# Patient Record
Sex: MALE | Race: WHITE | NOT HISPANIC OR LATINO | Employment: OTHER | URBAN - METROPOLITAN AREA
[De-identification: names, ages, dates, MRNs, and addresses within clinical notes are randomized per-mention and may not be internally consistent; named-entity substitution may affect disease eponyms.]

---

## 2017-06-12 ENCOUNTER — RECORDS - HEALTHEAST (OUTPATIENT)
Dept: LAB | Facility: CLINIC | Age: 67
End: 2017-06-12

## 2017-06-12 LAB
CHOLEST SERPL-MCNC: 153 MG/DL
FASTING STATUS PATIENT QL REPORTED: YES
HDLC SERPL-MCNC: 41 MG/DL
LDLC SERPL CALC-MCNC: 98 MG/DL
PSA SERPL-MCNC: 1.1 NG/ML (ref 0–4.5)
TRIGL SERPL-MCNC: 71 MG/DL

## 2017-10-15 ASSESSMENT — MIFFLIN-ST. JEOR: SCORE: 1612.22

## 2017-10-17 ENCOUNTER — SURGERY - HEALTHEAST (OUTPATIENT)
Dept: CARDIOLOGY | Facility: CLINIC | Age: 67
End: 2017-10-17

## 2017-10-17 ASSESSMENT — MIFFLIN-ST. JEOR: SCORE: 1604.5

## 2017-10-18 ASSESSMENT — MIFFLIN-ST. JEOR: SCORE: 1600.88

## 2017-12-06 ENCOUNTER — RECORDS - HEALTHEAST (OUTPATIENT)
Dept: ADMINISTRATIVE | Facility: OTHER | Age: 67
End: 2017-12-06

## 2017-12-06 LAB
LAB AP CHARGES (HE HISTORICAL CONVERSION): NORMAL
PATH REPORT.COMMENTS IMP SPEC: NORMAL
PATH REPORT.FINAL DX SPEC: NORMAL
PATH REPORT.GROSS SPEC: NORMAL
PATH REPORT.MICROSCOPIC SPEC OTHER STN: NORMAL
PATH REPORT.RELEVANT HX SPEC: NORMAL
RESULT FLAG (HE HISTORICAL CONVERSION): NORMAL

## 2018-12-29 ASSESSMENT — MIFFLIN-ST. JEOR: SCORE: 1661.2

## 2018-12-30 ASSESSMENT — MIFFLIN-ST. JEOR: SCORE: 1668.91

## 2018-12-31 ENCOUNTER — SURGERY - HEALTHEAST (OUTPATIENT)
Dept: CARDIOLOGY | Facility: CLINIC | Age: 68
End: 2018-12-31

## 2019-01-01 ASSESSMENT — MIFFLIN-ST. JEOR: SCORE: 1641.69

## 2019-01-07 ENCOUNTER — AMBULATORY - HEALTHEAST (OUTPATIENT)
Dept: CARDIOLOGY | Facility: CLINIC | Age: 69
End: 2019-01-07

## 2019-01-07 DIAGNOSIS — Z95.0 CARDIAC PACEMAKER IN SITU: ICD-10-CM

## 2019-01-07 LAB
HCC DEVICE COMMENTS: NORMAL
HCC DEVICE IMPLANTING PROVIDER: NORMAL
HCC DEVICE MANUFACTURE: NORMAL
HCC DEVICE MODEL: NORMAL
HCC DEVICE SERIAL NUMBER: NORMAL
HCC DEVICE TYPE: NORMAL

## 2019-01-07 ASSESSMENT — MIFFLIN-ST. JEOR: SCORE: 1641.69

## 2019-01-10 ENCOUNTER — COMMUNICATION - HEALTHEAST (OUTPATIENT)
Dept: CARDIOLOGY | Facility: CLINIC | Age: 69
End: 2019-01-10

## 2019-01-11 ENCOUNTER — COMMUNICATION - HEALTHEAST (OUTPATIENT)
Dept: CARDIOLOGY | Facility: CLINIC | Age: 69
End: 2019-01-11

## 2019-01-11 ENCOUNTER — AMBULATORY - HEALTHEAST (OUTPATIENT)
Dept: CARDIOLOGY | Facility: CLINIC | Age: 69
End: 2019-01-11

## 2019-01-11 DIAGNOSIS — Z95.0 CARDIAC PACEMAKER IN SITU: ICD-10-CM

## 2019-01-14 ENCOUNTER — OFFICE VISIT - HEALTHEAST (OUTPATIENT)
Dept: CARDIOLOGY | Facility: CLINIC | Age: 69
End: 2019-01-14

## 2019-01-14 DIAGNOSIS — I49.5 SSS (SICK SINUS SYNDROME) (H): ICD-10-CM

## 2019-01-14 DIAGNOSIS — I48.0 PAROXYSMAL ATRIAL FIBRILLATION (H): ICD-10-CM

## 2019-01-14 DIAGNOSIS — R00.1 BRADYCARDIA: ICD-10-CM

## 2019-01-14 LAB
ATRIAL RATE - MUSE: 89 BPM
DIASTOLIC BLOOD PRESSURE - MUSE: NORMAL MMHG
HCC DEVICE COMMENTS: NORMAL
HCC DEVICE IMPLANTING PROVIDER: NORMAL
HCC DEVICE MANUFACTURE: NORMAL
HCC DEVICE MODEL: NORMAL
HCC DEVICE SERIAL NUMBER: NORMAL
HCC DEVICE TYPE: NORMAL
INTERPRETATION ECG - MUSE: NORMAL
P AXIS - MUSE: 39 DEGREES
PR INTERVAL - MUSE: 158 MS
QRS DURATION - MUSE: 96 MS
QT - MUSE: 378 MS
QTC - MUSE: 459 MS
R AXIS - MUSE: -36 DEGREES
SYSTOLIC BLOOD PRESSURE - MUSE: NORMAL MMHG
T AXIS - MUSE: -2 DEGREES
VENTRICULAR RATE- MUSE: 89 BPM

## 2019-01-14 ASSESSMENT — MIFFLIN-ST. JEOR: SCORE: 1646.23

## 2019-01-15 ENCOUNTER — COMMUNICATION - HEALTHEAST (OUTPATIENT)
Dept: CARDIOLOGY | Facility: CLINIC | Age: 69
End: 2019-01-15

## 2019-01-15 DIAGNOSIS — I48.91 A-FIB (H): ICD-10-CM

## 2019-01-18 ENCOUNTER — AMBULATORY - HEALTHEAST (OUTPATIENT)
Dept: CARDIOLOGY | Facility: CLINIC | Age: 69
End: 2019-01-18

## 2019-01-18 DIAGNOSIS — Z95.0 CARDIAC PACEMAKER IN SITU: ICD-10-CM

## 2019-01-21 ENCOUNTER — AMBULATORY - HEALTHEAST (OUTPATIENT)
Dept: CARDIOLOGY | Facility: CLINIC | Age: 69
End: 2019-01-21

## 2019-01-21 DIAGNOSIS — Z95.0 CARDIAC PACEMAKER IN SITU: ICD-10-CM

## 2019-01-21 DIAGNOSIS — I49.5 SSS (SICK SINUS SYNDROME) (H): ICD-10-CM

## 2019-01-21 DIAGNOSIS — I48.0 PAROXYSMAL ATRIAL FIBRILLATION (H): ICD-10-CM

## 2019-01-21 DIAGNOSIS — I10 PRIMARY HYPERTENSION: ICD-10-CM

## 2019-01-21 LAB
HCC DEVICE COMMENTS: NORMAL
HCC DEVICE COMMENTS: NORMAL
HCC DEVICE IMPLANTING PROVIDER: NORMAL
HCC DEVICE IMPLANTING PROVIDER: NORMAL
HCC DEVICE MANUFACTURE: NORMAL
HCC DEVICE MANUFACTURE: NORMAL
HCC DEVICE MODEL: NORMAL
HCC DEVICE MODEL: NORMAL
HCC DEVICE SERIAL NUMBER: NORMAL
HCC DEVICE SERIAL NUMBER: NORMAL
HCC DEVICE TYPE: NORMAL
HCC DEVICE TYPE: NORMAL

## 2019-01-21 ASSESSMENT — MIFFLIN-ST. JEOR: SCORE: 1646.23

## 2019-01-23 ENCOUNTER — COMMUNICATION - HEALTHEAST (OUTPATIENT)
Dept: CARDIOLOGY | Facility: CLINIC | Age: 69
End: 2019-01-23

## 2019-01-25 ENCOUNTER — RECORDS - HEALTHEAST (OUTPATIENT)
Dept: LAB | Facility: CLINIC | Age: 69
End: 2019-01-25

## 2019-01-27 LAB — BACTERIA SPEC CULT: NO GROWTH

## 2019-02-04 ENCOUNTER — COMMUNICATION - HEALTHEAST (OUTPATIENT)
Dept: CARDIOLOGY | Facility: CLINIC | Age: 69
End: 2019-02-04

## 2019-02-05 ENCOUNTER — AMBULATORY - HEALTHEAST (OUTPATIENT)
Dept: CARDIOLOGY | Facility: CLINIC | Age: 69
End: 2019-02-05

## 2019-02-05 DIAGNOSIS — Z95.0 CARDIAC PACEMAKER IN SITU: ICD-10-CM

## 2019-02-18 ENCOUNTER — RECORDS - HEALTHEAST (OUTPATIENT)
Dept: ADMINISTRATIVE | Facility: OTHER | Age: 69
End: 2019-02-18

## 2019-02-18 ENCOUNTER — AMBULATORY - HEALTHEAST (OUTPATIENT)
Dept: CARDIOLOGY | Facility: CLINIC | Age: 69
End: 2019-02-18

## 2019-02-21 ENCOUNTER — AMBULATORY - HEALTHEAST (OUTPATIENT)
Dept: CARDIOLOGY | Facility: CLINIC | Age: 69
End: 2019-02-21

## 2019-02-21 DIAGNOSIS — Z95.0 CARDIAC PACEMAKER IN SITU: ICD-10-CM

## 2019-02-21 DIAGNOSIS — E78.2 MIXED HYPERLIPIDEMIA: ICD-10-CM

## 2019-02-21 DIAGNOSIS — I47.10 SVT (SUPRAVENTRICULAR TACHYCARDIA) (H): ICD-10-CM

## 2019-02-21 DIAGNOSIS — I10 PRIMARY HYPERTENSION: ICD-10-CM

## 2019-02-21 DIAGNOSIS — I49.5 SSS (SICK SINUS SYNDROME) (H): ICD-10-CM

## 2019-02-21 DIAGNOSIS — I48.91 A-FIB (H): ICD-10-CM

## 2019-02-21 DIAGNOSIS — I48.0 PAROXYSMAL ATRIAL FIBRILLATION (H): ICD-10-CM

## 2019-02-21 ASSESSMENT — MIFFLIN-ST. JEOR: SCORE: 1644.55

## 2019-03-06 ENCOUNTER — OFFICE VISIT - HEALTHEAST (OUTPATIENT)
Dept: SLEEP MEDICINE | Facility: CLINIC | Age: 69
End: 2019-03-06

## 2019-03-06 DIAGNOSIS — E66.811 CLASS 1 OBESITY DUE TO EXCESS CALORIES WITHOUT SERIOUS COMORBIDITY WITH BODY MASS INDEX (BMI) OF 31.0 TO 31.9 IN ADULT: ICD-10-CM

## 2019-03-06 DIAGNOSIS — E66.09 CLASS 1 OBESITY DUE TO EXCESS CALORIES WITHOUT SERIOUS COMORBIDITY WITH BODY MASS INDEX (BMI) OF 31.0 TO 31.9 IN ADULT: ICD-10-CM

## 2019-03-06 DIAGNOSIS — R06.83 SNORING: ICD-10-CM

## 2019-03-06 DIAGNOSIS — I48.0 PAROXYSMAL ATRIAL FIBRILLATION (H): ICD-10-CM

## 2019-03-06 DIAGNOSIS — G47.10 EXCESSIVE SLEEPINESS: ICD-10-CM

## 2019-03-06 ASSESSMENT — MIFFLIN-ST. JEOR: SCORE: 1644.51

## 2019-03-11 ENCOUNTER — OFFICE VISIT - HEALTHEAST (OUTPATIENT)
Dept: CARDIOLOGY | Facility: CLINIC | Age: 69
End: 2019-03-11

## 2019-03-11 DIAGNOSIS — I10 PRIMARY HYPERTENSION: ICD-10-CM

## 2019-03-11 DIAGNOSIS — I49.5 SSS (SICK SINUS SYNDROME) (H): ICD-10-CM

## 2019-03-11 DIAGNOSIS — I47.10 SVT (SUPRAVENTRICULAR TACHYCARDIA) (H): ICD-10-CM

## 2019-03-11 DIAGNOSIS — G45.9 TIA (TRANSIENT ISCHEMIC ATTACK): ICD-10-CM

## 2019-03-11 DIAGNOSIS — E78.2 MIXED HYPERLIPIDEMIA: ICD-10-CM

## 2019-03-11 DIAGNOSIS — R55 NEAR SYNCOPE: ICD-10-CM

## 2019-03-11 DIAGNOSIS — I48.0 PAROXYSMAL ATRIAL FIBRILLATION (H): ICD-10-CM

## 2019-03-11 DIAGNOSIS — R00.1 BRADYCARDIA: ICD-10-CM

## 2019-03-11 ASSESSMENT — MIFFLIN-ST. JEOR: SCORE: 1642.7

## 2019-03-26 ENCOUNTER — RECORDS - HEALTHEAST (OUTPATIENT)
Dept: ADMINISTRATIVE | Facility: OTHER | Age: 69
End: 2019-03-26

## 2019-03-26 ENCOUNTER — RECORDS - HEALTHEAST (OUTPATIENT)
Dept: SLEEP MEDICINE | Facility: CLINIC | Age: 69
End: 2019-03-26

## 2019-03-26 DIAGNOSIS — R06.83 SNORING: ICD-10-CM

## 2019-03-26 DIAGNOSIS — I48.0 PAROXYSMAL ATRIAL FIBRILLATION (H): ICD-10-CM

## 2019-03-26 DIAGNOSIS — G47.10 HYPERSOMNIA, UNSPECIFIED: ICD-10-CM

## 2019-04-03 ENCOUNTER — OFFICE VISIT - HEALTHEAST (OUTPATIENT)
Dept: SLEEP MEDICINE | Facility: CLINIC | Age: 69
End: 2019-04-03

## 2019-04-03 DIAGNOSIS — G47.33 OSA (OBSTRUCTIVE SLEEP APNEA): ICD-10-CM

## 2019-04-03 ASSESSMENT — MIFFLIN-ST. JEOR: SCORE: 1642.83

## 2019-04-19 ENCOUNTER — COMMUNICATION - HEALTHEAST (OUTPATIENT)
Dept: CARDIOLOGY | Facility: CLINIC | Age: 69
End: 2019-04-19

## 2019-04-20 ENCOUNTER — AMBULATORY - HEALTHEAST (OUTPATIENT)
Dept: CARDIOLOGY | Facility: CLINIC | Age: 69
End: 2019-04-20

## 2019-04-20 DIAGNOSIS — Z95.0 CARDIAC PACEMAKER IN SITU: ICD-10-CM

## 2019-04-26 ENCOUNTER — OFFICE VISIT - HEALTHEAST (OUTPATIENT)
Dept: CARDIOLOGY | Facility: CLINIC | Age: 69
End: 2019-04-26

## 2019-04-26 DIAGNOSIS — R55 NEAR SYNCOPE: ICD-10-CM

## 2019-04-26 DIAGNOSIS — R00.1 BRADYCARDIA: ICD-10-CM

## 2019-04-26 DIAGNOSIS — R07.9 CHEST PAIN, UNSPECIFIED TYPE: ICD-10-CM

## 2019-04-26 DIAGNOSIS — R42 DIZZINESS: ICD-10-CM

## 2019-04-26 DIAGNOSIS — I48.0 PAROXYSMAL ATRIAL FIBRILLATION (H): ICD-10-CM

## 2019-04-26 DIAGNOSIS — I47.10 SVT (SUPRAVENTRICULAR TACHYCARDIA) (H): ICD-10-CM

## 2019-04-26 DIAGNOSIS — K21.9 GASTROESOPHAGEAL REFLUX DISEASE, ESOPHAGITIS PRESENCE NOT SPECIFIED: ICD-10-CM

## 2019-04-26 DIAGNOSIS — R94.39 ABNORMAL STRESS TEST: ICD-10-CM

## 2019-04-26 DIAGNOSIS — G45.9 TIA (TRANSIENT ISCHEMIC ATTACK): ICD-10-CM

## 2019-04-26 DIAGNOSIS — I10 PRIMARY HYPERTENSION: ICD-10-CM

## 2019-04-26 DIAGNOSIS — G47.33 OSA (OBSTRUCTIVE SLEEP APNEA): ICD-10-CM

## 2019-04-26 DIAGNOSIS — I49.5 SSS (SICK SINUS SYNDROME) (H): ICD-10-CM

## 2019-04-26 DIAGNOSIS — Z95.0 CARDIAC PACEMAKER IN SITU: ICD-10-CM

## 2019-04-26 DIAGNOSIS — R41.0 CONFUSION: ICD-10-CM

## 2019-04-26 DIAGNOSIS — E78.2 MIXED HYPERLIPIDEMIA: ICD-10-CM

## 2019-04-26 DIAGNOSIS — R55 SYNCOPE: ICD-10-CM

## 2019-04-26 ASSESSMENT — MIFFLIN-ST. JEOR: SCORE: 1634.9

## 2019-05-25 ENCOUNTER — AMBULATORY - HEALTHEAST (OUTPATIENT)
Dept: CARDIOLOGY | Facility: CLINIC | Age: 69
End: 2019-05-25

## 2019-05-29 ENCOUNTER — OFFICE VISIT - HEALTHEAST (OUTPATIENT)
Dept: CARDIOLOGY | Facility: CLINIC | Age: 69
End: 2019-05-29

## 2019-05-29 DIAGNOSIS — I48.0 PAROXYSMAL ATRIAL FIBRILLATION (H): ICD-10-CM

## 2019-05-29 ASSESSMENT — MIFFLIN-ST. JEOR: SCORE: 1653.04

## 2019-06-22 ENCOUNTER — COMMUNICATION - HEALTHEAST (OUTPATIENT)
Dept: CARDIOLOGY | Facility: CLINIC | Age: 69
End: 2019-06-22

## 2019-06-24 ENCOUNTER — COMMUNICATION - HEALTHEAST (OUTPATIENT)
Dept: CARDIOLOGY | Facility: CLINIC | Age: 69
End: 2019-06-24

## 2019-06-24 DIAGNOSIS — I48.0 PAROXYSMAL ATRIAL FIBRILLATION (H): ICD-10-CM

## 2019-07-01 ENCOUNTER — RECORDS - HEALTHEAST (OUTPATIENT)
Dept: ADMINISTRATIVE | Facility: OTHER | Age: 69
End: 2019-07-01

## 2019-07-08 ENCOUNTER — HOSPITAL ENCOUNTER (OUTPATIENT)
Dept: NEUROLOGY | Facility: HOSPITAL | Age: 69
Discharge: HOME | End: 2019-07-08

## 2019-07-08 DIAGNOSIS — R43.9 DYSOSMIA: ICD-10-CM

## 2019-07-10 ENCOUNTER — OFFICE VISIT - HEALTHEAST (OUTPATIENT)
Dept: CARDIOLOGY | Facility: CLINIC | Age: 69
End: 2019-07-10

## 2019-07-10 DIAGNOSIS — I47.10 SVT (SUPRAVENTRICULAR TACHYCARDIA) (H): ICD-10-CM

## 2019-07-10 DIAGNOSIS — Z95.0 CARDIAC PACEMAKER IN SITU: ICD-10-CM

## 2019-07-10 DIAGNOSIS — I10 PRIMARY HYPERTENSION: ICD-10-CM

## 2019-07-10 DIAGNOSIS — I48.0 PAROXYSMAL ATRIAL FIBRILLATION (H): ICD-10-CM

## 2019-07-10 DIAGNOSIS — G47.33 OSA (OBSTRUCTIVE SLEEP APNEA): ICD-10-CM

## 2019-07-10 DIAGNOSIS — I49.5 SSS (SICK SINUS SYNDROME) (H): ICD-10-CM

## 2019-07-10 ASSESSMENT — MIFFLIN-ST. JEOR: SCORE: 1643.97

## 2019-07-15 ENCOUNTER — AMBULATORY - HEALTHEAST (OUTPATIENT)
Dept: CARDIOLOGY | Facility: CLINIC | Age: 69
End: 2019-07-15

## 2019-07-15 ENCOUNTER — COMMUNICATION - HEALTHEAST (OUTPATIENT)
Dept: CARDIOLOGY | Facility: CLINIC | Age: 69
End: 2019-07-15

## 2019-07-15 ENCOUNTER — SURGERY - HEALTHEAST (OUTPATIENT)
Dept: CARDIOLOGY | Facility: CLINIC | Age: 69
End: 2019-07-15

## 2019-07-15 DIAGNOSIS — I48.0 PAROXYSMAL ATRIAL FIBRILLATION (H): ICD-10-CM

## 2019-07-15 DIAGNOSIS — I42.9 CARDIOMYOPATHY (H): ICD-10-CM

## 2019-07-15 DIAGNOSIS — R93.1 ABNORMAL FINDINGS ON DIAGNOSTIC IMAGING OF HEART AND CORONARY CIRCULATION: ICD-10-CM

## 2019-07-24 ENCOUNTER — COMMUNICATION - HEALTHEAST (OUTPATIENT)
Dept: CARDIOLOGY | Facility: CLINIC | Age: 69
End: 2019-07-24

## 2019-08-14 ENCOUNTER — AMBULATORY - HEALTHEAST (OUTPATIENT)
Dept: CARDIOLOGY | Facility: CLINIC | Age: 69
End: 2019-08-14

## 2019-08-14 DIAGNOSIS — Z95.0 CARDIAC PACEMAKER IN SITU: ICD-10-CM

## 2019-08-15 ENCOUNTER — COMMUNICATION - HEALTHEAST (OUTPATIENT)
Dept: CARDIOLOGY | Facility: CLINIC | Age: 69
End: 2019-08-15

## 2019-09-05 ENCOUNTER — AMBULATORY - HEALTHEAST (OUTPATIENT)
Dept: CARDIOLOGY | Facility: CLINIC | Age: 69
End: 2019-09-05

## 2019-09-05 DIAGNOSIS — Z95.0 CARDIAC PACEMAKER IN SITU: ICD-10-CM

## 2019-12-10 ENCOUNTER — AMBULATORY - HEALTHEAST (OUTPATIENT)
Dept: CARDIOLOGY | Facility: CLINIC | Age: 69
End: 2019-12-10

## 2019-12-10 ENCOUNTER — COMMUNICATION - HEALTHEAST (OUTPATIENT)
Dept: CARDIOLOGY | Facility: CLINIC | Age: 69
End: 2019-12-10

## 2019-12-10 DIAGNOSIS — Z95.0 CARDIAC PACEMAKER IN SITU: ICD-10-CM

## 2019-12-10 DIAGNOSIS — I49.5 SSS (SICK SINUS SYNDROME) (H): ICD-10-CM

## 2020-01-01 ENCOUNTER — COMMUNICATION - HEALTHEAST (OUTPATIENT)
Dept: SCHEDULING | Facility: CLINIC | Age: 70
End: 2020-01-01

## 2020-01-30 ENCOUNTER — RECORDS - HEALTHEAST (OUTPATIENT)
Dept: ADMINISTRATIVE | Facility: OTHER | Age: 70
End: 2020-01-30

## 2020-01-30 ENCOUNTER — AMBULATORY - HEALTHEAST (OUTPATIENT)
Dept: CARDIOLOGY | Facility: CLINIC | Age: 70
End: 2020-01-30

## 2020-02-09 ENCOUNTER — COMMUNICATION - HEALTHEAST (OUTPATIENT)
Dept: CARDIOLOGY | Facility: CLINIC | Age: 70
End: 2020-02-09

## 2020-02-09 DIAGNOSIS — E78.2 MIXED HYPERLIPIDEMIA: ICD-10-CM

## 2020-02-18 ENCOUNTER — COMMUNICATION - HEALTHEAST (OUTPATIENT)
Dept: CARDIOLOGY | Facility: CLINIC | Age: 70
End: 2020-02-18

## 2020-04-13 ENCOUNTER — COMMUNICATION - HEALTHEAST (OUTPATIENT)
Dept: CARDIOLOGY | Facility: CLINIC | Age: 70
End: 2020-04-13

## 2020-04-13 DIAGNOSIS — I48.91 A-FIB (H): ICD-10-CM

## 2020-04-30 ENCOUNTER — AMBULATORY - HEALTHEAST (OUTPATIENT)
Dept: CARDIOLOGY | Facility: CLINIC | Age: 70
End: 2020-04-30

## 2020-04-30 DIAGNOSIS — Z95.0 CARDIAC PACEMAKER IN SITU: ICD-10-CM

## 2020-04-30 DIAGNOSIS — R00.1 BRADYCARDIA: ICD-10-CM

## 2020-05-06 ENCOUNTER — COMMUNICATION - HEALTHEAST (OUTPATIENT)
Dept: CARDIOLOGY | Facility: CLINIC | Age: 70
End: 2020-05-06

## 2020-05-06 ENCOUNTER — AMBULATORY - HEALTHEAST (OUTPATIENT)
Dept: CARDIOLOGY | Facility: CLINIC | Age: 70
End: 2020-05-06

## 2020-05-06 DIAGNOSIS — Z95.0 CARDIAC PACEMAKER IN SITU: ICD-10-CM

## 2020-05-06 DIAGNOSIS — R00.1 BRADYCARDIA: ICD-10-CM

## 2020-05-07 ENCOUNTER — OFFICE VISIT - HEALTHEAST (OUTPATIENT)
Dept: CARDIOLOGY | Facility: CLINIC | Age: 70
End: 2020-05-07

## 2020-05-07 DIAGNOSIS — Z86.79 STATUS POST ABLATION OF ATRIAL FIBRILLATION: ICD-10-CM

## 2020-05-07 DIAGNOSIS — Z95.0 CARDIAC PACEMAKER IN SITU: ICD-10-CM

## 2020-05-07 DIAGNOSIS — Z95.818 PRESENCE OF LEFT ATRIAL APPENDAGE CLOSURE DEVICE COMPOSED OF NICKEL-TITANIUM ALLOY WITH POLYETHYLENE TEREPHTHALATE MEMBRANE: ICD-10-CM

## 2020-05-07 DIAGNOSIS — Z98.890 STATUS POST ABLATION OF ATRIAL FIBRILLATION: ICD-10-CM

## 2020-05-07 DIAGNOSIS — I49.5 SSS (SICK SINUS SYNDROME) (H): ICD-10-CM

## 2020-05-07 DIAGNOSIS — I47.10 SVT (SUPRAVENTRICULAR TACHYCARDIA) (H): ICD-10-CM

## 2020-05-07 DIAGNOSIS — I48.0 PAROXYSMAL ATRIAL FIBRILLATION (H): ICD-10-CM

## 2020-05-07 DIAGNOSIS — G47.33 OSA (OBSTRUCTIVE SLEEP APNEA): ICD-10-CM

## 2020-06-18 ENCOUNTER — AMBULATORY - HEALTHEAST (OUTPATIENT)
Dept: CARDIOLOGY | Facility: CLINIC | Age: 70
End: 2020-06-18

## 2020-06-18 DIAGNOSIS — I47.10 SVT (SUPRAVENTRICULAR TACHYCARDIA) (H): ICD-10-CM

## 2020-06-18 DIAGNOSIS — I48.0 PAROXYSMAL ATRIAL FIBRILLATION (H): ICD-10-CM

## 2020-06-18 DIAGNOSIS — I49.5 SSS (SICK SINUS SYNDROME) (H): ICD-10-CM

## 2020-06-18 DIAGNOSIS — Z95.0 CARDIAC PACEMAKER IN SITU: ICD-10-CM

## 2020-06-22 ENCOUNTER — OFFICE VISIT - HEALTHEAST (OUTPATIENT)
Dept: CARDIOLOGY | Facility: CLINIC | Age: 70
End: 2020-06-22

## 2020-06-22 DIAGNOSIS — Z95.0 CARDIAC PACEMAKER IN SITU: ICD-10-CM

## 2020-06-22 DIAGNOSIS — I48.0 PAROXYSMAL ATRIAL FIBRILLATION (H): ICD-10-CM

## 2020-06-22 DIAGNOSIS — I10 PRIMARY HYPERTENSION: ICD-10-CM

## 2020-06-22 DIAGNOSIS — Z86.79 STATUS POST ABLATION OF ATRIAL FIBRILLATION: ICD-10-CM

## 2020-06-22 DIAGNOSIS — Z98.890 STATUS POST ABLATION OF ATRIAL FIBRILLATION: ICD-10-CM

## 2020-06-22 DIAGNOSIS — G47.33 OSA (OBSTRUCTIVE SLEEP APNEA): ICD-10-CM

## 2020-06-22 DIAGNOSIS — Z95.818 PRESENCE OF LEFT ATRIAL APPENDAGE CLOSURE DEVICE COMPOSED OF NICKEL-TITANIUM ALLOY WITH POLYETHYLENE TEREPHTHALATE MEMBRANE: ICD-10-CM

## 2020-06-22 DIAGNOSIS — I47.10 SVT (SUPRAVENTRICULAR TACHYCARDIA) (H): ICD-10-CM

## 2020-06-22 DIAGNOSIS — I49.5 SSS (SICK SINUS SYNDROME) (H): ICD-10-CM

## 2020-06-22 DIAGNOSIS — E78.2 MIXED HYPERLIPIDEMIA: ICD-10-CM

## 2020-07-08 ENCOUNTER — AMBULATORY - HEALTHEAST (OUTPATIENT)
Dept: CARDIOLOGY | Facility: CLINIC | Age: 70
End: 2020-07-08

## 2020-07-08 ENCOUNTER — COMMUNICATION - HEALTHEAST (OUTPATIENT)
Dept: CARDIOLOGY | Facility: CLINIC | Age: 70
End: 2020-07-08

## 2020-07-08 DIAGNOSIS — Z95.0 CARDIAC PACEMAKER IN SITU: ICD-10-CM

## 2020-07-08 DIAGNOSIS — I49.5 SSS (SICK SINUS SYNDROME) (H): ICD-10-CM

## 2020-07-26 ENCOUNTER — COMMUNICATION - HEALTHEAST (OUTPATIENT)
Dept: CARDIOLOGY | Facility: CLINIC | Age: 70
End: 2020-07-26

## 2020-07-26 DIAGNOSIS — E78.2 MIXED HYPERLIPIDEMIA: ICD-10-CM

## 2020-08-20 ENCOUNTER — COMMUNICATION - HEALTHEAST (OUTPATIENT)
Dept: CARDIOLOGY | Facility: CLINIC | Age: 70
End: 2020-08-20

## 2020-10-02 ENCOUNTER — RECORDS - HEALTHEAST (OUTPATIENT)
Dept: LAB | Facility: CLINIC | Age: 70
End: 2020-10-02

## 2020-10-02 LAB
ALBUMIN SERPL-MCNC: 4.2 G/DL (ref 3.5–5)
ALP SERPL-CCNC: 62 U/L (ref 45–120)
ALT SERPL W P-5'-P-CCNC: 35 U/L (ref 0–45)
ANION GAP SERPL CALCULATED.3IONS-SCNC: 9 MMOL/L (ref 5–18)
AST SERPL W P-5'-P-CCNC: 30 U/L (ref 0–40)
BILIRUB SERPL-MCNC: 0.4 MG/DL (ref 0–1)
BUN SERPL-MCNC: 11 MG/DL (ref 8–22)
CALCIUM SERPL-MCNC: 9.6 MG/DL (ref 8.5–10.5)
CHLORIDE BLD-SCNC: 108 MMOL/L (ref 98–107)
CHOLEST SERPL-MCNC: 134 MG/DL
CO2 SERPL-SCNC: 23 MMOL/L (ref 22–31)
CREAT SERPL-MCNC: 0.83 MG/DL (ref 0.7–1.3)
FASTING STATUS PATIENT QL REPORTED: NO
GFR SERPL CREATININE-BSD FRML MDRD: >60 ML/MIN/1.73M2
GLUCOSE BLD-MCNC: 103 MG/DL (ref 70–125)
HDLC SERPL-MCNC: 33 MG/DL
LDLC SERPL CALC-MCNC: 70 MG/DL
POTASSIUM BLD-SCNC: 4.5 MMOL/L (ref 3.5–5)
PROT SERPL-MCNC: 7.1 G/DL (ref 6–8)
PSA SERPL-MCNC: 0.7 NG/ML (ref 0–4.5)
SODIUM SERPL-SCNC: 140 MMOL/L (ref 136–145)
TRIGL SERPL-MCNC: 156 MG/DL

## 2020-10-09 ENCOUNTER — AMBULATORY - HEALTHEAST (OUTPATIENT)
Dept: CARDIOLOGY | Facility: CLINIC | Age: 70
End: 2020-10-09

## 2020-10-09 DIAGNOSIS — I49.5 SSS (SICK SINUS SYNDROME) (H): ICD-10-CM

## 2020-10-09 DIAGNOSIS — Z95.0 CARDIAC PACEMAKER IN SITU: ICD-10-CM

## 2020-10-29 ENCOUNTER — AMBULATORY - HEALTHEAST (OUTPATIENT)
Dept: CARDIOLOGY | Facility: CLINIC | Age: 70
End: 2020-10-29

## 2020-10-29 ENCOUNTER — RECORDS - HEALTHEAST (OUTPATIENT)
Dept: ADMINISTRATIVE | Facility: OTHER | Age: 70
End: 2020-10-29

## 2020-11-10 ENCOUNTER — HOSPITAL ENCOUNTER (OUTPATIENT)
Dept: RADIOLOGY | Facility: CLINIC | Age: 70
Discharge: HOME | End: 2020-11-10
Attending: ORTHOPAEDIC SURGERY

## 2020-11-10 ENCOUNTER — HOSPITAL ENCOUNTER (OUTPATIENT)
Dept: MRI IMAGING | Facility: CLINIC | Age: 70
Discharge: HOME | End: 2020-11-10
Attending: ORTHOPAEDIC SURGERY

## 2020-11-10 DIAGNOSIS — M25.512 LEFT SHOULDER PAIN: ICD-10-CM

## 2020-11-10 DIAGNOSIS — Z53.09 MRI CONTRAINDICATED DUE TO METAL IMPLANT: ICD-10-CM

## 2021-01-11 ENCOUNTER — AMBULATORY - HEALTHEAST (OUTPATIENT)
Dept: CARDIOLOGY | Facility: CLINIC | Age: 71
End: 2021-01-11

## 2021-01-11 DIAGNOSIS — I49.5 SSS (SICK SINUS SYNDROME) (H): ICD-10-CM

## 2021-01-11 DIAGNOSIS — Z95.0 CARDIAC PACEMAKER IN SITU: ICD-10-CM

## 2021-01-24 ENCOUNTER — COMMUNICATION - HEALTHEAST (OUTPATIENT)
Dept: CARDIOLOGY | Facility: CLINIC | Age: 71
End: 2021-01-24

## 2021-01-24 DIAGNOSIS — E78.2 MIXED HYPERLIPIDEMIA: ICD-10-CM

## 2021-01-25 RX ORDER — ATORVASTATIN CALCIUM 80 MG/1
TABLET, FILM COATED ORAL
Qty: 90 TABLET | Refills: 1 | Status: SHIPPED | OUTPATIENT
Start: 2021-01-25

## 2021-02-08 ENCOUNTER — COMMUNICATION - HEALTHEAST (OUTPATIENT)
Dept: CARDIOLOGY | Facility: CLINIC | Age: 71
End: 2021-02-08

## 2021-02-08 DIAGNOSIS — I10 PRIMARY HYPERTENSION: ICD-10-CM

## 2021-02-18 ENCOUNTER — COMMUNICATION - HEALTHEAST (OUTPATIENT)
Dept: CARDIOLOGY | Facility: CLINIC | Age: 71
End: 2021-02-18

## 2021-02-18 ENCOUNTER — AMBULATORY - HEALTHEAST (OUTPATIENT)
Dept: CARDIOLOGY | Facility: CLINIC | Age: 71
End: 2021-02-18

## 2021-02-18 DIAGNOSIS — I49.5 SSS (SICK SINUS SYNDROME) (H): ICD-10-CM

## 2021-02-18 DIAGNOSIS — Z95.0 CARDIAC PACEMAKER IN SITU: ICD-10-CM

## 2021-03-16 ENCOUNTER — COMMUNICATION - HEALTHEAST (OUTPATIENT)
Dept: CARDIOLOGY | Facility: CLINIC | Age: 71
End: 2021-03-16

## 2021-03-16 DIAGNOSIS — I49.5 SSS (SICK SINUS SYNDROME) (H): ICD-10-CM

## 2021-04-12 ENCOUNTER — AMBULATORY - HEALTHEAST (OUTPATIENT)
Dept: CARDIOLOGY | Facility: CLINIC | Age: 71
End: 2021-04-12

## 2021-04-12 DIAGNOSIS — I49.5 SSS (SICK SINUS SYNDROME) (H): ICD-10-CM

## 2021-04-12 DIAGNOSIS — Z95.0 CARDIAC PACEMAKER IN SITU: ICD-10-CM

## 2021-05-11 ENCOUNTER — RECORDS - HEALTHEAST (OUTPATIENT)
Dept: ADMINISTRATIVE | Facility: OTHER | Age: 71
End: 2021-05-11

## 2021-05-14 ENCOUNTER — AMBULATORY - HEALTHEAST (OUTPATIENT)
Dept: CARDIOLOGY | Facility: CLINIC | Age: 71
End: 2021-05-14

## 2021-05-14 DIAGNOSIS — I25.119 CORONARY ARTERY DISEASE INVOLVING NATIVE CORONARY ARTERY OF NATIVE HEART WITH ANGINA PECTORIS (H): ICD-10-CM

## 2021-05-14 DIAGNOSIS — I48.0 PAROXYSMAL ATRIAL FIBRILLATION (H): ICD-10-CM

## 2021-05-14 DIAGNOSIS — I10 PRIMARY HYPERTENSION: ICD-10-CM

## 2021-05-14 DIAGNOSIS — Z95.818 PRESENCE OF LEFT ATRIAL APPENDAGE CLOSURE DEVICE COMPOSED OF NICKEL-TITANIUM ALLOY WITH POLYETHYLENE TEREPHTHALATE MEMBRANE: ICD-10-CM

## 2021-05-14 DIAGNOSIS — Z95.0 CARDIAC PACEMAKER IN SITU: ICD-10-CM

## 2021-05-14 DIAGNOSIS — Z86.79 STATUS POST ABLATION OF ATRIAL FIBRILLATION: ICD-10-CM

## 2021-05-14 DIAGNOSIS — I42.9 CARDIOMYOPATHY (H): ICD-10-CM

## 2021-05-14 DIAGNOSIS — R00.1 BRADYCARDIA: ICD-10-CM

## 2021-05-14 DIAGNOSIS — I47.10 SVT (SUPRAVENTRICULAR TACHYCARDIA) (H): ICD-10-CM

## 2021-05-14 DIAGNOSIS — I49.5 SSS (SICK SINUS SYNDROME) (H): ICD-10-CM

## 2021-05-14 DIAGNOSIS — Z98.890 STATUS POST ABLATION OF ATRIAL FIBRILLATION: ICD-10-CM

## 2021-05-14 RX ORDER — IRBESARTAN 75 MG/1
37.5 TABLET ORAL DAILY
Qty: 45 TABLET | Refills: 3 | Status: SHIPPED | OUTPATIENT
Start: 2021-05-14 | End: 2022-02-24

## 2021-05-14 RX ORDER — METOPROLOL SUCCINATE 50 MG/1
50 TABLET, EXTENDED RELEASE ORAL DAILY
Qty: 90 TABLET | Refills: 3 | Status: SHIPPED | OUTPATIENT
Start: 2021-05-14 | End: 2022-02-24

## 2021-05-14 ASSESSMENT — MIFFLIN-ST. JEOR: SCORE: 1680.26

## 2021-05-17 ENCOUNTER — AMBULATORY - HEALTHEAST (OUTPATIENT)
Dept: CARDIOLOGY | Facility: CLINIC | Age: 71
End: 2021-05-17

## 2021-05-27 VITALS
WEIGHT: 212 LBS | DIASTOLIC BLOOD PRESSURE: 90 MMHG | HEART RATE: 67 BPM | RESPIRATION RATE: 14 BRPM | BODY MASS INDEX: 33.27 KG/M2 | SYSTOLIC BLOOD PRESSURE: 120 MMHG | HEIGHT: 67 IN

## 2021-05-27 NOTE — PROGRESS NOTES
"Sleep Study Review Visit  He is a 68 y.o. y/o male patient who comes to the sleep medicine clinic for sleep study review.    We had an extensive conversation to review the results of his sleep study.    The overnight polysomnography was completed with a digital sleep system using the international 10-20 electrode placement for recording EEG, EOG, EMG from chin, ECG, respiratory effort, oximetry, body position, airflow, nasal pressure, snoring sound, pulse rate and limb movement channels.  The study was completed as a diagnostic study.    Primary Findings:  Respiratory monitoring showed mild obstructive sleep apnea (AHI=8.5/hr).  Disease was more severe while supine.  The patient spent a total of 6.2 minutes at an oxygen saturation below 88%.     Other Findings:  Sleep Architecture:     Sleep onset latency was normal with Zolpidem 5 mg.    REM onset latency was normal.    All stages of sleep were sampled during the test.    Sleep efficiency was decreased.     Respiration:    Mean hemoglobin oxygen saturation (SaO2) during the diagnostic portion of the PSG in NREM and REM sleep was 91% with a SaO2 desaturation james observed to 85%.    Sleep-related Hypoxia was present as cumulative exposure time in sleep to SaO2 below 88% surpassed 5 total minutes (in this case, 6.2 minutes).     Movements:        Periodic leg movements were observed with high frequency.    The patient had a Periodic Limb Movement (PLM) index of 33.2 and the MELISSA PLM index was 2.3.     Parasomnia:    No activity consistent with parasomnia was observed in the video recording.     Electrocardiogram:    Two-lead ECG showed normal sinus rhythm.    We reviewed the oxygen saturation graph as well as the result tables from the report.    Wife reports he mainly sleeps lateral but sometimes on back (5 - 15%).   He thinks he only sleeps lateral.    Physical Exam:  /82   Pulse 65   Ht 5' 7.5\" (1.715 m)   Wt 202 lb (91.6 kg)   SpO2 97%   BMI 31.17 " kg/m    General appearance: No apparent distress, well groomed.  Head: Normocephalic, atraumatic.  Musculoskeletal: No edema noted.  No clubbing or cyanosis.  Skin: Warm, dry, intact.  Neurologic: Alert and oriented to person, place and time   Gait is normal.  Psychiatric: Affect pleasant.  Mood good.     Labs/Studies:  - We reviewed the results of the overnight PSG as described on the HPI.     Assessment and Plan:  In summary Ashok Del Rosario is a 68 y.o. year old male here for study results.  We had an extensive conversation to review the results of his sleep study and to  him on the importance of treating sleep apnea.  Mild sleep apnea is unlikely to increase risk for atrial fib or heart disease.  Treatment is based on improving symptoms and potential mild improvement in blood pressure for hypertensive patients.  We discussed options including: CPAP, MAD, Surgery, and Positional therapy.  Discussed mainly CPAP vs Zzoma or Night Shift as patient is edentulous and surgery for mild MARCELLA is not recommended.     Patient verbalized understanding of these issues, agrees with the plan and all questions were answered today. Patient was given an opportuntity to voice any other symptoms or concerns not listed above. Patient did not have any other symptoms or concerns.      Azael Espinoza MD  [facility] Board Certified in Internal Medicine and Sleep Medicine  [facility].    We spent a total of 15 minutes of face-to-face encounter and more than 50% of the encounter was used for counseling or coordination of care.

## 2021-05-28 ENCOUNTER — RECORDS - HEALTHEAST (OUTPATIENT)
Dept: ADMINISTRATIVE | Facility: CLINIC | Age: 71
End: 2021-05-28

## 2021-05-28 NOTE — TELEPHONE ENCOUNTER
----- Message from Shaheen Branch sent at 4/19/2019  8:42 AM CDT -----  Contact: Ashok  General phone call:    Caller: Annika  Primary cardiologist: Dr. Hyatt  Detailed reason for call: Patient has been itching and the skin on his fingers are peeling he is wanting to know if this could be because of the flecanide  New or active symptoms? Active  Best phone number: [phone]  Best time to contact: anytime  Ok to leave a detailed message? yes  Device?yes    Additional Info:

## 2021-05-28 NOTE — PROGRESS NOTES
Consultation - [facility]  Ashok Del Rosario,  1950, MRN [id number]    PCP: Omega Veloz MD, [phone]    Assessment and Plan: Acute confusional event with abnormal smell.  Hypertension.  Intermittent right bundle branch block morphology on ECG  Recommendations: Arrange for evaluation by neurology.  In the event that the symptoms were associated with flecainide will reduce dose to 75 mg once daily.    Chief Complaint: Confusion event    HPI:  We have been requested by Dr. Roa to evaluate Ashok Del Rosario for consultation who is a  68 y.o. year old male for above chief complaint.  Hx: 60-year-old man who was recently seen in the ER who noted as follows:68 y.o. male who presents to the Emergency Department for evaluation of dizziness. Patient describes a sharp pain on the back of his head with subsequent dizziness and associated sensation of room spinning. Patient experienced an episode of dizziness and temporary loss of vision at 1:00pm today which prompted his visit to the ED.      At time of signout, awaiting CT CTA.  Images have resulted and are negative for any acute pathology including hemorrhage, CVA.  Patient was quite symptomatic and the episodes were quite concerning, so initial ED provider suggested admission for observation and continued cardiac monitoring.  Pacemaker was interrogated and I spoke with [employer] and there were no events.  When I went to discuss results with patient, he had another wave of dizziness and associated abnormal smell.  At this time I am not sure whether this could be a prodrome of seizure activity, migraine, TIA, though he is anticoagulated, or less likely arrhythmia as his device did not show one.  Given that he still symptomatic, and unfortunately he cannot get an MRI at least in the ED due to his pacemaker, I discussed the case with the hospitalist and we will admit for observation.     Update: Patient call me back into the room to  state that he thought about our conversation and unfortunately still wishes to leave AGAINST MEDICAL ADVICE.  We discussed the risks including that of a cardiac event, neurologic event, seizure, permanent disability and death.  He unfortunately is too concerned about the financial implications of observation status so he is signing out AMA at this time.        Current Outpatient Medications:      acetaminophen (TYLENOL) 500 MG tablet, Take 1-2 tablets (500-1,000 mg total) by mouth every 4 (four) hours as needed., Disp: , Rfl: 0     atorvastatin (LIPITOR) 80 MG tablet, Take 1 tablet (80 mg total) by mouth at bedtime., Disp: 90 tablet, Rfl: 3     flecainide (TAMBOCOR) 150 MG tablet, Take 0.5 tablets (75 mg total) by mouth every 12 (twelve) hours., Disp: 90 tablet, Rfl: 3     irbesartan (AVAPRO) 150 MG tablet, Take 150 mg by mouth daily., Disp: , Rfl:      metoprolol succinate (TOPROL-XL) 50 MG 24 hr tablet, Take 1 tablet (50 mg total) by mouth daily., Disp: 30 tablet, Rfl: 5     metroNIDAZOLE (METROCREAM) 0.75 % cream, Apply 1 application topically 2 (two) times a day as needed (roseacea). For Rosacea - apply to nose and right side of face    , Disp: , Rfl:      omeprazole (PRILOSEC) 40 MG capsule, Take 40 mg by mouth daily before breakfast., Disp: , Rfl:      rivaroxaban 20 mg Tab, Take 1 tablet (20 mg total) by mouth daily with supper., Disp: 90 tablet, Rfl: 3  Medical History  Active Ambulatory (Non-Hospital) Problems    Diagnosis     MARCELLA (obstructive sleep apnea)     SVT (supraventricular tachycardia) (H)     Syncope     Near syncope     Paroxysmal atrial fibrillation (H)     Cardiac pacemaker in situ, dual chamber     SSS (sick sinus syndrome) (H)     TIA (transient ischemic attack)     Dizziness     Bradycardia     Primary hypertension     Mixed hyperlipidemia     Gastroesophageal reflux disease, esophagitis presence not specified     Abnormal stress test     Chest pain     Past Medical History:   Diagnosis Date      Coronary artery disease      Family history of myocardial infarction      GERD (gastroesophageal reflux disease)      High cholesterol      Hypertension        Surgical History  He  has a past surgical history that includes Coronary Angiogram (N/A, 10/17/2017); Left Heart Catheterization with Left Ventriculogram (N/A, 10/17/2017); EP Pacemaker Insertion (Left, 12/31/2018); and Abdominal surgery (2000).    Social History  Reviewed, and he  reports that he quit smoking about 19 years ago. He has never used smokeless tobacco. He reports that he drinks about 0.6 oz of alcohol per week. He reports that he does not use drugs.  Smoking status reviewed.  Social history othrwise not contributory to HPI.  Allergies  Allergies   Allergen Reactions     Cephalexin Rash       Family History  Reviewed, and family history includes Cancer in his father and mother; Colon cancer in his sister; Coronary artery disease in his brother; Heart attack in his brother; Rectal cancer in his sister; Valvular heart disease in his brother.  Extended Emergency Contact Information  Primary Emergency Contact: CarinEvelyne   [place]  Mobile Phone: [phone]  Relation: Spouse  Secondary Emergency Contact: Carin Lisseth   [place]  Mobile Phone: [phone]  Relation: Child  Family history otherwise negative or not conributory to HPI.    Psychosocial Needs  Social History     Social History Narrative     Not on file     Additional psychosocial needs reviewed per nursing assessment.    Prior to Admission Medications    (Not in a hospital admission)    Review of Systems:  A 12 point comprehensive review of systems was negative except as noted.  Review of systems is negative except for HPI  Physical Exam:  Less than 10 mL of urine  Vitals:    04/26/19 0933   BP: (!) 160/104   Pulse: (!) 56   Resp: 16     Head and neck without focal cranial neurologic defects.  JVD not distended.  Carotid upstroke normal  without bruit.  External eye exam normal without icterus.  External ear exam normal.  Neck without cervical lymphadenopathy or thyromegaly.  Heart: regular rate and rhythm, S1, S2 normal, no murmur, click, rub or gallop   Abdomen with normal bowel tones.  Skin without rash, ecchymosis, lesions.  Neuromuscular tone normal.  Peripheral pulse intact and equal.  Joints without swelling or erythema.      [unfilled]    Pertinent Labs  Lab Results: personally reviewed.   Admission on 04/20/2019, Discharged on 04/20/2019   Component Date Value     SYSTOLIC BLOOD PRESSURE 04/20/2019 172      DIASTOLIC BLOOD PRESSURE 04/20/2019 103      VENTRICULAR RATE 04/20/2019 92      ATRIAL RATE 04/20/2019 92      P-R INTERVAL 04/20/2019 176      QRS DURATION 04/20/2019 144      Q-T INTERVAL 04/20/2019 384      QTC CALCULATION (BEZET) 04/20/2019 474      P Axis 04/20/2019 61      R AXIS 04/20/2019 -43      T AXIS 04/20/2019 34      MUSE DIAGNOSIS 04/20/2019                      Value:Normal sinus rhythm  Left axis deviation  Right bundle branch block  Minimal voltage criteria for LVH, may be normal variant  Abnormal ECG  When compared with ECG of 18-JAN-2019 09:48,  Right bundle branch block is now Present  Confirmed by DIMITRI GONZALEZ MD LOC: ([id number]) on 4/20/2019 2:21:30 PM       Sodium 04/20/2019 137      Potassium 04/20/2019 4.1      Chloride 04/20/2019 108*     CO2 04/20/2019 18*     Anion Gap, Calculation 04/20/2019 11      Glucose 04/20/2019 171*     Calcium 04/20/2019 9.8      BUN 04/20/2019 11      Creatinine 04/20/2019 0.87      GFR MDRD Af Amer 04/20/2019 >60      GFR MDRD Non Af Amer 04/20/2019 >60      Troponin I 04/20/2019 <0.01      INR 04/20/2019 1.13*     WBC 04/20/2019 11.7*     RBC 04/20/2019 4.71      Hemoglobin 04/20/2019 14.5      Hematocrit 04/20/2019 41.8      MCV 04/20/2019 89      MCH 04/20/2019 30.8      MCHC 04/20/2019 34.7      RDW 04/20/2019 12.9      Platelets 04/20/2019 290      MPV 04/20/2019 9.3       Neutrophils % 04/20/2019 81*     Lymphocytes % 04/20/2019 15*     Monocytes % 04/20/2019 3      Eosinophils % 04/20/2019 0      Basophils % 04/20/2019 0      Neutrophils Absolute 04/20/2019 9.5*     Lymphocytes Absolute 04/20/2019 1.8      Monocytes Absolute 04/20/2019 0.4      Eosinophils Absolute 04/20/2019 0.0      Basophils Absolute 04/20/2019 0.0    Device Check on 04/20/2019   Component Date Value     Device Manufacture 04/20/2019 [employer]      Device Model 04/20/2019 [id number] [place] XT DR MRI      Device Serial Number 04/20/2019 [id number]      Device Type 04/20/2019 Pacemaker      Device Implanting Provid* 04/20/2019 ANTHONY Junior      Comments/Summary 04/20/2019                      Value:Type: [employer] remote pacemaker transmission done at [facility].  ZOHAIB transmission due to:  Dizziness  Presenting Rhythm/Rate: Normal Sinus in the 70s  Percentage Paced:  AS/VS - 95%  AP/VS - 4%  Battery status: 15 years remaining   Lead trends within expected range. Capture Thresholds within safety margin.  Arrhythmia history:  0 detected since device was last checked.  Device functioning as programmed.  Date of last device interrogation:  2/21/19  Katie Kan.     RN Device Check on 02/21/2019   Component Date Value     Device Manufacture 02/21/2019 [employer]      Device Model 02/21/2019 [id number] [place] XT DR MRI      Device Serial Number 02/21/2019 [id number]      Device Type 02/21/2019 Pacemaker      Device Implanting Provid* 02/21/2019 ANTHONY Junior      Comments/Summary 02/21/2019                      Value:Type: In clinic pacemaker check. Seen with Suzan Waddell RN CNP.   Presenting Rhythm: ASVS 82bpm  Lead/Battery status: Stable  Arrhythmias: No mode switches. 2 high ventricular rate detections. Review of EGMS show PSVT. Most recent and longest occurred on 2/10/2019 90a 5sec duration of PSVT with rates up to 200bpm.  Anticoagulant: Xarelto.   Comments: Normal device  function. Reprogrammed RA output from 2X to 1.5X with 1.5V safety margin, RV output from 2X to 1.5X with 1.5V safety margin, and PMT turned on.  BK     Device Check on 02/05/2019   Component Date Value     Device Manufacture 02/05/2019 [employer]      Device Model 02/05/2019 [id number] [place] XT DR MRI      Device Serial Number 02/05/2019 [id number]      Device Type 02/05/2019 Pacemaker      Device Implanting Provid* 02/05/2019 ANTHONY Junior      Comments/Summary 02/05/2019                      Value:Type: routine one month PO pacemaker remote transmission.   Presenting rhythm: sinus 80 bpm.  Battery/lead status: stable.  Arrhythmias: since 1/21/19; one ventricular high rate episode, appears to be PSVT. No mode switch episodes detected.  Comments: Normal magnet and pacemaker function. AMBREEN     Lab Requisition on 01/25/2019   Component Date Value     Culture 01/25/2019 No Growth    RN Device Check on 01/21/2019   Component Date Value     Device Manufacture 01/21/2019 [employer]      Device Model 01/21/2019 [id number] Kayli XT DR MRI      Device Serial Number 01/21/2019 [id number]      Device Type 01/21/2019 Pacemaker      Device Implanting Provid* 01/21/2019 ANTHONY Junior      Comments/Summary 01/21/2019                      Value:Type: In-clinic IPG check and initial appointment with Suzan Waddell RN, CNP in follow-up of atrial fibrillation (AF) with rapid ventricular response (RVR).  Presenting Rhythm: Sinus Rhythm at 63 bpm.  Lead/Battery status: Stable lead and battery measurements.  Arrhythmias: None since 01/18/19 in-hospital check  Anticoagulant: rivaroxaban  Comments: Normal device function; no programming changes made. Ms. Waddell provided with today's device findings. SMW     Admission on 01/18/2019, Discharged on 01/19/2019   Component Date Value     PTT 01/18/2019 31      INR 01/18/2019 1.24*     Troponin I 01/18/2019 <0.01      Magnesium 01/18/2019 2.1      Sodium 01/18/2019 138       Potassium 01/18/2019 4.0      Chloride 01/18/2019 107      CO2 01/18/2019 20*     Anion Gap, Calculation 01/18/2019 11      Glucose 01/18/2019 129*     BUN 01/18/2019 12      Creatinine 01/18/2019 0.92      GFR MDRD Af Amer 01/18/2019 >60      GFR MDRD Non Af Amer 01/18/2019 >60      Bilirubin, Total 01/18/2019 0.7      Calcium 01/18/2019 9.8      Protein, Total 01/18/2019 7.2      Albumin 01/18/2019 3.9      Alkaline Phosphatase 01/18/2019 51      AST 01/18/2019 33      ALT 01/18/2019 54*     BNP 01/18/2019 <10      WBC 01/18/2019 7.4      RBC 01/18/2019 4.81      Hemoglobin 01/18/2019 14.9      Hematocrit 01/18/2019 42.9      MCV 01/18/2019 89      MCH 01/18/2019 31.0      MCHC 01/18/2019 34.7      RDW 01/18/2019 11.9      Platelets 01/18/2019 286      MPV 01/18/2019 9.1      Neutrophils % 01/18/2019 48*     Lymphocytes % 01/18/2019 38      Monocytes % 01/18/2019 9      Eosinophils % 01/18/2019 4      Basophils % 01/18/2019 1      Neutrophils Absolute 01/18/2019 3.5      Lymphocytes Absolute 01/18/2019 2.8      Monocytes Absolute 01/18/2019 0.7      Eosinophils Absolute 01/18/2019 0.3      Basophils Absolute 01/18/2019 0.1      VENTRICULAR RATE 01/18/2019 103      ATRIAL RATE 01/18/2019 103      P-R INTERVAL 01/18/2019 168      QRS DURATION 01/18/2019 102      Q-T INTERVAL 01/18/2019 360      QTC CALCULATION (BEZET) 01/18/2019 471      P Axis 01/18/2019 22      R AXIS 01/18/2019 -33      T AXIS 01/18/2019 50      MUSE DIAGNOSIS 01/18/2019                      Value:Sinus tachycardia  Left axis deviation  Abnormal ECG  When compared with ECG of 14-JAN-2019 13:59,  T wave inversion no longer evident in Inferior leads  Confirmed by JUAN ANTONIO FLORES MD LOC:WW ([id number]) on 1/18/2019 10:57:32 AM       Troponin I 01/18/2019 0.03      Troponin I 01/18/2019 0.05      Troponin I 01/19/2019 0.04    Device Check on 01/18/2019   Component Date Value     Device Manufacture 01/18/2019 [employer]      Device Model 01/18/2019  W1DR01 Kayli XT DR MRI      Device Serial Number 01/18/2019 [id number]      Device Type 01/18/2019 Pacemaker      Device Implanting Provid* 01/18/2019 ANTHONY Junior      Comments/Summary 01/18/2019                      Value:Type: [employer] pacemaker remote transmission done at [facility].   Presenting rhythm: Atrial pacing w/PACâ  s and ventricular pacing at 60bpm.  Percentage Paced: 75% atrial pacing/76% ventricular pacing.   Additional Device details;Battery status: OK (5 years longevity remaining). Lead trends within expected range.  Capture Thresholds within safety limits.   Arrhythmia history: 15 mode switch episodes noted. AF burden approx. 2%. Most recent episode noted on Jan 18, â 19.  Heart Failure details: OptiVol status: Not available.  Other: AF  Impression: Normal device operation.  Report created by [employer] Pete lamas. AMBREEN     Office Visit on 01/14/2019   Component Date Value     VENTRICULAR RATE 01/14/2019 89      ATRIAL RATE 01/14/2019 89      P-R INTERVAL 01/14/2019 158      QRS DURATION 01/14/2019 96      Q-T INTERVAL 01/14/2019 378      QTC CALCULATION (BEZET) 01/14/2019 459      P Axis 01/14/2019 39      R AXIS 01/14/2019 -36      T AXIS 01/14/2019 -2      MUSE DIAGNOSIS 01/14/2019                      Value:Normal sinus rhythm  Left axis deviation  Incomplete right bundle branch block  Minimal voltage criteria for LVH, may be normal variant  Abnormal ECG  When compared with ECG of 11-JAN-2019 18:43,  Inverted T waves have replaced nonspecific T wave abnormality in Inferior leads  Confirmed by JACKIE RESENDEZ, [facility] LOC:DANICA ([id number]) on 1/14/2019 3:56:11 PM     Admission on 01/11/2019, Discharged on 01/11/2019   Component Date Value     SYSTOLIC BLOOD PRESSURE 01/11/2019 126      DIASTOLIC BLOOD PRESSURE 01/11/2019 85      VENTRICULAR RATE 01/11/2019 141      ATRIAL RATE 01/11/2019 115      QRS DURATION 01/11/2019 100      Q-T INTERVAL 01/11/2019 310      QTC CALCULATION (BEZET)  01/11/2019 474      R AXIS 01/11/2019 -37      T AXIS 01/11/2019 66      MUSE DIAGNOSIS 01/11/2019                      Value:Atrial fibrillation with rapid ventricular response  Left axis deviation  Pulmonary disease pattern  Incomplete right bundle branch block  Moderate voltage criteria for LVH, may be normal variant  Nonspecific ST abnormality  Abnormal ECG  When compared with ECG of 30-DEC-2018 10:24,  Atrial fibrillation has replaced Sinus rhythm  Vent. rate has increased BY  57 BPM  Nonspecific T wave abnormality no longer evident in Inferior leads  Confirmed by JUAN ANTONIO FLORES MD LOC: ([id number]) on 1/12/2019 5:46:18 PM       Sodium 01/11/2019 141      Potassium 01/11/2019 3.9      Chloride 01/11/2019 109*     CO2 01/11/2019 22      Anion Gap, Calculation 01/11/2019 10      Glucose 01/11/2019 131*     Calcium 01/11/2019 10.2      BUN 01/11/2019 14      Creatinine 01/11/2019 0.93      GFR MDRD Af Amer 01/11/2019 >60      GFR MDRD Non Af Amer 01/11/2019 >60      BNP 01/11/2019 11      Troponin I 01/11/2019 <0.01      WBC 01/11/2019 9.2      RBC 01/11/2019 5.06      Hemoglobin 01/11/2019 15.8      Hematocrit 01/11/2019 45.5      MCV 01/11/2019 90      MCH 01/11/2019 31.2      MCHC 01/11/2019 34.7      RDW 01/11/2019 12.4      Platelets 01/11/2019 321      MPV 01/11/2019 9.9      Neutrophils % 01/11/2019 54      Lymphocytes % 01/11/2019 35      Monocytes % 01/11/2019 7      Eosinophils % 01/11/2019 4      Basophils % 01/11/2019 1      Neutrophils Absolute 01/11/2019 4.9      Lymphocytes Absolute 01/11/2019 3.2      Monocytes Absolute 01/11/2019 0.6      Eosinophils Absolute 01/11/2019 0.4      Basophils Absolute 01/11/2019 0.1      SYSTOLIC BLOOD PRESSURE 01/11/2019 142      DIASTOLIC BLOOD PRESSURE 01/11/2019 102      VENTRICULAR RATE 01/11/2019 105      ATRIAL RATE 01/11/2019 105      P-R INTERVAL 01/11/2019 160      QRS DURATION 01/11/2019 98      Q-T INTERVAL 01/11/2019 354      QTC CALCULATION (ARUN)  01/11/2019 467      P Axis 01/11/2019 43      R AXIS 01/11/2019 -40      T AXIS 01/11/2019 32      MUSE DIAGNOSIS 01/11/2019                      Value:Sinus tachycardia  Left axis deviation  Pulmonary disease pattern  Incomplete right bundle branch block  Minimal voltage criteria for LVH, may be normal variant  Abnormal ECG  When compared with ECG of 11-JAN-2019 16:37,  Sinus rhythm has replaced Atrial fibrillation  Confirmed by JUAN ANTONIO FLORES MD LOC: ([id number]) on 1/12/2019 5:49:59 PM     There may be more visits with results that are not included.       Pertinent Radiology  Radiology Results: See Report  EKG Results: personally reviewed.  and See Report     Current Outpatient Medications:      acetaminophen (TYLENOL) 500 MG tablet, Take 1-2 tablets (500-1,000 mg total) by mouth every 4 (four) hours as needed., Disp: , Rfl: 0     atorvastatin (LIPITOR) 80 MG tablet, Take 1 tablet (80 mg total) by mouth at bedtime., Disp: 90 tablet, Rfl: 3     flecainide (TAMBOCOR) 150 MG tablet, Take 0.5 tablets (75 mg total) by mouth every 12 (twelve) hours., Disp: 90 tablet, Rfl: 3     irbesartan (AVAPRO) 150 MG tablet, Take 150 mg by mouth daily., Disp: , Rfl:      metoprolol succinate (TOPROL-XL) 50 MG 24 hr tablet, Take 1 tablet (50 mg total) by mouth daily., Disp: 30 tablet, Rfl: 5     metroNIDAZOLE (METROCREAM) 0.75 % cream, Apply 1 application topically 2 (two) times a day as needed (roseacea). For Rosacea - apply to nose and right side of face    , Disp: , Rfl:      omeprazole (PRILOSEC) 40 MG capsule, Take 40 mg by mouth daily before breakfast., Disp: , Rfl:      rivaroxaban 20 mg Tab, Take 1 tablet (20 mg total) by mouth daily with supper., Disp: 90 tablet, Rfl: 3

## 2021-05-28 NOTE — TELEPHONE ENCOUNTER
Return call made to patient to discuss concerns. Patient reports he noticed this morning that the skin between his fingers is itchy and peeling. Patient wonders if it could be from the flecainide.     Referenced Micromedex and discussed with Suzan Waddell who reports that this is unlikely related to flecainide. Especially since the symptoms have just occurred and patient has been on flecainide for several months. Patient was instructed to contact PCP to investigate other causes. Patient agreeable and also requested to schedule f/u with Dr. Hyatt. Patient was transferred to scheduling to arrange f/u. -[email or web address]

## 2021-05-29 NOTE — TELEPHONE ENCOUNTER
Pt called to report a small blood clot coming out of his nose. He takes Xarelto for afib. Nose not actively bleeding. Advised to monitor. If nose starts to actively bleed and they are unable to get it to stop, then report to the ER.    Follow up as planned.  Altaf Jacinto MD  Non-invasive Cardiology  [facility]

## 2021-05-29 NOTE — PROGRESS NOTES
[facility]   Outpatient Follow-up evaluation.     Current Outpatient Medications:      acetaminophen (TYLENOL) 500 MG tablet, Take 1-2 tablets (500-1,000 mg total) by mouth every 4 (four) hours as needed., Disp: , Rfl: 0     atorvastatin (LIPITOR) 80 MG tablet, Take 1 tablet (80 mg total) by mouth at bedtime., Disp: 90 tablet, Rfl: 3     flecainide (TAMBOCOR) 150 MG tablet, Take 0.5 tablets (75 mg total) by mouth Medrol Dose Pack scheduling ONLY for 1 dose. (Patient taking differently: 0.5 tablet in the morning and 1 tablet at night   ), Disp: 90 tablet, Rfl: 3     irbesartan (AVAPRO) 150 MG tablet, Take 150 mg by mouth daily., Disp: , Rfl:      metoprolol succinate (TOPROL-XL) 50 MG 24 hr tablet, Take 1 tablet (50 mg total) by mouth daily., Disp: 30 tablet, Rfl: 5     metroNIDAZOLE (METROCREAM) 0.75 % cream, Apply 1 application topically 2 (two) times a day as needed (roseacea). For Rosacea - apply to nose and right side of face    , Disp: , Rfl:      omeprazole (PRILOSEC) 40 MG capsule, Take 40 mg by mouth daily before breakfast., Disp: , Rfl:      rivaroxaban 20 mg Tab, Take 1 tablet (20 mg total) by mouth daily with supper., Disp: 90 tablet, Rfl: 3        Ashok Del Rosario is a 68 y.o. Male    Chief Complaint   Patient presents with     Follow-up       Diagnoses:(See Problem list)          Pacemaker 2018  Normal echo  Asx right carotid stenosis  NXT 2017: small infapical LVEF 61%  Paroxysmal atrial fibrillation Xarelto Flecainide  Recent ECV for a fin  Minimal ASCAD on angiogram 2017        Recommendations:    Will have seen in afib clinic to review whether candidate for ablation of Watchman      Subjective:   68 Mrecently seen in clinic 1 month ago w dizziness and abnormal olfaction sensations.      Pacer parox A Fib   Patient was seen in the emergency room sense of palpitations and tightness in the chest found to be in recurrent atrial fibrillation.  Underwent anesthesia and cardioversion  in the ER.  Since then he has felt back to normal.  The whole experience was very discomforting for him.  He like to explore options as to how to manage atrial fibrillation without having to have recurrent cardioversions.  He has been seen by atrial fibrillation clinic before.  Is somewhat aware of ablation procedures but does not recall having discussion on this.  No anginal pressure symptoms.  No other new changes.    Past Medical History:   Diagnosis Date     Coronary artery disease      Family history of myocardial infarction      GERD (gastroesophageal reflux disease)      High cholesterol      Hypertension      Past Surgical History:   Procedure Laterality Date     ABDOMINAL SURGERY  2000    appendix     CV CORONARY ANGIOGRAM N/A 10/17/2017    Procedure: Coronary Angiogram;  Surgeon: Ashok Hyatt MD;  Location: [facility];  Service:      CV LEFT HEART CATHETERIZATION WITH LEFT VENTRICULOGRAM N/A 10/17/2017    Procedure: Left Heart Catheterization with Left Ventriculogram;  Surgeon: Ashok Hyatt MD;  Location: [facility] Cath Lab;  Service:      EP PACEMAKER INSERT Left 12/31/2018    Procedure: EP Pacemaker Insertion;  Surgeon: Micheal Junior MD;  Location: [facility] Cath [facility];  Service: Cardiology     Allergies   Allergen Reactions     Cephalexin Rash     Family History   Problem Relation Age of Onset     Cancer Mother      Cancer Father      Heart attack Brother      Coronary artery disease Brother      Valvular heart disease Brother      Rectal cancer Sister      Colon cancer Sister       Social History     Socioeconomic History     Marital status:      Spouse name: Not on file     Number of children: Not on file     Years of education: Not on file     Highest education level: Not on file   Occupational History     Not on file   Social Needs     Financial resource strain: Not on file     Food insecurity:     Worry: Not on file     Inability: Not on file     Transportation  "needs:     Medical: Not on file     Non-medical: Not on file   Tobacco Use     Smoking status: Former Smoker     Last attempt to quit: 10/16/1999     Years since quittin.6     Smokeless tobacco: Never Used   Substance and Sexual Activity     Alcohol use: Yes     Alcohol/week: 0.6 oz     Types: 1 Cans of beer per week     Comment: none since december     Drug use: No     Sexual activity: Yes     Partners: Female   Lifestyle     Physical activity:     Days per week: Not on file     Minutes per session: Not on file     Stress: Not on file   Relationships     Social connections:     Talks on phone: Not on file     Gets together: Not on file     Attends [employer] service: Not on file     Active member of club or organization: Not on file     Attends meetings of clubs or organizations: Not on file     Relationship status: Not on file     Intimate partner violence:     Fear of current or ex partner: Not on file     Emotionally abused: Not on file     Physically abused: Not on file     Forced sexual activity: Not on file   Other Topics Concern     Not on file   Social History Narrative     Not on file         Objective:   /90 (Patient Site: Left Arm, Patient Position: Sitting, Cuff Size: Adult Large)   Pulse 64   Resp 16   Ht 5' 7\" (1.702 m)   Wt 206 lb (93.4 kg)   BMI 32.26 kg/m    206 lb (93.4 kg)   Wt Readings from Last 3 Encounters:   19 206 lb (93.4 kg)   19 205 lb (93 kg)   19 202 lb (91.6 kg)     BP Readings from Last 3 Encounters:   19 142/90   19 120/72   19 (!) 160/104     Pulse Readings from Last 3 Encounters:   19 64   19 87   19 (!) 56     General appearance: alert, appears stated age and cooperative  Head: Normocephalic, without obvious abnormality, atraumatic  Eyes: Normal external exam without jaundice.  Ears: Normal external auricular exam.  Nose: Normal external exam.Less than 10 mL of urine   Lungs: Clear to auscultation " bilaterally  Chest wall: no tenderness  Heart: regular rate and rhythm, S1, S2 normal, no murmur, click, rub or gallop regular rhythm on prolonged auscultation and palpation.  Pulses: 2+ and symmetric  Skin: Skin color, texture, turgor normal.   Neurologic: Grossly normal, no focal neurologic findings.    Review of Systems:   General: WNL  Cardiographics: Reviewed in clinic.    Lab Results:  Lab Results: Personally reviewed  Lab Results   Component Value Date    CHOL 172 12/31/2018    TRIG 102 12/31/2018    HDL 39 (L) 12/31/2018       Clinical evaluation time today including exam 30 minutes.  At least 50% of clinic evaluation time involved in assessment and patient counseling.  Part of this chart was created using a dictation software.  Typographic errors, word substitutions, and grammatical errors may unintentionally occur.    Ashok Hyatt M.D.  [facility]

## 2021-05-29 NOTE — PROGRESS NOTES
Presented to the emergency room in atrial fibrillation  Had been on Xarelto  Cut back on his flecainide was previously 150 twice a day but because of some giddiness it was reduced to 75 mg twice daily  Successful external cardioversion  Continue flecainide take 150 mg a.m., 75 mg p.m.  Follow-up in A. fib clinic in the next 2 to 3 weeks

## 2021-05-29 NOTE — PROGRESS NOTES
Discussed with his wife  Apparently has been a heart rate of 120-130s for the past few hours  Has a history atrial fibrillation apparently had a cardioversion in the past  Does have Medtronic pacemaker  Patient advised to seek attention at the emergency room  If he is in atrial fibrillation, could determine duration of atrial fibrillation by assessing his pacemaker

## 2021-05-29 NOTE — PATIENT INSTRUCTIONS - HE
Arrange follow up in EP atrial fibrillation clinic to discuss ablation and Watchman procedures for paroxysmal atrial fibrillation

## 2021-05-30 NOTE — PATIENT INSTRUCTIONS - HE
Ashok Del Rosario,    It was a pleasure to see you today at the [facility].     My recommendations after this visit include:    Change flecainide to 100 mg twice daily  Decrease irbesartan to 75 mg daily.  Call if blood pressure is consistently > 135/85    Consider ablation to treat A fib.    Follow up in 3 months, or sooner as needed.    Suzan Waddell, formerly [facility], Electrophysiology  [phone]  EP nurses [phone]

## 2021-05-30 NOTE — PROGRESS NOTES
1950  Home:[phone] (home) Cell:[phone] (mobile)  Emergency Contact: Evelyne Del Rosario     +++Important patient information for CSC/Cath Lab staff : None+++    [facility] EP Cath Lab Procedure Order   Ablation Type:  PVI- Atrial Fibrillation  Specific location of pathway: Left     Diagnosis:  AF  Anticipated Case Duration:  4 hours- Ok for two PVIs in a day   Scheduling Needs/Timeframe:  Next Available Please call pt to schedule    MD Preference: Dr Sedrick RESENDEZ Assist:  No Specific MD:  N/A    Current Device: Dual Pacemaker  Device Company/Device Rep Needed for Procedure: Medtronic    Pre-Procedural Testing needed: Pulmonary Vein CT/MRI  Mapping System Required:  GAL  ICE Needed:  Yes  Special equipment/Staff needed for case: None  Anesthesia:  General-Whole Case  Research Protocol:  No    [facility] EP Cath Lab Prep   Ordering Provider: Dr Dubose  Ordering Date: 7/15/2019  Orders Status: Intial order placed and Order set placed    H&P:  Schedule apt with EP NP to complete within 1 wk of scheduled PVI  PCP: Omega Veloz MD, [phone]    Pre-op Labs: Done within 7 days    Medical Records Pertinent for Procedure:  Echo 12/30/18 EF: 60%, EKG 5/25/19 AF with RVR and Device Implant Record [employer] DOI:12/31/18    Patient Education:    Teach with Patient: Teach with pt will be completed same day as pre-op H&P-see additional clinic note    Risks Reviewed:     Pulmonary Vein/AF/Radiofrequency Ablation  In addition to standard risks for Radiofrequency Ablation, there is:    <2% for significant pulmonary vein stenosis    <2% risk for embolic events    <1% risk for esophageal fistula    <1% risk death    Pulmonary Vein Isolation / Cryoablation Risks:    1-2% risk for phrenic nerve paralysis    <1% risk for pulmonary vein stenosis    Risk of esophageal irritation with no incidence of atrial-esophageal fistula    Rare cryoballoon rupture    <1% risk death      Consent: Will be obtained in [facility] day of  procedure    Pre-Procedure Instructions that were Reviewed with Patient:  NPO after midnight, Remove all jewelry prior to coming in for procedure, Shower prior to arrival, Notified patient of time and date of procedure by CV , Transportation arrangements needed s/p procedure, Post-procedure follow up process, Sedation plan/orders and Pre-procedure letter was sent to pt by CV     Pre-Procedure Medication Instructions:  Instructions given to pt regarding anticoagulants: Patient currently taking Xarelto, will change to Eliquis at pre-op physical with NP.- instructed to continue anticoagulation uninterrupted through their procedure  Instructions given to pt regarding antiarrhythmic medication: Flecainide; Pt instructed to hold 3 days prior to procedure  Instructions for medication, other than anticoagulants/antiarrhythmics listed above, given to pt: to take all morning medications with small sips of water, with the exception of OTC supplements and MVI and to start Pepcid 20mg BID 3 days prior to PVI, and to continue for 3 week s/p    Allergies   Allergen Reactions     Cephalexin Rash       Current Outpatient Medications:      acetaminophen (TYLENOL) 500 MG tablet, Take 1-2 tablets (500-1,000 mg total) by mouth every 4 (four) hours as needed., Disp: , Rfl: 0     atorvastatin (LIPITOR) 80 MG tablet, Take 1 tablet (80 mg total) by mouth at bedtime., Disp: 90 tablet, Rfl: 3     flecainide (TAMBOCOR) 100 MG tablet, Take 1 tablet (100 mg total) by mouth 2 (two) times a day., Disp: 180 tablet, Rfl: 3     irbesartan (AVAPRO) 75 MG tablet, Take 1 tablet (75 mg total) by mouth daily., Disp: 30 tablet, Rfl: 11     metoprolol succinate (TOPROL-XL) 50 MG 24 hr tablet, Take 1 tablet (50 mg total) by mouth daily., Disp: 30 tablet, Rfl: 5     metroNIDAZOLE (METROCREAM) 0.75 % cream, Apply 1 application topically 2 (two) times a day as needed (roseacea). For Rosacea - apply to nose and right side of face    , Disp: ,  Rfl:      omeprazole (PRILOSEC) 40 MG capsule, Take 40 mg by mouth daily before breakfast., Disp: , Rfl:      rivaroxaban 20 mg Tab, Take 1 tablet (20 mg total) by mouth daily with supper., Disp: 90 tablet, Rfl: 3    Documentation Date:7/15/2019 8:39 AM  Ivana Key RN

## 2021-05-30 NOTE — PROGRESS NOTES
Bedside EEG was performed that included photic stimulation; hyperventilation was deferred. The patient was awake and asleep throughout the recording.

## 2021-05-30 NOTE — PROGRESS NOTES
----- Message -----  From: Jorge Dubose MD  Sent: 7/11/2019   2:17 PM  To: Suzan Waddell, CNP, [facility] Ep Rn Support Pool    CJJMA  I have reviewed the patient's chart.  I agree that the patient is appropriate for moving forward with atrial fibrillation ablation.  Okay to schedule patient.  Procedure: 4 hours (okay for double up)  Mapping system: Carto or GAL  Cardiac CT/MRI: Yes  JOVANI: No if patient switches to Eliquis.  Thanks  Jorge

## 2021-05-31 VITALS — BODY MASS INDEX: 30.53 KG/M2 | HEIGHT: 67 IN | WEIGHT: 194.5 LBS

## 2021-05-31 NOTE — TELEPHONE ENCOUNTER
----- Message from Ashlyn Jacobs sent at 8/15/2019 12:10 PM CDT -----  Regarding: Remove order  Can you please remove this patient's PVI order?  Patient has cancelled procedure and all appointments associated with it.  He states he is going to [place].    Thank you

## 2021-06-02 VITALS — HEIGHT: 67 IN | BODY MASS INDEX: 31.86 KG/M2 | WEIGHT: 203 LBS

## 2021-06-02 VITALS — WEIGHT: 200 LBS | HEIGHT: 68 IN | BODY MASS INDEX: 30.31 KG/M2

## 2021-06-02 VITALS — WEIGHT: 203 LBS | BODY MASS INDEX: 31.86 KG/M2 | HEIGHT: 67 IN

## 2021-06-02 VITALS — HEIGHT: 68 IN | BODY MASS INDEX: 30.46 KG/M2 | WEIGHT: 201 LBS

## 2021-06-02 VITALS — BODY MASS INDEX: 30.31 KG/M2 | WEIGHT: 200 LBS | HEIGHT: 68 IN

## 2021-06-02 VITALS — BODY MASS INDEX: 30.46 KG/M2 | WEIGHT: 201 LBS | HEIGHT: 68 IN

## 2021-06-02 VITALS — WEIGHT: 202 LBS | BODY MASS INDEX: 30.62 KG/M2 | HEIGHT: 68 IN

## 2021-06-02 VITALS — WEIGHT: 202.6 LBS | BODY MASS INDEX: 31.8 KG/M2 | HEIGHT: 67 IN

## 2021-06-03 VITALS — WEIGHT: 204 LBS | BODY MASS INDEX: 32.02 KG/M2 | HEIGHT: 67 IN

## 2021-06-03 VITALS — BODY MASS INDEX: 31.71 KG/M2 | WEIGHT: 202 LBS | HEIGHT: 67 IN

## 2021-06-03 VITALS — BODY MASS INDEX: 32.33 KG/M2 | WEIGHT: 206 LBS | HEIGHT: 67 IN

## 2021-06-04 VITALS
DIASTOLIC BLOOD PRESSURE: 78 MMHG | HEART RATE: 78 BPM | BODY MASS INDEX: 32.11 KG/M2 | WEIGHT: 205 LBS | SYSTOLIC BLOOD PRESSURE: 150 MMHG

## 2021-06-04 VITALS
HEART RATE: 72 BPM | DIASTOLIC BLOOD PRESSURE: 64 MMHG | BODY MASS INDEX: 32.11 KG/M2 | SYSTOLIC BLOOD PRESSURE: 94 MMHG | WEIGHT: 205 LBS

## 2021-06-04 NOTE — TELEPHONE ENCOUNTER
Patient called back this am. Discussed remote findings. States he really has had no awareness of any fast rates or rhythm changes recently. Advised to call with any troubles. He agrees. He continues to follow with [facility] as well. Denies any other concerns at this time.    Indu Mcknight RN

## 2021-06-04 NOTE — TELEPHONE ENCOUNTER
"Dr Hyatt Cardiologist @ [facility]. Pacemaker check.  Wife and daughter have been diagnosed with influenza A. Patient had vaccine for B.   Daughter is on Tamiflu RX. Wife became ill/ vomiting all night after taking one dose (RX: twice a day for 5 days). Patient wants to know if he can take it? He wants to avoid becoming as ill as his wife and daughter are. He is taking RX Xarellto and other medications for his heart.   Today his throat is sore and he is coughing. Temperature during this call= 97.  When he overexerts himself he becomes short of breath, he states that this is not new. No difficulty breathing. Sore throat pain=\"3-4\" currently. Coughing is productive.  He states he has a slight touch of emphysema. He coughed all night last night.    Reason for Disposition    [1] Known COPD or other severe lung disease (i.e., bronchiectasis, cystic fibrosis, lung surgery) AND [2] worsening symptoms (i.e., increased sputum purulence or amount, increased breathing difficulty    Protocols used: COUGH - ACUTE [id number]    Patient requests on call Cardiologist call him back @ [phone] Home phone number  Pharmacy: [facility] in [facility].   Dr Huynh on call: paged to patient @ 4:15pm.     Charla Howe RN Triage Nurse Advisor  "

## 2021-06-04 NOTE — TELEPHONE ENCOUNTER
Type: routine remote pacemaker transmission.  Presenting rhythm: AS- and normal sinus, rate 80's, with frequent PACs.  Battery/lead status: stable, however trend shows low intrinsic R wave amplitude since last November.  Arrhythmias: since 9/5/19, two mode switches, EGMs confirm AF, longest 4 hrs on 10/31/19, burden 0.2%, V-rates >/=120 bpm 85%. Nine SVT-AF appear AF with RVR. Six fast A&V and 7 nonsustained VT; available EGMs suggest PSVT, at times possible AVNRT (example episode 48).  Anticoagulant: rivaroxaban  Comments: normal pacemaker function. Routed to device RN. prd     Transmission reviewed, agree with above. Hx of PVI at [facility] with last visit (3 months post-PVI) with Dr SAE Campos being 11/21/19 where it is noted that there had been no recent return of AF since PVI. Not sure if the 10/31/19 episode was addressed/noted prior to that visit. Otherwise since that episode of AF on 10/31 there have been 3 brief, atrial tachy bursts noted on logbook. Longest ~2 mins with rates 150s.      Call placed to patient to review findings. LVM for callback.   Indu Mcknight RN

## 2021-06-06 NOTE — TELEPHONE ENCOUNTER
PC from patient requesting a device RN visit and to see Suzan Waddell CNP. He said that last fall he had a PVI at [facility] and he had a Watchman Flex placed by them two weeks ago. He just noticed a [place] bruise about the size of his pacemaker on his chest.     He wanted to see Suzan to have the site assessed, but I advised he call [facility] since they did the recent procedure. He explained that he already did and they said he should be seen by a provider by tomorrow. I was honest and said I did not know he we would have anyone available, but I sent him to the .    Unfortunately, we did not have any openings in the time frame he requested. Therefore, he said he would go to the emergency room.    Renae López RN BSN  [facility]

## 2021-06-07 ENCOUNTER — HOSPITAL ENCOUNTER (OUTPATIENT)
Dept: NUCLEAR MEDICINE | Facility: CLINIC | Age: 71
Discharge: HOME | End: 2021-06-07
Attending: NURSE PRACTITIONER

## 2021-06-07 ENCOUNTER — HOSPITAL ENCOUNTER (OUTPATIENT)
Dept: CARDIOLOGY | Facility: CLINIC | Age: 71
Discharge: HOME | End: 2021-06-07
Attending: NURSE PRACTITIONER

## 2021-06-07 DIAGNOSIS — I25.119 CORONARY ARTERY DISEASE INVOLVING NATIVE CORONARY ARTERY OF NATIVE HEART WITH ANGINA PECTORIS (H): ICD-10-CM

## 2021-06-07 LAB
CV STRESS CURRENT BP HE: NORMAL
CV STRESS CURRENT HR HE: 102
CV STRESS CURRENT HR HE: 104
CV STRESS CURRENT HR HE: 104
CV STRESS CURRENT HR HE: 83
CV STRESS CURRENT HR HE: 85
CV STRESS CURRENT HR HE: 86
CV STRESS CURRENT HR HE: 88
CV STRESS CURRENT HR HE: 89
CV STRESS CURRENT HR HE: 89
CV STRESS CURRENT HR HE: 92
CV STRESS CURRENT HR HE: 92
CV STRESS CURRENT HR HE: 95
CV STRESS CURRENT HR HE: 95
CV STRESS CURRENT HR HE: 97
CV STRESS CURRENT HR HE: 98
CV STRESS CURRENT HR HE: 98
CV STRESS DEVIATION TIME HE: NORMAL
CV STRESS ECHO PERCENT HR HE: NORMAL
CV STRESS EXERCISE STAGE HE: NORMAL
CV STRESS FINAL RESTING BP HE: NORMAL
CV STRESS FINAL RESTING HR HE: 88
CV STRESS MAX HR HE: 104
CV STRESS MAX TREADMILL GRADE HE: 0
CV STRESS MAX TREADMILL SPEED HE: 0
CV STRESS PEAK DIA BP HE: NORMAL
CV STRESS PEAK SYS BP HE: NORMAL
CV STRESS PHASE HE: NORMAL
CV STRESS PROTOCOL HE: NORMAL
CV STRESS RESTING PT POSITION HE: NORMAL
CV STRESS ST DEVIATION AMOUNT HE: NORMAL
CV STRESS ST DEVIATION ELEVATION HE: NORMAL
CV STRESS ST EVELATION AMOUNT HE: NORMAL
CV STRESS TEST TYPE HE: NORMAL
CV STRESS TOTAL STAGE TIME MIN 1 HE: NORMAL
NUC STRESS EJECTION FRACTION: 63 %
RATE PRESSURE PRODUCT: NORMAL
STRESS ECHO BASELINE DIASTOLIC HE: 93
STRESS ECHO BASELINE HR: 86
STRESS ECHO BASELINE SYSTOLIC BP: 148
STRESS ECHO CALCULATED PERCENT HR: 69 %
STRESS ECHO LAST STRESS DIASTOLIC BP: 90
STRESS ECHO LAST STRESS HR: 97
STRESS ECHO LAST STRESS SYSTOLIC BP: 142
STRESS ECHO TARGET HR: 150

## 2021-06-07 NOTE — TELEPHONE ENCOUNTER
Patient's wife called to report that patient has been experiencing some shortness of breath, lightheadedness, as well as palpitations. She furthermore reports that patient's heart rate has been 130-170s within the past 1/2 hour. BP was reported at 118/82. Patient's spouse reports that patient is s/p PVI and Flecanide was discontinued by MD at the [facility] post PVI. Requested for a manual remote transmission. Results are as followed and were discussed with patient's spouse as well as Mane Alatorre NP. Mane recommended patient take an extra metoprolol 50mg tonight. These recommendations were then relayed to patent's spouse who verbalized understanding. Video visit with Suzan Waddell NP previously scheduled 5/7/2020.       Type: remote pacemaker check d/t patient symptoms  Presenting Rhythm: AF- VS-, 82-176bpm  Lead/Battery status: Stable lead and battery measurements.  Arrhythmias: 1 mode switch for 0.9% of the time. EGM available shows episode in progress since 1321 today. EGMs show AF with RVR. 1 SVT episode detected, this is the same AF episode.   Anticoagulant: Watchman  Comments: Normal device function. See Epic for notes. ANGELES Little, RN BSN   Device Nurse

## 2021-06-08 NOTE — PROGRESS NOTES
Review of Systems - History obtained from the patient  General ROS: negative  Psychological ROS: negative  Ophthalmic ROS: positive for - Visual disturbance, floaters   ENT ROS: negative  Hematological and Lymphatic ROS: positive for - easy bruising  Respiratory ROS: negative  Cardiovascular ROS: positive for - irregular heartbeat, palpitations and shortness of breath with activity  Gastrointestinal ROS: negative   Genito-Urinary ROS: negative  Musculoskeletal ROS: positive for - joint pain  Neurological ROS: positive for - daytime sleepiness, dizziness and loss  of balance   Dermatological ROS: negative

## 2021-06-08 NOTE — PATIENT INSTRUCTIONS - HE
Ashok Del Rosario,    It was a pleasure to see you today at the [facility].     My recommendations after this visit include:    Take an extra metoprolol 50 mg at onset of A fib    Gradually increase activity.  Call if shortness of breath worsens.    Call if A fib increases in frequency or duration    Follow up in 6-8 weeks, or sooner if needed      Suzan Waddell, [facility], Electrophysiology  [phone]  EP nurses [phone]

## 2021-06-09 NOTE — TELEPHONE ENCOUNTER
Type: remote pacemaker transmission sent for symptom of dizziness.  Presenting rhythm: normal sinus, rate 84 bpm.  Battery/lead status: stable  Arrhythmias: since 6/19/20, no AT/AF. Four nonsustained ventricular high rate episodes, these occurred between 6/21/20 and 6/25/20; EGMs suggest PSVT, longest 14 seconds, rates to 190 bpm.  Comments: normal pacemaker function. Routed to device RN. prd     Transmission reviewed, agree with above. Normal pacemaker function. No arrhythmias recorded to correlate with dizzy episodes.     Indu Mcknight, RN

## 2021-06-09 NOTE — TELEPHONE ENCOUNTER
Return call to patient, he states that yesterday evening while sitting down he felt really dizzy and had a strange taste in his mouth, this lasted for about 30 minutes.  He states this is how he felt when he went to the ER in 2018 and ended up having a dual pacemaker placed.    Pt states he is feeling well today, no dizziness lightheadedness or shortness of breath.  He states he is always well hydrated, avoids caffeine and alcohol.  He notes nothing out of the ordinary yesterday except that he has hurt his back.    Previous device check on 6-19-20 shows:  13 high ventricular arrhythmias detected. 6 fast A & V episodes, all viewable EGMs show atrial driven  SVT with rates between 153-176 bpm. 7 NSVT, all viewaable episodes show 1:1 conduction likely AVNRT.    Will ask patient to send another remote today to ensure that his episode was not related to heart rhythm.  Pt understands how to send a remote and will send now.  Will review with Device team and call patient once results have been reviewed.

## 2021-06-09 NOTE — TELEPHONE ENCOUNTER
Did call his wife and give an update on the phone, she states he is just not acting himself.  Explained his symptoms do not appear to be related to his heart rhythm, she states understanding.

## 2021-06-09 NOTE — TELEPHONE ENCOUNTER
Noted.  Chart reviewed and [facility].  Unable to leave message with normal transmission on voicemail.

## 2021-06-09 NOTE — PATIENT INSTRUCTIONS - HE
Ashok Del Rosario,    It was a pleasure to see you today at the [facility].     My recommendations after this visit include:    Decrease irbesartan to 1/2 tab (37.5 mg) daily.  Call if blood pressure is >140/x or < 95/x    Follow up in 3-4 months, or sooner if needed    Suzan Waddell, [facility], Electrophysiology  [phone]  EP nurses [phone]

## 2021-06-09 NOTE — PROGRESS NOTES
Remote device check.  Please see link for full device report.  Patient was informed of results and next follow up via mail.

## 2021-06-09 NOTE — TELEPHONE ENCOUNTER
----- Message from Marybeth Sheppard sent at 7/8/2020 11:42 AM CDT -----  General phone call:    Caller: Patient  Primary cardiologist: Suzan Waddell    Detailed reason for call: Patient stated that last night he got really dizzy and had a weird taste in his mouth. He does have a pacemaker and watchman. Please advise    Best phone number: [phone]  Best time to contact: today  Ok to leave a detailed message? yes  Device? yes    Additional Info:

## 2021-06-10 NOTE — TELEPHONE ENCOUNTER
Pat called me directly today.  He is complaining of blood pressure readings in the high 80s to 90s systolic over 60s diastolic occasionally.  At other times his blood pressure is running 110s over 60s.  Suzan Waddell NP recently cut his Avapro to 37.5 mg from 75 mg every day.  He denies any lightheadedness or dizziness.  He has been working hard outside in the heat.  He may be dehydrated.  He has tried increasing his fluid intake but may not be adequate.  He is not symptomatic with lower blood pressures and discussed he had a recent decrease in hypertensive medications and very hesitant to take him off of Avapro altogether.  To increase fluid intake.  To call Dr. Roa and make appointment with him if symptomatic with low blood pressures.

## 2021-06-11 ENCOUNTER — AMBULATORY - HEALTHEAST (OUTPATIENT)
Dept: CARDIOLOGY | Facility: CLINIC | Age: 71
End: 2021-06-11

## 2021-06-11 ENCOUNTER — RECORDS - HEALTHEAST (OUTPATIENT)
Dept: CARDIOLOGY | Facility: CLINIC | Age: 71
End: 2021-06-11

## 2021-06-11 DIAGNOSIS — Z98.890 STATUS POST ABLATION OF ATRIAL FIBRILLATION: ICD-10-CM

## 2021-06-11 DIAGNOSIS — Z95.0 CARDIAC PACEMAKER IN SITU: ICD-10-CM

## 2021-06-11 DIAGNOSIS — I25.10 NONOCCLUSIVE CORONARY ATHEROSCLEROSIS OF NATIVE CORONARY ARTERY: ICD-10-CM

## 2021-06-11 DIAGNOSIS — Z86.79 STATUS POST ABLATION OF ATRIAL FIBRILLATION: ICD-10-CM

## 2021-06-11 DIAGNOSIS — Z95.818 PRESENCE OF LEFT ATRIAL APPENDAGE CLOSURE DEVICE COMPOSED OF NICKEL-TITANIUM ALLOY WITH POLYETHYLENE TEREPHTHALATE MEMBRANE: ICD-10-CM

## 2021-06-11 DIAGNOSIS — I49.5 SSS (SICK SINUS SYNDROME) (H): ICD-10-CM

## 2021-06-11 DIAGNOSIS — I48.0 PAROXYSMAL ATRIAL FIBRILLATION (H): ICD-10-CM

## 2021-06-13 NOTE — PROGRESS NOTES
Patient tolerated MRI with no concerns expressed or observed.  [employer] rep arrived prior to scan and placed pacer into MRI Surescan mode.  HR & oximetry monitored continuously throughout.  [employer] rep placed patient back into previous pacer mode at completion of exam.

## 2021-06-15 NOTE — PROGRESS NOTES
Remote device check.  Please see link for full device report.  Patient was informed of results and next follow up via phone call.   Indu Mkcnight RN

## 2021-06-15 NOTE — TELEPHONE ENCOUNTER
Not sure what the [employer] nakia is picking up.  He can attach a strip to [employer] message if he wishes.  As he is asymptomatic and device check shows no significant arrhythmia I am not concerned.  No new recommendations.  Thanks,  Suzan

## 2021-06-15 NOTE — TELEPHONE ENCOUNTER
Recommendations from Suzan noted and reviewed with patient. State he will try to see if he can upload something onto [employer] but is still feeling ok and not too worried if he cannot figure out Mychart. Advised to call with any other concerns/questions.     Indu Mcknight RN

## 2021-06-15 NOTE — TELEPHONE ENCOUNTER
"Type: routine pacemaker remote transmission.   Presenting rhythm: As-Vs (NSR) HR 90-100s  Battery/lead status: stable.  Arrhythmias: since 1/11/21; 9 Fast A&V episodes, 14 ventricular high rate episodes, all available EGMs appear to be AT/SVT w RVR up to 180s, all <1 min.   Comments: normal pacemaker function. No arrhythmia noted to correlate with patient's Kardia monitor report of \"NSR with wide QRS.\"  Asymptomatic. AP 3%,  4%. See note in Epic. Routed to Suzan DURAN CNP per patient request.  MLG       Patient called device clinic with concerns about his home Kardia monitor showing him a message that states \"NSR with wide QRS.\" Patient denies any unsual symptoms just worried because he has not seen this message on this device before. Had him send pacemaker remote transmission. Nothing noted that would indicate wide QRS/NSVTs etc. He did have a few brief bursts of atrial tachy arrhythmias, but did not see any today that would correlate. Does have MVP on, and it will  briefly if needed.      Kardia may be picking up ectopy or paced beats intermittently, although seldom paced and PVC counts are 5/hour.  Unsure why it has consistently shown same message for last few hours, presenting EGM on pacemaker shows NSR HR 90-100s.      Reviewed with patient that his pacemaker remote looks good and if asymptomatic would not necessarily be too concerned about what the Kardia is stating. Patient understands and states \"I feel just fine.\" Advised to call with any troublesome symptoms. Verbalized understanding. Denies any other questions/concerns.     Will review with Suzan MONTANO CNP per patient request for any other recommendations.     Indu Mcknight RN    "

## 2021-06-16 PROBLEM — Z86.79 STATUS POST ABLATION OF ATRIAL FIBRILLATION: Status: ACTIVE | Noted: 2020-05-07

## 2021-06-16 PROBLEM — Z95.0 CARDIAC PACEMAKER IN SITU: Status: ACTIVE | Noted: 2019-01-07

## 2021-06-16 PROBLEM — I48.0 PAROXYSMAL ATRIAL FIBRILLATION (H): Status: ACTIVE | Noted: 2019-01-14

## 2021-06-16 PROBLEM — G47.33 OSA (OBSTRUCTIVE SLEEP APNEA): Status: ACTIVE | Noted: 2019-04-03

## 2021-06-16 PROBLEM — I47.10 SVT (SUPRAVENTRICULAR TACHYCARDIA) (H): Status: ACTIVE | Noted: 2019-01-19

## 2021-06-16 PROBLEM — Z98.890 STATUS POST ABLATION OF ATRIAL FIBRILLATION: Status: ACTIVE | Noted: 2020-05-07

## 2021-06-16 PROBLEM — I25.10 NONOCCLUSIVE CORONARY ATHEROSCLEROSIS OF NATIVE CORONARY ARTERY: Status: ACTIVE | Noted: 2017-10-17

## 2021-06-16 PROBLEM — Z95.818 PRESENCE OF LEFT ATRIAL APPENDAGE CLOSURE DEVICE COMPOSED OF NICKEL-TITANIUM ALLOY WITH POLYETHYLENE TEREPHTHALATE MEMBRANE: Status: ACTIVE | Noted: 2020-05-07

## 2021-06-17 NOTE — PROGRESS NOTES
In clinic device check with Suzan Waddell.  Please see link for full device report.  Patient was informed of results and next follow up during today's visit.

## 2021-06-17 NOTE — PATIENT INSTRUCTIONS - HE
Ashok Del Rosario,    It was a pleasure to see you today at the [facility].     My recommendations after this visit include:    Continue current medications    Stress test to evaluate shortness of breath with activity    Follow up with Dr. Hyatt (overdue)  Follow up with me in 6 months, or sooner if needed    Suzan Waddell, [facility], Electrophysiology  [phone]  EP nurses [phone]

## 2021-06-19 NOTE — LETTER
Letter by Zulay Potts RN at      Author: Zulay Potts RN Service: -- Author Type: --    Filed:  Encounter Date: 7/24/2019 Status: (Other)       Ashok Del Rosario  [address] [place] [address]        IMPORTANT INFORMATION REGARDING YOUR      PULMONARY VEIN ISOLATION ABLATION     Pre procedure Cardiac CT/MRI :      Used to obtain images of your Pulmonary Veins prior to the procedure to assist in mapping your heart during the ablation.    A  will be calling you to set up a time for the test.    The CT or MRI should be completed at least 2 weeks prior to your ablation.    Preoperative Physical : Tuesday 10/8/19      Your pre operative physical is scheduled with our EP Nurse Practitioner  Suzan Waddell .     Please bring these instructions with you to your appointment.    You will meet with a Nurse for education after your visit with the EP Nurse Practitioner    Pulmonary Vein Isolation Ablation: Monday 10/14/19 with Dr Dubose      Please arrive at 6:00 am for registration and prep.    Your procedure is scheduled for 8:00 am. Please keep in mind this time is not exact, and this time may vary based on unforseen circumstances.     Preparing at Home for your Pulmonary Vein Isolation Ablation:    Have nothing to eat or drink after midnight the night prior to your ablation.    Please DO NOT take any of your medication the morning of your procedure EXCEPT your Xarelto the morning of your ablation.    Your will need to start Pepcid 20mg twice daily 3 days prior to your ablation, and continue this for 3 weeks after your ablation. This medication will be sent to your pharmacy at your the time of your office visit in the clinic. It is important to take this medication to help reduce the discomfort caused through the process of your ablation, that potentially can cause irritation to the esophagus.    You will have general anesthesia for the procedure and will need to lay flat for 4-6 hours after the  procedure.    You will need to bring a current list of your medications with you for our admissions process the day of your procedure, please do not bring any of your own medications with you to the hospital.    The hospital cannot store your valuables, so please leave them at home    You will need to take off your jewelry prior to your procedure, we advise leaving your jewelry at home, as we cannot store your valuables    We ask that you please shower the morning of your procedure, or the night before.    You will have a jones catheter placed in your bladder prior to the procedure.  Please let us know if you have had difficulty with a jones catheter in the past.    If you use a CPAP machine for breathing while you are sleeping, please bring this with you to the hospital.    You will stay over night for an Observation stay.    Anticoagulation:    It is important to remain on your anticoagulation medication (Xarelto) uninterrupted before and after your ablation, PLEASE DO NOT STOP THIS MEDICATION.    You may not be able to have this procedure if you skip a dose of your Xarelto within 30 days of your procedure. We encourage you to make sure you are taking this medication without skipping any doses, if you happen to miss a dose prior to your procedure please contact us to make further arrangements and If you have any questions regarding your medication prior to your procedure please contact me at the number listed below.    Postoperative Information :      This procedure requires you to spend the night in the hospital overnight for observation. The hospital staff have asked that you arrange for your family/health  to be present at the hospital by 9:00 AM the following day to review your discharge instructions and transport you home from the hospital. Their goal is to have you discharged from the hospital by around 10:00AM.    Detailed information regarding discharge instructions will be given to you when you leave  the hospital.    Please plan on taking 5-7 days after the procedure for recuperation.     We ask that you do not drive until you are seen in the clinic for your post op follow up.  This is to aide in the healing of your groin sites.  This is scheduled on Thursday 10/17/19.    Follow up:  After the ablation you will be scheduled to see:    EP Nurse Clinician for an assessment and suture removal, if appropriate.    EP Nurse Practitioner in 4-6 weeks;  A 2 week heart monitor will be ordered for you to wear about 8 weeks after the ablation.    Dr Dubose or the Electrophysiology Nurse Practitioner 3 months after the ablation.    If you have any questions regarding scheduling please call the scheduling line at [phone]    Travel Restrictions following procedure :      No traveling by plane for 4 weeks.    Please refrain from extended travel outside the [place] for 3 weeks.    Contact the clinic for questions.    Parking information :      Please arrive at [facility], located at: [address] [place] [address]      Parking is available at the [address] [place] [address] entrance, and is open at 5:00 am.    If you park in the ramp located on [facility] street, take the elevator to Lobby/level one.     Enter the doors to the atrium/lobby area and check in at the main reception desk.    You will be assisted to Cardiac Special Care on the third floor.     Ramp parking will be free the day of your procedure and reduced to $4.00 for each visit after.      Please ask at the main reception desk in the lobby or on the third floor for parking validation.    Contact Information :      Please call with any questions or concerns regarding the procedure:Zulay Potts RN [phone]      Scheduling questions or concerns: Cardiovascular Procedural Schedulers at [phone]    For urgent needs after clinic hours or on weekends please contact the physician on call at [phone]                        Thank you for  [facility] [place].        Information on Atrial Fibrillation Ablations    What is Catheter Ablation?             A Catheter Ablation is a procedure that treats certain types of abnormal heart rhythms (arrhythmia). There are several components to the procedure, but the final purpose is target and destroy (ablate) small areas of your heart muscle that are causing the arrhythmia.     Why is an Ablations Done?  A catheter ablation is an effective way to treat some types of abnormal heart rhythms. An ablation is a relatively low risk procedure that may permanently cure your abnormal heart rhythm.  The ablation process damages the heart cells which results in scarring of that area. The scar is electrically inactive and can produce a permanent cure for the abnormal rhythm.  Ablation procedures can help avoid the need for rhythm medications and give patients the ability to return to their normal activity and live an active life. In patients that do not have symptoms, ablations are not typically done as there may still be an increased risk of stroke.    Why is Catheter Ablation Done?  Sometimes, the hearts electrical system does not work properly.  This can cause abnormal heart rhythms, called arrhythmias.  During an arrhythmia, the heart may beat too fast, too slowly, or irregularly.  Your doctor has recommended catheter ablation to treat a rapid (fast) heart rhythm, or tachycardia.      How Catheter Ablation Is Done  Catheter ablation uses thin, flexible wires called electrode catheters to find and destroy (ablate) problem cells. Here is how the procedure is done:    The pulmonary veins will be treated first. There are currently two tools used to ablate around the pulmonary veins.  1) Radiofrequency catheter will heat the tissue.  2)  Cryo-balloon catheter will freeze the tissue.       Testing will be done to confirm that effective treatment has been delivered.       Further testing may be performed to see if  a fib is still present or if some other rhythm problem such as atrial flutter is present. If an ongoing rhythm problem is discovered then further ablation can be done to isolate and destroy those areas responsible for the arrhythmia.       Your Experience during Catheter Ablation  In most cases, catheter ablations are done in an electrophysiology (EP) lab. Depending on your arrhythmia it often takes 4-6 hours, and sometimes longer.     The procedural area: You will be transferred back to the procedure room once you have been appropriately prepped by the nursing staff_ and you are ready for your ablation.     Sedation: You to be put completely asleep for your ablation using general anesthesia.    While you are asleep a bladder catheter will be inserted. This will be removed after your bedrest is complete.    Inserting the catheters: You will have 3-4 catheters inserted into the veins. Catheter locations can include the shoulder, neck, and groins. Catheters are guided to the heart with the help of x-ray monitors.    Finishing up: When the procedure is finished, the catheters are taken out of your body. A special closure device may be used to help seal these puncture sites. Youre then taken to your room to rest, and will be cared for by an intensive care unit (ICU) nurse.      Risks and Complications  The risks of catheter ablation are fairly low compared to the benefits you receive. Possible risks and complications include:    Common (up to 10%)  o Bleeding or bruising  o Shortness of breath  o Heartburn    Uncommon (< 1%)  o Blood clots  o A slow heart rhythm (requiring a permanent pacemaker)  o Perforation of the heart muscle, blood vessel, or lung (may require an emergency procedure)  o Stroke  o Damage to a heart valve   o Heart attack, also known as acute myocardial infarction, or AMI   o Death (extremely rare)    Before your Catheter Ablation  Before your catheter ablation, you will meet with the  electrophysiologist (specially trained heart doctor) who will do the procedure. The provider or a registered nurse will provide you with detailed instructions on how to prepare for this procedure, some of these instructions are listed below.    You will likely be told to stop or change your heart rhythm medications for a period of time before your ablation.     You may have testing done several days prior to your ablation or the morning of your ablation, such as an ECG, x-ray, blood tests, or echocardiogram.     You will not be allowed to eat or drink 8 hours before your ablation. You will be given further instructions by your physician or a registered nurse regarding the medication you will take the morning of your procedure.    You will need to arrange to have a  home from the hospital; you will not be permitted to drive after your procedure due to the sedation that you receive.     You are allowed to bring personal items and clothing to the hospital, but please refrain from bringing any valuables as the hospital is not responsible for any lost or stolen items.    You will need to bring a list of the names and dosages of the medication you are taking to the hospital.    It is important to mention to your doctor or registered nurse if you have any allergies, reactions to anesthesia, or have had history of bleeding problems.     Arriving at the Hospital the morning of your Catheter Ablation  You will need to check in at the 1st floor entrance at the [facility] the morning of your ablation, you will then be escorted to the 3rd floor where they will prepare you for your ablation and where your ablation will be performed.    When you arrive on the floor the doctor or registered nurse will meet with you prior to your ablation, this is a good time to ask questions and address any concerns you may have. You will then be asked to sign the consent form for your ablation, if this has not  already been done.    The nursing staff will begin to prepare you for your procedure:    A nurse will shave and cleanse the area where the ablation catheters will be placed. These areas are most commonly the left and right groin sites (the fold between your thigh and abdomen), and in some cases the chest, arm, and neck. This is done to reduce the risk of infection.      The nursing staff will start an intravenous (IV) line into a vein in your arm, which allows the staff to give you medication and sedatives to help you relax prior and during your ablation.    In some cases, the nursing staff will need to place a catheter that will drain urine from your bladder (Cosby Catheter), which is required due to the length and complexity of the ablation you are having.    After Catheter Ablation  Recovery immediately after your ablation in the hospital  After your catheter ablation procedure, you will be taken to a recovery room. You may need to lie flat for 2-6 hours while the insertion sites close up. During this time, a nurse will monitor you, and you will be given medication to make you comfortable. This ablation requires you to stay in the hospital overnight.    Going Home  When it is time to go home, your will need to have an adult family member or friend drive you. Most people can walk, climb stairs, and perform light activity soon after catheter ablation. You can most likely return to your full routine within a few days. However, you may be told to avoid running, heavy lifting, and other strenuous activities for a short time. Please make sure to follow any specific activity restrictions provided by the medical staff at the time of your discharge from the hospital.    Doctor's typically advise that you not drive until your post procedure assessment visit in the clinic.     Avoid heavy physical activity and heavy lifting for several days after the procedure to allow your body to heal.    Ask your doctor when you can  expect to return to work.    Take your temperature and check your incision for signs of infection (redness, swelling, drainage, or warmth) every day for a week. It is normal to have a small bruise or lump where the catheter was inserted.    Take your medications exactly as directed. Do not skip doses or stop medication without consulting your physician prior.    Learn to take your own pulse and keep a record of your results.    Follow-Up  After your ablations you will have a follow up visit to see how you are doing, to assess your rhythm after your ablation, and to address any medication changes if necessary. In many cases, one ablation is enough to treat an arrhythmia. However, sometimes the problem returns or another is found. If this happens, you may need a second catheter ablation. Tell your healthcare provider if you have any new or returning symptoms.    Common Symptoms after Catheter Ablation  In the first few weeks after catheter ablation, you may feel mild chest fullness or aching. You may also feel as if your heart is skipping beats or your heartbeat may feel faster than normal. You may think that your heart rhythm problem is about to return. These sensations are normal and usually go away with time. Talk to your healthcare provider if you are concerned.    When to Call Your Doctor    Increased bleeding, bruising, or pain at the insertion site    Episodes of atrial fibrillation are common post procedure, call the clinic if episodes are lasting longer than 4-6 hours    Difficulty with your speech or walking, or any visual disturbance    Lightheaded, dizziness, or feeling faint    Shortness of breath or chest pain    Coldness, swelling, or numbness of the arm or leg near the insertion site    A bruise or lump at the insertion site that is larger than a walnut    A fever over 100 F

## 2021-06-19 NOTE — LETTER
Letter by Marcella Velazco RDCS at      Author: Marcella Velazco RDCS Service: -- Author Type: --    Filed:  Encounter Date: 8/14/2019 Status: (Other)         Ashok Del Rosario  [address] [place] [address]      August 15, 2019      Dear Mr. Del Rosario,    RE: Remote Results    We are writing to you regarding your recent Remote Pacemaker check from the hospital. Your transmission was received successfully. Battery status is satisfactory at this time.     Your results are showing no significant changes.    Your next device appointment will be a remote check on September 5, 2019; this will occur automatically.    To schedule or reschedule, please call [phone] and press 1.    NOTE: If you would like to do an extra transmission, please call [phone] and press 3 to speak to a nurse BEFORE transmitting. This ensures that the Device Clinic staff is aware of the reason you are sending a transmission, and can follow-up with you after it has been reviewed.    We will be checking your implanted device from home (remotely) every three months unless otherwise instructed. We will need to see you in the clinic at least once a year. You may need to be seen in the clinic sooner depending on the results of your check.    Please be aware:    The follow-up schedule is like a Physician prescription.    Your remote monitor is paired to your specific implanted device.      Sincerely,    [facility]

## 2021-06-19 NOTE — LETTER
Letter by Marcella Velazco RDCS at      Author: Marcella Velazco RDCS Service: -- Author Type: --    Filed:  Encounter Date: 9/5/2019 Status: (Other)         Ashok Del Rosario  [address] [place] [address]      September 5, 2019      Dear Mr. Del Rosario,    RE: Remote Results    We are writing to you regarding your recent Remote Pacemaker check from home. Your transmission was received successfully. Battery status is satisfactory at this time.     Your results are being reviewed.  You will be contacted if further information is necessary.     Your next device appointment will be a remote check on December 10, 2019; this will occur automatically.    To schedule or reschedule, please call [phone] and press 1.    NOTE: If you would like to do an extra transmission, please call [phone] and press 3 to speak to a nurse BEFORE transmitting. This ensures that the Device Clinic staff is aware of the reason you are sending a transmission, and can follow-up with you after it has been reviewed.    We will be checking your implanted device from home (remotely) every three months unless otherwise instructed. We will need to see you in the clinic at least once a year. You may need to be seen in the clinic sooner depending on the results of your check.    Please be aware:    The follow-up schedule is like a Physician prescription.    Your remote monitor is paired to your specific implanted device.      Sincerely,    [facility]

## 2021-06-20 ENCOUNTER — HEALTH MAINTENANCE LETTER (OUTPATIENT)
Age: 71
End: 2021-06-20

## 2021-06-20 NOTE — LETTER
Letter by Marcella Velazco RDCS at      Author: Marcella Velazco RDCS Service: -- Author Type: --    Filed:  Encounter Date: 10/9/2020 Status: (Other)         Ashok Del Rosario  [address] [place] [address]      October 9, 2020      Dear Mr. Del Rosario,    RE: Remote Results    We are writing to you regarding your recent Remote Pacemaker check from home. Your transmission was received successfully. Battery status is satisfactory at this time.     Your results are showing no significant changes.    Your next device appointment will be a remote check on January 11, 2021; this will occur automatically.    To schedule or reschedule, please call [phone] and press 1.    NOTE: If you would like to do an extra transmission, please call [phone] and press 3 to speak to a nurse BEFORE transmitting. This ensures that the Device Clinic staff is aware of the reason you are sending a transmission, and can follow-up with you after it has been reviewed.    We will be checking your implanted device from home (remotely) every three months unless otherwise instructed. We will need to see you in the clinic at least once a year. You may need to be seen in the clinic sooner depending on the results of your check.    Please be aware:    The follow-up schedule is like a Physician prescription.    Your remote monitor is paired to your specific implanted device.      Sincerely,    [facility]

## 2021-06-20 NOTE — LETTER
Letter by Renae López RN at      Author: Renae López RN Service: -- Author Type: --    Filed:  Encounter Date: 4/30/2020 Status: (Other)         Ashok Del Rosario  [address] [place] [address]      April 30, 2020      Dear Mr. Del Rosario,    RE: Remote Results    We are writing to you regarding your recent Remote Pacemaker check from home. Your transmission was received successfully. Battery status is satisfactory at this time.     Your results are showing no significant changes.    Your next device appointment will be a remote check on 8/4/2020.  You can choose the time of day you wish to transmit.    To schedule or reschedule, please call [phone] and press 1.    NOTE: If you would like to do an extra transmission, please call [phone] and press 3 to speak to a nurse BEFORE transmitting. This ensures that the Device Clinic staff is aware of the reason you are sending a transmission, and can follow-up with you after it has been reviewed.    We will be checking your implanted device from home (remotely) every three months unless otherwise instructed. We will need to see you in the clinic at least once a year. You may need to be seen in the clinic sooner depending on the results of your check.    Please be aware:    The follow-up schedule is like a Physician prescription.    Your remote monitor is paired to your specific implanted device.      Sincerely,    [facility]

## 2021-06-20 NOTE — LETTER
Letter by Ivana eKy RN at      Author: Ivana Key RN Service: -- Author Type: --    Filed:  Encounter Date: 6/18/2020 Status: (Other)         Ashok Del Rosario  [address] [place] [address]      June 19, 2020      Dear Mr. Del Rosario,    RE: Remote Results    We are writing to you regarding your recent Remote Pacemaker check from home. Your transmission was received successfully. Battery status is satisfactory at this time.     Your results are within normal limits.    Your next device appointment will be a clinic visit.  Please call in August 2020 to schedule.      To schedule or reschedule, please call [phone] and press 1.    NOTE: If you would like to do an extra transmission, please call [phone] and press 3 to speak to a nurse BEFORE transmitting. This ensures that the Device Clinic staff is aware of the reason you are sending a transmission, and can follow-up with you after it has been reviewed.    We will be checking your implanted device from home (remotely) every three months unless otherwise instructed. We will need to see you in the clinic at least once a year. You may need to be seen in the clinic sooner depending on the results of your check.    Please be aware:    The follow-up schedule is like a Physician prescription.    Your remote monitor is paired to your specific implanted device.      Sincerely,    [facility]

## 2021-06-20 NOTE — LETTER
Letter by Marcella Velazco RDCS at      Author: Marcella Velazco RDCS Service: -- Author Type: --    Filed:  Encounter Date: 12/10/2019 Status: Signed         Ashok Del Rosario  [address] [place] [address]      December 10, 2019      Dear Mr. Del Rosario,    RE: Remote Results    We are writing to you regarding your recent Remote Pacemaker check from home. Your transmission was received successfully. Battery status is satisfactory at this time.     Your results are being reviewed.  You will be contacted if further information is necessary.     Your next device appointment will be a clinic visit.  Please call in December to schedule.      To schedule or reschedule, please call [phone] and press 1.    NOTE: If you would like to do an extra transmission, please call [phone] and press 3 to speak to a nurse BEFORE transmitting. This ensures that the Device Clinic staff is aware of the reason you are sending a transmission, and can follow-up with you after it has been reviewed.    We will be checking your implanted device from home (remotely) every three months unless otherwise instructed. We will need to see you in the clinic at least once a year. You may need to be seen in the clinic sooner depending on the results of your check.    Please be aware:    The follow-up schedule is like a Physician prescription.    Your remote monitor is paired to your specific implanted device.      Sincerely,    [facility]

## 2021-06-21 NOTE — LETTER
Letter by Any Sherman at      Author: Any Sherman Service: -- Author Type: --    Filed:  Encounter Date: 1/11/2021 Status: (Other)         Ashok Del Rosario  [address] [place] [address]      January 11, 2021      Dear Mr. Del Rosario,    RE: Remote Results    We are writing to you regarding your recent Remote Pacemaker check from home. Your transmission was received successfully. Battery status is satisfactory at this time.     Your results are showing no significant changes.    Your next device appointment will be a remote check on April 12, 2021; this will occur automatically.    To schedule or reschedule, please call [phone] and press 1.    NOTE: If you would like to do an extra transmission, please call [phone] and press 3 to speak to a nurse BEFORE transmitting. This ensures that the Device Clinic staff is aware of the reason you are sending a transmission, and can follow-up with you after it has been reviewed.    We will be checking your implanted device from home (remotely) every three months unless otherwise instructed. We will need to see you in the clinic at least once a year. You may need to be seen in the clinic sooner depending on the results of your check.    Please be aware:    The follow-up schedule is like a Physician prescription.    Your remote monitor is paired to your specific implanted device.      Sincerely,    [facility]

## 2021-06-21 NOTE — LETTER
Letter by Marcella Velazco RDCS at      Author: Marcella Velazco RDCS Service: -- Author Type: --    Filed:  Encounter Date: 4/12/2021 Status: (Other)         Ashok Del Rosario  [address] [place] [address]      April 12, 2021      Dear Mr. Del Rosario,    RE: Remote Results    We are writing to you regarding your recent Remote Pacemaker check from home. Your transmission was received successfully. Battery status is satisfactory at this time.     Your results are showing no significant changes.    You are overdue for your clinic visit.  Please call now to schedule.    To schedule or reschedule, please call [phone] and press 1.    NOTE: If you would like to do an extra transmission, please call [phone] and press 3 to speak to a nurse BEFORE transmitting. This ensures that the Device Clinic staff is aware of the reason you are sending a transmission, and can follow-up with you after it has been reviewed.    We will be checking your implanted device from home (remotely) every three months unless otherwise instructed. We will need to see you in the clinic at least once a year. You may need to be seen in the clinic sooner depending on the results of your check.    Please be aware:    The follow-up schedule is like a Physician prescription.    Your remote monitor is paired to your specific implanted device.      Sincerely,    [facility]

## 2021-06-22 NOTE — PATIENT INSTRUCTIONS - HE
Pacemaker Post-operative Checklist      The Device Registered Nurse (RN) reviewed the pacemaker function.      The Device RN did a wound assessment and wound care teaching.    Please call the Device Nurses with any signs of infection or questions regarding wound healing. Device Nurse Line: [phone], Option #3      The Device RN demonstrated and displayed the specific remote monitoring system for your pacemaker.      The Device RN reviewed the Partnership Agreement Form.    Patient Instructions    Do not lift your LEFT arm above the shoulder height, perform any vigorous arm movements such as swimming, golfing, washing windows, shoveling show, vacuuming or lifting greater than 10-15lbs with the affected arm for FOUR weeks from the date of implant, after 01/28/2019.  You may apply an ice pack to your device site for 10 minutes 4 to 5 times daily as needed for pain or discomfort.      To reduce the risk of infection, try to avoid any dental procedures for the first 6 weeks, after 02/11/2019 after your pacemaker implant. If you have an emergent or urgent dental need during this time, contact the device clinic for a prescription for an antibiotic.      You will receive a device identification (ID) card in the mail from the device  within 6 weeks to replace the temporary ID card you were given in the hospital.      You may travel by any mode of transportation; just show your pacemaker ID card. You may be subject to a hand search or use of a handheld wand, but official should not keep the wand over the implant site for greater than 5-10 seconds.      For any surgery, let your doctor know you have a pacemaker.       Your pacemaker is MRI safe after 02/11/2019      Most household appliances, including a microwave, will not interfere with your pacemaker function. If you suspect interference, simply move away from the source. Cell phones do not cause a problem. Please refer to the device booklet from the   or their website under the section on electromagnetic interference (CEM) for further guidelines on things that may interfere with your pacemaker.       Device Clinic Contact Information  Device Nurses: [phone], Option #3    Device Remote Specialists: [phone], Option #2. For questions about your Remote Transmission or Transmission Schedule  Device Schedulers: [phone], Option #1

## 2021-06-22 NOTE — PROGRESS NOTES
"DEVICE CLINIC RN POST-OP NOTE    Reason for visit: In-clinic post-operative wound and device check; s/p implant of a dual chamber pacemaker system, MRI conditional     Device: Medtronic [id number] [place] XT DR CHILDERS, RA and RV Leads: Medtronic [id number] CapSureFix Novus MRI SureScan  Procedure date: 12/31/2018  Implant Diagnosis: Symptomatic Bradycardia, Sick Sinus Syndrome, Syncope  Implanting Physician: Dr. Micheal Junior.      Assessment  Subjective: \"I haven't had any of that lightheadedness or feeling like I'm going to faint since I got the pacemaker.\"  \"It's still a little sore.\"  We discussed using an ice pack for 10 minute 4 to 5 times daily for site discomfort.  Vitals:   Vitals:    01/07/19 1420   BP: 112/70   Pulse: 76   Resp: 18   Temp: 97.7  F (36.5  C)     Heart Sounds: normal  Lung Sounds: clear to auscultation bilaterally  Incision/device pocket: Clean, dry and intact with resolving ecchymosis and edema.  There are no signs of infection present.  The Steri-strips were removed at today's visit and the area cleaned of residual adhesive.      Device Findings  Interrogation of device reveals normal sensing and capture thresholds  See the Paceart Report for a full summary of the device information.    Other: Intrinsic rhythm today is sinus rhythm at 68 bpm, occasional PACs observed.      Patient Education  Ashok Del Rosario was accompanied today by his wife, Eevlyne and daughter, Lisseth.     [facility]'s Partnership Agreement for Device Patients and Post-operative Checklist were presented and reviewed with Mr. Del Rosario and his family at today's appointment.    Signs and symptoms of infection, poor wound healing, and normal device function were reviewed. Range of motion and weight restrictions for the left arm are for four weeks. He was issued a Spotie remote monitor and instructed on its set-up and use for remote monitoring by the [employer] representative prior to today's " appointment. These instructions were reviewed with Mr. Del Rosario and his family at today s visit.       Plan  - On month Remote transmission on 02/05/2019  - Three month post-operative in-clinic device check on 03/26/2019 at our [facility] clinic location    Mariana Reynoso RN, MA  Device Nurse  [facility]

## 2021-06-23 NOTE — TELEPHONE ENCOUNTER
----- Message from Jacqui Sanches sent at 1/10/2019 10:05 AM CST -----  General phone call:    Caller: Patient Spouse Evelyne  Primary cardiologist: Dr Hyatt  Detailed reason for call: Evelyne is calling regarding new vitamins the patient wanted to take and wanted to know if that is ok. Patient wanted to take fish oils with omega 3 and b12. Please give Evelyne a call back  New or active symptoms? no  Best phone number: [phone]  Best time to contact: anytime  Ok to leave a detailed message? yes  Device? yes    Additional Info:

## 2021-06-23 NOTE — TELEPHONE ENCOUNTER
Spoke with patient's wife, Evelyne, she has questions about taking fish oil, omega 3 and B12.     Evelyne instructed to discuss with patient PCP and to make f/u appointment with Dr. Hyatt. Evelyne verbalized understanding and agrees with plan. Transferred to scheduling to make appointment with Dr. Hyatt. No further questions or concerns at this time.

## 2021-06-23 NOTE — TELEPHONE ENCOUNTER
Patient missed his morning dose of flecanide and took it at 1530. Patient wondering if he should still take his evening dose of flecanide.     Spoke with Mane Alatorre NP she states that he would be fine to take his other dose around 10 pm this evening.     Patient informed of Mane's recommendations, patient verbalized understanding and agrees with plan. No further questions or concerns at this time.

## 2021-06-23 NOTE — TELEPHONE ENCOUNTER
Jossie Lewis RN called in different dose of Xarelto to see if insurance will cover.    Patient informed and instructed to call back with any further questions or concerns. Patient verbalized understanding and agrees with plan. No further questions or concerns at this time.

## 2021-06-23 NOTE — PROGRESS NOTES
[facility]   Outpatient Follow-up evaluation.      Current Outpatient Medications:      acetaminophen (TYLENOL) 500 MG tablet, Take 1-2 tablets (500-1,000 mg total) by mouth every 4 (four) hours as needed., Disp: , Rfl: 0     aspirin 81 mg chewable tablet, Chew 81 mg every evening. , Disp: , Rfl:      atorvastatin (LIPITOR) 40 MG tablet, Take 1 tablet (40 mg total) by mouth at bedtime., Disp: 30 tablet, Rfl: 5     irbesartan (AVAPRO) 150 MG tablet, Take 150 mg by mouth daily., Disp: , Rfl:      metoprolol succinate (TOPROL-XL) 50 MG 24 hr tablet, Take 1 tablet (50 mg total) by mouth daily., Disp: 30 tablet, Rfl: 5     metroNIDAZOLE (METROCREAM) 0.75 % cream, Apply 1 application topically 2 (two) times a day. For Rosacea - apply to nose and right side of face, Disp: , Rfl:      omeprazole (PRILOSEC) 40 MG capsule, Take 40 mg by mouth daily before breakfast., Disp: , Rfl:         Ashok Del Rosario is a 68 y.o. Male    Chief Complaint   Patient presents with     Follow-up       Diagnoses:(See Problem list)          Recommendations:    Will initiate therapy with Xarelto 20 mg a day in view of his atrial fibrillation event.      Subjective:   68-year-old man who was seen recently in the hospital for possible TIA and found to have bradycardia.  Underwent permanent pacemaker placement and released.  He was seen in the emergency room this past weekend because of new onset atrial fibrillation.  He underwent cardioversion in the ER with successful restoration of sinus rhythm.  The ER note states as follows:     He is found to be in atrial fibrillation with RVR. After an interrogation of his pacemaker it is determined that the patient was in atrial fibrillation since about 2:30 this afternoon. Therefore he would be in the appropriate zone to be able to electrocardiovert him. He is currently on aspirin. He has no history of atrial fibrillation in the past. Patient's labs were largely unremarkable and the  chest x-ray was unremarkable. Patient was sedated with propofol, and required 2 shocks to be able to convert him to normal sinus rhythm. The first shock at 100 joules synchronized shock converted him briefly, however after about 10 seconds he went back into atrial fibrillation and required a second shock. The second shock at 100 joules converted the patient to normal sinus and he remained that way. He was observed in the emergency department for a period of time after the electrocardioversion and he never returned to atrial fibrillation therefor at this time I believe he is appropriate for discharge to home. He does have a follow up appointment with Dr. Hyatt in cardiology on Monday, which is appropriate follow up for this patient. Return precautions discussed.   Since his release from the hospital he has felt well tolerating.  He was started on metoprolol in the ER.  He had not been on beta-blocker before.  He is tolerating medication well.  He is currently not on a anticoagulant.  He has had a history of snoring and nasal problems in the past question of sleep apnea but never has had a sleep study.  History of stroke question of TIA during his recent admission.                 Past Medical History:   Diagnosis Date     Family history of myocardial infarction      GERD (gastroesophageal reflux disease)      High cholesterol      Hypertension      Past Surgical History:   Procedure Laterality Date     CV CORONARY ANGIOGRAM N/A 10/17/2017    Procedure: Coronary Angiogram;  Surgeon: Ashok Hyatt MD;  Location: [facility] Cath Lab;  Service:      CV LEFT HEART CATHETERIZATION WITH LEFT VENTRICULOGRAM N/A 10/17/2017    Procedure: Left Heart Catheterization with Left Ventriculogram;  Surgeon: Ashok Hyatt MD;  Location: [facility] Cath Lab;  Service:      EP PACEMAKER INSERT Left 12/31/2018    Procedure: EP Pacemaker Insertion;  Surgeon: Micheal Junior MD;  Location: [facility] Cath Lab;  Service:  "Cardiology     Allergies   Allergen Reactions     Cephalexin Rash     Family History   Problem Relation Age of Onset     Cancer Mother      Cancer Father      Heart attack Brother      Coronary artery disease Brother      Valvular heart disease Brother      Rectal cancer Sister      Colon cancer Sister       Social History     Socioeconomic History     Marital status:      Spouse name: Not on file     Number of children: Not on file     Years of education: Not on file     Highest education level: Not on file   Social Needs     Financial resource strain: Not on file     Food insecurity - worry: Not on file     Food insecurity - inability: Not on file     Transportation needs - medical: Not on file     Transportation needs - non-medical: Not on file   Occupational History     Not on file   Tobacco Use     Smoking status: Former Smoker     Last attempt to quit: 10/16/1999     Years since quittin.2     Smokeless tobacco: Never Used   Substance and Sexual Activity     Alcohol use: Yes     Alcohol/week: 0.6 oz     Types: 1 Cans of beer per week     Drug use: No     Sexual activity: Yes     Partners: Female   Other Topics Concern     Not on file   Social History Narrative     Not on file         Objective:   /80 (Patient Site: Left Arm, Patient Position: Sitting, Cuff Size: Adult Large)   Pulse 88   Resp 18   Ht 5' 8\" (1.727 m)   Wt 201 lb (91.2 kg)   BMI 30.56 kg/m    201 lb (91.2 kg)   Wt Readings from Last 3 Encounters:   19 201 lb (91.2 kg)   19 200 lb (90.7 kg)   19 200 lb (90.7 kg)     BP Readings from Last 3 Encounters:   19 124/80   19 127/84   19 112/70     Pulse Readings from Last 3 Encounters:   19 88   19 94   19 76     General appearance: alert, appears stated age and cooperative  Head: Normocephalic, without obvious abnormality, atraumatic  Eyes: Normal external exam without jaundice.  Ears: Normal external auricular exam.  Nose: " Normal external exam.  Lungs: clear to auscultation bilaterally  Chest wall: no tenderness  Heart: regular rate and rhythm, S1, S2 normal, no murmur, click, rub or gallop   Pulses: 2+ and symmetric  Skin: Skin color, texture, turgor normal.   Neurologic: Grossly normal, no focal neurologic findings.    Review of Systems:   General: WNL  Cardiographics: Reviewed in clinic.    Lab Results:  Lab Results: Personally reviewed  Lab Results   Component Value Date     01/11/2019    K 3.9 01/11/2019     (H) 01/11/2019    CO2 22 01/11/2019    BUN 14 01/11/2019    CREATININE 0.93 01/11/2019    CALCIUM 10.2 01/11/2019       Clinical evaluation time today including exam 20 minutes.  At least 50% of clinic evaluation time involved in assessment and patient counseling.  Part of this chart was created using a dictation software.  Typographic errors, word substitutions, and grammatical errors may unintentionally occur.    Ashok Hyatt M.D.  [facility]

## 2021-06-23 NOTE — TELEPHONE ENCOUNTER
----- Message from Roberto Perez sent at 1/23/2019  3:30 PM CST -----  Contact: Pt  General phone call:    Caller: Pt    Primary cardiologist: Dr Hyatt / Suzan MONTANO    Detailed reason for call: Pt was prescribed FLECANIDE by Suzan - his directions were 1/2 tab AM 1/2 tab PM - he forgot to take the AM dose so he took it now, but wasn't sure if that was ok - and should he also take it in the PM dose tonight as usual?    Pt wanted a call back today please    New or active symptoms? Na    Best phone number: home or cell he said either    Best time to contact: any    Ok to leave a detailed message? Yes    Device? n    Additional Info:

## 2021-06-23 NOTE — TELEPHONE ENCOUNTER
----- Message from Abiola Horn sent at 2/4/2019  8:29 AM CST -----  Contact: Patient   Caller: Annika    Primary cardiologist: Dr. Hyatt    Detailed reason for call: Annika states he has a new pacemaker in January and he has a cold or sinus infection, he can't breath well and asks what Dr. Hyatt recommends he can take for over-the-counter medication? Please return call.     New or active symptoms? Yes    Best phone number: [phone]  Best time to contact: Today  Ok to leave a detailed message? Yes  Device? Yes

## 2021-06-23 NOTE — PATIENT INSTRUCTIONS - HE
Ashok Del Rosario,    It was a pleasure to see you today at the [facility].     My recommendations after this visit include:    Continue current medications.    Keep a log of A fib episodes.    Follow up in 1 month    Suzan Waddell, [facility], Electrophysiology  [phone]  Shahrzad (nurse) [phone]

## 2021-06-23 NOTE — TELEPHONE ENCOUNTER
----- Message from Roberto Perez sent at 1/15/2019  8:37 AM CST -----  Contact: Pt  General phone call:    Caller: Pt    Primary cardiologist: JHOAN Hyatt    Detailed reason for call: Pt was just seen in an OV - was prescribed XARELTO - but it is far too expensive so he is asking for another option - would like a call to let him know what options are    New or active symptoms? na  Best phone number: [phone]  Best time to contact: any  Ok to leave a detailed message? yes  Device? no    Additional Info: thank you

## 2021-06-23 NOTE — TELEPHONE ENCOUNTER
Patient's insurance does not cover Xarelto, patient wondering if there is another medication he can try.     Dr. Hyatt what is your recommendation?

## 2021-06-23 NOTE — PROGRESS NOTES
In clinic device check with Device RN and follow-up with Suzan Waddell RN, CNP.  Please see link for full device report.  Patient was informed of results and next follow up during today's visit.

## 2021-06-23 NOTE — TELEPHONE ENCOUNTER
"Called patient and he states \"sinus infection\" has been spreading through his family members. Patient reports \"now it's got me\" and wonders what he can take to help with symptoms. Instructed patient to contact PCP for recommendations, but to avoid nasal decongestants containing pseudoephedrine in the meantime. Patient verbalized understanding and had no other questions at this time.  "

## 2021-06-23 NOTE — TELEPHONE ENCOUNTER
Somewhat panic call from patient's wife regarding Ashok's symptoms. He is calling with lightheadedness and dizziness. He just went to lay down and take his BP and heart rate with a BP cuff. His BP is 134/93 and heart rate 144bpm. He is resting now and took the heart rate again and it was down to 137bpm.    Advised he rest and see if the symptoms resolve. He will retake his BP and assess symptoms in 30 minutes . He has a history of PAT and PACs noted on the last remote check and his symptoms are similar to how he felt prior to getting the pacemaker with bradycardia.    They agreed to this plan and will go to the [facility] should his symptoms worsen and if they are still symptomatic.     Renae López RN BSN  [facility]

## 2021-06-24 NOTE — PROGRESS NOTES
[facility]   Outpatient Follow-up evaluation.     Current Outpatient Medications:      acetaminophen (TYLENOL) 500 MG tablet, Take 1-2 tablets (500-1,000 mg total) by mouth every 4 (four) hours as needed., Disp: , Rfl: 0     atorvastatin (LIPITOR) 80 MG tablet, Take 1 tablet (80 mg total) by mouth at bedtime., Disp: 90 tablet, Rfl: 3     flecainide (TAMBOCOR) 150 MG tablet, Take 0.5 tablets (75 mg total) by mouth every 12 (twelve) hours., Disp: 90 tablet, Rfl: 3     irbesartan (AVAPRO) 150 MG tablet, Take 150 mg by mouth daily., Disp: , Rfl:      metoprolol succinate (TOPROL-XL) 50 MG 24 hr tablet, Take 1 tablet (50 mg total) by mouth daily., Disp: 30 tablet, Rfl: 5     metroNIDAZOLE (METROCREAM) 0.75 % cream, Apply 1 application topically 2 (two) times a day as needed (roseacea). For Rosacea - apply to nose and right side of face    , Disp: , Rfl:      omeprazole (PRILOSEC) 40 MG capsule, Take 40 mg by mouth daily before breakfast., Disp: , Rfl:      rivaroxaban 20 mg Tab, Take 1 tablet (20 mg total) by mouth daily with supper., Disp: 90 tablet, Rfl: 3     zolpidem (AMBIEN) 5 MG tablet, Do not take this at home.  For use if needed during the sleep study. May take one tablet by mouth as directed once you are in the Sleep Lab., Disp: 1 tablet, Rfl: 0        Ashok Del Rosario is a 68 y.o. Male    Chief Complaint   Patient presents with     Follow-up     Shortness of Breath     SOB since having pacemaker put in and getting worse, confusion, with dizziness        Diagnoses:(See Problem list)        SVT.  Paroxysmal atrial fibrillation.  Recent cardioversion for atrial fibrillation.  Permanent pacemaker placement.  Daytime fatigue and possibility of sleep apnea.    Recommendations:    At present we will proceed with a sleep study to see if atrial fibrillation could be causing some of his daytime fatigue weakness symptoms.  Alternatively he might be overmedicated with his antihypertension regimen  with addition of metoprolol.  I suggested that we first see if the sleep study shows and consider CPAP treatment if MARCELLA is present.  If is not very significant study we could attempt reducing his irbesartan to 75 mg daily and monitor his blood pressure and see if he feels better on a lower dose      Subjective:   Daytime fatigue and weakness.  Some dyspnea with exertion.  No chest pain pressure tightness or heaviness.  Several paroxysms of SVT and a recent episode of atrial fibrillation requiring cardioversion.  Currently in sinus rhythm with stable rhythm                    Past Medical History:   Diagnosis Date     Coronary artery disease      Family history of myocardial infarction      GERD (gastroesophageal reflux disease)      High cholesterol      Hypertension      Past Surgical History:   Procedure Laterality Date     ABDOMINAL SURGERY  2000    appendix     CV CORONARY ANGIOGRAM N/A 10/17/2017    Procedure: Coronary Angiogram;  Surgeon: Ashok Hyatt MD;  Location: [facility] Cath Lab;  Service:      CV LEFT HEART CATHETERIZATION WITH LEFT VENTRICULOGRAM N/A 10/17/2017    Procedure: Left Heart Catheterization with Left Ventriculogram;  Surgeon: Ashok Hyatt MD;  Location: [facility] Cath Lab;  Service:      EP PACEMAKER INSERT Left 12/31/2018    Procedure: EP Pacemaker Insertion;  Surgeon: Micheal Junior MD;  Location: [facility] Cath Lab;  Service: Cardiology     Allergies   Allergen Reactions     Cephalexin Rash     Family History   Problem Relation Age of Onset     Cancer Mother      Cancer Father      Heart attack Brother      Coronary artery disease Brother      Valvular heart disease Brother      Rectal cancer Sister      Colon cancer Sister       Social History     Socioeconomic History     Marital status:      Spouse name: Not on file     Number of children: Not on file     Years of education: Not on file     Highest education level: Not on file   Occupational History     Not  "on file   Social Needs     Financial resource strain: Not on file     Food insecurity:     Worry: Not on file     Inability: Not on file     Transportation needs:     Medical: Not on file     Non-medical: Not on file   Tobacco Use     Smoking status: Former Smoker     Last attempt to quit: 10/16/1999     Years since quittin.4     Smokeless tobacco: Never Used   Substance and Sexual Activity     Alcohol use: Yes     Alcohol/week: 0.6 oz     Types: 1 Cans of beer per week     Comment: none since december     Drug use: No     Sexual activity: Yes     Partners: Female   Lifestyle     Physical activity:     Days per week: Not on file     Minutes per session: Not on file     Stress: Not on file   Relationships     Social connections:     Talks on phone: Not on file     Gets together: Not on file     Attends Sabianist service: Not on file     Active member of club or organization: Not on file     Attends meetings of clubs or organizations: Not on file     Relationship status: Not on file     Intimate partner violence:     Fear of current or ex partner: Not on file     Emotionally abused: Not on file     Physically abused: Not on file     Forced sexual activity: Not on file   Other Topics Concern     Not on file   Social History Narrative     Not on file         Objective:   /80 (Patient Site: Left Arm, Patient Position: Sitting, Cuff Size: Adult Regular)   Pulse 80   Resp 16   Ht 5' 7.32\" (1.71 m)   Wt 202 lb 9.6 oz (91.9 kg)   BMI 31.43 kg/m    202 lb 9.6 oz (91.9 kg)   Wt Readings from Last 3 Encounters:   19 202 lb 9.6 oz (91.9 kg)   19 203 lb (92.1 kg)   19 203 lb (92.1 kg)     BP Readings from Last 3 Encounters:   19 120/80   19 122/86   19 104/74     Pulse Readings from Last 3 Encounters:   19 80   19 74   19 84     General appearance: alert, appears stated age and cooperative  Head: Normocephalic, without obvious abnormality, atraumatic  Eyes: " Normal external exam without jaundice.  Ears: Normal external auricular exam.  Nose: Normal external exam.  Lungs: clear to auscultation bilaterally  Chest wall: no tenderness  Heart: regular rate and rhythm, S1, S2 normal, no murmur, click, rub or gallop normal rhythm on exam  Pulses: 2+ and symmetric  Skin: Skin color, texture, turgor normal.   Neurologic: Grossly normal, no focal neurologic findings.    Review of Systems:   General: WNL  Cardiographics: Reviewed in clinic.    Lab Results:  Lab Results: Personally reviewed  Lab Results   Component Value Date     01/18/2019    K 4.0 01/18/2019     01/18/2019    CO2 20 (L) 01/18/2019    BUN 12 01/18/2019    CREATININE 0.92 01/18/2019    CALCIUM 9.8 01/18/2019       Clinical evaluation time today including exam 35 minutes.  At least 50% of clinic evaluation time involved in assessment and patient counseling.  Part of this chart was created using a dictation software.  Typographic errors, word substitutions, and grammatical errors may unintentionally occur.    Ashok Hyatt M.D.  [facility]

## 2021-06-24 NOTE — PATIENT INSTRUCTIONS - HE
Ashok Del Rosario,    It was a pleasure to see you today at the [facility].     My recommendations after this visit include:    Continue current medications.    Follow up with Dr. Hyatt in 2-3 months.  Follow up with me in 6 months, or sooner if needed.    Suzan Waddell, [facility], Electrophysiology  [phone]  Shahrzad (nurse) [phone]

## 2021-06-24 NOTE — PROGRESS NOTES
Dear  Ashok Hyatt Md  [address] [place] [address]    Thank you for the opportunity to participate in the care of  Ashok Del Rosario.    Ashok Del Rosario is sent by Ashok Hyatt MD for a sleep consultation regarding snoring and sleep apnea.     He has a history of remote heavy tobacco use (60 - 70 pack-years), HTN, SSS s/p PPM, and atrial fibrillation, and obesity .    Schedule - Retired .  Now doing part time maintenance for [employer].  Up for the day around 5 AM.  Usually going to bed around 9 - 10 PM. Napping during the day in the chair.   He is waking during the night and watching the clock.    Sleep Disordered Breathing - Snoring is minimal but per wife he is a heavy breather.  No witnessed apneas or snort arousals.    No nocturia.  Occasional nocturnal GERD.   Upon waking feels pretty good.  He denies morning headaches.  No morning dry mouth.  Does get morning sinus congestion.   Patient was counseled on the importance of driving while alert, to pull over if drowsy, or nap before getting into the vehicle if sleepy.    Movement/Behaviors - No somniloquy.  No somnambulism.  No sleep related eating.  No dream enactment behavior.   He has only few teeth left and denies bruxism.    Patient denies typical restless legs syndrome symptoms.  Does get nocturnal leg cramps.    Alcohol use - None since heart troubles. Previously 3 - 5 drinks/beers in a sitting only on social occasions  Caffeine intake - coffee 2.5 cups /day. Last caffeine intake is usually in the morning.  Tobacco exposure - quit 20+ years ago  Illicit substances - none    Lives with wife, Libby, and daughter.  Other adult child lives in the [facility].  Has 1 pet, dog (daughter's dog).     No family history of snoring or Obstructive Sleep Apnea.    Past Medical History  Past Medical History:   Diagnosis Date     Coronary artery disease      Family history of myocardial infarction      GERD  (gastroesophageal reflux disease)      High cholesterol      Hypertension      Patient Active Problem List    Diagnosis Date Noted     SVT (supraventricular tachycardia) (H) 01/19/2019     Syncope 01/18/2019     Near syncope 01/18/2019     Paroxysmal atrial fibrillation (H) 01/14/2019     Cardiac pacemaker in situ, dual chamber 01/07/2019     Overview Note:     Medtronic [id number] [place] XT DR CHILDERS, RA and RV Leads: Medtronic 5076 CapSureFix Novus MRI SureScan, DOI 12/31/18 by Dr. Micheal Junior.       SSS (sick sinus syndrome) (H)      TIA (transient ischemic attack) 12/30/2018     Dizziness 12/30/2018     Bradycardia 12/29/2018     Primary hypertension      Mixed hyperlipidemia      Gastroesophageal reflux disease, esophagitis presence not specified      Abnormal stress test 10/16/2017     Chest pain 10/15/2017          Past Surgical History  Past Surgical History:   Procedure Laterality Date     ABDOMINAL SURGERY  2000    appendix     CV CORONARY ANGIOGRAM N/A 10/17/2017    Procedure: Coronary Angiogram;  Surgeon: Ashok Hyatt MD;  Location: [facility] Cath Lab;  Service:      CV LEFT HEART CATHETERIZATION WITH LEFT VENTRICULOGRAM N/A 10/17/2017    Procedure: Left Heart Catheterization with Left Ventriculogram;  Surgeon: Ashok Hyatt MD;  Location: [facility] Cath Lab;  Service:      EP PACEMAKER INSERT Left 12/31/2018    Procedure: EP Pacemaker Insertion;  Surgeon: Micheal Junior MD;  Location: [facility] Cath Lab;  Service: Cardiology        Meds  Current Outpatient Medications   Medication Sig Dispense Refill     acetaminophen (TYLENOL) 500 MG tablet Take 1-2 tablets (500-1,000 mg total) by mouth every 4 (four) hours as needed.  0     atorvastatin (LIPITOR) 80 MG tablet Take 1 tablet (80 mg total) by mouth at bedtime. 90 tablet 3     flecainide (TAMBOCOR) 150 MG tablet Take 0.5 tablets (75 mg total) by mouth every 12 (twelve) hours. 90 tablet 3     irbesartan (AVAPRO) 150 MG tablet Take 150 mg by  mouth daily.       metoprolol succinate (TOPROL-XL) 50 MG 24 hr tablet Take 1 tablet (50 mg total) by mouth daily. 30 tablet 5     metroNIDAZOLE (METROCREAM) 0.75 % cream Apply 1 application topically 2 (two) times a day as needed (roseacea). For Rosacea - apply to nose and right side of face              omeprazole (PRILOSEC) 40 MG capsule Take 40 mg by mouth daily before breakfast.       rivaroxaban 20 mg Tab Take 1 tablet (20 mg total) by mouth daily with supper. 90 tablet 3     No current facility-administered medications for this visit.         Allergies  Cephalexin     Social History  Social History     Socioeconomic History     Marital status:      Spouse name: Not on file     Number of children: Not on file     Years of education: Not on file     Highest education level: Not on file   Occupational History     Not on file   Social Needs     Financial resource strain: Not on file     Food insecurity:     Worry: Not on file     Inability: Not on file     Transportation needs:     Medical: Not on file     Non-medical: Not on file   Tobacco Use     Smoking status: Former Smoker     Last attempt to quit: 10/16/1999     Years since quittin.4     Smokeless tobacco: Never Used   Substance and Sexual Activity     Alcohol use: Yes     Alcohol/week: 0.6 oz     Types: 1 Cans of beer per week     Comment: none since december     Drug use: No     Sexual activity: Yes     Partners: Female   Lifestyle     Physical activity:     Days per week: Not on file     Minutes per session: Not on file     Stress: Not on file   Relationships     Social connections:     Talks on phone: Not on file     Gets together: Not on file     Attends Druze service: Not on file     Active member of club or organization: Not on file     Attends meetings of clubs or organizations: Not on file     Relationship status: Not on file     Intimate partner violence:     Fear of current or ex partner: Not on file     Emotionally abused: Not on  "file     Physically abused: Not on file     Forced sexual activity: Not on file   Other Topics Concern     Not on file   Social History Narrative     Not on file        Family History  Family History   Problem Relation Age of Onset     Cancer Mother      Cancer Father      Heart attack Brother      Coronary artery disease Brother      Valvular heart disease Brother      Rectal cancer Sister      Colon cancer Sister         Review of Systems: Dyspnea on exertion.   Constitutional: Negative except as noted in HPI.   Eyes: Negative except as noted in HPI.   ENT: Negative except as noted in HPI.   Cardiovascular: Negative except as noted in HPI.   Respiratory: Negative except as noted in HPI.   Gastrointestinal: Negative except as noted in HPI.   Genitourinary: Negative except as noted in HPI.   Musculoskeletal: Negative except as noted in HPI.   Integumentary: Negative except as noted in HPI.   Neurological: Negative except as noted in HPI.   Psychiatric: Negative except as noted in HPI.   Endocrine: Negative except as noted in HPI.   Hematologic/Lymphatic: Negative except as noted in HPI.      Physical Exam:  /86 (Patient Site: Right Arm, Patient Position: Sitting, Cuff Size: Adult Regular)   Pulse 74   Ht 5' 7.32\" (1.71 m)   Wt 203 lb (92.1 kg)   SpO2 97%   BMI 31.49 kg/m    General appearance: No apparent distress, well groomed.    HEENT:   Head: Normocephalic, atraumatic.  Eyes: PERRL  Mouth: Teeth: Upper full and lower partial  Oropharynx:  Mallampati Classification: III  Neck: Supple without bruit.    Musculoskeletal: No edema noted.  No clubbing or cyanosis.  Skin: Warm, dry, intact.  Neurologic: Alert and oriented to person, place and time   Gait is normal.  Psychiatric: Affect pleasant.  Mood good.     Labs/Studies:   Lab Results   Component Value Date    WBC 7.4 01/18/2019    HGB 14.9 01/18/2019    HCT 42.9 01/18/2019    MCV 89 01/18/2019     01/18/2019         Chemistry        Component " Value Date/Time     01/18/2019 1007    K 4.0 01/18/2019 1007     01/18/2019 1007    CO2 20 (L) 01/18/2019 1007    BUN 12 01/18/2019 1007    CREATININE 0.92 01/18/2019 1007     (H) 01/18/2019 1007        Component Value Date/Time    CALCIUM 9.8 01/18/2019 1007    ALKPHOS 51 01/18/2019 1007    AST 33 01/18/2019 1007    ALT 54 (H) 01/18/2019 1007    BILITOT 0.7 01/18/2019 1007            No results found for: FERRITIN  Lab Results   Component Value Date    TSH 1.75 12/31/2018     No results found for: HGBA1C      Assessment and Plan:  1. Snoring  2. Excessive sleepiness  I have a high suspicion for MARCELLA based on presence of snoring, obesity and gender. We discussed pathophysiology of obstructive sleep apnea, testing with overnight polysomnography, and treatment modalities (CPAP only discussed at this visit). We discussed consequences of untreated MARCELLA. Patient is interested in treatment and willing to undergo overnight sleep testing. Study has been ordered today.    In case of insomnia during the study:  one-time medication has been ordered.    - zolpidem (AMBIEN) 5 MG tablet; Do not take this at home.  For use if needed during the sleep study. May take one tablet by mouth as directed once you are in the Sleep Lab.  Dispense: 1 tablet; Refill: 0  - Split Night Sleep Study; Future    3. Paroxysmal atrial fibrillation (H)  Discussed link between atrial fibrillation and Sleep Apnea, both in terms of Obstructive Sleep Apnea as a risk factor for atrial fibrillation occurrence, recurrence, and persistence, and in terms of atrial fibrillation as a risk factor for Central Sleep Apnea.    - zolpidem (AMBIEN) 5 MG tablet; Do not take this at home.  For use if needed during the sleep study. May take one tablet by mouth as directed once you are in the Sleep Lab.  Dispense: 1 tablet; Refill: 0  - Split Night Sleep Study; Future    4. Class 1 obesity due to excess calories without serious comorbidity with body mass  index (BMI) of 31.0 to 31.9 in adult  We discussed the link between obesity, sleep apnea, and health outcomes.  Patient was encouraged to decrease caloric intake and increase activity levels to try to move towards a normal weight.  He was encouraged to discuss further strategies with his primary care provider.     Patient verbalized understanding of these issues, agrees with the plan and all questions were answered today. Patient was given an opportuntity to voice any other symptoms or concerns not listed above. Patient did not have any other symptoms or concerns.         MD ROBIN Keys Board Certified in Internal Medicine and Sleep Medicine  [facility].

## 2021-06-24 NOTE — PROGRESS NOTES
In clinic device check with Suzan Waddell RN CNP.  Please see link for full device report.  Patient was informed of results and next follow up during today's visit.      Small tall stitch noted on the lateral end of the incision, attempted to clip but unable, discussed what to monitor and look for. Pt will call with any questions or concerns.    Jyoti Rosas RN BSN PHN  Device Nurse   [facility]

## 2021-06-26 NOTE — PROGRESS NOTES
[facility] Heart Care Note    Assessment:  paroxysmal atrial fibrillation with elevated ventricular response  Atrial fibrillation ablation  [facility] 2020  Left atrial appendage occlusion [facility] February 4, 2020-watchman FLX  Tachybradycardia syndrome  Dual-chamber Medtronic pacemaker implanted December 31, 2018  Mild to moderate coronary artery disease  Coronary angiography  October 17, 2017 showed 30% stenosis LAD, left circumflex  Stress nuclear scan June 7, 2021-ejection fraction 63%; no ischemia; infarct   Excellent cholesterol control while on atorvastatin-LDL 70  Left shoulder pain seems to be some type of rotator cuff process may have been triggered by the pacemaker implant;  Moderate carotid artery stenosis-asymptomatic blockages less than 60%  Hypertension. -Controlled      Plan:    Pacemaker evaluation shows excellent function with almost no atrial fibrillation none lasting more than a few seconds-should last another 10 years   Stress nuclear scan June 7, 2021 showed preserved left ventricular ejection fraction at 63% (normal greater than 50%)  and there was no evidence for insufficiency of coronary blood  flow or ischemia  Your cholesterol has been well controlled/excellent  with LDL 70  Your left shoulder is quite painful and appears to have some type of degenerative process.  Although this may have been related to the pacemaker implant, removing the pacemaker will not improve or take care of this problem rather you should follow-up with the orthopedic surgeons about treatment of the shoulder issue which might include therapy or even surgery  We reviewed medications but made no adjustments  Continue to have device check through transtelephonic monitoring every 3 months  Return in 1 year for comprehensive cardiac arrhythmia and device assessment          Subjective:    I had the opportunity to see.Ashok Del Rosario , who is a 70 y.o. male with a known tachybradycardia syndrome  During episodes  atrial fibrillation would have very fast heart rates and is very symptomatic  Underwent ablation of atrial fibrillation at the [facility] and sometime later had left atrial appendage occlusion with a watchman device  Since then he has had very little atrial fibrillation  Does have a history of tachybradycardia syndrome with sinus bradycardia and atrial fibrillation with elevated ventricular response  Underwent a Medtronic dual-chamber pacemaker December 31, 2018  Has no awareness of tachypalpitations no syncopal or near syncopal episodes  Breathing is good exercise capacity good no orthopnea PND or pedal edema  Does have a history of hypertension-well-controlled with medications  Had evaluation for coronary artery disease.  Coronary angiography October 17, 2017 showed 30-35% stenosis of LAD, left circumflex  Stress nuclear scan June 7, 2021 showed ejection fraction 63% with no ischemia or infarction  Does have a history of carotid artery disease that is asymptomatic.  Studies have shown less than 69% blockage  Has been on atorvastatin and a recent cholesterol 134 with LDL 70  His biggest complaint is left shoulder pain.  His shoulder is quite painful he is seen the orthopedic specialist.  He is had a number of injections without benefit  Wonders if his pacemaker is causing the trouble and if removal would help  Tenderness is not right at the pacemaker but clearly in the shoulder joint area no swelling of the left arm discoloration of the left arm hand  We discussed how the pacemaker may have led to some type of Sudex type dystrophy or related to immobility shoulder impingement syndrome.  But removing of the pacemaker would not improve his left shoulder condition       Problem List:  Patient Active Problem List   Diagnosis     Primary hypertension     Mixed hyperlipidemia     Gastroesophageal reflux disease, esophagitis presence not specified     SSS (sick sinus syndrome) (H)     Cardiac pacemaker in situ, dual  chamber     Paroxysmal atrial fibrillation (H)     SVT (supraventricular tachycardia) (H)     MARCELLA (obstructive sleep apnea)     Status post ablation of atrial fibrillation     Left Atrial Appendage Closure (Watchman FLX)     Nonocclusive coronary atherosclerosis of native coronary artery     Medical History:  Past Medical History:   Diagnosis Date     Anxiety about health     per [facility]     Dyslipidemia      GERD (gastroesophageal reflux disease)      Hypertension      Nonocclusive coronary atherosclerosis of native coronary artery 10/17/2017     Paroxysmal atrial fibrillation (H) 01/14/2019    Dx [id number] score = 5 (age, HTN, CAD, TIA 2) Rx flecainide Rx Xarelto     Pulmonary emphysema (H) 11/21/2019    per [place]     Right bundle branch block 08/29/2019     Solitary pulmonary nodule 11/21/2019    per [place]     Syncope 01/18/2019     TIA (transient ischemic attack) 12/30/2018     Surgical History:  Past Surgical History:   Procedure Laterality Date     APPENDECTOMY  2000     CARDIAC ELECTROPHYSIOLOGY MAPPING AND ABLATION  08/29/2019    PVI done at [place]     CV CORONARY ANGIOGRAM N/A 10/17/2017    Procedure: Coronary Angiogram;  Surgeon: Ashok Hyatt MD;  Location: [facility] Lab;  Service:      CV LEFT HEART CATHETERIZATION WITH LEFT VENTRICULOGRAM N/A 10/17/2017    Procedure: Left Heart Catheterization with Left Ventriculogram;  Surgeon: Ashok Hyatt MD;  Location: [facility] Lab;  Service:      EP PACEMAKER INSERT Left 12/31/2018    Procedure: EP Pacemaker Insertion;  Surgeon: Micheal Juniro MD;  Location: [facility] Lab;  Service: Cardiology     Left atrial appendage closure  02/04/2020    Watchman FLX at [place]     Social History:  Social History     Socioeconomic History     Marital status:      Spouse name: Not on file     Number of children: Not on file     Years of education: Not on file     Highest education level: Not on file   Occupational History     Not on file    Social Needs     Financial resource strain: Not on file     Food insecurity     Worry: Not on file     Inability: Not on file     Transportation needs     Medical: Not on file     Non-medical: Not on file   Tobacco Use     Smoking status: Former Smoker     Quit date: 10/16/1999     Years since quittin.6     Smokeless tobacco: Never Used   Substance and Sexual Activity     Alcohol use: Yes     Alcohol/week: 1.0 standard drinks     Types: 1 Cans of beer per week     Comment: none since december     Drug use: No     Sexual activity: Yes     Partners: Female   Lifestyle     Physical activity     Days per week: Not on file     Minutes per session: Not on file     Stress: Not on file   Relationships     Social connections     Talks on phone: Not on file     Gets together: Not on file     Attends Zoroastrianism service: Not on file     Active member of club or organization: Not on file     Attends meetings of clubs or organizations: Not on file     Relationship status: Not on file     Intimate partner violence     Fear of current or ex partner: Not on file     Emotionally abused: Not on file     Physically abused: Not on file     Forced sexual activity: Not on file   Other Topics Concern     Not on file   Social History Narrative     Not on file       Review of Systems:      General: WNL  Eyes: WNL  Ears/Nose/Throat: Hearing Loss  Lungs: WNL  Heart: Shortness of Breath with activity  Stomach: Heartburn  Bladder: WNL  Muscle/Joints: WNL  Skin: WNL  Nervous System: WNL  Mental Health: WNL     Blood: WNL        Family History:  Family History   Problem Relation Age of Onset     Cancer Mother      Cancer Father      Heart attack Brother      Coronary artery disease Brother      Valvular heart disease Brother      Rectal cancer Sister      Colon cancer Sister          Allergies:  Allergies   Allergen Reactions     Cephalexin Rash       Medications:  Current Outpatient Medications   Medication Sig Dispense Refill      acetaminophen (TYLENOL) 500 MG tablet Take 1-2 tablets (500-1,000 mg total) by mouth every 4 (four) hours as needed.  0     aspirin (ASPIR-81) 81 MG EC tablet Take 81 mg by mouth daily.        atorvastatin (LIPITOR) 80 MG tablet TAKE 1 TABLET BY MOUTH EVERYDAY AT BEDTIME 90 tablet 1     irbesartan (AVAPRO) 75 MG tablet Take 0.5 tablets (37.5 mg total) by mouth daily. 45 tablet 3     metoprolol succinate (TOPROL-XL) 50 MG 24 hr tablet Take 1 tablet (50 mg total) by mouth daily. 90 tablet 3     metroNIDAZOLE (METROCREAM) 0.75 % cream Apply 1 application topically 2 (two) times a day as needed (roseacea). For Rosacea - apply to nose and right side of face              omeprazole (PRILOSEC) 40 MG capsule Take 40 mg by mouth daily before breakfast.       polymyxin B sulf/trimethoprim (POLYMYXIN B SUL-TRIMETHOPRIM OPHT) Apply to eye as needed.       No current facility-administered medications for this visit.          Surgical site  The pacemaker is well-healed to left subclavicular site.  There are no signs of inflammation irritation erythema or swelling no tenderness around the pacemaker site although there is exquisite tenderness into the left shoulder region    Device interrogation  Less than 5% atrial pacing, less than 5% ventricular pacing  Almost no atrial fibrillation in the past year  Lead function satisfactory  No ventricular tachycardia    Objective:   Vital signs:  /72 (Patient Site: Right Arm, Patient Position: Sitting, Cuff Size: Adult Regular)   Pulse 72   Temp 98.5  F (36.9  C)   Resp 16   Wt 209 lb 12.8 oz (95.2 kg)   BMI 32.86 kg/m        Physical Exam:    GENERAL APPEARANCE: Alert, cooperative and in no acute distress.  HEENT: No scleral icterus. No Xanthelasma. Oral mucous membranes pink and moist.  NECK: JVP normal cm. No Hepatojugular reflux. Thyroid not  Palpable  CHEST: clear to auscultation and percussion  CARDIOVASCULAR: S1, S2 without murmur    Brachial, radial  pulses are intact and  symmetric.   No carotid bruits noted.  ABDOMEN: Non tender. BS+. No bruits.  EXTREMITIES: No cyanosis, clubbing or edema.  Exquisite localized tenderness to the left shoulder joint patient has restricted left shoulder movement    Lab Results:  LIPIDS:  Lab Results   Component Value Date    CHOL 134 10/02/2020    CHOL 172 12/31/2018    CHOL 168 10/16/2017     Lab Results   Component Value Date    HDL 33 (L) 10/02/2020    HDL 39 (L) 12/31/2018    HDL 42 10/16/2017     Lab Results   Component Value Date    LDLCALC 70 10/02/2020    LDLCALC 113 12/31/2018    LDLCALC 104 10/16/2017     Lab Results   Component Value Date    TRIG 156 (H) 10/02/2020    TRIG 102 12/31/2018    TRIG 108 10/16/2017     No components found for: CHOLHDL    BMP:  Lab Results   Component Value Date    CREATININE 0.83 10/02/2020    BUN 11 10/02/2020     10/02/2020    K 4.5 10/02/2020     (H) 10/02/2020    CO2 23 10/02/2020         This note has been dictated using voice recognition software. Any grammatical or context distortions are unintentional and inherent to the software.  Micheal Junior MD  [facility]  [phone]

## 2021-06-26 NOTE — PATIENT INSTRUCTIONS - HE
Ashok Del Rosario    Thank you for coming to the [facility] today for a cardiac evaluation  It was my pleasure to see you today  A good contact for any questions would be Zulay Strong  RN @ [phone]    Pacemaker evaluation shows excellent function with almost no atrial fibrillation none lasting more than a few seconds-should last another 10 years   Stress nuclear scan June 7, 2021 showed preserved left ventricular ejection fraction at 63% (normal greater than 50%)  and there was no evidence for insufficiency of coronary blood  flow or ischemia  Your cholesterol has been well controlled/excellent  with LDL 70  Your left shoulder is quite painful and appears to have some type of degenerative process.  Although this may have been related to the pacemaker implant, removing the pacemaker will not improve or take care of this problem rather you should follow-up with the orthopedic surgeons about treatment of the shoulder issue which might include therapy or even surgery  We reviewed medications but made no adjustments  Continue to have device check through transtelephonic monitoring every 3 months  Return in 1 year for comprehensive cardiac arrhythmia and device assessment

## 2021-06-27 NOTE — PROGRESS NOTES
"Progress Notes by Suzan Waddell CNP at 2/21/2019  3:30 PM     Author: Suzan Waddell CNP Service: -- Author Type: Nurse Practitioner    Filed: 2/21/2019  4:13 PM Encounter Date: 2/21/2019 Status: Signed    : Suzan Waddell CNP (Nurse Practitioner)           Click to link to [facility] HEART CARE ELECTROPHYSIOLOGY NOTE      Assessment/Recommendations   Assessment/Plan:    1.  Paroxysmal atrial fibrillation: No recurrence of A fib or flutter, though he did have 2 very brief episodes of PSVT.  Flecainide 75 mg twice daily with his metoprolol succinate 50 mg daily.  We reviewed the natural progression of atrial fibrillation and flutter as well as treatment options of medication versus ablation.  Continue flecainide 75 mg twice daily with metoprolol succinate 50 mg daily.  I have a low threshold for switching his antiarrhythmic medication therapy to sotalol due to coronary disease, though it is very mild at this point.      He was reassured that atrial fibrillation is not life-threatening, but carries an increased risk for stroke.  He has a [id number] score of 5 for age 65-74, hypertension, possible TIA, and vascular disease.  Continue Xarelto 20 mg daily.  We discussed the importance of taking this with a full meal to ensure proper absorption.  He is taking it with his evening meal.  He states his medication will be expensive if he reaches the \"donut hole\", so we discussed alternatives of Eliquis, Pradaxa, and warfarin.  He will remain on Xarelto at this time.    2.  Sick sinus syndrome status post dual-chamber pacemaker implant:  The pacemaker was interrogated and is functioning appropriately.  The battery shows estimated longevity of 15 years.  Atrial and ventricular lead sensing, impedance, and pacing thresholds are stable and within normal limits.  He has had no further symptoms of syncope since his pacemaker implant.    3.  Hypertension: Blood pressure at target today.    4.  " Nonobstructive coronary artery disease: 30-35% stenosis of the LAD and circumflex seen on previous angiogram.  Denies anginal symptoms.  Continue atorvastatin 80 mg daily.    Follow up in 6 months.     History of Present Illness    Mr. Ashok Del Rosario is a very pleasant 68 y.o. male who comes in today company by his wife for EP device and arrhythmia follow-up.  December 2018, he was hospitalized for possible TIA and was found to have symptomatic sinus bradycardia for which he underwent dual-chamber pacemaker implant.  He was newly diagnosed with atrial fibrillation with rapid ventricular response associated with tachypalpitations, lightheadedness, and shortness of breath.  He underwent early cardioversion in the emergency department on 1/11/2019 after which he was started on metoprolol succinate 50 mg daily and Xarelto 20 mg daily for stroke prophylaxis.  He was hospitalized again over for another episode that was severely symptomatic as the heart rates were in the 200s.  He was given adenosine by EMS for what appeared to be SVT.  During his hospitalization, he was going to be started on sotalol, but was instead started on flecainide 75 mg twice daily by Dr. Chand who evaluated his coronary artery disease as not significant.  He also has a known history of carotid artery stenosis, hypertension, hyperlipidemia, and anxiety.    Pat states that he has been feeling well and has not had any further episodes of arrhythmia.  He denies chest discomfort, palpitations, abdominal fullness/bloating or peripheral edema, shortness of breath, paroxysmal nocturnal dyspnea, orthopnea, lightheadedness, dizziness, pre-syncope, or syncope.  He is scheduled for sleep apnea evaluation with Dr. Espinoza on 3/6/2019.    Cardiographics (personally reviewed):  EKG, Done 1/18/2019 starts in A. fib with transition to sinus rhythm  EKG done 1/14/2019 shows sinus rhythm at 89 bpm, incomplete right bundle branch block, QT/QTc interval  measures 378/459 ms  EKG done 1/11/2019 shows atrial fibrillation with rapid ventricular response at 141 bpm.  Subsequent EKG shows sinus tachycardia at 105 bpm with an incomplete right bundle branch block    Pacemaker Interrogation (MDT implant 12/31/2018):  Pacing mode: MVP   Presenting rhythm: AS-VS 82  Underlying rhythm: SR 82 bpm  A-paced: 2.8%.  V-paced <0.1%.  Battery: estimated longevity 15 years.  Atrial and Ventricular leads: Stable with good sensing, impedance, and pacing thresholds  Arrhythmias: 0 AT/AF.  2 NSVT, EGMS show brief PSVT.  [place]: <1  Histograms: Good rate distribution    Echo done 12/30/2018:    Left ventricle ejection fraction is normal. The estimated left ventricular ejection fraction is 60%.    Normal left ventricular size.    Normal right ventricular size and systolic function.    No hemodynamically significant valvular heart abnormalities.    When compared to the previous study dated 10/16/2017, no significant change.    Coronary angiogram done 10/17/2017:     Mid Cx lesion 30% stenosed.    Prox LAD to Dist LAD lesion 35% stenosed.  The left ventricular size is normal. The left ventricular systolic function is normal. LV systolic pressure is normal. LV end diastolic pressure is normal. There are no wall motion abnormalities in the left ventricle.       Physical Examination Review of Systems   Vitals:    02/21/19 1429   BP: 104/74   Pulse: 84   Resp: 16     Body mass index is 31.49 kg/m .  Wt Readings from Last 3 Encounters:   02/21/19 203 lb (92.1 kg)   01/21/19 201 lb (91.2 kg)   01/19/19 195 lb 3.2 oz (88.5 kg)     General Appearance:   Alert, well-appearing and in no acute distress.   HEENT: Atraumatic, normocephalic.  No scleral icterus, normal conjunctivae, EOM intact, PERRL; mucous membranes pink and moist.     Chest: Chest symmetric, spine straight.  Left subclavian pacemaker site with no sign of infection or tissue thinning.   Lungs:   Respirations unlabored: Lungs are  clear to auscultation.   Cardiovascular:   Normal first and second heart sounds with no murmurs, rubs, or gallops.  Regular rate and rhythm.  Radial and posterior tibial pulses are intact, no edema.   Abdomen:  Soft, nontender, nondistended, bowel sounds present   Extremities: No cyanosis or clubbing   Musculoskeletal: Moves all extremities   Skin: Warm, dry, intact.    Neurologic: Mood and affect are appropriate, alert and oriented to person, place, time, and situation    General: WNL  Eyes: Visual Distubance  Ears/Nose/Throat: Nosebleeds  Lungs: Shortness of Breath, Snoring  Heart: Shortness of Breath with activity  Stomach: WNL  Bladder: WNL  Muscle/Joints: WNL  Skin: WNL  Nervous System: WNL  Mental Health: WNL     Blood: WNL     Medical History  Surgical History Family History Social History   Past Medical History:   Diagnosis Date   ? Coronary artery disease    ? Family history of myocardial infarction    ? GERD (gastroesophageal reflux disease)    ? High cholesterol    ? Hypertension     Past Surgical History:   Procedure Laterality Date   ? ABDOMINAL SURGERY  2000    appendix   ? CV CORONARY ANGIOGRAM N/A 10/17/2017    Procedure: Coronary Angiogram;  Surgeon: Ashok Hyatt MD;  Location: [facility] Cath Lab;  Service:    ? CV LEFT HEART CATHETERIZATION WITH LEFT VENTRICULOGRAM N/A 10/17/2017    Procedure: Left Heart Catheterization with Left Ventriculogram;  Surgeon: Ashok Hyatt MD;  Location: [facility] Cath Lab;  Service:    ? EP PACEMAKER INSERT Left 12/31/2018    Procedure: EP Pacemaker Insertion;  Surgeon: Micheal Junior MD;  Location: [facility] Cath Lab;  Service: Cardiology    Family History   Problem Relation Age of Onset   ? Cancer Mother    ? Cancer Father    ? Heart attack Brother    ? Coronary artery disease Brother    ? Valvular heart disease Brother    ? Rectal cancer Sister    ? Colon cancer Sister     Social History     Socioeconomic History   ? Marital status:       Spouse name: Not on file   ? Number of children: Not on file   ? Years of education: Not on file   ? Highest education level: Not on file   Occupational History   ? Not on file   Social Needs   ? Financial resource strain: Not on file   ? Food insecurity:     Worry: Not on file     Inability: Not on file   ? Transportation needs:     Medical: Not on file     Non-medical: Not on file   Tobacco Use   ? Smoking status: Former Smoker     Last attempt to quit: 10/16/1999     Years since quittin.3   ? Smokeless tobacco: Never Used   Substance and Sexual Activity   ? Alcohol use: Yes     Alcohol/week: 0.6 oz     Types: 1 Cans of beer per week     Comment: none since december   ? Drug use: No   ? Sexual activity: Yes     Partners: Female   Lifestyle   ? Physical activity:     Days per week: Not on file     Minutes per session: Not on file   ? Stress: Not on file   Relationships   ? Social connections:     Talks on phone: Not on file     Gets together: Not on file     Attends Jain service: Not on file     Active member of club or organization: Not on file     Attends meetings of clubs or organizations: Not on file     Relationship status: Not on file   ? Intimate partner violence:     Fear of current or ex partner: Not on file     Emotionally abused: Not on file     Physically abused: Not on file     Forced sexual activity: Not on file   Other Topics Concern   ? Not on file   Social History Narrative   ? Not on file          Medications  Allergies   Current Outpatient Medications   Medication Sig Dispense Refill   ? acetaminophen (TYLENOL) 500 MG tablet Take 1-2 tablets (500-1,000 mg total) by mouth every 4 (four) hours as needed.  0   ? flecainide (TAMBOCOR) 150 MG tablet Take 0.5 tablets (75 mg total) by mouth every 12 (twelve) hours. 90 tablet 3   ? irbesartan (AVAPRO) 150 MG tablet Take 150 mg by mouth daily.     ? metoprolol succinate (TOPROL-XL) 50 MG 24 hr tablet Take 1 tablet (50 mg total) by mouth daily.  30 tablet 5   ? metroNIDAZOLE (METROCREAM) 0.75 % cream Apply 1 application topically 2 (two) times a day as needed (roseacea). For Rosacea - apply to nose and right side of face            ? omeprazole (PRILOSEC) 40 MG capsule Take 40 mg by mouth daily before breakfast.     ? rivaroxaban 20 mg Tab Take 1 tablet (20 mg total) by mouth daily with supper. 90 tablet 3   ? tobramycin-dexamethasone (TOBRADEX) ophthalmic solution INSTILL 2 DROPS 4 TIMES A DAY IN EACH EYE 7 DAY(S)  0   ? atorvastatin (LIPITOR) 40 MG tablet Take 2 tablets (80 mg total) by mouth at bedtime. 90 tablet 3   ? hydrOXYzine pamoate (VISTARIL) 25 MG capsule Take 25-50 mg by mouth every 8 (eight) hours as needed for anxiety.       No current facility-administered medications for this visit.       Allergies   Allergen Reactions   ? Cephalexin Rash      Medical, surgical, family, social history, and medications were all reviewed and updated as necessary.   Lab Results    Chemistry CBC Other   Lab Results   Component Value Date    CREATININE 0.92 01/18/2019    BUN 12 01/18/2019     01/18/2019    K 4.0 01/18/2019     01/18/2019    CO2 20 (L) 01/18/2019     Creatinine (mg/dL)   Date Value   01/18/2019 0.92   01/11/2019 0.93   12/29/2018 0.77   10/17/2017 0.98    Lab Results   Component Value Date    WBC 7.4 01/18/2019    HGB 14.9 01/18/2019    HCT 42.9 01/18/2019    MCV 89 01/18/2019     01/18/2019    Lab Results   Component Value Date    INR 1.24 (H) 01/18/2019    INR 1.03 12/29/2018    INR 1.03 10/17/2017     Lab Results   Component Value Date    TSH 1.75 12/31/2018            30 minutes were spent face to face in this visit with more than 50% spent discussing diagnoses as listed above, counseling, and coordination of care.    This note has been dictated using voice recognition software. Any grammatical, typographical, or context distortions are unintentional and inherent to the software.    Suzan Waddell, [facility]   Electrophysiology

## 2021-06-27 NOTE — PROGRESS NOTES
Progress Notes by Suzan Waddell CNP at 1/21/2019  8:30 AM     Author: Suzan Waddell CNP Service: -- Author Type: Nurse Practitioner    Filed: 1/21/2019 10:07 AM Encounter Date: 1/21/2019 Status: Signed    : Suzan Waddell CNP (Nurse Practitioner)           Click to link to [facility] Care     [facility] HEART [facility] ELECTROPHYSIOLOGY NOTE      Assessment/Recommendations   Assessment/Plan:    1.  Paroxysmal atrial fibrillation: Newly diagnosed on 1/11/2019 at which time he underwent early cardioversion.  He had recurrence of what is documented as a potential SVT, though likely atrial tachycardia or flutter, for which she was started on flecainide 75 mg twice daily with his metoprolol succinate 50 mg daily.  We had a lengthy discussion of the physiology and natural progression of atrial fibrillation and flutter as well as treatment options of rate control versus rhythm control with antiarrhythmic medications versus pulmonary vein isolation ablation.  We also discussed possible triggers; he is scheduled for valuation by sleep medicine in March.  Continue current doses of flecainide and metoprolol.  He was instructed to keep a log of symptomatic episodes of A. Fib.  He was given formation on A. fib to review at home.    He was reassured that atrial fibrillation is not life-threatening, but carries an increased risk for stroke.  He has a FOV1GR2-[id number] score of 5 for age 65-74, hypertension, possible TIA, and vascular disease.  Continue Xarelto 20 mg daily.  We discussed the importance of taking this with a full meal to ensure proper absorption.  He is taking it with his evening meal.  He states his medication is very expensive for him, so we discussed alternatives of Eliquis, Pradaxa, and warfarin.  He was given some information to review at home.    2.  Sick sinus syndrome status post dual-chamber pacemaker implant: The site is well-healed with no sign of infection or hematoma, though the overlying skin is  sensitive to light touch.  He was reassured that should improve with time.  The pacemaker was interrogated and is functioning appropriately.  The battery shows estimated longevity of 15.2 years.  Atrial and ventricular lead sensing, impedance, and pacing thresholds are stable and within normal limits.  He has had no further symptoms of syncope since his pacemaker implant.    3.  Hypertension: Blood pressure at target today.    4.  Nonobstructive coronary artery disease: 30-35% stenosis of the LAD and circumflex seen on previous angiogram.  Denies anginal symptoms.    Follow up in 1 month.     History of Present Illness    Mr. Ashok Del Rosario is a very pleasant 68 y.o. male who comes in today company by his wife and daughter for EP consultation of atrial fibrillation.  December 2018, he was hospitalized for possible TIA and was found to have symptomatic sinus bradycardia for which he underwent dual-chamber pacemaker implant.  Newly diagnosed with atrial fibrillation with rapid ventricular response associated with tachypalpitations, lightheadedness, and shortness of breath.  He underwent early cardioversion in the emergency department on 1/11/2019 after which he was started on metoprolol succinate 50 mg daily and Xarelto 20 mg daily for stroke prophylaxis.  He was hospitalized again over the weekend for another episode that was severely symptomatic at the heart rates were in the 200s.  He was given adenosine by EMS for what appeared to be SVT.  During his hospitalization, he was going to be started on sotalol, but was instead started on flecainide 75 mg twice daily by Dr. Chand who evaluated his coronary artery disease as not significant.  He also has a known history of carotid artery stenosis, hypertension, hyperlipidemia, and anxiety.    He states that he has not had any further episodes of arrhythmia.  He denies chest discomfort, palpitations, abdominal fullness/bloating or peripheral edema, shortness of  breath, paroxysmal nocturnal dyspnea, orthopnea, lightheadedness, dizziness, pre-syncope, or syncope.  He did have a brief episode of blurred vision, but without recurrence.  He is scheduled for sleep apnea evaluation in March.    Cardiographics (personally reviewed):  EKG, Done 1/18/2019 starts in A. fib with transition to sinus rhythm  EKG done 1/14/2019 shows sinus rhythm at 89 bpm, incomplete right bundle branch block, QT/QTc interval measures 378/459 ms  EKG done 1/11/2019 shows atrial fibrillation with rapid ventricular response at 141 bpm.  Subsequent EKG shows sinus tachycardia at 105 bpm with an incomplete right bundle branch block    Pacemaker Interrogation (MDT implant 12/31/2018):  Pacing mode: MVP   Presenting rhythm: SR 63 bpm  Underlying rhythm: SR 63 bpm  A-paced: 5.8%.  V-paced <0.1%.  Battery: estimated longevity 15.2 years.  Atrial and Ventricular leads: Stable with good sensing, impedance, and pacing thresholds  Arrhythmias: 1:1 AT/AF in the 200s on 1/18/19.  Episodes of AF on 1/11/2019 with RVR.  [place]: <1  Histograms: Good rate distribution      Echo done 12/30/2018:    Left ventricle ejection fraction is normal. The estimated left ventricular ejection fraction is 60%.    Normal left ventricular size.    Normal right ventricular size and systolic function.    No hemodynamically significant valvular heart abnormalities.    When compared to the previous study dated 10/16/2017, no significant change.    Coronary angiogram done 10/17/2017:     Mid Cx lesion 30% stenosed.    Prox LAD to Dist LAD lesion 35% stenosed.  The left ventricular size is normal. The left ventricular systolic function is normal. LV systolic pressure is normal. LV end diastolic pressure is normal. There are no wall motion abnormalities in the left ventricle.       Physical Examination Review of Systems   Vitals:    01/21/19 0742   BP: 138/80   Pulse: 68   Resp: 16     Body mass index is 30.56 kg/m .  Wt Readings from  Last 3 Encounters:   01/21/19 201 lb (91.2 kg)   01/19/19 195 lb 3.2 oz (88.5 kg)   01/14/19 201 lb (91.2 kg)     General Appearance:   Alert, well-appearing and in no acute distress.   HEENT: Atraumatic, normocephalic.  No scleral icterus, normal conjunctivae, EOM intact, PERRL; mucous membranes pink and moist.     Chest: Chest symmetric, spine straight.  Left subclavian pacemaker site with no sign of infection or tissue thinning, site is sensitive to light touch.   Lungs:   Respirations unlabored: Lungs are clear to auscultation.   Cardiovascular:   Normal first and second heart sounds with no murmurs, rubs, or gallops.  Regular rate and rhythm.  Radial and posterior tibial pulses are intact, no JVD, no edema.   Abdomen:  Soft, nontender, nondistended, bowel sounds present   Extremities: No cyanosis or clubbing   Musculoskeletal: Moves all extremities   Skin: Warm, dry, intact.    Neurologic: Mood and affect are appropriate, alert and oriented to person, place, time, and situation    General: WNL  Eyes: Visual Distubance  Ears/Nose/Throat: Hearing Loss  Lungs: Shortness of Breath  Heart: Shortness of Breath with activity  Stomach: WNL  Bladder: WNL  Muscle/Joints: WNL  Skin: Rash  Nervous System: WNL  Mental Health: WNL, Anxiety     Blood: Easy Bruising     Medical History  Surgical History Family History Social History   Past Medical History:   Diagnosis Date   ? Coronary artery disease    ? Family history of myocardial infarction    ? GERD (gastroesophageal reflux disease)    ? High cholesterol    ? Hypertension     Past Surgical History:   Procedure Laterality Date   ? ABDOMINAL SURGERY  2000    appendix   ? CV CORONARY ANGIOGRAM N/A 10/17/2017    Procedure: Coronary Angiogram;  Surgeon: Ashok Hyatt MD;  Location: [facility] Cath Lab;  Service:    ? CV LEFT HEART CATHETERIZATION WITH LEFT VENTRICULOGRAM N/A 10/17/2017    Procedure: Left Heart Catheterization with Left Ventriculogram;  Surgeon: Ashok BHAT  MD [place];  Location: [facility];  Service:    ? EP PACEMAKER INSERT Left 2018    Procedure: EP Pacemaker Insertion;  Surgeon: Micheal Junior MD;  Location: [facility] Cath Lab;  Service: Cardiology    Family History   Problem Relation Age of Onset   ? Cancer Mother    ? Cancer Father    ? Heart attack Brother    ? Coronary artery disease Brother    ? Valvular heart disease Brother    ? Rectal cancer Sister    ? Colon cancer Sister     Social History     Socioeconomic History   ? Marital status:      Spouse name: Not on file   ? Number of children: Not on file   ? Years of education: Not on file   ? Highest education level: Not on file   Social Needs   ? Financial resource strain: Not on file   ? Food insecurity - worry: Not on file   ? Food insecurity - inability: Not on file   ? Transportation needs - medical: Not on file   ? Transportation needs - non-medical: Not on file   Occupational History   ? Not on file   Tobacco Use   ? Smoking status: Former Smoker     Last attempt to quit: 10/16/1999     Years since quittin.2   ? Smokeless tobacco: Never Used   Substance and Sexual Activity   ? Alcohol use: Yes     Alcohol/week: 0.6 oz     Types: 1 Cans of beer per week     Comment: none since december   ? Drug use: No   ? Sexual activity: Yes     Partners: Female   Other Topics Concern   ? Not on file   Social History Narrative   ? Not on file          Medications  Allergies   Current Outpatient Medications   Medication Sig Dispense Refill   ? acetaminophen (TYLENOL) 500 MG tablet Take 1-2 tablets (500-1,000 mg total) by mouth every 4 (four) hours as needed.  0   ? atorvastatin (LIPITOR) 40 MG tablet Take 2 tablets (80 mg total) by mouth at bedtime. 60 tablet 0   ? flecainide (TAMBOCOR) 150 MG tablet Take 0.5 tablets (75 mg total) by mouth every 12 (twelve) hours. 90 tablet 3   ? hydrOXYzine pamoate (VISTARIL) 25 MG capsule Take 25-50 mg by mouth every 8 (eight) hours as needed for  anxiety.     ? irbesartan (AVAPRO) 150 MG tablet Take 150 mg by mouth daily.     ? metoprolol succinate (TOPROL-XL) 50 MG 24 hr tablet Take 1 tablet (50 mg total) by mouth daily. 30 tablet 5   ? metroNIDAZOLE (METROCREAM) 0.75 % cream Apply 1 application topically 2 (two) times a day as needed (roseacea). For Rosacea - apply to nose and right side of face            ? omeprazole (PRILOSEC) 40 MG capsule Take 40 mg by mouth daily before breakfast.     ? rivaroxaban 20 mg Tab Take 1 tablet (20 mg total) by mouth daily with supper. 90 tablet 1     No current facility-administered medications for this visit.       Allergies   Allergen Reactions   ? Cephalexin Rash      Medical, surgical, family, social history, and medications were all reviewed and updated as necessary.   Lab Results    Chemistry CBC Other   Lab Results   Component Value Date    CREATININE 0.92 01/18/2019    BUN 12 01/18/2019     01/18/2019    K 4.0 01/18/2019     01/18/2019    CO2 20 (L) 01/18/2019     Creatinine (mg/dL)   Date Value   01/18/2019 0.92   01/11/2019 0.93   12/29/2018 0.77   10/17/2017 0.98    Lab Results   Component Value Date    WBC 7.4 01/18/2019    HGB 14.9 01/18/2019    HCT 42.9 01/18/2019    MCV 89 01/18/2019     01/18/2019    Lab Results   Component Value Date    INR 1.24 (H) 01/18/2019    INR 1.03 12/29/2018    INR 1.03 10/17/2017     Lab Results   Component Value Date    TSH 1.75 12/31/2018            60 minutes were spent face to face in this visit with more than 50% spent discussing diagnoses as listed above, counseling, and coordination of care.    This note has been dictated using voice recognition software. Any grammatical, typographical, or context distortions are unintentional and inherent to the software.    Suzan Waddell, [facility]   Electrophysiology

## 2021-06-27 NOTE — PROGRESS NOTES
Progress Notes by Suzan Waddell CNP at 7/10/2019  7:50 AM     Author: Suzan Waddell CNP Service: -- Author Type: Nurse Practitioner    Filed: 7/10/2019 10:33 AM Encounter Date: 7/10/2019 Status: Signed    : Suzan Waddell CNP (Nurse Practitioner)           Click to link to [facility] HEART [facility] ELECTROPHYSIOLOGY NOTE      Assessment/Recommendations   Assessment/Plan:    1.  Paroxysmal atrial fibrillation: Symptomatic recurrence of FELISHA duncan with rapid ventricular response undergoing urgent cardioversion in the emergency department on 5/25/2019.  His flecainide was increased at that point and he has had no further episodes, but would like to get off of antiarrhythmic medications.  We had a lengthy discussion the physiology and natural progression of atrial fibrillation and treatment options of antiarrhythmic medications versus  pulmonary vein isolation ablation.  We discussed the ablation procedure utilizing cryo or RF and less than 1% risk for complication including but not limited to stroke, myocardial or esophageal perforation, pulmonary vein stenosis, phrenic nerve injury, or death.  We also discussed expected recovery and follow-up.  He was given some information to review at home.  I will have 1 of the EP nurses call him in about a week to see if he wishes to move forward with ablation.  In the interim, change flecainide to 100 mg twice daily.    He was reassured that atrial fibrillation is not life-threatening, but carries an increased risk for stroke.  He has a [id number] score of 5 for age 65-74, hypertension, possible TIA, and vascular disease.  Continue Xarelto 20 mg daily.  He is taking it with his evening meal and denies side effects, missed doses, or bleeding issues.     2.  Sick sinus syndrome status post dual-chamber pacemaker implant: Previous pacemaker interrogation shows that it is functioning appropriately.  The battery showed estimated longevity of 15 years.   Atrial and ventricular lead sensing, impedance, and pacing thresholds were stable and within normal limits.  He has had no further symptoms of syncope since his pacemaker implant.    3.  Hypertension: Blood pressure at target in clinic today, but frequent episodes of symptomatic hypotension at home with blood pressures consistently 90s/50s.  Decrease irbesartan to 75 mg daily.    4.  Nonobstructive coronary artery disease: 30-35% stenosis of the LAD and circumflex seen on previous angiogram.  Denies anginal symptoms.  Continue atorvastatin 80 mg daily.    Follow up in 3 months, or sooner if he decides to move forward with PVI.     History of Present Illness    Mr. Ashok Del Rosario is a very pleasant 68 y.o. male who comes in today company by his wife for EP device and arrhythmia follow-up.  December 2018, he was hospitalized for possible TIA and was found to have symptomatic sinus bradycardia for which he underwent dual-chamber pacemaker implant.  He was newly diagnosed with atrial fibrillation with rapid ventricular response associated with tachypalpitations, lightheadedness, and shortness of breath.  He underwent early cardioversion in the emergency department on 1/11/2019 after which he was started on metoprolol succinate 50 mg daily and Xarelto 20 mg daily for stroke prophylaxis.  He was hospitalized again over for another episode that was severely symptomatic as the heart rates were in the 200s.  He was given adenosine by EMS for what appeared to be SVT.  During his hospitalization, he was going to be started on sotalol, but was instead started on flecainide 75 mg twice daily by Dr. Chand who evaluated his coronary artery disease as not significant.  He also has a known history of carotid artery stenosis, hypertension, hyperlipidemia, anxiety, and mild obstructive sleep apnea.  He had an episode of sudden sharp pain in the back of his head with subsequent dizziness and abnormal smell with negative CT  scan, he recently had an EEG, results pending.  He remains on Xarelto 20 mg daily for stroke prophylaxis.    Annika has had 3 or 4 episodes of atrial fibrillation since I last saw him in February.  Due to symptom severity, he is evaluated in the emergency department and has undergone urgent cardioversion, last on 5/25/2019 after which his flecainide was increased to 75 mg in the morning and 150 mg in the evening.  He states he has had no further episodes of A. fib.  His primary complaint is ongoing orthostatic lightheadedness and his blood pressures in the90s/50s.  He reports that his heart rates have been stable in the 60s.  He denies chest discomfort, abdominal fullness/bloating or peripheral edema, shortness of breath, paroxysmal nocturnal dyspnea, orthopnea, dizziness, pre-syncope, or syncope.     Cardiographics (personally reviewed):  EKG done 5/25/2019 shows atrial fibrillation with rapid ventricular response at 139 bpm   EKG, Done 1/18/2019 starts in A. fib with transition to sinus rhythm  EKG done 1/14/2019 shows sinus rhythm at 89 bpm, incomplete right bundle branch block, QT/QTc interval measures 378/459 ms  EKG done 1/11/2019 shows atrial fibrillation with rapid ventricular response at 141 bpm.  Subsequent EKG shows sinus tachycardia at 105 bpm with an incomplete right bundle branch block    Pacemaker Interrogation 4/22/19 (MDT implant 12/31/2018):  Pacing mode: MVP   Presenting rhythm: AS-VS 70s  Underlying rhythm: SR 70s  A-paced: 4%.  V-paced <0.1%.  Battery: estimated longevity 15 years.  Atrial and Ventricular leads: Stable with good sensing, impedance, and pacing thresholds  Arrhythmias: None  Herod: <1  Histograms: Good rate distribution    Echo done 12/30/2018:    Left ventricle ejection fraction is normal. The estimated left ventricular ejection fraction is 60%.    Normal left ventricular size.    Normal right ventricular size and systolic function.    No hemodynamically significant valvular  heart abnormalities.    When compared to the previous study dated 10/16/2017, no significant change.    Coronary angiogram done 10/17/2017:     Mid Cx lesion 30% stenosed.    Prox LAD to Dist LAD lesion 35% stenosed.  The left ventricular size is normal. The left ventricular systolic function is normal. LV systolic pressure is normal. LV end diastolic pressure is normal. There are no wall motion abnormalities in the left ventricle.       Physical Examination Review of Systems   Vitals:    07/10/19 0759   BP: 124/90   Pulse: 72   Resp: 16     Body mass index is 31.95 kg/m .  Wt Readings from Last 3 Encounters:   07/10/19 204 lb (92.5 kg)   05/29/19 206 lb (93.4 kg)   05/25/19 205 lb (93 kg)     General Appearance:   Alert, well-appearing and in no acute distress.   HEENT: Atraumatic, normocephalic.  No scleral icterus, normal conjunctivae, EOM intact, PERRL; mucous membranes pink and moist.     Chest: Chest symmetric, spine straight.  Left subclavian pacemaker site with no sign of infection or tissue thinning.   Lungs:   Respirations unlabored: Lungs are clear to auscultation.   Cardiovascular:   Normal first and second heart sounds with no murmurs, rubs, or gallops.  Regular rate and rhythm.  Radial and posterior tibial pulses are intact, no edema.   Abdomen:  Soft, nontender, nondistended, bowel sounds present   Extremities: No cyanosis or clubbing   Musculoskeletal: Moves all extremities   Skin: Warm, dry, intact.    Neurologic: Mood and affect are appropriate, alert and oriented to person, place, time, and situation    General: WNL  Eyes: Visual Distubance  Ears/Nose/Throat: Hearing Loss  Lungs: Shortness of Breath  Heart: Shortness of Breath with activity, Irregular Heartbeat  Stomach: WNL  Bladder: WNL  Muscle/Joints: WNL  Skin: WNL  Nervous System: Daytime Sleepiness, Dizziness  Mental Health: WNL     Blood: WNL     Medical History  Surgical History Family History Social History   Past Medical History:    Diagnosis Date   ? Coronary artery disease    ? Family history of myocardial infarction    ? GERD (gastroesophageal reflux disease)    ? High cholesterol    ? Hypertension     Past Surgical History:   Procedure Laterality Date   ? ABDOMINAL SURGERY  2000    appendix   ? CV CORONARY ANGIOGRAM N/A 10/17/2017    Procedure: Coronary Angiogram;  Surgeon: Ashok Hyatt MD;  Location: [facility] Cath Lab;  Service:    ? CV LEFT HEART CATHETERIZATION WITH LEFT VENTRICULOGRAM N/A 10/17/2017    Procedure: Left Heart Catheterization with Left Ventriculogram;  Surgeon: Ashok Hyatt MD;  Location: [facility] Cath Lab;  Service:    ? EP PACEMAKER INSERT Left 2018    Procedure: EP Pacemaker Insertion;  Surgeon: Micheal Junior MD;  Location: [facility] Cath Lab;  Service: Cardiology    Family History   Problem Relation Age of Onset   ? Cancer Mother    ? Cancer Father    ? Heart attack Brother    ? Coronary artery disease Brother    ? Valvular heart disease Brother    ? Rectal cancer Sister    ? Colon cancer Sister     Social History     Socioeconomic History   ? Marital status:      Spouse name: Not on file   ? Number of children: Not on file   ? Years of education: Not on file   ? Highest education level: Not on file   Occupational History   ? Not on file   Social Needs   ? Financial resource strain: Not on file   ? Food insecurity:     Worry: Not on file     Inability: Not on file   ? Transportation needs:     Medical: Not on file     Non-medical: Not on file   Tobacco Use   ? Smoking status: Former Smoker     Last attempt to quit: 10/16/1999     Years since quittin.7   ? Smokeless tobacco: Never Used   Substance and Sexual Activity   ? Alcohol use: Yes     Alcohol/week: 0.6 oz     Types: 1 Cans of beer per week     Comment: none since december   ? Drug use: No   ? Sexual activity: Yes     Partners: Female   Lifestyle   ? Physical activity:     Days per week: Not on file     Minutes per  session: Not on file   ? Stress: Not on file   Relationships   ? Social connections:     Talks on phone: Not on file     Gets together: Not on file     Attends [employer] service: Not on file     Active member of club or organization: Not on file     Attends meetings of clubs or organizations: Not on file     Relationship status: Not on file   ? Intimate partner violence:     Fear of current or ex partner: Not on file     Emotionally abused: Not on file     Physically abused: Not on file     Forced sexual activity: Not on file   Other Topics Concern   ? Not on file   Social History Narrative   ? Not on file          Medications  Allergies   Current Outpatient Medications   Medication Sig Dispense Refill   ? acetaminophen (TYLENOL) 500 MG tablet Take 1-2 tablets (500-1,000 mg total) by mouth every 4 (four) hours as needed.  0   ? atorvastatin (LIPITOR) 80 MG tablet Take 1 tablet (80 mg total) by mouth at bedtime. 90 tablet 3   ? flecainide (TAMBOCOR) 100 MG tablet Take 1 tablet (100 mg total) by mouth 2 (two) times a day. 180 tablet 3   ? irbesartan (AVAPRO) 75 MG tablet Take 1 tablet (75 mg total) by mouth daily. 30 tablet 11   ? metoprolol succinate (TOPROL-XL) 50 MG 24 hr tablet Take 1 tablet (50 mg total) by mouth daily. 30 tablet 5   ? metroNIDAZOLE (METROCREAM) 0.75 % cream Apply 1 application topically 2 (two) times a day as needed (roseacea). For Rosacea - apply to nose and right side of face            ? omeprazole (PRILOSEC) 40 MG capsule Take 40 mg by mouth daily before breakfast.     ? rivaroxaban 20 mg Tab Take 1 tablet (20 mg total) by mouth daily with supper. 90 tablet 3     No current facility-administered medications for this visit.       Allergies   Allergen Reactions   ? Cephalexin Rash      Medical, surgical, family, social history, and medications were all reviewed and updated as necessary.   Lab Results    Chemistry CBC Other   Lab Results   Component Value Date    CREATININE 0.82 05/25/2019     BUN 7 (L) 05/25/2019     05/25/2019    K 4.0 05/25/2019     (H) 05/25/2019    CO2 20 (L) 05/25/2019     Creatinine (mg/dL)   Date Value   05/25/2019 0.82   04/20/2019 0.87   01/18/2019 0.92   01/11/2019 0.93    Lab Results   Component Value Date    WBC 8.8 05/25/2019    HGB 14.9 05/25/2019    HCT 42.0 05/25/2019    MCV 86 05/25/2019     05/25/2019    Lab Results   Component Value Date    INR 1.13 (H) 04/20/2019    INR 1.24 (H) 01/18/2019    INR 1.03 12/29/2018     Lab Results   Component Value Date    TSH 1.75 12/31/2018            50 minutes were spent face to face in this visit with more than 50% spent discussing diagnoses as listed above, counseling, and coordination of care.    This note has been dictated using voice recognition software. Any grammatical, typographical, or context distortions are unintentional and inherent to the software.    Suzan Waddell, [facility]   Electrophysiology

## 2021-06-29 NOTE — PROGRESS NOTES
"Progress Notes by Suzan Waddell CNP at 6/22/2020  9:50 AM     Author: Suzan Waddell CNP Service: -- Author Type: Nurse Practitioner    Filed: 6/22/2020 11:40 AM Encounter Date: 6/22/2020 Status: Signed    : Suzan Waddell CNP (Nurse Practitioner)           The patient has been notified of following:     \"This video visit will be conducted via a call between you and your physician/provider. We have found that certain health care needs can be provided without the need for an in-person physical exam.  This service lets us provide the care you need with a video conversation.  If a prescription is necessary we can send it directly to your pharmacy.  If lab work is needed we can place an order for that and you can then stop by our lab to have the test done at a later time.      Patient has given verbal consent to a Video visit? Yes    HEART CARE VIDEO ENCOUNTER        The patient has chosen to have the visit conducted as a video visit, to reduce risk of exposure given the current status of Coronavirus in our community. This video visit is being conducted via a call between the patient and physician/provider. Health care needs are being provided without a physical exam.     Assessment/Recommendations   Assessment/Plan:  1.  Paroxysmal atrial fibrillation: No further episodes of A fib.  He remains off membrane active antiarrhythmic medications.  Continue metoprolol succinate 50 mg daily.  Take an extra metoprolol 50 mg at onset of AF.       He was reassured that atrial fibrillation is not life-threatening, but carries an increased risk for stroke.  He has a [id number] score of 5 for age 65-74, hypertension, possible TIA, and vascular disease.  He underwent left atrial appendage closure with the Watchman FLX device as part of the option study done at [place] on 2/4/2020.  He has had no neurologic symptoms or events.  Post-implant JOVANI no significant pari-device leak or thrombus, so Xarelto was discontinued.  He remains " on aspirin 81 mg with clopidogrel 75 mg daily..      2.  Sick sinus syndrome status post dual-chamber pacemaker implant:  The pacemaker was remotely interrogated and is functioning appropriately.  The battery showed estimated longevity of 13.4 years.  Atrial and ventricular lead sensing, impedance, and pacing thresholds were stable and within normal limits.  He has had no further symptoms of syncope since his pacemaker implant.     3.  Hypertension: Blood pressure on the low side of target per home readings.  Continue metoprolol.  Decrease irbesartan to 1/2 tab daily.     4.  Nonobstructive coronary artery disease: 30-35% stenosis of the LAD and circumflex seen on previous angiogram, but severe coronary calcification seen on more recent CTA.  Denies exertional chest discomfort.  He has noted dyspnea with more strenuous exertion over the past month, though has significantly increased his level of activity with the warmer weather.  He was instructed to gradually increase activity.  Consider NM stress test for ischemia evaluation if symptoms do not improve.  Continue atorvastatin 80 mg daily.    Follow Up Plan:  3 months  I have reviewed the note as documented.  This accurately captures the substance of my conversation with the patient.    Total time of video between patient and provider was 21 minutes   Start Time: 0955   Stop Time: 1016    Originating Location (pt. Location): Home    Distant Location (provider location):  Home     Mode of Communication:  [facility]       History of Present Illness/Subjective    Ashok Del Rosario is a 69 y.o. male who is being evaluated via a billable video visit and has consented to a video visit. He has a history of sinus bradycardia status post dual-chamber pacemaker implant on 12/31/2018, TIA, atrial fibrillation, carotid artery stenosis, hypertension, hyperlipidemia, anxiety, and mild obstructive sleep apnea.  He was newly diagnosed with atrial fibrillation  with rapid ventricular response for which he underwent early cardioversion in the emergency department on 1/11/2019.  Symptoms consist of tachypalpitations, lightheadedness, and shortness of breath.   He was started on metoprolol, but hospitalized again over for another episode that was severely symptomatic as the heart rates were in the 200s.  He was given adenosine by EMS for what appeared to be SVT.  During his hospitalization, he was going to be started on sotalol, but was instead started on flecainide 75 mg twice daily by Dr. Chand who evaluated his coronary artery disease as not significant.   He underwent pulmonary vein isolation ablation on 8/29/2019 performed at [place] prior to which his flecainide was discontinued.he underwent left atrial appendage closure with the Watchman FLX as part of the option study on 2/4/2020, also done at [place].    Post implant JOVANI showed a well-seated device with no significant pari-device flow and no evidence of thrombus.  Xarelto was discontinued and he is on aspirin and Plavix for 45 days after which time the Plavix will be discontinued.  He returns here for subsequent device and arrhythmia follow-up.  He is also following with a pulmonologist for emphysema and an indeterminate opacity of the LLL seen on pre-PVI CT.     Pat states that he has been feeling well.  He continues to have dyspnea with more strenuous activity, but symptoms have lessened.  He has had no further episodes of A. fib.  He has been very active with yard work and playing golf. He denies chest discomfort, abdominal fullness/bloating or peripheral edema, shortness of breath at rest or normal activity, paroxysmal nocturnal dyspnea, orthopnea, dizziness, pre-syncope, or syncope.  SBP has been consistently in the 90s.     Cardiographics (EKGs and device interrogations personally reviewed):  EKG done 2/5/2020 (image not available for review) NSR 77 bpm, QT/QTc 408/461 ms  EKG done 5/25/2019 shows atrial  fibrillation with rapid ventricular response at 139 bpm   EKG, Done 1/18/2019 starts in A. fib with transition to sinus rhythm  EKG done 1/14/2019 shows sinus rhythm at 89 bpm, incomplete right bundle branch block, QT/QTc interval measures 378/459 ms  EKG done 1/11/2019 shows atrial fibrillation with rapid ventricular response at 141 bpm.  Subsequent EKG shows sinus tachycardia at 105 bpm with an incomplete right bundle branch block     Pacemaker Interrogation, remote 6/19/2020 (MDT implant 12/31/2018):  Pacing mode: MVP   Presenting rhythm: AS-VS. SR 70s with 7-bt run of AT  Underlying rhythm: NA  A-paced: 5.2%.  V-paced 15.3%.  Battery: estimated longevity 13.4 years.  Atrial and Ventricular leads: Stable with good sensing, impedance, and pacing thresholds  Arrhythmias: 0 AT/AF.  13 HVR + 7 NSVT, EGMs show 1-1 SVT , longest 11 sec  [place]: 0% (previous 0.3%)  Histograms: Good rate distribution     JOVANI done 5/29/2020 at [place]:  1. Status post WATCHMAN device placement (02-).  2. Sinus rhythm.  3. Small (3-4 mm) posterior leak (JOVANI angle 129 degree at mid-esophageal level), otherwise good  seal of WATCHMAN device.  4. No thrombus identified on atrial facing surface of WATCHMAN device.  5. No evidence of LA thrombus.  6. Tiny posterior pericardial effusion (2 mm).  7. Estimated left ventricular ejection fraction 60 %.  8. Small residual iatrogenic atrial septal defect (approximately 3 mm in diameter) with left to  right shunt  9. Moderate tricuspid valve regurgitation.  10. Normal estimated pulmonary artery systolic pressure.  11. Tiny patent foramen ovale with left to right but no evidence of right to left shunt.  12. Severe immobile atherosclerosis of the descending thoracic aorta.    CTA pulmonary veings done 11/20/2019 at [place]:   PULMONARY VEINS:  Two right and two left pulmonary veins enter the left atrium unobstructed. No  evidence of pulmonary vein stenosis.     CARDIOVASCULAR FINDINGS:      Presumed focal dissection with nearby ulcerated plaque in the left subclavian  artery, likely unchanged. Scattered calcified and noncalcified plaque in the  remainder of the thoracic aorta and visualized arch vessels. Severe coronary  calcification. No intracardiac mass or thrombus. Left atrial enlargement.    ADDITIONAL FINDINGS:      Focal peripheral hyperenhancement in hepatic segment 4A, indeterminate but  likely benign. Left anterior chest wall dual chamber pacemaker. Redemonstrated  centrilobular and paraseptal emphysema with decreased fluid and debris within  the previously noted left lower lobe bulla/cavity; this lesion remains somewhat  thick-walled.    Coronary angiogram done 10/17/2017:     Mid Cx lesion 30% stenosed.    Prox LAD to Dist LAD lesion 35% stenosed.  The left ventricular size is normal. The left ventricular systolic function is normal. LV systolic pressure is normal. LV end diastolic pressure is normal. There are no wall motion abnormalities in the left ventricle.      I have reviewed and updated the patient's Past Medical History, Social History, Family History and Medication List.     Physical Examination performed via live video encounter Review of Systems   General Appearance:   Well-appearing, in no distress, normal body habitus, upright.   ENT/Mouth: membranes moist, no nasal discharge or bleeding gums.  Normal head shape, no evidence of injury or laceration.     EYES:  no scleral icterus, normal conjunctivae   Neck: no evidence of thyromegaly.  Supple   Chest/Lungs:   No audible wheezing equal chest wall expansion. Non labored breathing.  No cough.   Cardiovascular:   No evidence of elevated jugular venous pressure.  No evidence of pitting edema bilaterally    Abdomen:  no evidence of abdominal distention. No observe juandice.     Extremities: no cyanosis or clubbing noted.    Skin: no xanthelasma, normal skin color. No evidence of facial lacerations.      Neurologic: Normal arm  motion bilateral, no tremors.  No evidence of focal defect.       Psychiatric: alert and oriented x4, calm.  Mood and affect appropriate.           See CMA note attached, personally reviewed.                                   Medical History  Surgical History Family History Social History   Past Medical History:   Diagnosis Date   ? Coronary artery disease    ? Family history of myocardial infarction    ? GERD (gastroesophageal reflux disease)    ? High cholesterol    ? Hypertension    ? Paroxysmal atrial fibrillation (H) 1/14/2019    Dx [id number] score = 5 (age, HTN, CAD, TIA 2) Rx flecainide Rx Xarelto    Past Surgical History:   Procedure Laterality Date   ? ABDOMINAL SURGERY  2000    appendix   ? CARDIAC ELECTROPHYSIOLOGY MAPPING AND ABLATION  08/29/2019    PVI done at [place]   ? CV CORONARY ANGIOGRAM N/A 10/17/2017    Procedure: Coronary Angiogram;  Surgeon: Ashok Hyatt MD;  Location: [facility] Cath Lab;  Service:    ? CV LEFT HEART CATHETERIZATION WITH LEFT VENTRICULOGRAM N/A 10/17/2017    Procedure: Left Heart Catheterization with Left Ventriculogram;  Surgeon: Ashok Hyatt MD;  Location: [facility] Cath Lab;  Service:    ? EP PACEMAKER INSERT Left 12/31/2018    Procedure: EP Pacemaker Insertion;  Surgeon: Micheal Junior MD;  Location: [facility] Cath Lab;  Service: Cardiology   ? Left atrial appendage closure  02/04/2020    Watchman FLX at [place]    Family History   Problem Relation Age of Onset   ? Cancer Mother    ? Cancer Father    ? Heart attack Brother    ? Coronary artery disease Brother    ? Valvular heart disease Brother    ? Rectal cancer Sister    ? Colon cancer Sister       Social History     Socioeconomic History   ? Marital status:      Spouse name: Not on file   ? Number of children: Not on file   ? Years of education: Not on file   ? Highest education level: Not on file   Occupational History   ? Not on file   Social Needs   ? Financial resource strain: Not on file    ? Food insecurity     Worry: Not on file     Inability: Not on file   ? Transportation needs     Medical: Not on file     Non-medical: Not on file   Tobacco Use   ? Smoking status: Former Smoker     Last attempt to quit: 10/16/1999     Years since quittin.6   ? Smokeless tobacco: Never Used   Substance and Sexual Activity   ? Alcohol use: Yes     Alcohol/week: 1.0 standard drinks     Types: 1 Cans of beer per week     Comment: none since december   ? Drug use: No   ? Sexual activity: Yes     Partners: Female   Lifestyle   ? Physical activity     Days per week: Not on file     Minutes per session: Not on file   ? Stress: Not on file   Relationships   ? Social connections     Talks on phone: Not on file     Gets together: Not on file     Attends [employer] service: Not on file     Active member of club or organization: Not on file     Attends meetings of clubs or organizations: Not on file     Relationship status: Not on file   ? Intimate partner violence     Fear of current or ex partner: Not on file     Emotionally abused: Not on file     Physically abused: Not on file     Forced sexual activity: Not on file   Other Topics Concern   ? Not on file   Social History Narrative   ? Not on file          Medications  Allergies   Current Outpatient Medications   Medication Sig Dispense Refill   ? acetaminophen (TYLENOL) 500 MG tablet Take 1-2 tablets (500-1,000 mg total) by mouth every 4 (four) hours as needed.  0   ? aspirin (ASPIR-81) 81 MG EC tablet Take 81 mg by mouth daily.      ? atorvastatin (LIPITOR) 80 MG tablet TAKE 1 TABLET BY MOUTH EVERYDAY AT BEDTIME 90 tablet 0   ? clopidogreL (PLAVIX) 75 mg tablet Take 75 mg by mouth daily.     ? irbesartan (AVAPRO) 75 MG tablet Take 0.5 tablets (37.5 mg total) by mouth daily. 45 tablet 3   ? metoprolol succinate (TOPROL-XL) 50 MG 24 hr tablet Take 1 tablet (50 mg total) by mouth daily. 90 tablet 3   ? metroNIDAZOLE (METROCREAM) 0.75 % cream Apply 1 application  topically 2 (two) times a day as needed (roseacea). For Rosacea - apply to nose and right side of face            ? omeprazole (PRILOSEC) 40 MG capsule Take 40 mg by mouth daily before breakfast.     ? polymyxin B sulf/trimethoprim (POLYMYXIN B SUL-TRIMETHOPRIM OPHT) Apply to eye as needed.       No current facility-administered medications for this visit.     Allergies   Allergen Reactions   ? Cephalexin Rash         Lab Results    Chemistry/lipid CBC Cardiac Enzymes/BNP/TSH/INR   Lab Results   Component Value Date    CHOL 172 12/31/2018    HDL 39 (L) 12/31/2018    LDLCALC 113 12/31/2018    TRIG 102 12/31/2018    CREATININE 0.85 08/14/2019    BUN 10 08/14/2019    K 3.7 08/14/2019     08/14/2019     08/14/2019    CO2 22 08/14/2019    Lab Results   Component Value Date    WBC 11.1 (H) 08/14/2019    HGB 13.6 (L) 08/14/2019    HCT 38.4 (L) 08/14/2019    MCV 88 08/14/2019     08/14/2019    Lab Results   Component Value Date    TROPONINI <0.01 08/15/2019    BNP <10 01/18/2019    TSH 1.75 12/31/2018    INR 2.34 (H) 08/14/2019     Outside labs reviewed     Suzan Waddell CNP  Cardiac Electrophysiology

## 2021-06-29 NOTE — PROGRESS NOTES
Progress Notes by Indu Mcknight RN at 10/29/2020 12:13 PM     Author: Indu Mcknight RN Service: -- Author Type: Registered Nurse    Filed: 11/9/2020  2:12 PM Encounter Date: 10/29/2020 Status: Signed    : Indu Mcknight RN (Registered Nurse)           Per [employer] lookup, device is 1.5/3T compatible.   Indu Mcknight RN

## 2021-06-29 NOTE — PROGRESS NOTES
"Progress Notes by Suzan Waddell CNP at 5/7/2020  9:10 AM     Author: Suzan Waddell CNP Service: -- Author Type: Nurse Practitioner    Filed: 5/7/2020 12:48 PM Encounter Date: 5/7/2020 Status: Signed    : Suzan Waddell CNP (Nurse Practitioner)           The patient has been notified of following:     \"This video visit will be conducted via a call between you and your physician/provider. We have found that certain health care needs can be provided without the need for an in-person physical exam.  This service lets us provide the care you need with a video conversation.  If a prescription is necessary we can send it directly to your pharmacy.  If lab work is needed we can place an order for that and you can then stop by our lab to have the test done at a later time.      Patient has given verbal consent to a Video visit? Yes    HEART CARE VIDEO ENCOUNTER        The patient has chosen to have the visit conducted as a video visit, to reduce risk of exposure given the current status of Coronavirus in our community. This video visit is being conducted via a call between the patient and physician/provider. Health care needs are being provided without a physical exam.     Assessment/Recommendations   Assessment/Plan:  1.  Paroxysmal atrial fibrillation: He has had 3 rather prolonged episodes of AF with RVR since ablation done at [place] in August 2019.  The episode in February may have been related to the Watchman implant procedure, but the episode yesterday had no identifiable trigger.  He is very symptomatic and ventricular rates are fast, though terminated after taking an extra metoprolol.  We reviewed AF recurrence post ablation and treatment options of medications or repeat ablation.  At this point, AF burden remains less than 1%.  He remains off membrane active antiarrhythmic medications.  Continue metoprolol succinate 50 mg daily.  Take an extra metoprolol 50 mg at onset of AF.       He was reassured that atrial " fibrillation is not life-threatening, but carries an increased risk for stroke.  He has a [id number] score of 5 for age 65-74, hypertension, possible TIA, and vascular disease.  He underwent left atrial appendage closure with the Watchman FLX device as part of the option study done at [place] on 2/4/2020.  He remains on Xarelto 20 mg daily and aspirin 81 mg daily.    3-month post implant JOVANI will likely be scheduled in the very near future.      2.  Sick sinus syndrome status post dual-chamber pacemaker implant:  The pacemaker was remotely interrogated and is functioning appropriately.  The battery showed estimated longevity of 13.6 years.  Atrial and ventricular lead sensing, impedance, and pacing thresholds were stable and within normal limits.  He has had no further symptoms of syncope since his pacemaker implant.     3.  Hypertension: Blood pressure at target per home readings.  Continue metoprolol ans irbesartan.     4.  Nonobstructive coronary artery disease: 30-35% stenosis of the LAD and circumflex seen on previous angiogram, but severe coronary calcification seen on more recent CTA.  Denies exertional chest discomfort.  He has noted dyspnea with more strenuous exertion over the past month, though has significantly increased his level of activity with the warmer weather.  He was instructed to gradually increase activity.  Will do NM stress test for ischemia evaluation if symptoms do not improve.  Continue atorvastatin 80 mg daily.    Follow Up Plan:  6-8 weeks  I have reviewed the note as documented.  This accurately captures the substance of my conversation with the patient.    Total time of video between patient and provider was 30 minutes   Start Time: 0931   Stop Time: 1001    Originating Location (pt. Location): Home    Distant Location (provider location):  [facility] HEART CARE      Mode of Communication:  [facility]       History of Present Illness/Subjective    Ashok RODRIGUEZ  Carin is a 69 y.o. male who is being evaluated via a billable video visit and has consented to a video visit. He has a history of sinus bradycardia status post dual-chamber pacemaker implant on 12/31/2018, TIA, atrial fibrillation, carotid artery stenosis, hypertension, hyperlipidemia, anxiety, and mild obstructive sleep apnea.  He was newly with atrial fibrillation with rapid ventricular response for which he underwent early cardioversion in the emergency department on 1/11/2019.  Symptoms consist of tachypalpitations, lightheadedness, and shortness of breath.   He was started on metoprolol, but hospitalized again over for another episode that was severely symptomatic as the heart rates were in the 200s.  He was given adenosine by EMS for what appeared to be SVT.  During his hospitalization, he was going to be started on sotalol, but was instead started on flecainide 75 mg twice daily by Dr. Chand who evaluated his coronary artery disease as not significant.   He underwent pulmonary vein isolation ablation on 8/29/2019 performed at [place] prior to which his flecainide was discontinued.he underwent left atrial appendage closure with the Watchman FLX as part of the option study on 2/4/2020, also done at [place].  He remains on Xarelto 20 mg daily for stroke prophylaxis.  He is in the process of having his 3-month post implant JOVANI scheduled.  He returns here for subsequent device and arrhythmia follow-up.  He is also following with a pulmonologist for emphysema and an indeterminate opacity of the LLL seen on pre-PVI CT.     Pat has had 3 AF with RVR since his ablation, 1 in October, 1 lasting for 11 hours on 2/22/2020, and another on 5/6/2020.  He took an extra metoprolol 50 mg as instructed and the episode terminated fairly shortly thereafter.  Today he reports his heart rate at 78 bpm with a blood pressure of 119/78.  Generally, he has felt quite well, though notes for at least the past month dyspnea with more  strenuous activity.  He also states that he has been more active doing yard work. He denies chest discomfort, abdominal fullness/bloating or peripheral edema, shortness of breath at rest or normal activity, paroxysmal nocturnal dyspnea, orthopnea, dizziness, pre-syncope, or syncope.      Cardiographics (EKGs and device interrogations personally reviewed):  EKG done 2/5/2020 (image not available for review) NSR 77 bpm, QT/QTc 408/461 ms  EKG done 5/25/2019 shows atrial fibrillation with rapid ventricular response at 139 bpm   EKG, Done 1/18/2019 starts in A. fib with transition to sinus rhythm  EKG done 1/14/2019 shows sinus rhythm at 89 bpm, incomplete right bundle branch block, QT/QTc interval measures 378/459 ms  EKG done 1/11/2019 shows atrial fibrillation with rapid ventricular response at 141 bpm.  Subsequent EKG shows sinus tachycardia at 105 bpm with an incomplete right bundle branch block     Pacemaker Interrogation, remote 4/30/20 (MDT implant 12/31/2018):  Pacing mode: MVP   Presenting rhythm: AS-VS/ 70s  Underlying rhythm: NA  A-paced: 3%.  V-paced 4%.  Battery: estimated longevity 13.6 years.  Atrial and Ventricular leads: Stable with good sensing, impedance, and pacing thresholds  Arrhythmias: 4 AF-RVR, longest 11 hrs on 2/22/2020.  VR > 120 bpm 70%.  17 HVR EGMs show 1-1 SVT up to 21 sec and 170 bpm  [place]: 0.3%  Histograms: Good rate distribution    Alert PPM remote from 5/6/2020 shows episode of AF-RVR  bpm     Echo done 2/6/2020 at [place]:  1. Status post WATCHMAN device placement, 04-February-2020.  2. No pericardial effusion.  3. Normal left ventricular chamber size and wall thickness  4. Calculated 2-D linear left ventricular ejection fraction 68 %.  5. No regional wall motion abnormalities.  6. Normal right ventricular chamber size and systolic function  7. Thickened tricuspid valve with mild-moderate tricuspid valve regurgitation.    CTA pulmonary veings done 11/20/2019 at  [place]:   PULMONARY VEINS:  Two right and two left pulmonary veins enter the left atrium unobstructed. No  evidence of pulmonary vein stenosis.     CARDIOVASCULAR FINDINGS:     Presumed focal dissection with nearby ulcerated plaque in the left subclavian  artery, likely unchanged. Scattered calcified and noncalcified plaque in the  remainder of the thoracic aorta and visualized arch vessels. Severe coronary  calcification. No intracardiac mass or thrombus. Left atrial enlargement.    ADDITIONAL FINDINGS:      Focal peripheral hyperenhancement in hepatic segment 4A, indeterminate but  likely benign. Left anterior chest wall dual chamber pacemaker. Redemonstrated  centrilobular and paraseptal emphysema with decreased fluid and debris within  the previously noted left lower lobe bulla/cavity; this lesion remains somewhat  thick-walled.    Coronary angiogram done 10/17/2017:     Mid Cx lesion 30% stenosed.    Prox LAD to Dist LAD lesion 35% stenosed.  The left ventricular size is normal. The left ventricular systolic function is normal. LV systolic pressure is normal. LV end diastolic pressure is normal. There are no wall motion abnormalities in the left ventricle.      I have reviewed and updated the patient's Past Medical History, Social History, Family History and Medication List.     Physical Examination performed via live video encounter Review of Systems   General Appearance:    Awake, alert, no distress, normal body habitus, upright.   ENT/Mouth: membranes moist, no nasal discharge or bleeding gums.  Normal head shape, no evidence of injury or laceration.     EYES:  no scleral icterus, normal conjunctivae   Neck: no evidence of thyromegaly.  Supple   Chest/Lungs:   No audible wheezing equal chest wall expansion. Non labored breathing.  No cough.   Cardiovascular:   No evidence of elevated jugular venous pressure.  No evidence of pitting edema bilaterally    Abdomen:  no evidence of abdominal distention. No observe  ruth.     Extremities: no cyanosis or clubbing noted.    Skin: no xanthelasma, normal skin color. No evidence of facial lacerations.      Neurologic: Normal arm motion bilateral, no tremors.  No evidence of focal defect.       Psychiatric: alert and oriented x4, calm.  Mood and affect appropriate.           See CMA note attached, personally reviewed.                                   Medical History  Surgical History Family History Social History   Past Medical History:   Diagnosis Date   ? Coronary artery disease    ? Family history of myocardial infarction    ? GERD (gastroesophageal reflux disease)    ? High cholesterol    ? Hypertension    ? Paroxysmal atrial fibrillation (H) 1/14/2019    Dx [id number] score = 5 (age, HTN, CAD, TIA 2) Rx flecainide Rx Xarelto    Past Surgical History:   Procedure Laterality Date   ? ABDOMINAL SURGERY  2000    appendix   ? CARDIAC ELECTROPHYSIOLOGY MAPPING AND ABLATION  08/29/2019    PVI done at [place]   ? CV CORONARY ANGIOGRAM N/A 10/17/2017    Procedure: Coronary Angiogram;  Surgeon: Ashok Hyatt MD;  Location: [facility] Cath Lab;  Service:    ? CV LEFT HEART CATHETERIZATION WITH LEFT VENTRICULOGRAM N/A 10/17/2017    Procedure: Left Heart Catheterization with Left Ventriculogram;  Surgeon: Ashok Hyatt MD;  Location: [facility] Cath Lab;  Service:    ? EP PACEMAKER INSERT Left 12/31/2018    Procedure: EP Pacemaker Insertion;  Surgeon: Micheal Junior MD;  Location: [facility] Cath Lab;  Service: Cardiology   ? Left atrial appendage closure  02/04/2020    Watchman FLX at [place]    Family History   Problem Relation Age of Onset   ? Cancer Mother    ? Cancer Father    ? Heart attack Brother    ? Coronary artery disease Brother    ? Valvular heart disease Brother    ? Rectal cancer Sister    ? Colon cancer Sister       Social History     Socioeconomic History   ? Marital status:      Spouse name: Not on file   ? Number of children: Not on file   ? Years  of education: Not on file   ? Highest education level: Not on file   Occupational History   ? Not on file   Social Needs   ? Financial resource strain: Not on file   ? Food insecurity     Worry: Not on file     Inability: Not on file   ? Transportation needs     Medical: Not on file     Non-medical: Not on file   Tobacco Use   ? Smoking status: Former Smoker     Last attempt to quit: 10/16/1999     Years since quittin.5   ? Smokeless tobacco: Never Used   Substance and Sexual Activity   ? Alcohol use: Yes     Alcohol/week: 1.0 standard drinks     Types: 1 Cans of beer per week     Comment: none since december   ? Drug use: No   ? Sexual activity: Yes     Partners: Female   Lifestyle   ? Physical activity     Days per week: Not on file     Minutes per session: Not on file   ? Stress: Not on file   Relationships   ? Social connections     Talks on phone: Not on file     Gets together: Not on file     Attends Jain service: Not on file     Active member of club or organization: Not on file     Attends meetings of clubs or organizations: Not on file     Relationship status: Not on file   ? Intimate partner violence     Fear of current or ex partner: Not on file     Emotionally abused: Not on file     Physically abused: Not on file     Forced sexual activity: Not on file   Other Topics Concern   ? Not on file   Social History Narrative   ? Not on file          Medications  Allergies   Current Outpatient Medications   Medication Sig Dispense Refill   ? acetaminophen (TYLENOL) 500 MG tablet Take 1-2 tablets (500-1,000 mg total) by mouth every 4 (four) hours as needed.  0   ? aspirin (ASPIR-81) 81 MG EC tablet 1 tab(s)     ? atorvastatin (LIPITOR) 80 MG tablet TAKE 1 TABLET BY MOUTH EVERYDAY AT BEDTIME 90 tablet 0   ? irbesartan (AVAPRO) 75 MG tablet Take 1 tablet (75 mg total) by mouth daily. 30 tablet 11   ? metoprolol succinate (TOPROL-XL) 50 MG 24 hr tablet Take 1 tablet (50 mg total) by mouth daily. 90 tablet  3   ? omeprazole (PRILOSEC) 40 MG capsule Take 40 mg by mouth daily before breakfast.     ? XARELTO 20 mg tablet TAKE 1 TABLET (20 MG TOTAL) BY MOUTH DAILY WITH SUPPER. 90 tablet 0   ? metroNIDAZOLE (METROCREAM) 0.75 % cream Apply 1 application topically 2 (two) times a day as needed (roseacea). For Rosacea - apply to nose and right side of face              No current facility-administered medications for this visit.     Allergies   Allergen Reactions   ? Cephalexin Rash         Lab Results    Chemistry/lipid CBC Cardiac Enzymes/BNP/TSH/INR   Lab Results   Component Value Date    CHOL 172 12/31/2018    HDL 39 (L) 12/31/2018    LDLCALC 113 12/31/2018    TRIG 102 12/31/2018    CREATININE 0.85 08/14/2019    BUN 10 08/14/2019    K 3.7 08/14/2019     08/14/2019     08/14/2019    CO2 22 08/14/2019    Lab Results   Component Value Date    WBC 11.1 (H) 08/14/2019    HGB 13.6 (L) 08/14/2019    HCT 38.4 (L) 08/14/2019    MCV 88 08/14/2019     08/14/2019    Lab Results   Component Value Date    TROPONINI <0.01 08/15/2019    BNP <10 01/18/2019    TSH 1.75 12/31/2018    INR 2.34 (H) 08/14/2019     Outside labs reviewed     Suzan Waddell CNP  Cardiac Electrophysiology

## 2021-06-30 NOTE — PROGRESS NOTES
Progress Notes by Micheal Junior MD at 5/17/2021  9:40 AM     Author: Micheal Junior MD Service: -- Author Type: Physician    Filed: 5/17/2021  9:41 AM Encounter Date: 5/17/2021 Status: Signed    : Micheal Junior MD (Physician)       Suzan Waddell, Micheal Kwong MD             Pt having pain at PPM site into left shoulder.  Initially, pain consistent with rotator cuff, but was able to worsen it by pressing on his PPM.  If this is really the case, what are our options?  Should I schedule him to see you to see if you think the PPM is really involved?   Thanks,   Suzan      Pacemaker is implanted December 31, 2018  I with discomfort at pacemaker site  Plan  Set patient up to see me in device clinic

## 2021-06-30 NOTE — PROGRESS NOTES
Progress Notes by Suzan Waddell CNP at 5/14/2021  9:50 AM     Author: Suzan Waddell CNP Service: -- Author Type: Nurse Practitioner    Filed: 5/14/2021 12:34 PM Encounter Date: 5/14/2021 Status: Signed    : Suzan Waddell CNP (Nurse Practitioner)             Assessment/Recommendations   Assessment/Plan:    1.  Paroxysmal atrial fibrillation:  Brief episodes of atrial tachycardia, but no episodes of A fib.  He remains off membrane active antiarrhythmic medications.  Continue metoprolol succinate 50 mg daily.  Take an extra metoprolol 50 mg at onset of AF.       He was reassured that atrial fibrillation is not life-threatening, but carries an increased risk for stroke.  He has a [id number]-PMEx score of 5 for age 65-74, hypertension, possible TIA, and vascular disease.  He underwent left atrial appendage closure with the Watchman FLX device as part of the option study done at [place] on 2/4/2020.  He has had no neurologic symptoms or events.   He remains on aspirin 81 mg indefinitely.      2.  Sick sinus syndrome status post dual-chamber pacemaker implant:  The pacemaker was interrogated and is functioning appropriately.  The battery showed estimated longevity of 12.6 years.  Atrial and ventricular lead sensing, impedance, and pacing thresholds were stable and within normal limits.  He has had no further symptoms of syncope since his pacemaker implant.  He is having discomfort at the pacemaker site, reproducible with palpation.  Discuss further with MICHELLE RESENDEZ.     3.  Hypertension: Blood pressure at target.  Continue metoprolol and irbesartan.     4.  Nonobstructive coronary artery disease: 30-35% stenosis of the LAD and circumflex seen on previous angiogram, but severe coronary calcification seen on more recent CTA.  Denies exertional chest discomfort, but continues to have dyspnea with exertion.  NM stress test to evaluate for ischemia.  Continue atorvastatin 80 mg daily.    Follow up with Dr. Hyatt after his stress  test  Routine device and arrhythmia follow-up in 6 months     History of Present Illness/Subjective    Mr. Ashok Del Rosario is a 70 y.o. male who comes in today for EP device and arrhythmia follow-up.  He has a history of sinus bradycardia status post dual-chamber pacemaker implant on 12/31/2018, TIA, atrial fibrillation, carotid artery stenosis, hypertension, hyperlipidemia, anxiety, and mild obstructive sleep apnea.  He was newly diagnosed with atrial fibrillation with rapid ventricular response for which he underwent early cardioversion in the emergency department on 1/11/2019.  Symptoms consist of tachypalpitations, lightheadedness, and shortness of breath.   He was started on metoprolol, but hospitalized again over for another episode that was severely symptomatic as the heart rates were in the 200s.  He was given adenosine by EMS for what appeared to be SVT.  During his hospitalization, he was going to be started on sotalol, but was instead started on flecainide 75 mg twice daily by Dr. Chand who evaluated his coronary artery disease as not significant.   He underwent pulmonary vein isolation ablation on 8/29/2019 performed at [place] prior to which his flecainide was discontinued.he underwent left atrial appendage closure with the Watchman FLX as part of the option study on 2/4/2020, also done at [place].    Post implant JOVANI showed a well-seated device with no significant pari-device flow and no evidence of thrombus.  Xarelto was discontinued and he is on aspirin and Plavix for 45 days after which time the Plavix will be discontinued.  He returns here for subsequent device and arrhythmia follow-up.  He is also following with a pulmonologist for emphysema and an indeterminate opacity of the LLL seen on pre-PVI CT.     Pat states that he has been feeling well.  He continues to be very active, but still has dyspnea on exertion.  He denies chest discomfort, abdominal fullness/bloating or peripheral edema,  "shortness of breath at rest or normal activity, paroxysmal nocturnal dyspnea, orthopnea, dizziness, pre-syncope, or syncope.    Is also had significant left shoulder pain, particularly with lateral above the head movement.  He has had cortisone injections without relief and seen orthopedist.  Comfort is worsened if he has been laying on his left side.  Able to reproduce pain with pressing on his pacemaker while he was lifting his arm.     Cardiographics (EKGs and device interrogations personally reviewed):  EKG done 2/5/2020 (image not available for review) NSR 77 bpm, QT/QTc 408/461 ms  EKG done 5/25/2019 shows atrial fibrillation with rapid ventricular response at 139 bpm   EKG, Done 1/18/2019 starts in A. fib with transition to sinus rhythm  EKG done 1/14/2019 shows sinus rhythm at 89 bpm, incomplete right bundle branch block, QT/QTc interval measures 378/459 ms  EKG done 1/11/2019 shows atrial fibrillation with rapid ventricular response at 141 bpm.  Subsequent EKG shows sinus tachycardia at 105 bpm with an incomplete right bundle branch block    Kardia strips reviewed, show sinus rhythm, \"wide QRS\" is minimal and consistent with known right bundle branch block     Pacemaker Interrogation (MDT implant 12/31/2018):  Pacing mode: MVP   Presenting rhythm: AS-VS. SR 70  Underlying rhythm: SR 70  A-paced: 3.2%.  V-paced 3.1%.  Battery: estimated longevity 12.6 years.  Atrial and Ventricular leads: Stable with good sensing, impedance, and pacing thresholds  Arrhythmias: 0 AF.  11 HVR + 10 NSVT, EGMs show 1-1 SVT , longest 7 min, but most < 10 sec  [place]: < 0.1%  Histograms: Good rate distribution     JOVANI done 1/23/2021 at [place]:  1. The baseline ECG demonstrated sinus rhythm with a rate of 82 bpm.  2. The WATCHMAN device is visualized and is well-expanded within the left atrial appendage.  3. Small residual jet on the postero-inferiorior aspect of the device (jet size 2 mm; JOVANI angle  139 degrees; clip #61). The " remainder of the WATCHMAN device margin appears well sealed.  4. No intracardiac mass or thrombus identified.  No thrombus on the atrial face of the WATCHMAN  device.  5. No pericardial effusion.  6. No shunt at atrial level by color flow imaging and agitated saline contrast injection.  7. Normal left ventricular chamber size.  Left ventricular ejection fraction 60%.  8. 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  9. Normal right ventricular chamber size and systolic function.  10. Moderate tricuspid valve regurgitation.     CTA pulmonary veings done 11/20/2019 at [place]:   PULMONARY VEINS:  Two right and two left pulmonary veins enter the left atrium unobstructed. No  evidence of pulmonary vein stenosis.     CARDIOVASCULAR FINDINGS:     Presumed focal dissection with nearby ulcerated plaque in the left subclavian  artery, likely unchanged. Scattered calcified and noncalcified plaque in the  remainder of the thoracic aorta and visualized arch vessels. Severe coronary  calcification. No intracardiac mass or thrombus. Left atrial enlargement.    ADDITIONAL FINDINGS:      Focal peripheral hyperenhancement in hepatic segment 4A, indeterminate but  likely benign. Left anterior chest wall dual chamber pacemaker. Redemonstrated  centrilobular and paraseptal emphysema with decreased fluid and debris within  the previously noted left lower lobe bulla/cavity; this lesion remains somewhat  thick-walled.     Coronary angiogram done 10/17/2017:     Mid Cx lesion 30% stenosed.    Prox LAD to Dist LAD lesion 35% stenosed.  The left ventricular size is normal. The left ventricular systolic function is normal. LV systolic pressure is normal. LV end diastolic pressure is normal. There are no wall motion abnormalities in the left ventricle.    I have reviewed and updated the patient's Past Medical History, Social History, Family History and Medication List.     Physical Examination Review of Systems    Vitals:    05/14/21 0934   BP: 120/90   Pulse: 67   Resp: 14     Body mass index is 33.2 kg/m .  Wt Readings from Last 3 Encounters:   05/14/21 212 lb (96.2 kg)   07/08/20 205 lb (93 kg)   06/22/20 205 lb (93 kg)       General Appearance:   Alert, well-appearing and in no acute distress.   HEENT: Atraumatic, normocephalic.  No scleral icterus, normal conjunctivae, EOMs intact, PERRL.  Wearing a mask.   Chest/Lungs:   Chest symmetric, spine straight.  Respirations unlabored.  Lungs are clear to auscultation.  Left subclavian pacemaker site with no sign of infection or tissue thinning.  Discomfort at site radiating to shoulder reproducible with palpation.   Cardiovascular:   Regular rate and rhythm.  Normal first and second heart sounds with no murmurs, rubs, or gallops; radial and posterior tibial pulses are intact, No edema.   Abdomen:  Soft, nondistended, bowel sounds present.   Extremities: No cyanosis or clubbing.   Musculoskeletal: Moves all extremities.     Skin: Warm, dry, intact.    Neurologic: Mood and affect are appropriate.  Alert and oriented to person, place, time, and situation.    General: WNL  Eyes: Visual Distubance  Ears/Nose/Throat: Hearing Loss  Lungs: Shortness of Breath, Snoring  Heart: Shortness of Breath with activity, Irregular Heartbeat  Stomach: Nausea  Bladder: WNL  Muscle/Joints: Muscle Weakness  Skin: WNL  Nervous System: WNL  Mental Health: Confusion     Blood: Easy Bleeding, Easy Bruising       Medical History  Surgical History Family History Social History   Past Medical History:   Diagnosis Date   ? Coronary artery disease    ? Family history of myocardial infarction    ? GERD (gastroesophageal reflux disease)    ? High cholesterol    ? Hypertension    ? Paroxysmal atrial fibrillation (H) 1/14/2019    Dx [id number] score = 5 (age, HTN, CAD, TIA 2) Rx flecainide Rx Xarelto    Past Surgical History:   Procedure Laterality Date   ? ABDOMINAL SURGERY  2000    appendix   ? CARDIAC  ELECTROPHYSIOLOGY MAPPING AND ABLATION  2019    PVI done at [place]   ? CV CORONARY ANGIOGRAM N/A 10/17/2017    Procedure: Coronary Angiogram;  Surgeon: Ashok Hyatt MD;  Location: [facility] Cath Lab;  Service:    ? CV LEFT HEART CATHETERIZATION WITH LEFT VENTRICULOGRAM N/A 10/17/2017    Procedure: Left Heart Catheterization with Left Ventriculogram;  Surgeon: Ashok Hyatt MD;  Location: [facility] Cath Lab;  Service:    ? EP PACEMAKER INSERT Left 2018    Procedure: EP Pacemaker Insertion;  Surgeon: Micheal Junior MD;  Location: [facility] Cath Lab;  Service: Cardiology   ? Left atrial appendage closure  2020    Watchman FLX at [place]    Family History   Problem Relation Age of Onset   ? Cancer Mother    ? Cancer Father    ? Heart attack Brother    ? Coronary artery disease Brother    ? Valvular heart disease Brother    ? Rectal cancer Sister    ? Colon cancer Sister     Social History     Tobacco Use   ? Smoking status: Former Smoker     Quit date: 10/16/1999     Years since quittin.5   ? Smokeless tobacco: Never Used   Substance Use Topics   ? Alcohol use: Yes     Alcohol/week: 1.0 standard drinks     Types: 1 Cans of beer per week     Comment: none since december   ? Drug use: No          Medications  Allergies   Current Outpatient Medications   Medication Sig Dispense Refill   ? acetaminophen (TYLENOL) 500 MG tablet Take 1-2 tablets (500-1,000 mg total) by mouth every 4 (four) hours as needed.  0   ? aspirin (ASPIR-81) 81 MG EC tablet Take 81 mg by mouth daily.      ? atorvastatin (LIPITOR) 80 MG tablet TAKE 1 TABLET BY MOUTH EVERYDAY AT BEDTIME 90 tablet 1   ? irbesartan (AVAPRO) 75 MG tablet TAKE 1 TABLET BY MOUTH EVERY DAY 90 tablet 1   ? metoprolol succinate (TOPROL-XL) 50 MG 24 hr tablet TAKE 1 TABLET BY MOUTH EVERY DAY 90 tablet 0   ? metroNIDAZOLE (METROCREAM) 0.75 % cream Apply 1 application topically 2 (two) times a day as needed (roseacea). For Rosacea - apply to  nose and right side of face            ? omeprazole (PRILOSEC) 40 MG capsule Take 40 mg by mouth daily before breakfast.     ? polymyxin B sulf/trimethoprim (POLYMYXIN B SUL-TRIMETHOPRIM OPHT) Apply to eye as needed.     ? clopidogreL (PLAVIX) 75 mg tablet Take 75 mg by mouth daily.       No current facility-administered medications for this visit.     Allergies   Allergen Reactions   ? Cephalexin Rash         Lab Results    Chemistry/lipid CBC Other   Lab Results   Component Value Date    CREATININE 0.83 10/02/2020    BUN 11 10/02/2020     10/02/2020    K 4.5 10/02/2020     (H) 10/02/2020    CO2 23 10/02/2020     Creatinine (mg/dL)   Date Value   10/02/2020 0.83   07/08/2020 0.89   08/14/2019 0.85   05/25/2019 0.82       Lab Results   Component Value Date    CHOL 134 10/02/2020    HDL 33 (L) 10/02/2020     Lab Results   Component Value Date    LDLCALC 70 10/02/2020    Lab Results   Component Value Date    WBC 7.8 07/08/2020    HGB 13.4 (L) 07/08/2020    HCT 40.7 07/08/2020    MCV 89 07/08/2020     07/08/2020    Lab Results   Component Value Date    TROPONINI <0.01 07/08/2020    BNP <10 01/18/2019    TSH 0.53 07/08/2020     Lab Results   Component Value Date    INR 2.34 (H) 08/14/2019    INR 1.13 (H) 04/20/2019    INR 1.24 (H) 01/18/2019      58 minutes were spent on the date of encounter performing chart review, history and exam, documentation, and further activities as noted above.  This note has been dictated using voice recognition software. Any grammatical, typographical, or context distortions are unintentional and inherent to the software.

## 2021-07-06 VITALS
HEART RATE: 72 BPM | BODY MASS INDEX: 32.86 KG/M2 | RESPIRATION RATE: 16 BRPM | WEIGHT: 209.8 LBS | DIASTOLIC BLOOD PRESSURE: 72 MMHG | TEMPERATURE: 98.5 F | SYSTOLIC BLOOD PRESSURE: 118 MMHG

## 2021-07-11 PROBLEM — I47.10 SVT (SUPRAVENTRICULAR TACHYCARDIA) (H): Status: RESOLVED | Noted: 2019-01-19 | Resolved: 2021-07-06

## 2021-07-14 PROBLEM — J43.9 PULMONARY EMPHYSEMA (H): Status: RESOLVED | Noted: 2019-11-21 | Resolved: 2021-06-07

## 2021-07-14 PROBLEM — I25.119 CORONARY ARTERY DISEASE INVOLVING NATIVE CORONARY ARTERY OF NATIVE HEART WITH ANGINA PECTORIS (H): Status: RESOLVED | Noted: 2021-05-14 | Resolved: 2021-06-07

## 2021-07-14 PROBLEM — R00.1 BRADYCARDIA: Status: RESOLVED | Noted: 2018-12-29 | Resolved: 2021-06-07

## 2021-07-14 PROBLEM — I47.10 SVT (SUPRAVENTRICULAR TACHYCARDIA) (H): Status: RESOLVED | Noted: 2019-01-19 | Resolved: 2021-07-06

## 2021-07-14 PROBLEM — G45.9 TIA (TRANSIENT ISCHEMIC ATTACK): Status: RESOLVED | Noted: 2018-12-30 | Resolved: 2021-06-07

## 2021-07-22 ENCOUNTER — DOCUMENTATION ONLY (OUTPATIENT)
Dept: CARDIOLOGY | Facility: CLINIC | Age: 71
End: 2021-07-22

## 2021-07-22 NOTE — PROGRESS NOTES
7/23/2021 Wife called back and was informed about Suzan's recommendations- verbalized agreement.  Sadie Gil RN          7/22/2021 8:46 am, Called patient and wife about Afib episode on 7/18 for 9.5 hours, VR > 120 bpm ~ 65% of the time. (report below)  [facility] and tried to just relax on 7/18.  Will forward to Suzan MONTANO and Dr Hyatt for recommendations.        Type: alert remote pacemaker transmission 7/18/21 and 7/19/21 for AT/AF exceeding burden.  Presenting rhythm: AF with ventricular sensing, rate 120 bpm. Occasional ventricular undersensing noted.  Battery/lead status: stable  Arrhythmias: since 5/14/21, one AF episode, in progress since 7/18/21 at 3:10PM, V-rates >/=120 bpm 60%.   Transmission dated 7/19/21 shows episode duration was 9.5 hrs, presenting rhythm that day was normal sinus rate 90 bpm  13 nonsustained V-high rates, most appear PSVT, episode 236 appears 6-bt NSVT. EGMs for SVT episodes suggest RVR during AF. Fast A&V events appear supraventricular.  Anticoagulant: Hx LAAO 02/2020.   Comments: normal pacemaker function. Routed to device RN. prd  ADD: agree with above. AFib terminated by 12:58 am on 7/19.  Note to Dr. Hyatt and Suzan MONTANO NP. Sadie Gil RN.

## 2021-07-22 NOTE — TELEPHONE ENCOUNTER
As long as he converted back to SR, no changes at this time.  Remind him he is to take an extra metoprolol at onset of AF with RVR.  Follow up in 3 months, or sooner if he has frequent AF.  Thanks,  Suzan

## 2021-08-20 ENCOUNTER — ANCILLARY PROCEDURE (OUTPATIENT)
Dept: CARDIOLOGY | Facility: CLINIC | Age: 71
End: 2021-08-20
Attending: INTERNAL MEDICINE
Payer: MEDICARE

## 2021-08-20 DIAGNOSIS — Z95.0 CARDIAC PACEMAKER IN SITU: ICD-10-CM

## 2021-08-20 PROCEDURE — 93294 REM INTERROG EVL PM/LDLS PM: CPT | Performed by: INTERNAL MEDICINE

## 2021-08-20 PROCEDURE — 93296 REM INTERROG EVL PM/IDS: CPT | Performed by: INTERNAL MEDICINE

## 2021-08-21 LAB
MDC_IDC_EPISODE_DTM: NORMAL
MDC_IDC_EPISODE_DTM: NORMAL
MDC_IDC_EPISODE_DURATION: 0 S
MDC_IDC_EPISODE_DURATION: 18 S
MDC_IDC_EPISODE_ID: 247
MDC_IDC_EPISODE_ID: 248
MDC_IDC_EPISODE_TYPE: NORMAL
MDC_IDC_EPISODE_TYPE: NORMAL
MDC_IDC_LEAD_IMPLANT_DT: NORMAL
MDC_IDC_LEAD_IMPLANT_DT: NORMAL
MDC_IDC_LEAD_LOCATION: NORMAL
MDC_IDC_LEAD_LOCATION: NORMAL
MDC_IDC_LEAD_LOCATION_DETAIL_1: NORMAL
MDC_IDC_LEAD_LOCATION_DETAIL_1: NORMAL
MDC_IDC_LEAD_MFG: NORMAL
MDC_IDC_LEAD_MFG: NORMAL
MDC_IDC_LEAD_MODEL: NORMAL
MDC_IDC_LEAD_MODEL: NORMAL
MDC_IDC_LEAD_POLARITY_TYPE: NORMAL
MDC_IDC_LEAD_POLARITY_TYPE: NORMAL
MDC_IDC_LEAD_SERIAL: NORMAL
MDC_IDC_LEAD_SERIAL: NORMAL
MDC_IDC_LEAD_SPECIAL_FUNCTION: NORMAL
MDC_IDC_LEAD_SPECIAL_FUNCTION: NORMAL
MDC_IDC_MSMT_BATTERY_DTM: NORMAL
MDC_IDC_MSMT_BATTERY_REMAINING_LONGEVITY: 148 MO
MDC_IDC_MSMT_BATTERY_RRT_TRIGGER: 2.62
MDC_IDC_MSMT_BATTERY_STATUS: NORMAL
MDC_IDC_MSMT_BATTERY_VOLTAGE: 3.02 V
MDC_IDC_MSMT_LEADCHNL_RA_IMPEDANCE_VALUE: 285 OHM
MDC_IDC_MSMT_LEADCHNL_RA_IMPEDANCE_VALUE: 342 OHM
MDC_IDC_MSMT_LEADCHNL_RA_PACING_THRESHOLD_AMPLITUDE: 0.62 V
MDC_IDC_MSMT_LEADCHNL_RA_PACING_THRESHOLD_PULSEWIDTH: 0.4 MS
MDC_IDC_MSMT_LEADCHNL_RA_SENSING_INTR_AMPL: 2.88 MV
MDC_IDC_MSMT_LEADCHNL_RA_SENSING_INTR_AMPL: 2.88 MV
MDC_IDC_MSMT_LEADCHNL_RV_IMPEDANCE_VALUE: 323 OHM
MDC_IDC_MSMT_LEADCHNL_RV_IMPEDANCE_VALUE: 380 OHM
MDC_IDC_MSMT_LEADCHNL_RV_PACING_THRESHOLD_AMPLITUDE: 1 V
MDC_IDC_MSMT_LEADCHNL_RV_PACING_THRESHOLD_PULSEWIDTH: 0.4 MS
MDC_IDC_MSMT_LEADCHNL_RV_SENSING_INTR_AMPL: 1.88 MV
MDC_IDC_MSMT_LEADCHNL_RV_SENSING_INTR_AMPL: 1.88 MV
MDC_IDC_PG_IMPLANT_DTM: NORMAL
MDC_IDC_PG_MFG: NORMAL
MDC_IDC_PG_MODEL: NORMAL
MDC_IDC_PG_SERIAL: NORMAL
MDC_IDC_PG_TYPE: NORMAL
MDC_IDC_SESS_CLINIC_NAME: NORMAL
MDC_IDC_SESS_DTM: NORMAL
MDC_IDC_SESS_TYPE: NORMAL
MDC_IDC_SET_BRADY_AT_MODE_SWITCH_RATE: 171 {BEATS}/MIN
MDC_IDC_SET_BRADY_HYSTRATE: NORMAL
MDC_IDC_SET_BRADY_LOWRATE: 60 {BEATS}/MIN
MDC_IDC_SET_BRADY_MAX_SENSOR_RATE: 130 {BEATS}/MIN
MDC_IDC_SET_BRADY_MAX_TRACKING_RATE: 130 {BEATS}/MIN
MDC_IDC_SET_BRADY_MODE: NORMAL
MDC_IDC_SET_BRADY_PAV_DELAY_LOW: 180 MS
MDC_IDC_SET_BRADY_SAV_DELAY_LOW: 150 MS
MDC_IDC_SET_LEADCHNL_RA_PACING_AMPLITUDE: 1.5 V
MDC_IDC_SET_LEADCHNL_RA_PACING_ANODE_ELECTRODE_1: NORMAL
MDC_IDC_SET_LEADCHNL_RA_PACING_ANODE_LOCATION_1: NORMAL
MDC_IDC_SET_LEADCHNL_RA_PACING_CAPTURE_MODE: NORMAL
MDC_IDC_SET_LEADCHNL_RA_PACING_CATHODE_ELECTRODE_1: NORMAL
MDC_IDC_SET_LEADCHNL_RA_PACING_CATHODE_LOCATION_1: NORMAL
MDC_IDC_SET_LEADCHNL_RA_PACING_POLARITY: NORMAL
MDC_IDC_SET_LEADCHNL_RA_PACING_PULSEWIDTH: 0.4 MS
MDC_IDC_SET_LEADCHNL_RA_SENSING_ANODE_ELECTRODE_1: NORMAL
MDC_IDC_SET_LEADCHNL_RA_SENSING_ANODE_LOCATION_1: NORMAL
MDC_IDC_SET_LEADCHNL_RA_SENSING_CATHODE_ELECTRODE_1: NORMAL
MDC_IDC_SET_LEADCHNL_RA_SENSING_CATHODE_LOCATION_1: NORMAL
MDC_IDC_SET_LEADCHNL_RA_SENSING_POLARITY: NORMAL
MDC_IDC_SET_LEADCHNL_RA_SENSING_SENSITIVITY: 0.3 MV
MDC_IDC_SET_LEADCHNL_RV_PACING_AMPLITUDE: 1.5 V
MDC_IDC_SET_LEADCHNL_RV_PACING_ANODE_ELECTRODE_1: NORMAL
MDC_IDC_SET_LEADCHNL_RV_PACING_ANODE_LOCATION_1: NORMAL
MDC_IDC_SET_LEADCHNL_RV_PACING_CAPTURE_MODE: NORMAL
MDC_IDC_SET_LEADCHNL_RV_PACING_CATHODE_ELECTRODE_1: NORMAL
MDC_IDC_SET_LEADCHNL_RV_PACING_CATHODE_LOCATION_1: NORMAL
MDC_IDC_SET_LEADCHNL_RV_PACING_POLARITY: NORMAL
MDC_IDC_SET_LEADCHNL_RV_PACING_PULSEWIDTH: 0.4 MS
MDC_IDC_SET_LEADCHNL_RV_SENSING_ANODE_ELECTRODE_1: NORMAL
MDC_IDC_SET_LEADCHNL_RV_SENSING_ANODE_LOCATION_1: NORMAL
MDC_IDC_SET_LEADCHNL_RV_SENSING_CATHODE_ELECTRODE_1: NORMAL
MDC_IDC_SET_LEADCHNL_RV_SENSING_CATHODE_LOCATION_1: NORMAL
MDC_IDC_SET_LEADCHNL_RV_SENSING_POLARITY: NORMAL
MDC_IDC_SET_LEADCHNL_RV_SENSING_SENSITIVITY: 0.9 MV
MDC_IDC_SET_ZONE_DETECTION_INTERVAL: 350 MS
MDC_IDC_SET_ZONE_DETECTION_INTERVAL: 400 MS
MDC_IDC_SET_ZONE_TYPE: NORMAL
MDC_IDC_STAT_AT_BURDEN_PERCENT: 0 %
MDC_IDC_STAT_AT_DTM_END: NORMAL
MDC_IDC_STAT_AT_DTM_START: NORMAL
MDC_IDC_STAT_BRADY_AP_VP_PERCENT: 1.85 %
MDC_IDC_STAT_BRADY_AP_VS_PERCENT: 4.33 %
MDC_IDC_STAT_BRADY_AS_VP_PERCENT: 3.36 %
MDC_IDC_STAT_BRADY_AS_VS_PERCENT: 90.46 %
MDC_IDC_STAT_BRADY_DTM_END: NORMAL
MDC_IDC_STAT_BRADY_DTM_START: NORMAL
MDC_IDC_STAT_BRADY_RA_PERCENT_PACED: 4.92 %
MDC_IDC_STAT_BRADY_RV_PERCENT_PACED: 5.21 %
MDC_IDC_STAT_EPISODE_RECENT_COUNT: 0
MDC_IDC_STAT_EPISODE_RECENT_COUNT: 1
MDC_IDC_STAT_EPISODE_RECENT_COUNT_DTM_END: NORMAL
MDC_IDC_STAT_EPISODE_RECENT_COUNT_DTM_START: NORMAL
MDC_IDC_STAT_EPISODE_TOTAL_COUNT: 0
MDC_IDC_STAT_EPISODE_TOTAL_COUNT: 0
MDC_IDC_STAT_EPISODE_TOTAL_COUNT: 10
MDC_IDC_STAT_EPISODE_TOTAL_COUNT: 113
MDC_IDC_STAT_EPISODE_TOTAL_COUNT: 47
MDC_IDC_STAT_EPISODE_TOTAL_COUNT_DTM_END: NORMAL
MDC_IDC_STAT_EPISODE_TOTAL_COUNT_DTM_START: NORMAL
MDC_IDC_STAT_EPISODE_TYPE: NORMAL

## 2021-09-16 ENCOUNTER — LAB REQUISITION (OUTPATIENT)
Dept: LAB | Facility: CLINIC | Age: 71
End: 2021-09-16
Payer: MEDICARE

## 2021-09-16 DIAGNOSIS — Z03.818 ENCOUNTER FOR OBSERVATION FOR SUSPECTED EXPOSURE TO OTHER BIOLOGICAL AGENTS RULED OUT: ICD-10-CM

## 2021-09-16 PROCEDURE — U0005 INFEC AGEN DETEC AMPLI PROBE: HCPCS | Mod: ORL | Performed by: PHYSICIAN ASSISTANT

## 2021-09-17 LAB — SARS-COV-2 RNA RESP QL NAA+PROBE: NEGATIVE

## 2021-10-11 ENCOUNTER — HEALTH MAINTENANCE LETTER (OUTPATIENT)
Age: 71
End: 2021-10-11

## 2021-10-20 ENCOUNTER — MEDICAL CORRESPONDENCE (OUTPATIENT)
Dept: HEALTH INFORMATION MANAGEMENT | Facility: CLINIC | Age: 71
End: 2021-10-20

## 2021-10-20 ENCOUNTER — TRANSFERRED RECORDS (OUTPATIENT)
Dept: HEALTH INFORMATION MANAGEMENT | Facility: CLINIC | Age: 71
End: 2021-10-20
Payer: MEDICARE

## 2021-10-22 ENCOUNTER — TRANSCRIBE ORDERS (OUTPATIENT)
Dept: OTHER | Age: 71
End: 2021-10-22

## 2021-10-22 ENCOUNTER — PATIENT OUTREACH (OUTPATIENT)
Dept: ONCOLOGY | Facility: CLINIC | Age: 71
End: 2021-10-22

## 2021-10-22 DIAGNOSIS — K63.5 COLORECTAL POLYPS: Primary | ICD-10-CM

## 2021-10-22 DIAGNOSIS — K62.1 COLORECTAL POLYPS: Primary | ICD-10-CM

## 2021-10-22 DIAGNOSIS — K57.30 DIVERTICULOSIS OF COLON WITHOUT DIVERTICULITIS: ICD-10-CM

## 2021-10-22 DIAGNOSIS — K64.8 HEMORRHOIDS, INTERNAL: ICD-10-CM

## 2021-10-22 NOTE — PROGRESS NOTES
Writer received referral, reviewed for appropriate plan, and sent to New Patient Scheduling for completion.    Referred by [facility] for multiple polyps and family history of CRC

## 2021-10-25 ENCOUNTER — DOCUMENTATION ONLY (OUTPATIENT)
Dept: ONCOLOGY | Facility: CLINIC | Age: 71
End: 2021-10-25

## 2021-11-16 ENCOUNTER — LAB REQUISITION (OUTPATIENT)
Dept: LAB | Facility: CLINIC | Age: 71
End: 2021-11-16
Payer: MEDICARE

## 2021-11-16 DIAGNOSIS — I10 ESSENTIAL (PRIMARY) HYPERTENSION: ICD-10-CM

## 2021-11-16 DIAGNOSIS — I25.119 ATHEROSCLEROTIC HEART DISEASE OF NATIVE CORONARY ARTERY WITH UNSPECIFIED ANGINA PECTORIS (H): ICD-10-CM

## 2021-11-16 DIAGNOSIS — Z12.5 ENCOUNTER FOR SCREENING FOR MALIGNANT NEOPLASM OF PROSTATE: ICD-10-CM

## 2021-11-16 LAB
ALBUMIN SERPL-MCNC: 3.9 G/DL (ref 3.5–5)
ALP SERPL-CCNC: 67 U/L (ref 45–120)
ALT SERPL W P-5'-P-CCNC: 34 U/L (ref 0–45)
ANION GAP SERPL CALCULATED.3IONS-SCNC: 10 MMOL/L (ref 5–18)
AST SERPL W P-5'-P-CCNC: 25 U/L (ref 0–40)
BILIRUB SERPL-MCNC: 0.4 MG/DL (ref 0–1)
BUN SERPL-MCNC: 7 MG/DL (ref 8–28)
CALCIUM SERPL-MCNC: 10.2 MG/DL (ref 8.5–10.5)
CHLORIDE BLD-SCNC: 111 MMOL/L (ref 98–107)
CHOLEST SERPL-MCNC: 119 MG/DL
CO2 SERPL-SCNC: 22 MMOL/L (ref 22–31)
CREAT SERPL-MCNC: 0.97 MG/DL (ref 0.7–1.3)
FASTING STATUS PATIENT QL REPORTED: ABNORMAL
GFR SERPL CREATININE-BSD FRML MDRD: 79 ML/MIN/1.73M2
GLUCOSE BLD-MCNC: 111 MG/DL (ref 70–125)
HDLC SERPL-MCNC: 34 MG/DL
LDLC SERPL CALC-MCNC: 65 MG/DL
POTASSIUM BLD-SCNC: 5.2 MMOL/L (ref 3.5–5)
PROT SERPL-MCNC: 7.1 G/DL (ref 6–8)
PSA SERPL-MCNC: 0.77 UG/L (ref 0–6.5)
SODIUM SERPL-SCNC: 143 MMOL/L (ref 136–145)
TRIGL SERPL-MCNC: 98 MG/DL

## 2021-11-16 PROCEDURE — 80061 LIPID PANEL: CPT | Mod: ORL | Performed by: FAMILY MEDICINE

## 2021-11-16 PROCEDURE — 80053 COMPREHEN METABOLIC PANEL: CPT | Mod: ORL | Performed by: FAMILY MEDICINE

## 2021-11-16 PROCEDURE — G0103 PSA SCREENING: HCPCS | Mod: ORL | Performed by: FAMILY MEDICINE

## 2021-11-29 ENCOUNTER — ANCILLARY PROCEDURE (OUTPATIENT)
Dept: CARDIOLOGY | Facility: CLINIC | Age: 71
End: 2021-11-29
Attending: INTERNAL MEDICINE
Payer: MEDICARE

## 2021-11-29 DIAGNOSIS — Z95.0 PACEMAKER: ICD-10-CM

## 2021-11-29 DIAGNOSIS — I49.5 SICK SINUS SYNDROME (H): ICD-10-CM

## 2021-11-29 PROCEDURE — 93294 REM INTERROG EVL PM/LDLS PM: CPT | Performed by: INTERNAL MEDICINE

## 2021-11-29 PROCEDURE — 93296 REM INTERROG EVL PM/IDS: CPT | Performed by: INTERNAL MEDICINE

## 2021-12-08 LAB
MDC_IDC_EPISODE_DTM: NORMAL
MDC_IDC_EPISODE_DTM: NORMAL
MDC_IDC_EPISODE_DURATION: 1 S
MDC_IDC_EPISODE_DURATION: 9 S
MDC_IDC_EPISODE_ID: 266
MDC_IDC_EPISODE_ID: 267
MDC_IDC_EPISODE_TYPE: NORMAL
MDC_IDC_EPISODE_TYPE: NORMAL
MDC_IDC_LEAD_IMPLANT_DT: NORMAL
MDC_IDC_LEAD_IMPLANT_DT: NORMAL
MDC_IDC_LEAD_LOCATION: NORMAL
MDC_IDC_LEAD_LOCATION: NORMAL
MDC_IDC_LEAD_LOCATION_DETAIL_1: NORMAL
MDC_IDC_LEAD_LOCATION_DETAIL_1: NORMAL
MDC_IDC_LEAD_MFG: NORMAL
MDC_IDC_LEAD_MFG: NORMAL
MDC_IDC_LEAD_MODEL: NORMAL
MDC_IDC_LEAD_MODEL: NORMAL
MDC_IDC_LEAD_POLARITY_TYPE: NORMAL
MDC_IDC_LEAD_POLARITY_TYPE: NORMAL
MDC_IDC_LEAD_SERIAL: NORMAL
MDC_IDC_LEAD_SERIAL: NORMAL
MDC_IDC_LEAD_SPECIAL_FUNCTION: NORMAL
MDC_IDC_LEAD_SPECIAL_FUNCTION: NORMAL
MDC_IDC_MSMT_BATTERY_DTM: NORMAL
MDC_IDC_MSMT_BATTERY_REMAINING_LONGEVITY: 144 MO
MDC_IDC_MSMT_BATTERY_RRT_TRIGGER: 2.62
MDC_IDC_MSMT_BATTERY_STATUS: NORMAL
MDC_IDC_MSMT_BATTERY_VOLTAGE: 3.02 V
MDC_IDC_MSMT_LEADCHNL_RA_IMPEDANCE_VALUE: 304 OHM
MDC_IDC_MSMT_LEADCHNL_RA_IMPEDANCE_VALUE: 342 OHM
MDC_IDC_MSMT_LEADCHNL_RA_PACING_THRESHOLD_AMPLITUDE: 0.62 V
MDC_IDC_MSMT_LEADCHNL_RA_PACING_THRESHOLD_PULSEWIDTH: 0.4 MS
MDC_IDC_MSMT_LEADCHNL_RA_SENSING_INTR_AMPL: 2.5 MV
MDC_IDC_MSMT_LEADCHNL_RA_SENSING_INTR_AMPL: 2.5 MV
MDC_IDC_MSMT_LEADCHNL_RV_IMPEDANCE_VALUE: 361 OHM
MDC_IDC_MSMT_LEADCHNL_RV_IMPEDANCE_VALUE: 437 OHM
MDC_IDC_MSMT_LEADCHNL_RV_PACING_THRESHOLD_AMPLITUDE: 1.12 V
MDC_IDC_MSMT_LEADCHNL_RV_PACING_THRESHOLD_PULSEWIDTH: 0.4 MS
MDC_IDC_MSMT_LEADCHNL_RV_SENSING_INTR_AMPL: 1.25 MV
MDC_IDC_MSMT_LEADCHNL_RV_SENSING_INTR_AMPL: 1.25 MV
MDC_IDC_PG_IMPLANT_DTM: NORMAL
MDC_IDC_PG_MFG: NORMAL
MDC_IDC_PG_MODEL: NORMAL
MDC_IDC_PG_SERIAL: NORMAL
MDC_IDC_PG_TYPE: NORMAL
MDC_IDC_SESS_CLINIC_NAME: NORMAL
MDC_IDC_SESS_DTM: NORMAL
MDC_IDC_SESS_TYPE: NORMAL
MDC_IDC_SET_BRADY_AT_MODE_SWITCH_RATE: 171 {BEATS}/MIN
MDC_IDC_SET_BRADY_HYSTRATE: NORMAL
MDC_IDC_SET_BRADY_LOWRATE: 60 {BEATS}/MIN
MDC_IDC_SET_BRADY_MAX_SENSOR_RATE: 130 {BEATS}/MIN
MDC_IDC_SET_BRADY_MAX_TRACKING_RATE: 130 {BEATS}/MIN
MDC_IDC_SET_BRADY_MODE: NORMAL
MDC_IDC_SET_BRADY_PAV_DELAY_LOW: 180 MS
MDC_IDC_SET_BRADY_SAV_DELAY_LOW: 150 MS
MDC_IDC_SET_LEADCHNL_RA_PACING_AMPLITUDE: 1.5 V
MDC_IDC_SET_LEADCHNL_RA_PACING_ANODE_ELECTRODE_1: NORMAL
MDC_IDC_SET_LEADCHNL_RA_PACING_ANODE_LOCATION_1: NORMAL
MDC_IDC_SET_LEADCHNL_RA_PACING_CAPTURE_MODE: NORMAL
MDC_IDC_SET_LEADCHNL_RA_PACING_CATHODE_ELECTRODE_1: NORMAL
MDC_IDC_SET_LEADCHNL_RA_PACING_CATHODE_LOCATION_1: NORMAL
MDC_IDC_SET_LEADCHNL_RA_PACING_POLARITY: NORMAL
MDC_IDC_SET_LEADCHNL_RA_PACING_PULSEWIDTH: 0.4 MS
MDC_IDC_SET_LEADCHNL_RA_SENSING_ANODE_ELECTRODE_1: NORMAL
MDC_IDC_SET_LEADCHNL_RA_SENSING_ANODE_LOCATION_1: NORMAL
MDC_IDC_SET_LEADCHNL_RA_SENSING_CATHODE_ELECTRODE_1: NORMAL
MDC_IDC_SET_LEADCHNL_RA_SENSING_CATHODE_LOCATION_1: NORMAL
MDC_IDC_SET_LEADCHNL_RA_SENSING_POLARITY: NORMAL
MDC_IDC_SET_LEADCHNL_RA_SENSING_SENSITIVITY: 0.3 MV
MDC_IDC_SET_LEADCHNL_RV_PACING_AMPLITUDE: 1.75 V
MDC_IDC_SET_LEADCHNL_RV_PACING_ANODE_ELECTRODE_1: NORMAL
MDC_IDC_SET_LEADCHNL_RV_PACING_ANODE_LOCATION_1: NORMAL
MDC_IDC_SET_LEADCHNL_RV_PACING_CAPTURE_MODE: NORMAL
MDC_IDC_SET_LEADCHNL_RV_PACING_CATHODE_ELECTRODE_1: NORMAL
MDC_IDC_SET_LEADCHNL_RV_PACING_CATHODE_LOCATION_1: NORMAL
MDC_IDC_SET_LEADCHNL_RV_PACING_POLARITY: NORMAL
MDC_IDC_SET_LEADCHNL_RV_PACING_PULSEWIDTH: 0.4 MS
MDC_IDC_SET_LEADCHNL_RV_SENSING_ANODE_ELECTRODE_1: NORMAL
MDC_IDC_SET_LEADCHNL_RV_SENSING_ANODE_LOCATION_1: NORMAL
MDC_IDC_SET_LEADCHNL_RV_SENSING_CATHODE_ELECTRODE_1: NORMAL
MDC_IDC_SET_LEADCHNL_RV_SENSING_CATHODE_LOCATION_1: NORMAL
MDC_IDC_SET_LEADCHNL_RV_SENSING_POLARITY: NORMAL
MDC_IDC_SET_LEADCHNL_RV_SENSING_SENSITIVITY: 0.9 MV
MDC_IDC_SET_ZONE_DETECTION_INTERVAL: 350 MS
MDC_IDC_SET_ZONE_DETECTION_INTERVAL: 400 MS
MDC_IDC_SET_ZONE_TYPE: NORMAL
MDC_IDC_STAT_AT_BURDEN_PERCENT: 0 %
MDC_IDC_STAT_AT_DTM_END: NORMAL
MDC_IDC_STAT_AT_DTM_START: NORMAL
MDC_IDC_STAT_BRADY_AP_VP_PERCENT: 2.76 %
MDC_IDC_STAT_BRADY_AP_VS_PERCENT: 4.09 %
MDC_IDC_STAT_BRADY_AS_VP_PERCENT: 7.64 %
MDC_IDC_STAT_BRADY_AS_VS_PERCENT: 85.5 %
MDC_IDC_STAT_BRADY_DTM_END: NORMAL
MDC_IDC_STAT_BRADY_DTM_START: NORMAL
MDC_IDC_STAT_BRADY_RA_PERCENT_PACED: 4.91 %
MDC_IDC_STAT_BRADY_RV_PERCENT_PACED: 10.41 %
MDC_IDC_STAT_EPISODE_RECENT_COUNT: 0
MDC_IDC_STAT_EPISODE_RECENT_COUNT: 16
MDC_IDC_STAT_EPISODE_RECENT_COUNT_DTM_END: NORMAL
MDC_IDC_STAT_EPISODE_RECENT_COUNT_DTM_START: NORMAL
MDC_IDC_STAT_EPISODE_TOTAL_COUNT: 0
MDC_IDC_STAT_EPISODE_TOTAL_COUNT: 0
MDC_IDC_STAT_EPISODE_TOTAL_COUNT: 10
MDC_IDC_STAT_EPISODE_TOTAL_COUNT: 129
MDC_IDC_STAT_EPISODE_TOTAL_COUNT: 47
MDC_IDC_STAT_EPISODE_TOTAL_COUNT_DTM_END: NORMAL
MDC_IDC_STAT_EPISODE_TOTAL_COUNT_DTM_START: NORMAL
MDC_IDC_STAT_EPISODE_TYPE: NORMAL

## 2022-01-10 ENCOUNTER — TELEPHONE (OUTPATIENT)
Dept: CARDIOLOGY | Facility: CLINIC | Age: 72
End: 2022-01-10
Payer: MEDICARE

## 2022-01-10 NOTE — TELEPHONE ENCOUNTER
1/10/2022:  Took call from patient wondering if Suzan Waddell NP still works here, and then if he should be worried about his fitbit saying his heart rhythm has a wide QRS.  Per patient records, he  10%, explained that could be what the watch is seeing, nothing to be worried about.  Sadie Gil, Device RN.

## 2022-02-24 ENCOUNTER — OFFICE VISIT (OUTPATIENT)
Dept: CARDIOLOGY | Facility: CLINIC | Age: 72
End: 2022-02-24
Attending: INTERNAL MEDICINE
Payer: MEDICARE

## 2022-02-24 VITALS
WEIGHT: 212.4 LBS | DIASTOLIC BLOOD PRESSURE: 86 MMHG | BODY MASS INDEX: 33.34 KG/M2 | SYSTOLIC BLOOD PRESSURE: 118 MMHG | RESPIRATION RATE: 16 BRPM | HEIGHT: 67 IN | HEART RATE: 88 BPM

## 2022-02-24 DIAGNOSIS — Z95.0 PACEMAKER: ICD-10-CM

## 2022-02-24 DIAGNOSIS — I48.0 PAROXYSMAL ATRIAL FIBRILLATION (H): Primary | ICD-10-CM

## 2022-02-24 DIAGNOSIS — Z98.890 STATUS POST ABLATION OF ATRIAL FIBRILLATION: ICD-10-CM

## 2022-02-24 DIAGNOSIS — Z95.0 CARDIAC PACEMAKER IN SITU: ICD-10-CM

## 2022-02-24 DIAGNOSIS — Z86.79 STATUS POST ABLATION OF ATRIAL FIBRILLATION: ICD-10-CM

## 2022-02-24 DIAGNOSIS — I49.5 SICK SINUS SYNDROME (H): Primary | ICD-10-CM

## 2022-02-24 DIAGNOSIS — I25.10 NONOCCLUSIVE CORONARY ATHEROSCLEROSIS OF NATIVE CORONARY ARTERY: ICD-10-CM

## 2022-02-24 DIAGNOSIS — I48.91 A-FIB (H): ICD-10-CM

## 2022-02-24 DIAGNOSIS — I49.5 SSS (SICK SINUS SYNDROME) (H): ICD-10-CM

## 2022-02-24 DIAGNOSIS — Z95.818 PRESENCE OF LEFT ATRIAL APPENDAGE CLOSURE DEVICE COMPOSED OF NICKEL-TITANIUM ALLOY WITH POLYETHYLENE TEREPHTHALATE MEMBRANE: ICD-10-CM

## 2022-02-24 PROCEDURE — 99215 OFFICE O/P EST HI 40 MIN: CPT | Performed by: NURSE PRACTITIONER

## 2022-02-24 PROCEDURE — 93280 PM DEVICE PROGR EVAL DUAL: CPT | Performed by: INTERNAL MEDICINE

## 2022-02-24 RX ORDER — CARBOXYMETHYLCELLULOSE SODIUM 5 MG/ML
1 SOLUTION/ DROPS OPHTHALMIC AT BEDTIME
COMMUNITY

## 2022-02-24 RX ORDER — METOPROLOL SUCCINATE 50 MG/1
50 TABLET, EXTENDED RELEASE ORAL 2 TIMES DAILY
Qty: 180 TABLET | Refills: 3 | Status: SHIPPED | OUTPATIENT
Start: 2022-02-24 | End: 2022-07-26 | Stop reason: ALTCHOICE

## 2022-02-24 RX ORDER — NEOMYCIN SULFATE, POLYMYXIN B SULFATE AND DEXAMETHASONE 3.5; 10000; 1 MG/ML; [USP'U]/ML; MG/ML
SUSPENSION/ DROPS OPHTHALMIC PRN
COMMUNITY
Start: 2022-02-16 | End: 2022-09-14

## 2022-02-24 RX ORDER — NEOMYCIN SULFATE, POLYMYXIN B SULFATE, AND DEXAMETHASONE 3.5; 10000; 1 MG/G; [USP'U]/G; MG/G
0.5 OINTMENT OPHTHALMIC
COMMUNITY
Start: 2022-02-16 | End: 2024-05-07

## 2022-02-24 NOTE — LETTER
2/24/2022    Omega Veloz MD  [facility] [address]   [place] [address]    RE: Ashok Del Rosario       Dear Colleague,     I had the pleasure of seeing Ashok Del Rosario in the [facility].    HEART CARE ELECTROPHYSIOLOGY NOTE      [facility]  [phone]      Assessment/Recommendations   Assessment/Plan:  1.  Paroxysmal atrial fibrillation:   Prolonged episode of A. fib yesterday lasting almost 14 hours with very rapid ventricular rates associated with lightheadedness and nausea.  He also has short episodes more consistent with PAT.  He remains off membrane active antiarrhythmic medications.  Increase metoprolol succinate to 50 mg twice daily.  If the morning dose is making him more tired, take the entire 100 mg at night.     He was reassured that atrial fibrillation is not life-threatening, but carries an increased risk for stroke.  He has a [id number] score of 5 for age 65-74, hypertension, possible TIA, and vascular disease.  He underwent left atrial appendage closure with the Watchman FLX device as part of the option study done at [place] on 2/4/2020.  He has had no neurologic symptoms or events.   He remains on aspirin 81 mg indefinitely.      2.  Sick sinus syndrome status post dual-chamber pacemaker implant:  The pacemaker was interrogated and is functioning appropriately.  The battery showed estimated longevity of 11.7 years.  Atrial and ventricular lead sensing, impedance, and pacing thresholds were stable and within normal limits.  He has had no further symptoms of syncope since his pacemaker implant.       3.  Hypertension: Blood pressure at target.  Discontinue irbesartan to allow for the increase in metoprolol as above.     4.  Nonobstructive coronary artery disease: 30-35% stenosis of the LAD and circumflex seen on previous angiogram, but severe coronary calcification seen on more recent CTA.  Denies exertional chest discomfort,  but continues to have dyspnea and lightheadedness with strenuous exertion.  NM stress test done in June 2021 negative for ischemia.  He was instructed to call if episodes continue or worsen.  Continue atorvastatin 80 mg daily.    5.  Nonsustained ventricular tachycardia: Brief episodes consistent with NSVT seen on pacemaker interrogation.  Considered low risk in the setting of normal LVEF without ischemia.  Possibly symptomatic with lightheadedness.  Increase metoprolol as above.    Follow-up with me in 3 months  Follow up with Dr. Hyatt in 6 months     History of Present Illness/Subjective    HPI: Ashok Del Rosario is a 71 year old male who comes in today companied by his wife for EP device and arrhythmia follow-up.  He has a history of sinus bradycardia status post dual-chamber pacemaker implant on 12/31/2018, TIA, atrial fibrillation, carotid artery stenosis, hypertension, hyperlipidemia, anxiety, mild obstructive sleep apnea, and emphysema.      He was diagnosed with atrial fibrillation with rapid ventricular response for which he underwent early cardioversion in the emergency department on 1/11/2019.  Symptoms consist of tachypalpitations, lightheadedness, and shortness of breath.   He was started on metoprolol, but hospitalized again over for another episode that was severely symptomatic as the heart rates were in the 200s.  He was given adenosine by EMS for what appeared to be SVT.  During his hospitalization, he was going to be started on sotalol, but was instead started on flecainide 75 mg twice daily by Dr. Chand who evaluated his coronary artery disease as not significant.   He underwent pulmonary vein isolation ablation on 8/29/2019 performed at [facility] prior to which his flecainide was discontinued.  He underwent left atrial appendage closure with the Watchman FLX as part of the option study on 2/4/2020, also done at [facility].    Post implant JOVANI showed a well-seated device with no significant  pari-device flow and no evidence of thrombus.  He is on aspirin for stroke prophylaxis.       Pat states that he has been feeling well.  He got very short of breath and lightheaded after shoveling snow the other day.  Yesterday, he had no specific awareness of arrhythmia, but was dizzy and nauseous.  He reports occasional episodes of lightheadedness.  He denies chest discomfort, abdominal fullness/bloating or peripheral edema, shortness of breath at rest or normal activity, paroxysmal nocturnal dyspnea, orthopnea, dizziness, pre-syncope, or syncope.        Cardiographics (EKGs and device interrogations personally reviewed):  EKG done 2/5/2020 (image not available for review) NSR 77 bpm, QT/QTc 408/461 ms  EKG done 5/25/2019 shows atrial fibrillation with rapid ventricular response at 139 bpm   EKG, Done 1/18/2019 starts in A. fib with transition to sinus rhythm  EKG done 1/14/2019 shows sinus rhythm at 89 bpm, incomplete right bundle branch block, QT/QTc interval measures 378/459 ms  EKG done 1/11/2019 shows atrial fibrillation with rapid ventricular response at 141 bpm.  Subsequent EKG shows sinus tachycardia at 105 bpm with an incomplete right bundle branch block     Pacemaker Interrogation (MDT implant 12/31/2018):  Pacing mode: MVP   Presenting rhythm: AS-VS. SR 87  Underlying rhythm: SR 87  A-paced: 4.8%.  V-paced 8%.  Battery: estimated longevity 12.6 years.  Atrial and Ventricular leads: Stable with good sensing, impedance, and pacing thresholds  Arrhythmias: 2 AF with RVR, longest 13.5 hours on 2/22/2022.  Also 20 2 fast AMV consistent with SVT.  45 NSVT, some appear to be true NSVT, others atrial driven  Donnelly: 0.3% (previous < 0.1%)  Histograms: Good rate distribution     JOVANI done 1/23/2021 at [place]:  1. The baseline ECG demonstrated sinus rhythm with a rate of 82 bpm.  2. The WATCHMAN device is visualized and is well-expanded within the left atrial appendage.  3. Small residual jet on the  postero-inferiorior aspect of the device (jet size 2 mm; JOVANI angle  139 degrees; clip #61). The remainder of the WATCHMAN device margin appears well sealed.  4. No intracardiac mass or thrombus identified.  No thrombus on the atrial face of the WATCHMAN  device.  5. No pericardial effusion.  6. No shunt at atrial level by color flow imaging and agitated saline contrast injection.  7. Normal left ventricular chamber size.  Left ventricular ejection fraction 60%.  8. 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  9. Normal right ventricular chamber size and systolic function.  10. Moderate tricuspid valve regurgitation.     CTA pulmonary veings done 11/20/2019 at [place]:   PULMONARY VEINS:  Two right and two left pulmonary veins enter the left atrium unobstructed. No  evidence of pulmonary vein stenosis.     CARDIOVASCULAR FINDINGS:     Presumed focal dissection with nearby ulcerated plaque in the left subclavian  artery, likely unchanged. Scattered calcified and noncalcified plaque in the  remainder of the thoracic aorta and visualized arch vessels. Severe coronary  calcification. No intracardiac mass or thrombus. Left atrial enlargement.    ADDITIONAL FINDINGS:      Focal peripheral hyperenhancement in hepatic segment 4A, indeterminate but  likely benign. Left anterior chest wall dual chamber pacemaker. Redemonstrated  centrilobular and paraseptal emphysema with decreased fluid and debris within  the previously noted left lower lobe bulla/cavity; this lesion remains somewhat  thick-walled.    Nuclear Lexiscan stress test done 6/7/2021:     The nuclear stress test is negative for inducible myocardial ischemia or infarction.     The left ventricular ejection fraction at stress is 63%.     A prior study was conducted on 10/16/2017.  This study has no change when compared with the prior study.     Coronary angiogram done 10/17/2017:     Mid Cx lesion 30% stenosed.    Prox LAD to Dist LAD  lesion 35% stenosed.  The left ventricular size is normal. The left ventricular systolic function is normal. LV systolic pressure is normal. LV end diastolic pressure is normal. There are no wall motion abnormalities in the left ventricle.    I have reviewed and updated the patient's Past Medical History, Social History, Family History and Medication List.  Outside records personally reviewed.     Physical Examination  Review of Systems   Vitals: There were no vitals taken for this visit.  BMI= There is no height or weight on file to calculate BMI.  Wt Readings from Last 3 Encounters:   07/06/21 94.3 kg (208 lb)   06/11/21 95.2 kg (209 lb 12.8 oz)   05/14/21 96.2 kg (212 lb)       General Appearance:   Alert, well-appearing and in no acute distress.   HEENT: Atraumatic, normocephalic.  No scleral icterus, normal conjunctivae, EOMs intact, PERRL.  Wearing a mask.   Chest/Lungs:   Chest symmetric, spine straight.  Respirations unlabored.  Lungs are clear to auscultation.  Left subclavian pacemaker site with no sign of infection or tissue thinning.   Cardiovascular:   Regular rate and rhythm.  Normal first and second heart sounds with no murmurs, rubs, or gallops; radial and posterior tibial pulses are intact, No edema.   Abdomen:  Soft, nondistended, bowel sounds present.   Extremities: No cyanosis or clubbing.   Musculoskeletal: Moves all extremities.     Skin: Warm, dry, intact.    Neurologic: Mood and affect are appropriate.  Alert and oriented to person, place, time, and situation.        ROS: 10 point ROS neg other than the symptoms noted above in the HPI.         Medical History  Surgical History Family History Social History   Past Medical History:   Diagnosis Date     Anxiety about health     per [facility]     Dyslipidemia      GERD (gastroesophageal reflux disease)      Hypertension      Nonocclusive coronary atherosclerosis of native coronary artery 10/17/2017     Paroxysmal atrial fibrillation (H)  2019    Dx [id number] score = 5 (age, HTN, CAD, TIA 2) Rx flecainide Rx Xarelto     Pulmonary emphysema (H) 2019    per [facility]     Right bundle branch block 2019     Solitary pulmonary nodule 2019    per [facility]     Syncope 2019     TIA (transient ischemic attack) 2018     Past Surgical History:   Procedure Laterality Date     APPENDECTOMY       CARDIAC ELECTROPHYSIOLOGY MAPPING AND ABLATION  2019    PVI done at [facility]     CV CORONARY ANGIOGRAM N/A 10/17/2017    Procedure: Coronary Angiogram;  Surgeon: Ashok Hyatt MD;  Location: [facility] Cath Lab;  Service:      CV LEFT HEART CATHETERIZATION WITH LEFT VENTRICULOGRAM N/A 10/17/2017    Procedure: Left Heart Catheterization with Left Ventriculogram;  Surgeon: Ashok Hyatt MD;  Location: [facility] Cath Lab;  Service:      EP PACEMAKER INSERT Left 2018    Procedure: EP Pacemaker Insertion;  Surgeon: Micheal Junior MD;  Location: [facility] Cath Lab;  Service: Cardiology     OTHER SURGICAL HISTORY  2020    Left atrial appendage closureWatchman FLX at [facility]     Family History   Problem Relation Age of Onset     Cancer Mother      Cancer Father      Coronary Artery Disease Brother      Coronary Artery Disease Brother      Valvular heart disease Brother      Rectal Cancer Sister      Colon Cancer Sister         Social History     Socioeconomic History     Marital status:      Spouse name: Not on file     Number of children: Not on file     Years of education: Not on file     Highest education level: Not on file   Occupational History     Not on file   Tobacco Use     Smoking status: Former Smoker     Quit date: 10/16/1999     Years since quittin.3     Smokeless tobacco: Never Used   Substance and Sexual Activity     Alcohol use: Yes     Alcohol/week: 1.0 standard drink     Comment: Alcoholic Drinks/day: none since december     Drug use: No     Sexual activity: Yes     Partners: Female    Other Topics Concern     Not on file   Social History Narrative     Not on file     Social Determinants of Health     Financial Resource Strain: Not on file   Food Insecurity: Not on file   Transportation Needs: Not on file   Physical Activity: Not on file   Stress: Not on file   Social Connections: Not on file   Intimate Partner Violence: Not on file   Housing Stability: Not on file           Medications  Allergies   Current Outpatient Medications   Medication Sig Dispense Refill     acetaminophen (TYLENOL) 500 MG tablet [ACETAMINOPHEN (TYLENOL) 500 MG TABLET] Take 1-2 tablets (500-1,000 mg total) by mouth every 4 (four) hours as needed.  0     aspirin (ASPIR-81) 81 MG EC tablet [ASPIRIN (ASPIR-81) 81 MG EC TABLET] Take 81 mg by mouth daily.        atorvastatin (LIPITOR) 80 MG tablet [ATORVASTATIN (LIPITOR) 80 MG TABLET] TAKE 1 TABLET BY MOUTH EVERYDAY AT BEDTIME 90 tablet 1     irbesartan (AVAPRO) 75 MG tablet [IRBESARTAN (AVAPRO) 75 MG TABLET] Take 0.5 tablets (37.5 mg total) by mouth daily. 45 tablet 3     metoprolol succinate (TOPROL-XL) 50 MG 24 hr tablet [METOPROLOL SUCCINATE (TOPROL-XL) 50 MG 24 HR TABLET] Take 1 tablet (50 mg total) by mouth daily. 90 tablet 3     metroNIDAZOLE (METROCREAM) 0.75 % cream [METRONIDAZOLE (METROCREAM) 0.75 % CREAM] Apply 1 application topically 2 (two) times a day as needed (roseacea). For Rosacea - apply to nose and right side of face              omeprazole (PRILOSEC) 40 MG capsule [OMEPRAZOLE (PRILOSEC) 40 MG CAPSULE] Take 40 mg by mouth daily before breakfast.       polymyxin B sulf/trimethoprim (POLYMYXIN B SUL-TRIMETHOPRIM OPHT) [POLYMYXIN B SULF/TRIMETHOPRIM (POLYMYXIN B SUL-TRIMETHOPRIM OPHT)] Apply to eye as needed.         Allergies   Allergen Reactions     Cephalexin Rash          Lab Results    Chemistry/lipid CBC Cardiac Enzymes/BNP/TSH/INR   Recent Labs   Lab Test 11/16/21 0923   CHOL 119   HDL 34*   LDL 65   TRIG 98     Recent Labs   Lab Test 11/16/21 0923  10/02/20  1020 12/31/18  0442   LDL 65 70 113     Recent Labs   Lab Test 11/16/21  0923      POTASSIUM 5.2*   CHLORIDE 111*   CO2 22      BUN 7*   CR 0.97   GFRESTIMATED 79   ESSENCE 10.2     Recent Labs   Lab Test 11/16/21  0923 10/02/20  1020 07/08/20  1620   CR 0.97 0.83 0.89     No results for input(s): A1C in the last 38970 hours.   Recent Labs   Lab Test 07/08/20  1620   WBC 7.8   HGB 13.4*   HCT 40.7   MCV 89        Recent Labs   Lab Test 07/08/20  1620 08/14/19  2230 05/25/19  1413   HGB 13.4* 13.6* 14.9    Recent Labs   Lab Test 07/08/20  1620 08/15/19  0138 08/14/19  2230   TROPONINI <0.01 <0.01 <0.01     Recent Labs   Lab Test 01/18/19  1007 01/11/19  1639   BNP <10 11     Recent Labs   Lab Test 07/08/20  1620   TSH 0.53     Recent Labs   Lab Test 08/14/19  2230 04/20/19  1348 01/18/19  1007   INR 2.34* 1.13* 1.24*      52 minutes were spent on the date of encounter performing chart review, history and exam, documentation, and further activities as noted above.

## 2022-02-24 NOTE — PATIENT INSTRUCTIONS
Ashok Del Rosario,    It was a pleasure to see you today at the [facility].     My recommendations after this visit include:    Increase metoprolol succinate to 50 mg twice daily.  If you are more tired, take the whole 100 mg at night.  Stop taking irbesartan.    If you continue to have exertional symptoms, call Dr. Hyatt    Follow up in 3 months    Suzan Waddell, CNP  [facility], Electrophysiology  [phone]  EP nurses [phone]

## 2022-02-24 NOTE — PROGRESS NOTES
HEART CARE ELECTROPHYSIOLOGY NOTE      [facility]  [phone]      Assessment/Recommendations   Assessment/Plan:  1.  Paroxysmal atrial fibrillation:   Prolonged episode of A. fib yesterday lasting almost 14 hours with very rapid ventricular rates associated with lightheadedness and nausea.  He also has short episodes more consistent with PAT.  He remains off membrane active antiarrhythmic medications.  Increase metoprolol succinate to 50 mg twice daily.  If the morning dose is making him more tired, take the entire 100 mg at night.     He was reassured that atrial fibrillation is not life-threatening, but carries an increased risk for stroke.  He has a [id number] score of 5 for age 65-74, hypertension, possible TIA, and vascular disease.  He underwent left atrial appendage closure with the Watchman FLX device as part of the option study done at [place] on 2/4/2020.  He has had no neurologic symptoms or events.   He remains on aspirin 81 mg indefinitely.      2.  Sick sinus syndrome status post dual-chamber pacemaker implant:  The pacemaker was interrogated and is functioning appropriately.  The battery showed estimated longevity of 11.7 years.  Atrial and ventricular lead sensing, impedance, and pacing thresholds were stable and within normal limits.  He has had no further symptoms of syncope since his pacemaker implant.       3.  Hypertension: Blood pressure at target.  Discontinue irbesartan to allow for the increase in metoprolol as above.     4.  Nonobstructive coronary artery disease: 30-35% stenosis of the LAD and circumflex seen on previous angiogram, but severe coronary calcification seen on more recent CTA.  Denies exertional chest discomfort, but continues to have dyspnea and lightheadedness with strenuous exertion.  NM stress test done in June 2021 negative for ischemia.  He was instructed to call if episodes continue or worsen.  Continue atorvastatin 80 mg daily.    5.   Nonsustained ventricular tachycardia: Brief episodes consistent with NSVT seen on pacemaker interrogation.  Considered low risk in the setting of normal LVEF without ischemia.  Possibly symptomatic with lightheadedness.  Increase metoprolol as above.    Follow-up with me in 3 months  Follow up with Dr. Hyatt in 6 months     History of Present Illness/Subjective    HPI: Ashok Del Rosario is a 71 year old male who comes in today companied by his wife for EP device and arrhythmia follow-up.  He has a history of sinus bradycardia status post dual-chamber pacemaker implant on 12/31/2018, TIA, atrial fibrillation, carotid artery stenosis, hypertension, hyperlipidemia, anxiety, mild obstructive sleep apnea, and emphysema.      He was diagnosed with atrial fibrillation with rapid ventricular response for which he underwent early cardioversion in the emergency department on 1/11/2019.  Symptoms consist of tachypalpitations, lightheadedness, and shortness of breath.   He was started on metoprolol, but hospitalized again over for another episode that was severely symptomatic as the heart rates were in the 200s.  He was given adenosine by EMS for what appeared to be SVT.  During his hospitalization, he was going to be started on sotalol, but was instead started on flecainide 75 mg twice daily by Dr. Chand who evaluated his coronary artery disease as not significant.   He underwent pulmonary vein isolation ablation on 8/29/2019 performed at [place] prior to which his flecainide was discontinued.  He underwent left atrial appendage closure with the Watchman FLX as part of the option study on 2/4/2020, also done at [place].    Post implant JOVANI showed a well-seated device with no significant pari-device flow and no evidence of thrombus.  He is on aspirin for stroke prophylaxis.       Annika states that he has been feeling well.  He got very short of breath and lightheaded after shoveling snow the other day.  Yesterday, he had no  specific awareness of arrhythmia, but was dizzy and nauseous.  He reports occasional episodes of lightheadedness.  He denies chest discomfort, abdominal fullness/bloating or peripheral edema, shortness of breath at rest or normal activity, paroxysmal nocturnal dyspnea, orthopnea, dizziness, pre-syncope, or syncope.        Cardiographics (EKGs and device interrogations personally reviewed):  EKG done 2/5/2020 (image not available for review) NSR 77 bpm, QT/QTc 408/461 ms  EKG done 5/25/2019 shows atrial fibrillation with rapid ventricular response at 139 bpm   EKG, Done 1/18/2019 starts in A. fib with transition to sinus rhythm  EKG done 1/14/2019 shows sinus rhythm at 89 bpm, incomplete right bundle branch block, QT/QTc interval measures 378/459 ms  EKG done 1/11/2019 shows atrial fibrillation with rapid ventricular response at 141 bpm.  Subsequent EKG shows sinus tachycardia at 105 bpm with an incomplete right bundle branch block     Pacemaker Interrogation (MDT implant 12/31/2018):  Pacing mode: MVP   Presenting rhythm: AS-VS. SR 87  Underlying rhythm: SR 87  A-paced: 4.8%.  V-paced 8%.  Battery: estimated longevity 12.6 years.  Atrial and Ventricular leads: Stable with good sensing, impedance, and pacing thresholds  Arrhythmias: 2 AF with RVR, longest 13.5 hours on 2/22/2022.  Also 20 2 fast AMV consistent with SVT.  45 NSVT, some appear to be true NSVT, others atrial driven  [place]: 0.3% (previous < 0.1%)  Histograms: Good rate distribution     JOVANI done 1/23/2021 at [place]:  1. The baseline ECG demonstrated sinus rhythm with a rate of 82 bpm.  2. The WATCHMAN device is visualized and is well-expanded within the left atrial appendage.  3. Small residual jet on the postero-inferiorior aspect of the device (jet size 2 mm; JOVANI angle  139 degrees; clip #61). The remainder of the WATCHMAN device margin appears well sealed.  4. No intracardiac mass or thrombus identified.  No thrombus on the atrial face of the  WATCHMAN  device.  5. No pericardial effusion.  6. No shunt at atrial level by color flow imaging and agitated saline contrast injection.  7. Normal left ventricular chamber size.  Left ventricular ejection fraction 60%.  8. 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  9. Normal right ventricular chamber size and systolic function.  10. Moderate tricuspid valve regurgitation.     CTA pulmonary veings done 11/20/2019 at [place]:   PULMONARY VEINS:  Two right and two left pulmonary veins enter the left atrium unobstructed. No  evidence of pulmonary vein stenosis.     CARDIOVASCULAR FINDINGS:     Presumed focal dissection with nearby ulcerated plaque in the left subclavian  artery, likely unchanged. Scattered calcified and noncalcified plaque in the  remainder of the thoracic aorta and visualized arch vessels. Severe coronary  calcification. No intracardiac mass or thrombus. Left atrial enlargement.    ADDITIONAL FINDINGS:      Focal peripheral hyperenhancement in hepatic segment 4A, indeterminate but  likely benign. Left anterior chest wall dual chamber pacemaker. Redemonstrated  centrilobular and paraseptal emphysema with decreased fluid and debris within  the previously noted left lower lobe bulla/cavity; this lesion remains somewhat  thick-walled.    Nuclear Lexiscan stress test done 6/7/2021:     The nuclear stress test is negative for inducible myocardial ischemia or infarction.     The left ventricular ejection fraction at stress is 63%.     A prior study was conducted on 10/16/2017.  This study has no change when compared with the prior study.     Coronary angiogram done 10/17/2017:     Mid Cx lesion 30% stenosed.    Prox LAD to Dist LAD lesion 35% stenosed.  The left ventricular size is normal. The left ventricular systolic function is normal. LV systolic pressure is normal. LV end diastolic pressure is normal. There are no wall motion abnormalities in the left ventricle.    I have  reviewed and updated the patient's Past Medical History, Social History, Family History and Medication List.  Outside records personally reviewed.     Physical Examination  Review of Systems   Vitals: There were no vitals taken for this visit.  BMI= There is no height or weight on file to calculate BMI.  Wt Readings from Last 3 Encounters:   07/06/21 94.3 kg (208 lb)   06/11/21 95.2 kg (209 lb 12.8 oz)   05/14/21 96.2 kg (212 lb)       General Appearance:   Alert, well-appearing and in no acute distress.   HEENT: Atraumatic, normocephalic.  No scleral icterus, normal conjunctivae, EOMs intact, PERRL.  Wearing a mask.   Chest/Lungs:   Chest symmetric, spine straight.  Respirations unlabored.  Lungs are clear to auscultation.  Left subclavian pacemaker site with no sign of infection or tissue thinning.   Cardiovascular:   Regular rate and rhythm.  Normal first and second heart sounds with no murmurs, rubs, or gallops; radial and posterior tibial pulses are intact, No edema.   Abdomen:  Soft, nondistended, bowel sounds present.   Extremities: No cyanosis or clubbing.   Musculoskeletal: Moves all extremities.     Skin: Warm, dry, intact.    Neurologic: Mood and affect are appropriate.  Alert and oriented to person, place, time, and situation.        ROS: 10 point ROS neg other than the symptoms noted above in the HPI.         Medical History  Surgical History Family History Social History   Past Medical History:   Diagnosis Date     Anxiety about health     per [facility]     Dyslipidemia      GERD (gastroesophageal reflux disease)      Hypertension      Nonocclusive coronary atherosclerosis of native coronary artery 10/17/2017     Paroxysmal atrial fibrillation (H) 01/14/2019    Dx [id number] score = 5 (age, HTN, CAD, TIA 2) Rx flecainide Rx Xarelto     Pulmonary emphysema (H) 11/21/2019    per [place]     Right bundle branch block 08/29/2019     Solitary pulmonary nodule 11/21/2019    per [place]     Syncope 01/18/2019      TIA (transient ischemic attack) 2018     Past Surgical History:   Procedure Laterality Date     APPENDECTOMY       CARDIAC ELECTROPHYSIOLOGY MAPPING AND ABLATION  2019    PVI done at [place]     CV CORONARY ANGIOGRAM N/A 10/17/2017    Procedure: Coronary Angiogram;  Surgeon: Ashok Hyatt MD;  Location: [facility];  Service:      CV LEFT HEART CATHETERIZATION WITH LEFT VENTRICULOGRAM N/A 10/17/2017    Procedure: Left Heart Catheterization with Left Ventriculogram;  Surgeon: Ashok Hyatt MD;  Location: [facility];  Service:      EP PACEMAKER INSERT Left 2018    Procedure: EP Pacemaker Insertion;  Surgeon: Micheal Junior MD;  Location: [facility];  Service: Cardiology     OTHER SURGICAL HISTORY  2020    Left atrial appendage closureWatchman FLX at [place]     Family History   Problem Relation Age of Onset     Cancer Mother      Cancer Father      Coronary Artery Disease Brother      Coronary Artery Disease Brother      Valvular heart disease Brother      Rectal Cancer Sister      Colon Cancer Sister         Social History     Socioeconomic History     Marital status:      Spouse name: Not on file     Number of children: Not on file     Years of education: Not on file     Highest education level: Not on file   Occupational History     Not on file   Tobacco Use     Smoking status: Former Smoker     Quit date: 10/16/1999     Years since quittin.3     Smokeless tobacco: Never Used   Substance and Sexual Activity     Alcohol use: Yes     Alcohol/week: 1.0 standard drink     Comment: Alcoholic Drinks/day: none since december     Drug use: No     Sexual activity: Yes     Partners: Female   Other Topics Concern     Not on file   Social History Narrative     Not on file     Social Determinants of Health     Financial Resource Strain: Not on file   Food Insecurity: Not on file   Transportation Needs: Not on file   Physical Activity: Not on file    Stress: Not on file   Social Connections: Not on file   Intimate Partner Violence: Not on file   Housing Stability: Not on file           Medications  Allergies   Current Outpatient Medications   Medication Sig Dispense Refill     acetaminophen (TYLENOL) 500 MG tablet [ACETAMINOPHEN (TYLENOL) 500 MG TABLET] Take 1-2 tablets (500-1,000 mg total) by mouth every 4 (four) hours as needed.  0     aspirin (ASPIR-81) 81 MG EC tablet [ASPIRIN (ASPIR-81) 81 MG EC TABLET] Take 81 mg by mouth daily.        atorvastatin (LIPITOR) 80 MG tablet [ATORVASTATIN (LIPITOR) 80 MG TABLET] TAKE 1 TABLET BY MOUTH EVERYDAY AT BEDTIME 90 tablet 1     irbesartan (AVAPRO) 75 MG tablet [IRBESARTAN (AVAPRO) 75 MG TABLET] Take 0.5 tablets (37.5 mg total) by mouth daily. 45 tablet 3     metoprolol succinate (TOPROL-XL) 50 MG 24 hr tablet [METOPROLOL SUCCINATE (TOPROL-XL) 50 MG 24 HR TABLET] Take 1 tablet (50 mg total) by mouth daily. 90 tablet 3     metroNIDAZOLE (METROCREAM) 0.75 % cream [METRONIDAZOLE (METROCREAM) 0.75 % CREAM] Apply 1 application topically 2 (two) times a day as needed (roseacea). For Rosacea - apply to nose and right side of face              omeprazole (PRILOSEC) 40 MG capsule [OMEPRAZOLE (PRILOSEC) 40 MG CAPSULE] Take 40 mg by mouth daily before breakfast.       polymyxin B sulf/trimethoprim (POLYMYXIN B SUL-TRIMETHOPRIM OPHT) [POLYMYXIN B SULF/TRIMETHOPRIM (POLYMYXIN B SUL-TRIMETHOPRIM OPHT)] Apply to eye as needed.         Allergies   Allergen Reactions     Cephalexin Rash          Lab Results    Chemistry/lipid CBC Cardiac Enzymes/BNP/TSH/INR   Recent Labs   Lab Test 11/16/21 0923   CHOL 119   HDL 34*   LDL 65   TRIG 98     Recent Labs   Lab Test 11/16/21  0923 10/02/20  1020 12/31/18  0442   LDL 65 70 113     Recent Labs   Lab Test 11/16/21 0923      POTASSIUM 5.2*   CHLORIDE 111*   CO2 22      BUN 7*   CR 0.97   GFRESTIMATED 79   ESSENCE 10.2     Recent Labs   Lab Test 11/16/21  0923 10/02/20  1020  07/08/20  1620   CR 0.97 0.83 0.89     No results for input(s): A1C in the last 78675 hours.   Recent Labs   Lab Test 07/08/20  1620   WBC 7.8   HGB 13.4*   HCT 40.7   MCV 89        Recent Labs   Lab Test 07/08/20  1620 08/14/19  2230 05/25/19  1413   HGB 13.4* 13.6* 14.9    Recent Labs   Lab Test 07/08/20  1620 08/15/19  0138 08/14/19  2230   TROPONINI <0.01 <0.01 <0.01     Recent Labs   Lab Test 01/18/19  1007 01/11/19  1639   BNP <10 11     Recent Labs   Lab Test 07/08/20  1620   TSH 0.53     Recent Labs   Lab Test 08/14/19  2230 04/20/19  1348 01/18/19  1007   INR 2.34* 1.13* 1.24*      52 minutes were spent on the date of encounter performing chart review, history and exam, documentation, and further activities as noted above.

## 2022-02-25 LAB
MDC_IDC_EPISODE_DTM: NORMAL
MDC_IDC_EPISODE_DURATION: 0 S
MDC_IDC_EPISODE_DURATION: 1 S
MDC_IDC_EPISODE_DURATION: 126 S
MDC_IDC_EPISODE_DURATION: 16 S
MDC_IDC_EPISODE_DURATION: 2 S
MDC_IDC_EPISODE_DURATION: 216 S
MDC_IDC_EPISODE_DURATION: 256 S
MDC_IDC_EPISODE_DURATION: 3 S
MDC_IDC_EPISODE_DURATION: 44 S
MDC_IDC_EPISODE_DURATION: 440 S
MDC_IDC_EPISODE_DURATION: 7 S
MDC_IDC_EPISODE_DURATION: 7 S
MDC_IDC_EPISODE_DURATION: NORMAL S
MDC_IDC_EPISODE_ID: 268
MDC_IDC_EPISODE_ID: 269
MDC_IDC_EPISODE_ID: 270
MDC_IDC_EPISODE_ID: 271
MDC_IDC_EPISODE_ID: 272
MDC_IDC_EPISODE_ID: 273
MDC_IDC_EPISODE_ID: 274
MDC_IDC_EPISODE_ID: 275
MDC_IDC_EPISODE_ID: 276
MDC_IDC_EPISODE_ID: 277
MDC_IDC_EPISODE_ID: 278
MDC_IDC_EPISODE_ID: 279
MDC_IDC_EPISODE_ID: 280
MDC_IDC_EPISODE_ID: 281
MDC_IDC_EPISODE_ID: 282
MDC_IDC_EPISODE_ID: 283
MDC_IDC_EPISODE_ID: 284
MDC_IDC_EPISODE_ID: 285
MDC_IDC_EPISODE_ID: 286
MDC_IDC_EPISODE_ID: 287
MDC_IDC_EPISODE_ID: 288
MDC_IDC_EPISODE_ID: 289
MDC_IDC_EPISODE_ID: 290
MDC_IDC_EPISODE_ID: 291
MDC_IDC_EPISODE_TYPE: NORMAL
MDC_IDC_LEAD_IMPLANT_DT: NORMAL
MDC_IDC_LEAD_IMPLANT_DT: NORMAL
MDC_IDC_LEAD_LOCATION: NORMAL
MDC_IDC_LEAD_LOCATION: NORMAL
MDC_IDC_LEAD_LOCATION_DETAIL_1: NORMAL
MDC_IDC_LEAD_LOCATION_DETAIL_1: NORMAL
MDC_IDC_LEAD_MFG: NORMAL
MDC_IDC_LEAD_MFG: NORMAL
MDC_IDC_LEAD_MODEL: NORMAL
MDC_IDC_LEAD_MODEL: NORMAL
MDC_IDC_LEAD_POLARITY_TYPE: NORMAL
MDC_IDC_LEAD_POLARITY_TYPE: NORMAL
MDC_IDC_LEAD_SERIAL: NORMAL
MDC_IDC_LEAD_SERIAL: NORMAL
MDC_IDC_LEAD_SPECIAL_FUNCTION: NORMAL
MDC_IDC_LEAD_SPECIAL_FUNCTION: NORMAL
MDC_IDC_MSMT_BATTERY_DTM: NORMAL
MDC_IDC_MSMT_BATTERY_REMAINING_LONGEVITY: 141 MO
MDC_IDC_MSMT_BATTERY_RRT_TRIGGER: 2.62
MDC_IDC_MSMT_BATTERY_STATUS: NORMAL
MDC_IDC_MSMT_BATTERY_VOLTAGE: 3.01 V
MDC_IDC_MSMT_LEADCHNL_RA_IMPEDANCE_VALUE: 304 OHM
MDC_IDC_MSMT_LEADCHNL_RA_IMPEDANCE_VALUE: 361 OHM
MDC_IDC_MSMT_LEADCHNL_RA_PACING_THRESHOLD_AMPLITUDE: 0.5 V
MDC_IDC_MSMT_LEADCHNL_RA_PACING_THRESHOLD_AMPLITUDE: 0.62 V
MDC_IDC_MSMT_LEADCHNL_RA_PACING_THRESHOLD_PULSEWIDTH: 0.4 MS
MDC_IDC_MSMT_LEADCHNL_RA_PACING_THRESHOLD_PULSEWIDTH: 0.4 MS
MDC_IDC_MSMT_LEADCHNL_RA_SENSING_INTR_AMPL: 3.12 MV
MDC_IDC_MSMT_LEADCHNL_RA_SENSING_INTR_AMPL: 3.62 MV
MDC_IDC_MSMT_LEADCHNL_RV_IMPEDANCE_VALUE: 342 OHM
MDC_IDC_MSMT_LEADCHNL_RV_IMPEDANCE_VALUE: 399 OHM
MDC_IDC_MSMT_LEADCHNL_RV_PACING_THRESHOLD_AMPLITUDE: 1.25 V
MDC_IDC_MSMT_LEADCHNL_RV_PACING_THRESHOLD_AMPLITUDE: 1.25 V
MDC_IDC_MSMT_LEADCHNL_RV_PACING_THRESHOLD_PULSEWIDTH: 0.4 MS
MDC_IDC_MSMT_LEADCHNL_RV_PACING_THRESHOLD_PULSEWIDTH: 0.4 MS
MDC_IDC_MSMT_LEADCHNL_RV_SENSING_INTR_AMPL: 1.88 MV
MDC_IDC_MSMT_LEADCHNL_RV_SENSING_INTR_AMPL: 2 MV
MDC_IDC_PG_IMPLANT_DTM: NORMAL
MDC_IDC_PG_MFG: NORMAL
MDC_IDC_PG_MODEL: NORMAL
MDC_IDC_PG_SERIAL: NORMAL
MDC_IDC_PG_TYPE: NORMAL
MDC_IDC_SESS_CLINIC_NAME: NORMAL
MDC_IDC_SESS_DTM: NORMAL
MDC_IDC_SESS_TYPE: NORMAL
MDC_IDC_SET_BRADY_AT_MODE_SWITCH_RATE: 171 {BEATS}/MIN
MDC_IDC_SET_BRADY_HYSTRATE: NORMAL
MDC_IDC_SET_BRADY_LOWRATE: 60 {BEATS}/MIN
MDC_IDC_SET_BRADY_MAX_SENSOR_RATE: 130 {BEATS}/MIN
MDC_IDC_SET_BRADY_MAX_TRACKING_RATE: 130 {BEATS}/MIN
MDC_IDC_SET_BRADY_MODE: NORMAL
MDC_IDC_SET_BRADY_PAV_DELAY_LOW: 180 MS
MDC_IDC_SET_BRADY_SAV_DELAY_LOW: 150 MS
MDC_IDC_SET_LEADCHNL_RA_PACING_AMPLITUDE: NORMAL
MDC_IDC_SET_LEADCHNL_RA_PACING_ANODE_ELECTRODE_1: NORMAL
MDC_IDC_SET_LEADCHNL_RA_PACING_ANODE_LOCATION_1: NORMAL
MDC_IDC_SET_LEADCHNL_RA_PACING_CAPTURE_MODE: NORMAL
MDC_IDC_SET_LEADCHNL_RA_PACING_CATHODE_ELECTRODE_1: NORMAL
MDC_IDC_SET_LEADCHNL_RA_PACING_CATHODE_LOCATION_1: NORMAL
MDC_IDC_SET_LEADCHNL_RA_PACING_POLARITY: NORMAL
MDC_IDC_SET_LEADCHNL_RA_PACING_PULSEWIDTH: 0.4 MS
MDC_IDC_SET_LEADCHNL_RA_SENSING_ANODE_ELECTRODE_1: NORMAL
MDC_IDC_SET_LEADCHNL_RA_SENSING_ANODE_LOCATION_1: NORMAL
MDC_IDC_SET_LEADCHNL_RA_SENSING_CATHODE_ELECTRODE_1: NORMAL
MDC_IDC_SET_LEADCHNL_RA_SENSING_CATHODE_LOCATION_1: NORMAL
MDC_IDC_SET_LEADCHNL_RA_SENSING_POLARITY: NORMAL
MDC_IDC_SET_LEADCHNL_RA_SENSING_SENSITIVITY: 0.3 MV
MDC_IDC_SET_LEADCHNL_RV_PACING_AMPLITUDE: NORMAL
MDC_IDC_SET_LEADCHNL_RV_PACING_ANODE_ELECTRODE_1: NORMAL
MDC_IDC_SET_LEADCHNL_RV_PACING_ANODE_LOCATION_1: NORMAL
MDC_IDC_SET_LEADCHNL_RV_PACING_CAPTURE_MODE: NORMAL
MDC_IDC_SET_LEADCHNL_RV_PACING_CATHODE_ELECTRODE_1: NORMAL
MDC_IDC_SET_LEADCHNL_RV_PACING_CATHODE_LOCATION_1: NORMAL
MDC_IDC_SET_LEADCHNL_RV_PACING_POLARITY: NORMAL
MDC_IDC_SET_LEADCHNL_RV_PACING_PULSEWIDTH: 0.4 MS
MDC_IDC_SET_LEADCHNL_RV_SENSING_ANODE_ELECTRODE_1: NORMAL
MDC_IDC_SET_LEADCHNL_RV_SENSING_ANODE_LOCATION_1: NORMAL
MDC_IDC_SET_LEADCHNL_RV_SENSING_CATHODE_ELECTRODE_1: NORMAL
MDC_IDC_SET_LEADCHNL_RV_SENSING_CATHODE_LOCATION_1: NORMAL
MDC_IDC_SET_LEADCHNL_RV_SENSING_POLARITY: NORMAL
MDC_IDC_SET_LEADCHNL_RV_SENSING_SENSITIVITY: 0.9 MV
MDC_IDC_SET_ZONE_DETECTION_INTERVAL: 350 MS
MDC_IDC_SET_ZONE_DETECTION_INTERVAL: 400 MS
MDC_IDC_SET_ZONE_TYPE: NORMAL
MDC_IDC_STAT_AT_BURDEN_PERCENT: 0.3 %
MDC_IDC_STAT_AT_DTM_END: NORMAL
MDC_IDC_STAT_AT_DTM_START: NORMAL
MDC_IDC_STAT_AT_MODE_SW_COUNT: 2
MDC_IDC_STAT_BRADY_AP_VP_PERCENT: 2.3 %
MDC_IDC_STAT_BRADY_AP_VS_PERCENT: 4.08 %
MDC_IDC_STAT_BRADY_AS_VP_PERCENT: 5.64 %
MDC_IDC_STAT_BRADY_AS_VS_PERCENT: 87.98 %
MDC_IDC_STAT_BRADY_DTM_END: NORMAL
MDC_IDC_STAT_BRADY_DTM_START: NORMAL
MDC_IDC_STAT_BRADY_RA_PERCENT_PACED: 4.77 %
MDC_IDC_STAT_BRADY_RV_PERCENT_PACED: 7.95 %
MDC_IDC_STAT_EPISODE_RECENT_COUNT: 0
MDC_IDC_STAT_EPISODE_RECENT_COUNT: 2
MDC_IDC_STAT_EPISODE_RECENT_COUNT: 45
MDC_IDC_STAT_EPISODE_RECENT_COUNT: 45
MDC_IDC_STAT_EPISODE_RECENT_COUNT_DTM_END: NORMAL
MDC_IDC_STAT_EPISODE_RECENT_COUNT_DTM_START: NORMAL
MDC_IDC_STAT_EPISODE_TOTAL_COUNT: 0
MDC_IDC_STAT_EPISODE_TOTAL_COUNT: 11
MDC_IDC_STAT_EPISODE_TOTAL_COUNT: 144
MDC_IDC_STAT_EPISODE_TOTAL_COUNT_DTM_END: NORMAL
MDC_IDC_STAT_EPISODE_TOTAL_COUNT_DTM_START: NORMAL
MDC_IDC_STAT_EPISODE_TYPE: NORMAL

## 2022-03-08 ENCOUNTER — TELEPHONE (OUTPATIENT)
Dept: CARDIOLOGY | Facility: CLINIC | Age: 72
End: 2022-03-08
Payer: MEDICARE

## 2022-03-08 NOTE — TELEPHONE ENCOUNTER
Sadie Gil RN Westlund, Jessica, RN  Caller: Unspecified (Today,  9:05 AM)  I called him but had to L/M. If nothing else, he could send in a picture of his incision for us to check. Sadie

## 2022-03-08 NOTE — TELEPHONE ENCOUNTER
"Pc from patient and wife to report over the last few days he notes intense \"stinging\" at his PPM incision, he also feels like he can't lift up his left arm.  Denies any changes, or injury, and has attempted to ice it with some relief.  He denies fever or drainage, but notes the skin appears \"maikol red and feels warm\".     He also reports a BP today of 148/96, had recent medication changes at 2/24 OV with Suzan.      I've asked him to monitor his BP, taking down multiple readings as one elevated BP isnt enough to see a trend.    He otherwise reports he's feeling pretty good on the increased metoprolol, Hrs 60-lows 100's.    Finally, pt reports a wide QRS reading on his apple watch, review of chart shows this was previously discussed as likely VPacing.    Will send to device and Suzan to see if changes are needed.  "

## 2022-03-10 NOTE — TELEPHONE ENCOUNTER
3/10/2022: Talked to patient about stinging at incision and being unable to left Left arm much.  He stated that it has been going on for about 6 months, has been a little worse lately. He has tried ice packs.  Explained that he should check it with his Primary MD, they can check the shoulder and call the cardiologists if they think the PPM needs to be checked- it was just checked on 2/24/2022.  His wife also chimed in and said the shoulder has been a problem for a while.  Sadie Gil, Device RN.

## 2022-05-24 ENCOUNTER — OFFICE VISIT (OUTPATIENT)
Dept: CARDIOLOGY | Facility: CLINIC | Age: 72
End: 2022-05-24
Attending: NURSE PRACTITIONER
Payer: MEDICARE

## 2022-05-24 VITALS
RESPIRATION RATE: 12 BRPM | WEIGHT: 216 LBS | HEART RATE: 66 BPM | DIASTOLIC BLOOD PRESSURE: 78 MMHG | BODY MASS INDEX: 33.83 KG/M2 | SYSTOLIC BLOOD PRESSURE: 138 MMHG

## 2022-05-24 DIAGNOSIS — Z95.0 CARDIAC PACEMAKER IN SITU: ICD-10-CM

## 2022-05-24 DIAGNOSIS — I25.10 NONOCCLUSIVE CORONARY ATHEROSCLEROSIS OF NATIVE CORONARY ARTERY: ICD-10-CM

## 2022-05-24 DIAGNOSIS — Z98.890 STATUS POST ABLATION OF ATRIAL FIBRILLATION: ICD-10-CM

## 2022-05-24 DIAGNOSIS — Z86.79 STATUS POST ABLATION OF ATRIAL FIBRILLATION: ICD-10-CM

## 2022-05-24 DIAGNOSIS — Z95.818 PRESENCE OF LEFT ATRIAL APPENDAGE CLOSURE DEVICE COMPOSED OF NICKEL-TITANIUM ALLOY WITH POLYETHYLENE TEREPHTHALATE MEMBRANE: ICD-10-CM

## 2022-05-24 DIAGNOSIS — I49.5 SSS (SICK SINUS SYNDROME) (H): ICD-10-CM

## 2022-05-24 DIAGNOSIS — I48.0 PAROXYSMAL ATRIAL FIBRILLATION (H): Primary | ICD-10-CM

## 2022-05-24 PROCEDURE — 99215 OFFICE O/P EST HI 40 MIN: CPT | Performed by: NURSE PRACTITIONER

## 2022-05-24 RX ORDER — IRBESARTAN 75 MG/1
TABLET ORAL
COMMUNITY
Start: 2022-05-16 | End: 2022-07-29

## 2022-05-24 NOTE — PROGRESS NOTES
HEART CARE ELECTROPHYSIOLOGY NOTE      [facility]  [phone]      Assessment/Recommendations   Assessment/Plan:  1.  Paroxysmal atrial fibrillation:   Brief episodes of SVT primarily on 5/2/2022, but no A. fib.  He believes he was doing heavy yard work that morning.  He remains off membrane active antiarrhythmic medications.  Continue metoprolol succinate 50 mg twice daily.  He has been more tired since the increase in metoprolol; take the entire 100 mg at night.  Consider using a lower dose of metoprolol and adding diltiazem.  If he has more frequent episodes of AF, further discuss antiarrhythmic medications versus repeat ablation.     He was reassured that atrial fibrillation is not life-threatening, but carries an increased risk for stroke.  He has a [id number] score of 5 for age 65-74, hypertension, possible TIA, and vascular disease.  He underwent left atrial appendage closure with the Watchman FLX device as part of the option study done at [place] on 2/4/2020.  He has had no neurologic symptoms or events.   He remains on aspirin 81 mg indefinitely.      2.  Sick sinus syndrome status post dual-chamber pacemaker implant:  The pacemaker was remotely interrogated and is functioning appropriately.  The battery showed estimated longevity of 11.5 years.  Atrial and ventricular lead sensing, impedance, and pacing thresholds were stable and within normal limits.  He has had no further symptoms of syncope since his pacemaker implant.       3.  Hypertension: Blood pressure at target.  Continue metoprolol as above.     4.  Nonobstructive coronary artery disease: 30-35% stenosis of the LAD and circumflex seen on previous angiogram, but severe coronary calcification seen on more recent CTA.  Denies exertional chest discomfort, but continues to have dyspnea with strenuous exertion.  NM stress test done in June 2021 negative for ischemia.  He was instructed to call if episodes continue or worsen.   Continue atorvastatin 80 mg daily.    5.  Nonsustained ventricular tachycardia: Brief episodes consistent with NSVT seen on pacemaker interrogation.  Considered low risk in the setting of normal LVEF without ischemia.  Possibly symptomatic with lightheadedness.  Increase metoprolol as above.    Follow up with Dr. Hyatt in 3 months  Follow up with me in February 2023     History of Present Illness/Subjective    HPI: Ashok Del Rosario is a 71 year old male who comes in today companied by his wife for EP device and arrhythmia follow-up.  He has a history of sinus bradycardia status post dual-chamber pacemaker implant on 12/31/2018, TIA, atrial fibrillation, carotid artery stenosis, hypertension, hyperlipidemia, anxiety, mild obstructive sleep apnea, and emphysema.      He was diagnosed with atrial fibrillation with rapid ventricular response for which he underwent early cardioversion in the emergency department on 1/11/2019.  Symptoms consist of tachypalpitations, lightheadedness, and shortness of breath.   He was started on metoprolol, but hospitalized again over for another episode that was severely symptomatic as the heart rates were in the 200s.  He was given adenosine by EMS for what appeared to be SVT.  During his hospitalization, he was going to be started on sotalol, but was instead started on flecainide 75 mg twice daily by Dr. Chand who evaluated his coronary artery disease as not significant.   He underwent pulmonary vein isolation ablation on 8/29/2019 performed at [place] prior to which his flecainide was discontinued.  He underwent left atrial appendage closure with the Watchman FLX as part of the option study on 2/4/2020, also done at [place].    Post implant JOVANI showed a well-seated device with no significant pari-device flow and no evidence of thrombus.  He had a prolonged episode of AF with very rapid ventricular response in February associated with lightheadedness and nausea as well as short  episodes more consistent with PAT.  He is on aspirin for stroke prophylaxis.       Pat states that he has been feeling well, but believes he is a bit more tired since the increase in metoprolol.  He has had some dyspnea with very strenuous activity, but not with routine activities.  He has not had any A. fib.  He denies chest discomfort, sustained palpitations, abdominal fullness/bloating or peripheral edema, shortness of breath at rest or normal activity, paroxysmal nocturnal dyspnea, orthopnea, dizziness, pre-syncope, or syncope.        Cardiographics (EKGs and device interrogation personally reviewed):  EKG done 2/5/2020 (image not available for review) NSR 77 bpm, QT/QTc 408/461 ms  EKG done 5/25/2019 shows atrial fibrillation with rapid ventricular response at 139 bpm   EKG, Done 1/18/2019 starts in A. fib with transition to sinus rhythm  EKG done 1/14/2019 shows sinus rhythm at 89 bpm, incomplete right bundle branch block, QT/QTc interval measures 378/459 ms  EKG done 1/11/2019 shows atrial fibrillation with rapid ventricular response at 141 bpm.  Subsequent EKG shows sinus tachycardia at 105 bpm with an incomplete right bundle branch block     Pacemaker Interrogation, remote 5/11/2022 (MDT implant 12/31/2018):  Pacing mode: MVP   Presenting rhythm: AS-VS.   Underlying rhythm: NA  A-paced: 2.8%.  V-paced 7.8%.  Battery: estimated longevity 11.5 years.  Atrial and Ventricular leads: Stable with good sensing, impedance, and pacing thresholds  Arrhythmias: 0 AT/AF.  8 fast A&V consistent with SVT.  23 NSVT, some appear to be true NSVT, others atrial driven, most on 5/2/2022  [place]: <0.1% (previous 0.3%)  Histograms: Good rate distribution     JOVANI done 1/23/2021 at [place]:  1. The baseline ECG demonstrated sinus rhythm with a rate of 82 bpm.  2. The WATCHMAN device is visualized and is well-expanded within the left atrial appendage.  3. Small residual jet on the postero-inferiorior aspect of the device (jet  size 2 mm; JOVANI angle  139 degrees; clip #61). The remainder of the WATCHMAN device margin appears well sealed.  4. No intracardiac mass or thrombus identified.  No thrombus on the atrial face of the WATCHMAN  device.  5. No pericardial effusion.  6. No shunt at atrial level by color flow imaging and agitated saline contrast injection.  7. Normal left ventricular chamber size.  Left ventricular ejection fraction 60%.  8. 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  9. Normal right ventricular chamber size and systolic function.  10. Moderate tricuspid valve regurgitation.     CTA pulmonary veings done 11/20/2019 at [place]:   PULMONARY VEINS:  Two right and two left pulmonary veins enter the left atrium unobstructed. No  evidence of pulmonary vein stenosis.     CARDIOVASCULAR FINDINGS:     Presumed focal dissection with nearby ulcerated plaque in the left subclavian  artery, likely unchanged. Scattered calcified and noncalcified plaque in the  remainder of the thoracic aorta and visualized arch vessels. Severe coronary  calcification. No intracardiac mass or thrombus. Left atrial enlargement.    ADDITIONAL FINDINGS:      Focal peripheral hyperenhancement in hepatic segment 4A, indeterminate but  likely benign. Left anterior chest wall dual chamber pacemaker. Redemonstrated  centrilobular and paraseptal emphysema with decreased fluid and debris within  the previously noted left lower lobe bulla/cavity; this lesion remains somewhat  thick-walled.    Nuclear Lexiscan stress test done 6/7/2021:     The nuclear stress test is negative for inducible myocardial ischemia or infarction.     The left ventricular ejection fraction at stress is 63%.     A prior study was conducted on 10/16/2017.  This study has no change when compared with the prior study.     Coronary angiogram done 10/17/2017:     Mid Cx lesion 30% stenosed.    Prox LAD to Dist LAD lesion 35% stenosed.  The left ventricular size is  normal. The left ventricular systolic function is normal. LV systolic pressure is normal. LV end diastolic pressure is normal. There are no wall motion abnormalities in the left ventricle.    I have reviewed and updated the patient's Past Medical History, Social History, Family History and Medication List.  Outside records personally reviewed.     Physical Examination  Review of Systems   Vitals: /78 (BP Location: Left arm, Patient Position: Sitting, Cuff Size: Adult Regular)   Pulse 66   Resp 12   Wt 98 kg (216 lb)   BMI 33.83 kg/m    BMI= Body mass index is 33.83 kg/m .  Wt Readings from Last 3 Encounters:   05/24/22 98 kg (216 lb)   02/24/22 96.3 kg (212 lb 6.4 oz)   07/06/21 94.3 kg (208 lb)       General Appearance:   Alert, well-appearing and in no acute distress.   HEENT: Atraumatic, normocephalic.  No scleral icterus, normal conjunctivae, EOMs intact, PERRL.  Wearing a mask.   Chest/Lungs:   Chest symmetric, spine straight.  Respirations unlabored.  Lungs are clear to auscultation.  Left subclavian pacemaker site with no sign of infection or tissue thinning.   Cardiovascular:   Regular rate and rhythm.  Normal first and second heart sounds with no murmurs, rubs, or gallops; radial and posterior tibial pulses are intact, No edema.   Abdomen:  Soft, nondistended, bowel sounds present.   Extremities: No cyanosis or clubbing.   Musculoskeletal: Moves all extremities.     Skin: Warm, dry, intact.    Neurologic: Mood and affect are appropriate.  Alert and oriented to person, place, time, and situation.        ROS: 10 point ROS neg other than the symptoms noted above in the HPI.         Medical History  Surgical History Family History Social History   Past Medical History:   Diagnosis Date     Anxiety about health     per [facility]     Dyslipidemia      GERD (gastroesophageal reflux disease)      Hypertension      Nonocclusive coronary atherosclerosis of native coronary artery 10/17/2017     Paroxysmal  atrial fibrillation (H) 2019    Dx [id number] score = 5 (age, HTN, CAD, TIA 2) Rx flecainide Rx Xarelto     Pulmonary emphysema (H) 2019    per [place]     Right bundle branch block 2019     Solitary pulmonary nodule 2019    per [place]     Syncope 2019     TIA (transient ischemic attack) 2018     Past Surgical History:   Procedure Laterality Date     APPENDECTOMY       CARDIAC ELECTROPHYSIOLOGY MAPPING AND ABLATION  2019    PVI done at [place]     CV CORONARY ANGIOGRAM N/A 10/17/2017    Procedure: Coronary Angiogram;  Surgeon: Ashok Hyatt MD;  Location: [facility];  Service:      CV LEFT HEART CATHETERIZATION WITH LEFT VENTRICULOGRAM N/A 10/17/2017    Procedure: Left Heart Catheterization with Left Ventriculogram;  Surgeon: Ashok Hyatt MD;  Location: [facility];  Service:      EP PACEMAKER INSERT Left 2018    Procedure: EP Pacemaker Insertion;  Surgeon: Micheal Junior MD;  Location: [facility];  Service: Cardiology     OTHER SURGICAL HISTORY  2020    Left atrial appendage closureWatchman FLX at [place]     Family History   Problem Relation Age of Onset     Cancer Mother      Cancer Father      Coronary Artery Disease Brother      Coronary Artery Disease Brother      Valvular heart disease Brother      Rectal Cancer Sister      Colon Cancer Sister         Social History     Socioeconomic History     Marital status:      Spouse name: Not on file     Number of children: Not on file     Years of education: Not on file     Highest education level: Not on file   Occupational History     Not on file   Tobacco Use     Smoking status: Former Smoker     Quit date: 10/16/1999     Years since quittin.6     Smokeless tobacco: Never Used   Substance and Sexual Activity     Alcohol use: Yes     Alcohol/week: 1.0 standard drink     Comment: Alcoholic Drinks/day: none since december     Drug use: No     Sexual activity: Yes      Partners: Female   Other Topics Concern     Not on file   Social History Narrative     Not on file     Social Determinants of Health     Financial Resource Strain: Not on file   Food Insecurity: Not on file   Transportation Needs: Not on file   Physical Activity: Not on file   Stress: Not on file   Social Connections: Not on file   Intimate Partner Violence: Not on file   Housing Stability: Not on file           Medications  Allergies   Current Outpatient Medications   Medication Sig Dispense Refill     acetaminophen (TYLENOL) 500 MG tablet [ACETAMINOPHEN (TYLENOL) 500 MG TABLET] Take 1-2 tablets (500-1,000 mg total) by mouth every 4 (four) hours as needed.  0     aspirin (ASPIR-81) 81 MG EC tablet [ASPIRIN (ASPIR-81) 81 MG EC TABLET] Take 81 mg by mouth daily.        atorvastatin (LIPITOR) 80 MG tablet [ATORVASTATIN (LIPITOR) 80 MG TABLET] TAKE 1 TABLET BY MOUTH EVERYDAY AT BEDTIME 90 tablet 1     carboxymethylcellulose (REFRESH PLUS) 0.5 % SOLN ophthalmic solution as needed       metoprolol succinate ER (TOPROL-XL) 50 MG 24 hr tablet Take 1 tablet (50 mg) by mouth 2 times daily 180 tablet 3     metroNIDAZOLE (METROCREAM) 0.75 % cream [METRONIDAZOLE (METROCREAM) 0.75 % CREAM] Apply 1 application topically 2 (two) times a day as needed (roseacea). For Rosacea - apply to nose and right side of face              neomycin-polymyxin-dexamethasone (MAXITROL) 3.5-02727-7.1 ophthalmic ointment as needed       neomycin-polymyxin-dexamethasone (MAXITROL) 3.5-46114-5.1 SUSP ophthalmic susp as needed       omeprazole (PRILOSEC) 40 MG capsule [OMEPRAZOLE (PRILOSEC) 40 MG CAPSULE] Take 40 mg by mouth daily before breakfast.       irbesartan (AVAPRO) 75 MG tablet          Allergies   Allergen Reactions     Cephalexin Rash          Lab Results    Chemistry/lipid CBC Cardiac Enzymes/BNP/TSH/INR   Recent Labs   Lab Test 11/16/21 0923   CHOL 119   HDL 34*   LDL 65   TRIG 98     Recent Labs   Lab Test 11/16/21  0923 10/02/20  1020  12/31/18  0442   LDL 65 70 113     Recent Labs   Lab Test 11/16/21  0923      POTASSIUM 5.2*   CHLORIDE 111*   CO2 22      BUN 7*   CR 0.97   GFRESTIMATED 79   ESSENCE 10.2     Recent Labs   Lab Test 11/16/21  0923 10/02/20  1020 07/08/20  1620   CR 0.97 0.83 0.89      Recent Labs   Lab Test 07/08/20  1620   WBC 7.8   HGB 13.4*   HCT 40.7   MCV 89        Recent Labs   Lab Test 07/08/20  1620 08/14/19  2230 05/25/19  1413   HGB 13.4* 13.6* 14.9    Recent Labs   Lab Test 07/08/20  1620 08/15/19  0138 08/14/19  2230   TROPONINI <0.01 <0.01 <0.01     Recent Labs   Lab Test 01/18/19  1007 01/11/19  1639   BNP <10 11     Recent Labs   Lab Test 07/08/20  1620   TSH 0.53     Recent Labs   Lab Test 08/14/19  2230 04/20/19  1348 01/18/19  1007   INR 2.34* 1.13* 1.24*      51 minutes were spent on the date of encounter performing chart review, history and exam, documentation, and further activities as noted above.

## 2022-05-24 NOTE — PATIENT INSTRUCTIONS
Ashok Del Rosario,    It was a pleasure to see you today at the [facility].     My recommendations after this visit include:    Continue current medications  Try taking the entire metoprolol dose at night.  Take an extra metoprolol 50 mg for fast A fib    Let me know if you are still too tired and we can change your medications    Call if you have frequent or prolonged A fib    Follow up with Dr. Hyatt in 3 months  Follow up with me and device in February 2023 (you will continue to have remote device checks every 3 months, next 6/2/2022)    Suzan Waddell, CNP  [facility], Electrophysiology  [phone]  EP nurses [phone]

## 2022-05-24 NOTE — LETTER
5/24/2022    Bj Hendrickson MD  [facility] [address] [place] [address]    RE: Ashok Del Rosario       Dear Colleague,     I had the pleasure of seeing Ashok Del Rosario in the [facility].    HEART CARE ELECTROPHYSIOLOGY NOTE      [facility]  [phone]      Assessment/Recommendations   Assessment/Plan:  1.  Paroxysmal atrial fibrillation:   Brief episodes of SVT primarily on 5/2/2022, but no A. fib.  He believes he was doing heavy yard work that morning.  He remains off membrane active antiarrhythmic medications.  Continue metoprolol succinate 50 mg twice daily.  He has been more tired since the increase in metoprolol; take the entire 100 mg at night.  Consider using a lower dose of metoprolol and adding diltiazem.  If he has more frequent episodes of AF, further discuss antiarrhythmic medications versus repeat ablation.     He was reassured that atrial fibrillation is not life-threatening, but carries an increased risk for stroke.  He has a [id number] score of 5 for age 65-74, hypertension, possible TIA, and vascular disease.  He underwent left atrial appendage closure with the Watchman FLX device as part of the option study done at [facility] on 2/4/2020.  He has had no neurologic symptoms or events.   He remains on aspirin 81 mg indefinitely.      2.  Sick sinus syndrome status post dual-chamber pacemaker implant:  The pacemaker was remotely interrogated and is functioning appropriately.  The battery showed estimated longevity of 11.5 years.  Atrial and ventricular lead sensing, impedance, and pacing thresholds were stable and within normal limits.  He has had no further symptoms of syncope since his pacemaker implant.       3.  Hypertension: Blood pressure at target.  Continue metoprolol as above.     4.  Nonobstructive coronary artery disease: 30-35% stenosis of the LAD and circumflex seen on previous angiogram, but severe coronary calcification seen  on more recent CTA.  Denies exertional chest discomfort, but continues to have dyspnea with strenuous exertion.  NM stress test done in June 2021 negative for ischemia.  He was instructed to call if episodes continue or worsen.  Continue atorvastatin 80 mg daily.    5.  Nonsustained ventricular tachycardia: Brief episodes consistent with NSVT seen on pacemaker interrogation.  Considered low risk in the setting of normal LVEF without ischemia.  Possibly symptomatic with lightheadedness.  Increase metoprolol as above.    Follow up with Dr. Hyatt in 3 months  Follow up with me in February 2023     History of Present Illness/Subjective    HPI: Ashok Del Rosario is a 71 year old male who comes in today companied by his wife for EP device and arrhythmia follow-up.  He has a history of sinus bradycardia status post dual-chamber pacemaker implant on 12/31/2018, TIA, atrial fibrillation, carotid artery stenosis, hypertension, hyperlipidemia, anxiety, mild obstructive sleep apnea, and emphysema.      He was diagnosed with atrial fibrillation with rapid ventricular response for which he underwent early cardioversion in the emergency department on 1/11/2019.  Symptoms consist of tachypalpitations, lightheadedness, and shortness of breath.   He was started on metoprolol, but hospitalized again over for another episode that was severely symptomatic as the heart rates were in the 200s.  He was given adenosine by EMS for what appeared to be SVT.  During his hospitalization, he was going to be started on sotalol, but was instead started on flecainide 75 mg twice daily by Dr. Chand who evaluated his coronary artery disease as not significant.   He underwent pulmonary vein isolation ablation on 8/29/2019 performed at [facility] prior to which his flecainide was discontinued.  He underwent left atrial appendage closure with the Watchman FLX as part of the option study on 2/4/2020, also done at [facility].    Post implant JOVANI showed a  well-seated device with no significant pari-device flow and no evidence of thrombus.  He had a prolonged episode of AF with very rapid ventricular response in February associated with lightheadedness and nausea as well as short episodes more consistent with PAT.  He is on aspirin for stroke prophylaxis.       Pat states that he has been feeling well, but believes he is a bit more tired since the increase in metoprolol.  He has had some dyspnea with very strenuous activity, but not with routine activities.  He has not had any A. fib.  He denies chest discomfort, sustained palpitations, abdominal fullness/bloating or peripheral edema, shortness of breath at rest or normal activity, paroxysmal nocturnal dyspnea, orthopnea, dizziness, pre-syncope, or syncope.        Cardiographics (EKGs and device interrogation personally reviewed):  EKG done 2/5/2020 (image not available for review) NSR 77 bpm, QT/QTc 408/461 ms  EKG done 5/25/2019 shows atrial fibrillation with rapid ventricular response at 139 bpm   EKG, Done 1/18/2019 starts in A. fib with transition to sinus rhythm  EKG done 1/14/2019 shows sinus rhythm at 89 bpm, incomplete right bundle branch block, QT/QTc interval measures 378/459 ms  EKG done 1/11/2019 shows atrial fibrillation with rapid ventricular response at 141 bpm.  Subsequent EKG shows sinus tachycardia at 105 bpm with an incomplete right bundle branch block     Pacemaker Interrogation, remote 5/11/2022 (MDT implant 12/31/2018):  Pacing mode: MVP   Presenting rhythm: AS-VS.   Underlying rhythm: NA  A-paced: 2.8%.  V-paced 7.8%.  Battery: estimated longevity 11.5 years.  Atrial and Ventricular leads: Stable with good sensing, impedance, and pacing thresholds  Arrhythmias: 0 AT/AF.  8 fast A&V consistent with SVT.  23 NSVT, some appear to be true NSVT, others atrial driven, most on 5/2/2022  [place]: <0.1% (previous 0.3%)  Histograms: Good rate distribution     JOVANI done 1/23/2021 at [facility]:  1. The  baseline ECG demonstrated sinus rhythm with a rate of 82 bpm.  2. The WATCHMAN device is visualized and is well-expanded within the left atrial appendage.  3. Small residual jet on the postero-inferiorior aspect of the device (jet size 2 mm; JOVANI angle  139 degrees; clip #61). The remainder of the WATCHMAN device margin appears well sealed.  4. No intracardiac mass or thrombus identified.  No thrombus on the atrial face of the WATCHMAN  device.  5. No pericardial effusion.  6. No shunt at atrial level by color flow imaging and agitated saline contrast injection.  7. Normal left ventricular chamber size.  Left ventricular ejection fraction 60%.  8. 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  9. Normal right ventricular chamber size and systolic function.  10. Moderate tricuspid valve regurgitation.     CTA pulmonary veings done 11/20/2019 at [facility]:   PULMONARY VEINS:  Two right and two left pulmonary veins enter the left atrium unobstructed. No  evidence of pulmonary vein stenosis.     CARDIOVASCULAR FINDINGS:     Presumed focal dissection with nearby ulcerated plaque in the left subclavian  artery, likely unchanged. Scattered calcified and noncalcified plaque in the  remainder of the thoracic aorta and visualized arch vessels. Severe coronary  calcification. No intracardiac mass or thrombus. Left atrial enlargement.    ADDITIONAL FINDINGS:      Focal peripheral hyperenhancement in hepatic segment 4A, indeterminate but  likely benign. Left anterior chest wall dual chamber pacemaker. Redemonstrated  centrilobular and paraseptal emphysema with decreased fluid and debris within  the previously noted left lower lobe bulla/cavity; this lesion remains somewhat  thick-walled.    Nuclear Lexiscan stress test done 6/7/2021:     The nuclear stress test is negative for inducible myocardial ischemia or infarction.     The left ventricular ejection fraction at stress is 63%.     A prior study was  conducted on 10/16/2017.  This study has no change when compared with the prior study.     Coronary angiogram done 10/17/2017:     Mid Cx lesion 30% stenosed.    Prox LAD to Dist LAD lesion 35% stenosed.  The left ventricular size is normal. The left ventricular systolic function is normal. LV systolic pressure is normal. LV end diastolic pressure is normal. There are no wall motion abnormalities in the left ventricle.    I have reviewed and updated the patient's Past Medical History, Social History, Family History and Medication List.  Outside records personally reviewed.     Physical Examination  Review of Systems   Vitals: /78 (BP Location: Left arm, Patient Position: Sitting, Cuff Size: Adult Regular)   Pulse 66   Resp 12   Wt 98 kg (216 lb)   BMI 33.83 kg/m    BMI= Body mass index is 33.83 kg/m .  Wt Readings from Last 3 Encounters:   05/24/22 98 kg (216 lb)   02/24/22 96.3 kg (212 lb 6.4 oz)   07/06/21 94.3 kg (208 lb)       General Appearance:   Alert, well-appearing and in no acute distress.   HEENT: Atraumatic, normocephalic.  No scleral icterus, normal conjunctivae, EOMs intact, PERRL.  Wearing a mask.   Chest/Lungs:   Chest symmetric, spine straight.  Respirations unlabored.  Lungs are clear to auscultation.  Left subclavian pacemaker site with no sign of infection or tissue thinning.   Cardiovascular:   Regular rate and rhythm.  Normal first and second heart sounds with no murmurs, rubs, or gallops; radial and posterior tibial pulses are intact, No edema.   Abdomen:  Soft, nondistended, bowel sounds present.   Extremities: No cyanosis or clubbing.   Musculoskeletal: Moves all extremities.     Skin: Warm, dry, intact.    Neurologic: Mood and affect are appropriate.  Alert and oriented to person, place, time, and situation.        ROS: 10 point ROS neg other than the symptoms noted above in the HPI.         Medical History  Surgical History Family History Social History   Past Medical History:    Diagnosis Date     Anxiety about health     per [facility]     Dyslipidemia      GERD (gastroesophageal reflux disease)      Hypertension      Nonocclusive coronary atherosclerosis of native coronary artery 10/17/2017     Paroxysmal atrial fibrillation (H) 01/14/2019    Dx [id number] score = 5 (age, HTN, CAD, TIA 2) Rx flecainide Rx Xarelto     Pulmonary emphysema (H) 11/21/2019    per [facility]     Right bundle branch block 08/29/2019     Solitary pulmonary nodule 11/21/2019    per [facility]     Syncope 01/18/2019     TIA (transient ischemic attack) 12/30/2018     Past Surgical History:   Procedure Laterality Date     APPENDECTOMY  2000     CARDIAC ELECTROPHYSIOLOGY MAPPING AND ABLATION  08/29/2019    PVI done at [facility]     CV CORONARY ANGIOGRAM N/A 10/17/2017    Procedure: Coronary Angiogram;  Surgeon: Ashok Hyatt MD;  Location: [facility] Cath Lab;  Service:      CV LEFT HEART CATHETERIZATION WITH LEFT VENTRICULOGRAM N/A 10/17/2017    Procedure: Left Heart Catheterization with Left Ventriculogram;  Surgeon: Ashok Hyatt MD;  Location: [facility] Cath Lab;  Service:      EP PACEMAKER INSERT Left 12/31/2018    Procedure: EP Pacemaker Insertion;  Surgeon: Micheal Junior MD;  Location: [facility] Cath Lab;  Service: Cardiology     OTHER SURGICAL HISTORY  02/04/2020    Left atrial appendage closureAnne RASHEED at [facility]     Family History   Problem Relation Age of Onset     Cancer Mother      Cancer Father      Coronary Artery Disease Brother      Coronary Artery Disease Brother      Valvular heart disease Brother      Rectal Cancer Sister      Colon Cancer Sister         Social History     Socioeconomic History     Marital status:      Spouse name: Not on file     Number of children: Not on file     Years of education: Not on file     Highest education level: Not on file   Occupational History     Not on file   Tobacco Use     Smoking status: Former Smoker     Quit date: 10/16/1999     Years since  quittin.6     Smokeless tobacco: Never Used   Substance and Sexual Activity     Alcohol use: Yes     Alcohol/week: 1.0 standard drink     Comment: Alcoholic Drinks/day: none since december     Drug use: No     Sexual activity: Yes     Partners: Female   Other Topics Concern     Not on file   Social History Narrative     Not on file     Social Determinants of Health     Financial Resource Strain: Not on file   Food Insecurity: Not on file   Transportation Needs: Not on file   Physical Activity: Not on file   Stress: Not on file   Social Connections: Not on file   Intimate Partner Violence: Not on file   Housing Stability: Not on file           Medications  Allergies   Current Outpatient Medications   Medication Sig Dispense Refill     acetaminophen (TYLENOL) 500 MG tablet [ACETAMINOPHEN (TYLENOL) 500 MG TABLET] Take 1-2 tablets (500-1,000 mg total) by mouth every 4 (four) hours as needed.  0     aspirin (ASPIR-81) 81 MG EC tablet [ASPIRIN (ASPIR-81) 81 MG EC TABLET] Take 81 mg by mouth daily.        atorvastatin (LIPITOR) 80 MG tablet [ATORVASTATIN (LIPITOR) 80 MG TABLET] TAKE 1 TABLET BY MOUTH EVERYDAY AT BEDTIME 90 tablet 1     carboxymethylcellulose (REFRESH PLUS) 0.5 % SOLN ophthalmic solution as needed       metoprolol succinate ER (TOPROL-XL) 50 MG 24 hr tablet Take 1 tablet (50 mg) by mouth 2 times daily 180 tablet 3     metroNIDAZOLE (METROCREAM) 0.75 % cream [METRONIDAZOLE (METROCREAM) 0.75 % CREAM] Apply 1 application topically 2 (two) times a day as needed (roseacea). For Rosacea - apply to nose and right side of face              neomycin-polymyxin-dexamethasone (MAXITROL) 3.5-27119-1.1 ophthalmic ointment as needed       neomycin-polymyxin-dexamethasone (MAXITROL) 3.5-03396-9.1 SUSP ophthalmic susp as needed       omeprazole (PRILOSEC) 40 MG capsule [OMEPRAZOLE (PRILOSEC) 40 MG CAPSULE] Take 40 mg by mouth daily before breakfast.       irbesartan (AVAPRO) 75 MG tablet          Allergies   Allergen  Reactions     Cephalexin Rash          Lab Results    Chemistry/lipid CBC Cardiac Enzymes/BNP/TSH/INR   Recent Labs   Lab Test 11/16/21  0923   CHOL 119   HDL 34*   LDL 65   TRIG 98     Recent Labs   Lab Test 11/16/21  0923 10/02/20  1020 12/31/18  0442   LDL 65 70 113     Recent Labs   Lab Test 11/16/21  0923      POTASSIUM 5.2*   CHLORIDE 111*   CO2 22      BUN 7*   CR 0.97   GFRESTIMATED 79   ESSENCE 10.2     Recent Labs   Lab Test 11/16/21  0923 10/02/20  1020 07/08/20  1620   CR 0.97 0.83 0.89      Recent Labs   Lab Test 07/08/20  1620   WBC 7.8   HGB 13.4*   HCT 40.7   MCV 89        Recent Labs   Lab Test 07/08/20  1620 08/14/19  2230 05/25/19  1413   HGB 13.4* 13.6* 14.9    Recent Labs   Lab Test 07/08/20  1620 08/15/19  0138 08/14/19  2230   TROPONINI <0.01 <0.01 <0.01     Recent Labs   Lab Test 01/18/19  1007 01/11/19  1639   BNP <10 11     Recent Labs   Lab Test 07/08/20  1620   TSH 0.53     Recent Labs   Lab Test 08/14/19  2230 04/20/19  1348 01/18/19  1007   INR 2.34* 1.13* 1.24*      51 minutes were spent on the date of encounter performing chart review, history and exam, documentation, and further activities as noted above.                Thank you for allowing me to participate in the care of your patient.      Sincerely,     FARAZ Bruno CNP     [facility]  cc:   FARAZ Bruno CNP  [address] [place] [address]

## 2022-06-02 ENCOUNTER — ANCILLARY PROCEDURE (OUTPATIENT)
Dept: CARDIOLOGY | Facility: CLINIC | Age: 72
End: 2022-06-02
Attending: INTERNAL MEDICINE
Payer: MEDICARE

## 2022-06-02 DIAGNOSIS — I49.5 SICK SINUS SYNDROME (H): ICD-10-CM

## 2022-06-02 DIAGNOSIS — Z95.0 PACEMAKER: ICD-10-CM

## 2022-06-02 LAB
MDC_IDC_EPISODE_DTM: NORMAL
MDC_IDC_EPISODE_DURATION: 1 S
MDC_IDC_EPISODE_DURATION: 2 S
MDC_IDC_EPISODE_DURATION: 21 S
MDC_IDC_EPISODE_ID: 323
MDC_IDC_EPISODE_ID: 324
MDC_IDC_EPISODE_ID: 325
MDC_IDC_EPISODE_TYPE: NORMAL
MDC_IDC_LEAD_IMPLANT_DT: NORMAL
MDC_IDC_LEAD_IMPLANT_DT: NORMAL
MDC_IDC_LEAD_LOCATION: NORMAL
MDC_IDC_LEAD_LOCATION: NORMAL
MDC_IDC_LEAD_LOCATION_DETAIL_1: NORMAL
MDC_IDC_LEAD_LOCATION_DETAIL_1: NORMAL
MDC_IDC_LEAD_MFG: NORMAL
MDC_IDC_LEAD_MFG: NORMAL
MDC_IDC_LEAD_MODEL: NORMAL
MDC_IDC_LEAD_MODEL: NORMAL
MDC_IDC_LEAD_POLARITY_TYPE: NORMAL
MDC_IDC_LEAD_POLARITY_TYPE: NORMAL
MDC_IDC_LEAD_SERIAL: NORMAL
MDC_IDC_LEAD_SERIAL: NORMAL
MDC_IDC_LEAD_SPECIAL_FUNCTION: NORMAL
MDC_IDC_LEAD_SPECIAL_FUNCTION: NORMAL
MDC_IDC_MSMT_BATTERY_DTM: NORMAL
MDC_IDC_MSMT_BATTERY_REMAINING_LONGEVITY: 138 MO
MDC_IDC_MSMT_BATTERY_RRT_TRIGGER: 2.62
MDC_IDC_MSMT_BATTERY_STATUS: NORMAL
MDC_IDC_MSMT_BATTERY_VOLTAGE: 3.01 V
MDC_IDC_MSMT_LEADCHNL_RA_IMPEDANCE_VALUE: 304 OHM
MDC_IDC_MSMT_LEADCHNL_RA_IMPEDANCE_VALUE: 342 OHM
MDC_IDC_MSMT_LEADCHNL_RA_PACING_THRESHOLD_AMPLITUDE: 0.62 V
MDC_IDC_MSMT_LEADCHNL_RA_PACING_THRESHOLD_PULSEWIDTH: 0.4 MS
MDC_IDC_MSMT_LEADCHNL_RA_SENSING_INTR_AMPL: 3 MV
MDC_IDC_MSMT_LEADCHNL_RA_SENSING_INTR_AMPL: 3 MV
MDC_IDC_MSMT_LEADCHNL_RV_IMPEDANCE_VALUE: 323 OHM
MDC_IDC_MSMT_LEADCHNL_RV_IMPEDANCE_VALUE: 380 OHM
MDC_IDC_MSMT_LEADCHNL_RV_PACING_THRESHOLD_AMPLITUDE: 1.12 V
MDC_IDC_MSMT_LEADCHNL_RV_PACING_THRESHOLD_PULSEWIDTH: 0.4 MS
MDC_IDC_MSMT_LEADCHNL_RV_SENSING_INTR_AMPL: 1.88 MV
MDC_IDC_MSMT_LEADCHNL_RV_SENSING_INTR_AMPL: 1.88 MV
MDC_IDC_PG_IMPLANT_DTM: NORMAL
MDC_IDC_PG_MFG: NORMAL
MDC_IDC_PG_MODEL: NORMAL
MDC_IDC_PG_SERIAL: NORMAL
MDC_IDC_PG_TYPE: NORMAL
MDC_IDC_SESS_CLINIC_NAME: NORMAL
MDC_IDC_SESS_DTM: NORMAL
MDC_IDC_SESS_TYPE: NORMAL
MDC_IDC_SET_BRADY_AT_MODE_SWITCH_RATE: 171 {BEATS}/MIN
MDC_IDC_SET_BRADY_HYSTRATE: NORMAL
MDC_IDC_SET_BRADY_LOWRATE: 60 {BEATS}/MIN
MDC_IDC_SET_BRADY_MAX_SENSOR_RATE: 130 {BEATS}/MIN
MDC_IDC_SET_BRADY_MAX_TRACKING_RATE: 130 {BEATS}/MIN
MDC_IDC_SET_BRADY_MODE: NORMAL
MDC_IDC_SET_BRADY_PAV_DELAY_LOW: 180 MS
MDC_IDC_SET_BRADY_SAV_DELAY_LOW: 150 MS
MDC_IDC_SET_LEADCHNL_RA_PACING_AMPLITUDE: 1.5 V
MDC_IDC_SET_LEADCHNL_RA_PACING_ANODE_ELECTRODE_1: NORMAL
MDC_IDC_SET_LEADCHNL_RA_PACING_ANODE_LOCATION_1: NORMAL
MDC_IDC_SET_LEADCHNL_RA_PACING_CAPTURE_MODE: NORMAL
MDC_IDC_SET_LEADCHNL_RA_PACING_CATHODE_ELECTRODE_1: NORMAL
MDC_IDC_SET_LEADCHNL_RA_PACING_CATHODE_LOCATION_1: NORMAL
MDC_IDC_SET_LEADCHNL_RA_PACING_POLARITY: NORMAL
MDC_IDC_SET_LEADCHNL_RA_PACING_PULSEWIDTH: 0.4 MS
MDC_IDC_SET_LEADCHNL_RA_SENSING_ANODE_ELECTRODE_1: NORMAL
MDC_IDC_SET_LEADCHNL_RA_SENSING_ANODE_LOCATION_1: NORMAL
MDC_IDC_SET_LEADCHNL_RA_SENSING_CATHODE_ELECTRODE_1: NORMAL
MDC_IDC_SET_LEADCHNL_RA_SENSING_CATHODE_LOCATION_1: NORMAL
MDC_IDC_SET_LEADCHNL_RA_SENSING_POLARITY: NORMAL
MDC_IDC_SET_LEADCHNL_RA_SENSING_SENSITIVITY: 0.3 MV
MDC_IDC_SET_LEADCHNL_RV_PACING_AMPLITUDE: 1.75 V
MDC_IDC_SET_LEADCHNL_RV_PACING_ANODE_ELECTRODE_1: NORMAL
MDC_IDC_SET_LEADCHNL_RV_PACING_ANODE_LOCATION_1: NORMAL
MDC_IDC_SET_LEADCHNL_RV_PACING_CAPTURE_MODE: NORMAL
MDC_IDC_SET_LEADCHNL_RV_PACING_CATHODE_ELECTRODE_1: NORMAL
MDC_IDC_SET_LEADCHNL_RV_PACING_CATHODE_LOCATION_1: NORMAL
MDC_IDC_SET_LEADCHNL_RV_PACING_POLARITY: NORMAL
MDC_IDC_SET_LEADCHNL_RV_PACING_PULSEWIDTH: 0.4 MS
MDC_IDC_SET_LEADCHNL_RV_SENSING_ANODE_ELECTRODE_1: NORMAL
MDC_IDC_SET_LEADCHNL_RV_SENSING_ANODE_LOCATION_1: NORMAL
MDC_IDC_SET_LEADCHNL_RV_SENSING_CATHODE_ELECTRODE_1: NORMAL
MDC_IDC_SET_LEADCHNL_RV_SENSING_CATHODE_LOCATION_1: NORMAL
MDC_IDC_SET_LEADCHNL_RV_SENSING_POLARITY: NORMAL
MDC_IDC_SET_LEADCHNL_RV_SENSING_SENSITIVITY: 0.9 MV
MDC_IDC_SET_ZONE_DETECTION_INTERVAL: 350 MS
MDC_IDC_SET_ZONE_DETECTION_INTERVAL: 400 MS
MDC_IDC_SET_ZONE_TYPE: NORMAL
MDC_IDC_STAT_AT_BURDEN_PERCENT: 0 %
MDC_IDC_STAT_AT_DTM_END: NORMAL
MDC_IDC_STAT_AT_DTM_START: NORMAL
MDC_IDC_STAT_BRADY_AP_VP_PERCENT: 3.55 %
MDC_IDC_STAT_BRADY_AP_VS_PERCENT: 5.32 %
MDC_IDC_STAT_BRADY_AS_VP_PERCENT: 17.01 %
MDC_IDC_STAT_BRADY_AS_VS_PERCENT: 74.13 %
MDC_IDC_STAT_BRADY_DTM_END: NORMAL
MDC_IDC_STAT_BRADY_DTM_START: NORMAL
MDC_IDC_STAT_BRADY_RA_PERCENT_PACED: 6.52 %
MDC_IDC_STAT_BRADY_RV_PERCENT_PACED: 20.55 %
MDC_IDC_STAT_EPISODE_RECENT_COUNT: 0
MDC_IDC_STAT_EPISODE_RECENT_COUNT: 25
MDC_IDC_STAT_EPISODE_RECENT_COUNT_DTM_END: NORMAL
MDC_IDC_STAT_EPISODE_RECENT_COUNT_DTM_START: NORMAL
MDC_IDC_STAT_EPISODE_TOTAL_COUNT: 0
MDC_IDC_STAT_EPISODE_TOTAL_COUNT: 0
MDC_IDC_STAT_EPISODE_TOTAL_COUNT: 11
MDC_IDC_STAT_EPISODE_TOTAL_COUNT: 169
MDC_IDC_STAT_EPISODE_TOTAL_COUNT: 52
MDC_IDC_STAT_EPISODE_TOTAL_COUNT_DTM_END: NORMAL
MDC_IDC_STAT_EPISODE_TOTAL_COUNT_DTM_START: NORMAL
MDC_IDC_STAT_EPISODE_TYPE: NORMAL

## 2022-06-02 PROCEDURE — 93294 REM INTERROG EVL PM/LDLS PM: CPT | Performed by: INTERNAL MEDICINE

## 2022-06-02 PROCEDURE — 93296 REM INTERROG EVL PM/IDS: CPT | Performed by: INTERNAL MEDICINE

## 2022-07-17 ENCOUNTER — HEALTH MAINTENANCE LETTER (OUTPATIENT)
Age: 72
End: 2022-07-17

## 2022-07-25 ENCOUNTER — TELEPHONE (OUTPATIENT)
Dept: CARDIOLOGY | Facility: CLINIC | Age: 72
End: 2022-07-25

## 2022-07-25 DIAGNOSIS — I48.0 PAROXYSMAL ATRIAL FIBRILLATION (H): Primary | ICD-10-CM

## 2022-07-25 NOTE — Clinical Note
Hi there! Can someone please call patient for an ekg with the EP RN's on Friday 7-29 and also a follow-up apt with Suzan in 1 month? Thank you! Marcela

## 2022-07-25 NOTE — TELEPHONE ENCOUNTER
----- Message from Any PENG Sherman sent at 7/25/2022  8:15 AM CDT -----  Regarding: device RN review  No charge. No Epic interface required.  Type: alert remote pacemaker transmission for AT/AF >/=6hrs for two days.  Presenting rhythm: AS- 115 bpm.  Battery/lead status: stable.  Arrhythmias: since 6/2/22; 8 mode switch episodes, EGMs confirm AF, longest lasting 19hrs on 7/22/22, v-rates >/=120bpm ~60%, AF burden 2.9%. 247 fast A&V episodes, available EGMs show RVR during AF. 5 ventricular high rate episodes, EGMs show PSVT.  Anticoagulant: LAAO.     Discharge instructions reviewed with pt and pt denied having any questions. Discharge paperwork signed and pt discharged.         Monica Cheung RN  05/04/22 2108

## 2022-07-26 RX ORDER — SOTALOL HYDROCHLORIDE 80 MG/1
80 TABLET ORAL 2 TIMES DAILY
Qty: 60 TABLET | Refills: 6 | Status: SHIPPED | OUTPATIENT
Start: 2022-07-26 | End: 2022-09-14

## 2022-07-26 NOTE — TELEPHONE ENCOUNTER
EPRNs:  Ongoing episodes of AT/AF-RVR.  Start sotalol 80 mg BID and discontinue metoprolol.  EKG on Friday.  Follow up with me in 1 month.    Device:  Please keep AF alerts at >6 hrs as starting antiarrhythmic medication.  Thanks,  Suzan

## 2022-07-26 NOTE — TELEPHONE ENCOUNTER
Reviewed results of transmission with patient. Patient reports that he has been experiencing some dizziness lately from time to time though they don't last very long. He denies any other symptoms such as shortness of breath or palpitations. He reports that he is taking Metoprolol 50mg BID. He had a PVI at [facility] several years ago and question if they would consider another one, informed patient that he could have this discussion with a provider as I'm uncertain what the criteria are. Routed to Suzan Waddell NP to review. Logbook attached.

## 2022-07-26 NOTE — PROGRESS NOTES
Noted.  Phone call to patient and reviewed recommendations from Suzan Waddell.  He states understanding and is agreeable to medication change.  He will continue Metoprolol today, last dose tonight and then tomorrow start Sotalol 80 mg bid.  Reviewed possible side effects and need for ekg after 5 doses, he will come to the clinic on Friday afternoon, orders placed and rx sent to pharmacy.  He will follow up with Suzan in one month and order placed, will ask scheduling to contact him.

## 2022-07-29 ENCOUNTER — ALLIED HEALTH/NURSE VISIT (OUTPATIENT)
Dept: CARDIOLOGY | Facility: CLINIC | Age: 72
End: 2022-07-29
Payer: MEDICARE

## 2022-07-29 VITALS
BODY MASS INDEX: 32.11 KG/M2 | HEART RATE: 60 BPM | WEIGHT: 205 LBS | RESPIRATION RATE: 14 BRPM | SYSTOLIC BLOOD PRESSURE: 118 MMHG | DIASTOLIC BLOOD PRESSURE: 76 MMHG

## 2022-07-29 DIAGNOSIS — I48.0 PAROXYSMAL ATRIAL FIBRILLATION (H): ICD-10-CM

## 2022-07-29 LAB
ATRIAL RATE - MUSE: 60 BPM
DIASTOLIC BLOOD PRESSURE - MUSE: NORMAL MMHG
INTERPRETATION ECG - MUSE: NORMAL
P AXIS - MUSE: 40 DEGREES
PR INTERVAL - MUSE: 158 MS
QRS DURATION - MUSE: 134 MS
QT - MUSE: 474 MS
QTC - MUSE: 474 MS
R AXIS - MUSE: -37 DEGREES
SYSTOLIC BLOOD PRESSURE - MUSE: NORMAL MMHG
T AXIS - MUSE: -13 DEGREES
VENTRICULAR RATE- MUSE: 60 BPM

## 2022-07-29 PROCEDURE — 99207 PR NO CHARGE NURSE ONLY: CPT

## 2022-07-29 PROCEDURE — 93000 ELECTROCARDIOGRAM COMPLETE: CPT | Performed by: INTERNAL MEDICINE

## 2022-07-29 NOTE — PROGRESS NOTES
Suzan Waddell, APRN Zulay Rivera, RN  EKG reviewed, QT/Qtc ok.   Continue sotalol and follow up as scheduled.   Thanks,   Suzan

## 2022-08-18 ENCOUNTER — TELEPHONE (OUTPATIENT)
Dept: CARDIOLOGY | Facility: CLINIC | Age: 72
End: 2022-08-18

## 2022-08-18 NOTE — TELEPHONE ENCOUNTER
[facility] Call Center    Phone Message    May a detailed message be left on voicemail: yes     Reason for Call: Other:  Ashok is calling to ask if covid medication is ok for him to take?  Please call to advise.    Action Taken: Other: cardio    Travel Screening: Not Applicable

## 2022-08-18 NOTE — TELEPHONE ENCOUNTER
The prescribed should have given him instructions.  Hold atorvastatin while taking Paxlovid, other medication should be okay.  We need to have a PharmD resource to help us feel to these questions.  Thanks,  Suzan

## 2022-08-18 NOTE — TELEPHONE ENCOUNTER
Suzan-  Pt calling, he is positive for COVID  He is being prescribed Paxlovid 100mg BID for 5 days   Ok to take with medication below, which are current per pt?    Please advise  Thank you,  8/18/2022 10:58 AM  Zulay Strong RN      Allergies   Allergen Reactions     Cephalexin Rash       Current Outpatient Medications:      acetaminophen (TYLENOL) 500 MG tablet, [ACETAMINOPHEN (TYLENOL) 500 MG TABLET] Take 1-2 tablets (500-1,000 mg total) by mouth every 4 (four) hours as needed., Disp: , Rfl: 0     aspirin (ASPIR-81) 81 MG EC tablet, [ASPIRIN (ASPIR-81) 81 MG EC TABLET] Take 81 mg by mouth daily. , Disp: , Rfl:      atorvastatin (LIPITOR) 80 MG tablet, [ATORVASTATIN (LIPITOR) 80 MG TABLET] TAKE 1 TABLET BY MOUTH EVERYDAY AT BEDTIME, Disp: 90 tablet, Rfl: 1     carboxymethylcellulose (REFRESH PLUS) 0.5 % SOLN ophthalmic solution, as needed, Disp: , Rfl:      neomycin-polymyxin-dexamethasone (MAXITROL) 3.5-78233-0.1 ophthalmic ointment, as needed, Disp: , Rfl:      neomycin-polymyxin-dexamethasone (MAXITROL) 3.5-36651-2.1 SUSP ophthalmic susp, as needed, Disp: , Rfl:      omeprazole (PRILOSEC) 40 MG capsule, [OMEPRAZOLE (PRILOSEC) 40 MG CAPSULE] Take 40 mg by mouth daily before breakfast., Disp: , Rfl:      sotalol (BETAPACE) 80 MG tablet, Take 1 tablet (80 mg) by mouth 2 times daily, Disp: 60 tablet, Rfl: 6

## 2022-08-18 NOTE — TELEPHONE ENCOUNTER
1st Attempt to contact pt, voicemail message was left with contact information and instructing pt to call back.  8/18/2022 1:44 PM  Zulay Strong RN

## 2022-09-13 ENCOUNTER — ANCILLARY PROCEDURE (OUTPATIENT)
Dept: CARDIOLOGY | Facility: CLINIC | Age: 72
End: 2022-09-13
Payer: MEDICARE

## 2022-09-13 DIAGNOSIS — Z95.0 PACEMAKER: ICD-10-CM

## 2022-09-13 DIAGNOSIS — I49.5 SICK SINUS SYNDROME (H): ICD-10-CM

## 2022-09-13 DIAGNOSIS — I48.91 A-FIB (H): ICD-10-CM

## 2022-09-14 ENCOUNTER — OFFICE VISIT (OUTPATIENT)
Dept: CARDIOLOGY | Facility: CLINIC | Age: 72
End: 2022-09-14
Attending: NURSE PRACTITIONER
Payer: MEDICARE

## 2022-09-14 VITALS
DIASTOLIC BLOOD PRESSURE: 72 MMHG | BODY MASS INDEX: 32.33 KG/M2 | WEIGHT: 206 LBS | HEART RATE: 60 BPM | HEIGHT: 67 IN | SYSTOLIC BLOOD PRESSURE: 112 MMHG | RESPIRATION RATE: 16 BRPM

## 2022-09-14 DIAGNOSIS — Z95.818 PRESENCE OF LEFT ATRIAL APPENDAGE CLOSURE DEVICE COMPOSED OF NICKEL-TITANIUM ALLOY WITH POLYETHYLENE TEREPHTHALATE MEMBRANE: ICD-10-CM

## 2022-09-14 DIAGNOSIS — I49.5 SSS (SICK SINUS SYNDROME) (H): ICD-10-CM

## 2022-09-14 DIAGNOSIS — I25.10 NONOCCLUSIVE CORONARY ATHEROSCLEROSIS OF NATIVE CORONARY ARTERY: ICD-10-CM

## 2022-09-14 DIAGNOSIS — Z95.0 CARDIAC PACEMAKER IN SITU: Primary | ICD-10-CM

## 2022-09-14 DIAGNOSIS — I48.0 PAROXYSMAL ATRIAL FIBRILLATION (H): ICD-10-CM

## 2022-09-14 LAB
MDC_IDC_EPISODE_DTM: NORMAL
MDC_IDC_EPISODE_DTM: NORMAL
MDC_IDC_EPISODE_DURATION: 0 S
MDC_IDC_EPISODE_DURATION: 3 S
MDC_IDC_EPISODE_ID: 586
MDC_IDC_EPISODE_ID: 587
MDC_IDC_EPISODE_TYPE: NORMAL
MDC_IDC_EPISODE_TYPE: NORMAL
MDC_IDC_LEAD_IMPLANT_DT: NORMAL
MDC_IDC_LEAD_IMPLANT_DT: NORMAL
MDC_IDC_LEAD_LOCATION: NORMAL
MDC_IDC_LEAD_LOCATION: NORMAL
MDC_IDC_LEAD_LOCATION_DETAIL_1: NORMAL
MDC_IDC_LEAD_LOCATION_DETAIL_1: NORMAL
MDC_IDC_LEAD_MFG: NORMAL
MDC_IDC_LEAD_MFG: NORMAL
MDC_IDC_LEAD_MODEL: NORMAL
MDC_IDC_LEAD_MODEL: NORMAL
MDC_IDC_LEAD_POLARITY_TYPE: NORMAL
MDC_IDC_LEAD_POLARITY_TYPE: NORMAL
MDC_IDC_LEAD_SERIAL: NORMAL
MDC_IDC_LEAD_SERIAL: NORMAL
MDC_IDC_LEAD_SPECIAL_FUNCTION: NORMAL
MDC_IDC_LEAD_SPECIAL_FUNCTION: NORMAL
MDC_IDC_MSMT_BATTERY_DTM: NORMAL
MDC_IDC_MSMT_BATTERY_REMAINING_LONGEVITY: 133 MO
MDC_IDC_MSMT_BATTERY_RRT_TRIGGER: 2.62
MDC_IDC_MSMT_BATTERY_STATUS: NORMAL
MDC_IDC_MSMT_BATTERY_VOLTAGE: 3 V
MDC_IDC_MSMT_LEADCHNL_RA_IMPEDANCE_VALUE: 304 OHM
MDC_IDC_MSMT_LEADCHNL_RA_IMPEDANCE_VALUE: 361 OHM
MDC_IDC_MSMT_LEADCHNL_RA_PACING_THRESHOLD_AMPLITUDE: 0.62 V
MDC_IDC_MSMT_LEADCHNL_RA_PACING_THRESHOLD_PULSEWIDTH: 0.4 MS
MDC_IDC_MSMT_LEADCHNL_RA_SENSING_INTR_AMPL: 2.5 MV
MDC_IDC_MSMT_LEADCHNL_RA_SENSING_INTR_AMPL: 2.5 MV
MDC_IDC_MSMT_LEADCHNL_RV_IMPEDANCE_VALUE: 323 OHM
MDC_IDC_MSMT_LEADCHNL_RV_IMPEDANCE_VALUE: 399 OHM
MDC_IDC_MSMT_LEADCHNL_RV_PACING_THRESHOLD_AMPLITUDE: 1.12 V
MDC_IDC_MSMT_LEADCHNL_RV_PACING_THRESHOLD_PULSEWIDTH: 0.4 MS
MDC_IDC_MSMT_LEADCHNL_RV_SENSING_INTR_AMPL: 2.5 MV
MDC_IDC_MSMT_LEADCHNL_RV_SENSING_INTR_AMPL: 2.5 MV
MDC_IDC_PG_IMPLANT_DTM: NORMAL
MDC_IDC_PG_MFG: NORMAL
MDC_IDC_PG_MODEL: NORMAL
MDC_IDC_PG_SERIAL: NORMAL
MDC_IDC_PG_TYPE: NORMAL
MDC_IDC_SESS_CLINIC_NAME: NORMAL
MDC_IDC_SESS_DTM: NORMAL
MDC_IDC_SESS_TYPE: NORMAL
MDC_IDC_SET_BRADY_AT_MODE_SWITCH_RATE: 171 {BEATS}/MIN
MDC_IDC_SET_BRADY_HYSTRATE: NORMAL
MDC_IDC_SET_BRADY_LOWRATE: 60 {BEATS}/MIN
MDC_IDC_SET_BRADY_MAX_SENSOR_RATE: 130 {BEATS}/MIN
MDC_IDC_SET_BRADY_MAX_TRACKING_RATE: 130 {BEATS}/MIN
MDC_IDC_SET_BRADY_MODE: NORMAL
MDC_IDC_SET_BRADY_PAV_DELAY_LOW: 180 MS
MDC_IDC_SET_BRADY_SAV_DELAY_LOW: 150 MS
MDC_IDC_SET_LEADCHNL_RA_PACING_AMPLITUDE: 1.5 V
MDC_IDC_SET_LEADCHNL_RA_PACING_ANODE_ELECTRODE_1: NORMAL
MDC_IDC_SET_LEADCHNL_RA_PACING_ANODE_LOCATION_1: NORMAL
MDC_IDC_SET_LEADCHNL_RA_PACING_CAPTURE_MODE: NORMAL
MDC_IDC_SET_LEADCHNL_RA_PACING_CATHODE_ELECTRODE_1: NORMAL
MDC_IDC_SET_LEADCHNL_RA_PACING_CATHODE_LOCATION_1: NORMAL
MDC_IDC_SET_LEADCHNL_RA_PACING_POLARITY: NORMAL
MDC_IDC_SET_LEADCHNL_RA_PACING_PULSEWIDTH: 0.4 MS
MDC_IDC_SET_LEADCHNL_RA_SENSING_ANODE_ELECTRODE_1: NORMAL
MDC_IDC_SET_LEADCHNL_RA_SENSING_ANODE_LOCATION_1: NORMAL
MDC_IDC_SET_LEADCHNL_RA_SENSING_CATHODE_ELECTRODE_1: NORMAL
MDC_IDC_SET_LEADCHNL_RA_SENSING_CATHODE_LOCATION_1: NORMAL
MDC_IDC_SET_LEADCHNL_RA_SENSING_POLARITY: NORMAL
MDC_IDC_SET_LEADCHNL_RA_SENSING_SENSITIVITY: 0.3 MV
MDC_IDC_SET_LEADCHNL_RV_PACING_AMPLITUDE: 1.75 V
MDC_IDC_SET_LEADCHNL_RV_PACING_ANODE_ELECTRODE_1: NORMAL
MDC_IDC_SET_LEADCHNL_RV_PACING_ANODE_LOCATION_1: NORMAL
MDC_IDC_SET_LEADCHNL_RV_PACING_CAPTURE_MODE: NORMAL
MDC_IDC_SET_LEADCHNL_RV_PACING_CATHODE_ELECTRODE_1: NORMAL
MDC_IDC_SET_LEADCHNL_RV_PACING_CATHODE_LOCATION_1: NORMAL
MDC_IDC_SET_LEADCHNL_RV_PACING_POLARITY: NORMAL
MDC_IDC_SET_LEADCHNL_RV_PACING_PULSEWIDTH: 0.4 MS
MDC_IDC_SET_LEADCHNL_RV_SENSING_ANODE_ELECTRODE_1: NORMAL
MDC_IDC_SET_LEADCHNL_RV_SENSING_ANODE_LOCATION_1: NORMAL
MDC_IDC_SET_LEADCHNL_RV_SENSING_CATHODE_ELECTRODE_1: NORMAL
MDC_IDC_SET_LEADCHNL_RV_SENSING_CATHODE_LOCATION_1: NORMAL
MDC_IDC_SET_LEADCHNL_RV_SENSING_POLARITY: NORMAL
MDC_IDC_SET_LEADCHNL_RV_SENSING_SENSITIVITY: 0.9 MV
MDC_IDC_SET_ZONE_DETECTION_INTERVAL: 350 MS
MDC_IDC_SET_ZONE_DETECTION_INTERVAL: 400 MS
MDC_IDC_SET_ZONE_TYPE: NORMAL
MDC_IDC_STAT_AT_BURDEN_PERCENT: 0 %
MDC_IDC_STAT_AT_DTM_END: NORMAL
MDC_IDC_STAT_AT_DTM_START: NORMAL
MDC_IDC_STAT_BRADY_AP_VP_PERCENT: 11.39 %
MDC_IDC_STAT_BRADY_AP_VS_PERCENT: 17.59 %
MDC_IDC_STAT_BRADY_AS_VP_PERCENT: 36.91 %
MDC_IDC_STAT_BRADY_AS_VS_PERCENT: 34.11 %
MDC_IDC_STAT_BRADY_DTM_END: NORMAL
MDC_IDC_STAT_BRADY_DTM_START: NORMAL
MDC_IDC_STAT_BRADY_RA_PERCENT_PACED: 27.7 %
MDC_IDC_STAT_BRADY_RV_PERCENT_PACED: 48.3 %
MDC_IDC_STAT_EPISODE_RECENT_COUNT: 0
MDC_IDC_STAT_EPISODE_RECENT_COUNT: 2
MDC_IDC_STAT_EPISODE_RECENT_COUNT_DTM_END: NORMAL
MDC_IDC_STAT_EPISODE_RECENT_COUNT_DTM_START: NORMAL
MDC_IDC_STAT_EPISODE_TOTAL_COUNT: 0
MDC_IDC_STAT_EPISODE_TOTAL_COUNT: 0
MDC_IDC_STAT_EPISODE_TOTAL_COUNT: 176
MDC_IDC_STAT_EPISODE_TOTAL_COUNT: 19
MDC_IDC_STAT_EPISODE_TOTAL_COUNT: 261
MDC_IDC_STAT_EPISODE_TOTAL_COUNT_DTM_END: NORMAL
MDC_IDC_STAT_EPISODE_TOTAL_COUNT_DTM_START: NORMAL
MDC_IDC_STAT_EPISODE_TYPE: NORMAL

## 2022-09-14 PROCEDURE — 93296 REM INTERROG EVL PM/IDS: CPT | Performed by: INTERNAL MEDICINE

## 2022-09-14 PROCEDURE — 93294 REM INTERROG EVL PM/LDLS PM: CPT | Performed by: INTERNAL MEDICINE

## 2022-09-14 PROCEDURE — 99214 OFFICE O/P EST MOD 30 MIN: CPT | Performed by: NURSE PRACTITIONER

## 2022-09-14 RX ORDER — SOTALOL HYDROCHLORIDE 80 MG/1
80 TABLET ORAL 2 TIMES DAILY
Qty: 180 TABLET | Refills: 3 | Status: SHIPPED | OUTPATIENT
Start: 2022-09-14 | End: 2023-10-02

## 2022-09-14 NOTE — PROGRESS NOTES
HEART CARE ELECTROPHYSIOLOGY NOTE      [facility]  [phone]      Assessment/Recommendations   Assessment/Plan:  1.  Paroxysmal atrial fibrillation:  No further significant episodes of arrhythmia since starting sotalol, which he is tolerating without side effects.  We discussed the natural progression of atrial fibrillation and treatment options of antiarrhythmic medications versus repeat ablation.  For now, he will continue on sotalol 80 mg twice daily.     He was reassured that atrial fibrillation is not life-threatening, but carries an increased risk for stroke.  He has a [id number] score of 5 for age 65-74, hypertension, possible TIA, and vascular disease.  He underwent left atrial appendage closure with the Watchman FLX device as part of the Option study done at [place] on 2/4/2020.  He has had no neurologic symptoms or events.    Continue aspirin 81 mg indefinitely.      2.  Sick sinus syndrome status post dual-chamber pacemaker implant:  The pacemaker was remotely interrogated and is functioning appropriately.  The battery showed estimated longevity of 11.1 years.  Atrial and ventricular lead sensing, impedance, and pacing thresholds were stable and within normal limits.  He has had no further symptoms of syncope since his pacemaker implant.    Since starting sotalol, ventricular pacing is increased to 48%; continue to monitor.     3.  Hypertension: Blood pressure at target.  Continue sotalol as above.     4.  Nonobstructive coronary artery disease: 30-35% stenosis of the LAD and circumflex seen on previous angiogram, but severe coronary calcification seen on more recent CTA.  Denies exertional chest discomfort, but continues to have dyspnea with strenuous exertion.  NM stress test done in June 2021 negative for ischemia.  He was instructed to call if episodes continue or worsen.  Continue atorvastatin 80 mg daily.    5.  Nonsustained ventricular tachycardia: No recent symptomatic  episodes.  Brief episodes consistent with NSVT seen on previous pacemaker interrogation.  Considered low risk in the setting of normal LVEF without ischemia.  Possibly symptomatic with lightheadedness.     Follow up with Dr. Lopez in 1-2 months (previously saw Dr. Hyatt who is retiring)  Follow up with me in February 2023     History of Present Illness/Subjective    HPI: Ashok Del Rosario is a 71 year old male who comes in today companied by his wife for EP device and arrhythmia follow-up.  He has a history of sinus bradycardia status post dual-chamber pacemaker implant on 12/31/2018, TIA, atrial fibrillation, carotid artery stenosis, hypertension, hyperlipidemia, anxiety, mild obstructive sleep apnea, and emphysema.  COVID infection in August 2022.    He was diagnosed with atrial fibrillation with rapid ventricular response for which he underwent early cardioversion in the emergency department on 1/11/2019.  Symptoms consist of tachypalpitations, lightheadedness, and shortness of breath.   He was started on metoprolol, but hospitalized again over for another episode that was severely symptomatic as the heart rates were in the 200s.  He was given adenosine by EMS for what appeared to be SVT.  During his hospitalization, he was going to be started on sotalol, but was instead started on flecainide 75 mg twice daily by Dr. Chand who evaluated his coronary artery disease as not significant.   He underwent pulmonary vein isolation ablation on 8/29/2019 performed at [place] prior to which his flecainide was discontinued.  He underwent left atrial appendage closure with the Watchman FLX as part of the option study on 2/4/2020, also done at [place].    Post implant JOVANI showed a well-seated device with no significant pari-device flow and no evidence of thrombus.  He had a prolonged episode of AF with very rapid ventricular response in February associated with lightheadedness and nausea as well as short episodes more  consistent with PAT.  He continued to have frequent episodes of AF, so was started on sotalol 80 mg twice daily on 7/25/2022.  He is on aspirin for stroke prophylaxis.       Pat states that he has been feeling well and has been quite active.  He has had some dyspnea with very strenuous activity, but not with routine activities.  He has not had any A. fib.  He denies chest discomfort, sustained palpitations, abdominal fullness/bloating or peripheral edema, shortness of breath at rest or normal activity, paroxysmal nocturnal dyspnea, orthopnea, dizziness, pre-syncope, or syncope.        Cardiographics (EKGs and device interrogation personally reviewed):  EKG done 7/29/2022 shows atrial pacing with intrinsic ventricular conduction at 60 bpm, right bundle branch block,  ms, QT/QTc is 474/474 ms  EKG done 5/25/2019 shows atrial fibrillation with rapid ventricular response at 139 bpm   EKG, Done 1/18/2019 starts in A. fib with transition to sinus rhythm  EKG done 1/14/2019 shows sinus rhythm at 89 bpm, incomplete right bundle branch block, QT/QTc interval measures 378/459 ms  EKG done 1/11/2019 shows atrial fibrillation with rapid ventricular response at 141 bpm.  Subsequent EKG shows sinus tachycardia at 105 bpm with an incomplete right bundle branch block     Pacemaker Interrogation, remote 9/13/2022 (MDT implant 12/31/2018):  Pacing mode: MVP   Presenting rhythm: AP-VS 75-80 bpm.   Underlying rhythm: NA  A-paced: 27%.  V-paced 48%.  Battery: estimated longevity 11.1 years.  Atrial and Ventricular leads: Stable with good sensing, impedance, and pacing thresholds  Arrhythmias: Since 7/24/2022,26 sec AT/AF, 2 VHR appear to be PSVT  [place]: <0.1%   Histograms: Good rate distribution     JOVANI done 1/23/2021 at [place]:  1. The baseline ECG demonstrated sinus rhythm with a rate of 82 bpm.  2. The WATCHMAN device is visualized and is well-expanded within the left atrial appendage.  3. Small residual jet on the  postero-inferiorior aspect of the device (jet size 2 mm; JOVANI angle  139 degrees; clip #61). The remainder of the WATCHMAN device margin appears well sealed.  4. No intracardiac mass or thrombus identified.  No thrombus on the atrial face of the WATCHMAN  device.  5. No pericardial effusion.  6. No shunt at atrial level by color flow imaging and agitated saline contrast injection.  7. Normal left ventricular chamber size.  Left ventricular ejection fraction 60%.  8. 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  9. Normal right ventricular chamber size and systolic function.  10. Moderate tricuspid valve regurgitation.     CTA pulmonary veings done 11/20/2019 at [place]:   PULMONARY VEINS:  Two right and two left pulmonary veins enter the left atrium unobstructed. No  evidence of pulmonary vein stenosis.     CARDIOVASCULAR FINDINGS:     Presumed focal dissection with nearby ulcerated plaque in the left subclavian  artery, likely unchanged. Scattered calcified and noncalcified plaque in the  remainder of the thoracic aorta and visualized arch vessels. Severe coronary  calcification. No intracardiac mass or thrombus. Left atrial enlargement.    ADDITIONAL FINDINGS:      Focal peripheral hyperenhancement in hepatic segment 4A, indeterminate but  likely benign. Left anterior chest wall dual chamber pacemaker. Redemonstrated  centrilobular and paraseptal emphysema with decreased fluid and debris within  the previously noted left lower lobe bulla/cavity; this lesion remains somewhat  thick-walled.    Nuclear Lexiscan stress test done 6/7/2021:     The nuclear stress test is negative for inducible myocardial ischemia or infarction.     The left ventricular ejection fraction at stress is 63%.     A prior study was conducted on 10/16/2017.  This study has no change when compared with the prior study.     Coronary angiogram done 10/17/2017:     Mid Cx lesion 30% stenosed.    Prox LAD to Dist LAD  "lesion 35% stenosed.  The left ventricular size is normal. The left ventricular systolic function is normal. LV systolic pressure is normal. LV end diastolic pressure is normal. There are no wall motion abnormalities in the left ventricle.    I have reviewed and updated the patient's Past Medical History, Social History, Family History and Medication List.  Outside records personally reviewed.     Physical Examination  Review of Systems   Vitals: /72 (BP Location: Right arm, Patient Position: Sitting, Cuff Size: Adult Regular)   Pulse 60   Resp 16   Ht 1.702 m (5' 7\")   Wt 93.4 kg (206 lb)   BMI 32.26 kg/m    BMI= Body mass index is 32.26 kg/m .  Wt Readings from Last 3 Encounters:   09/14/22 93.4 kg (206 lb)   07/29/22 93 kg (205 lb)   05/24/22 98 kg (216 lb)       General Appearance:   Alert, well-appearing and in no acute distress.   HEENT: Atraumatic, normocephalic.  No scleral icterus, normal conjunctivae, EOMs intact, PERRL.  Wearing a mask.   Chest/Lungs:   Chest symmetric, spine straight.  Respirations unlabored.  Lungs are clear to auscultation.  Left subclavian pacemaker site with no sign of infection or tissue thinning.   Cardiovascular:   Regular rate and rhythm.  Normal first and second heart sounds with no murmurs, rubs, or gallops; radial and posterior tibial pulses are intact, No edema.   Abdomen:  Soft, nondistended, bowel sounds present.   Extremities: No cyanosis or clubbing.   Musculoskeletal: Moves all extremities.     Skin: Warm, dry, intact.    Neurologic: Mood and affect are appropriate.  Alert and oriented to person, place, time, and situation.        ROS: 10 point ROS neg other than the symptoms noted above in the HPI.         Medical History  Surgical History Family History Social History   Past Medical History:   Diagnosis Date     Anxiety about health     per [facility]     Dyslipidemia      GERD (gastroesophageal reflux disease)      Hypertension      Nonocclusive coronary " atherosclerosis of native coronary artery 10/17/2017     Paroxysmal atrial fibrillation (H) 2019    Dx [id number] score = 5 (age, HTN, CAD, TIA 2) Rx flecainide Rx Xarelto     Pulmonary emphysema (H) 2019    per [facility]     Right bundle branch block 2019     Solitary pulmonary nodule 2019    per [facility]     Syncope 2019     TIA (transient ischemic attack) 2018     Past Surgical History:   Procedure Laterality Date     APPENDECTOMY       CARDIAC ELECTROPHYSIOLOGY MAPPING AND ABLATION  2019    PVI done at [facility]     CV CORONARY ANGIOGRAM N/A 10/17/2017    Procedure: Coronary Angiogram;  Surgeon: Ashok Hyatt MD;  Location: [facility] Cath Lab;  Service:      CV LEFT HEART CATHETERIZATION WITH LEFT VENTRICULOGRAM N/A 10/17/2017    Procedure: Left Heart Catheterization with Left Ventriculogram;  Surgeon: Ashok Hyatt MD;  Location: [facility] Cath Lab;  Service:      EP PACEMAKER INSERT Left 2018    Procedure: EP Pacemaker Insertion;  Surgeon: Micheal Junior MD;  Location: [facility] Cath Lab;  Service: Cardiology     OTHER SURGICAL HISTORY  2020    Left atrial appendage closureWatchman FLX at [facility]     Family History   Problem Relation Age of Onset     Cancer Mother      Cancer Father      Coronary Artery Disease Brother      Coronary Artery Disease Brother      Valvular heart disease Brother      Rectal Cancer Sister      Colon Cancer Sister         Social History     Socioeconomic History     Marital status:      Spouse name: Not on file     Number of children: Not on file     Years of education: Not on file     Highest education level: Not on file   Occupational History     Not on file   Tobacco Use     Smoking status: Former Smoker     Quit date: 10/16/1999     Years since quittin.9     Smokeless tobacco: Never Used   Substance and Sexual Activity     Alcohol use: Yes     Alcohol/week: 1.0 standard drink     Comment: Alcoholic  Drinks/day: none since december     Drug use: No     Sexual activity: Yes     Partners: Female   Other Topics Concern     Not on file   Social History Narrative     Not on file     Social Determinants of Health     Financial Resource Strain: Not on file   Food Insecurity: Not on file   Transportation Needs: Not on file   Physical Activity: Not on file   Stress: Not on file   Social Connections: Not on file   Intimate Partner Violence: Not on file   Housing Stability: Not on file           Medications  Allergies   Current Outpatient Medications   Medication Sig Dispense Refill     acetaminophen (TYLENOL) 500 MG tablet [ACETAMINOPHEN (TYLENOL) 500 MG TABLET] Take 1-2 tablets (500-1,000 mg total) by mouth every 4 (four) hours as needed.  0     aspirin (ASPIR-81) 81 MG EC tablet [ASPIRIN (ASPIR-81) 81 MG EC TABLET] Take 81 mg by mouth daily.        atorvastatin (LIPITOR) 80 MG tablet [ATORVASTATIN (LIPITOR) 80 MG TABLET] TAKE 1 TABLET BY MOUTH EVERYDAY AT BEDTIME 90 tablet 1     carboxymethylcellulose (REFRESH PLUS) 0.5 % SOLN ophthalmic solution as needed       neomycin-polymyxin-dexamethasone (MAXITROL) 3.5-96062-7.1 ophthalmic ointment as needed       omeprazole (PRILOSEC) 40 MG capsule [OMEPRAZOLE (PRILOSEC) 40 MG CAPSULE] Take 40 mg by mouth daily before breakfast.       sotalol (BETAPACE) 80 MG tablet Take 1 tablet (80 mg) by mouth 2 times daily 180 tablet 3       Allergies   Allergen Reactions     Cephalexin Rash          Lab Results    Chemistry/lipid CBC Cardiac Enzymes/BNP/TSH/INR   Recent Labs   Lab Test 11/16/21  0923   CHOL 119   HDL 34*   LDL 65   TRIG 98     Recent Labs   Lab Test 11/16/21  0923 10/02/20  1020 12/31/18  0442   LDL 65 70 113     Recent Labs   Lab Test 11/16/21  0923      POTASSIUM 5.2*   CHLORIDE 111*   CO2 22      BUN 7*   CR 0.97   GFRESTIMATED 79   ESSENCE 10.2     Recent Labs   Lab Test 11/16/21  0923 10/02/20  1020 07/08/20  1620   CR 0.97 0.83 0.89      Recent Labs   Lab  Test 07/08/20  1620   WBC 7.8   HGB 13.4*   HCT 40.7   MCV 89        Recent Labs   Lab Test 07/08/20  1620 08/14/19  2230 05/25/19  1413   HGB 13.4* 13.6* 14.9    Recent Labs   Lab Test 07/08/20  1620 08/15/19  0138 08/14/19  2230   TROPONINI <0.01 <0.01 <0.01     Recent Labs   Lab Test 01/18/19  1007 01/11/19  1639   BNP <10 11     Recent Labs   Lab Test 07/08/20  1620   TSH 0.53     Recent Labs   Lab Test 08/14/19  2230 04/20/19  1348 01/18/19  1007   INR 2.34* 1.13* 1.24*

## 2022-09-14 NOTE — PATIENT INSTRUCTIONS
Ashok Del Rosario,    It was a pleasure to see you today at the [facility].     My recommendations after this visit include:    Continue sotalol 80 mg twice daily to suppress A fib.  Consider repeat ablation vs medication.    Follow up with Dr. Lopez  Follow up with me in 6 months    Suzan Waddell CNP  [facility], Electrophysiology  [phone]  EP nurses [phone]

## 2022-09-14 NOTE — LETTER
9/14/2022    Bj Hendrickson MD  [facility] [address] [place] [address]    RE: Ashok Del Rosario       Dear Colleague,     I had the pleasure of seeing Ashok Del Rosario in the [facility].    HEART CARE ELECTROPHYSIOLOGY NOTE      [facility]  [phone]      Assessment/Recommendations   Assessment/Plan:  1.  Paroxysmal atrial fibrillation:  No further significant episodes of arrhythmia since starting sotalol, which he is tolerating without side effects.  We discussed the natural progression of atrial fibrillation and treatment options of antiarrhythmic medications versus repeat ablation.  For now, he will continue on sotalol 80 mg twice daily.     He was reassured that atrial fibrillation is not life-threatening, but carries an increased risk for stroke.  He has a [id number] score of 5 for age 65-74, hypertension, possible TIA, and vascular disease.  He underwent left atrial appendage closure with the Watchman FLX device as part of the Option study done at [place] on 2/4/2020.  He has had no neurologic symptoms or events.    Continue aspirin 81 mg indefinitely.      2.  Sick sinus syndrome status post dual-chamber pacemaker implant:  The pacemaker was remotely interrogated and is functioning appropriately.  The battery showed estimated longevity of 11.1 years.  Atrial and ventricular lead sensing, impedance, and pacing thresholds were stable and within normal limits.  He has had no further symptoms of syncope since his pacemaker implant.    Since starting sotalol, ventricular pacing is increased to 48%; continue to monitor.     3.  Hypertension: Blood pressure at target.  Continue sotalol as above.     4.  Nonobstructive coronary artery disease: 30-35% stenosis of the LAD and circumflex seen on previous angiogram, but severe coronary calcification seen on more recent CTA.  Denies exertional chest discomfort, but continues to have dyspnea with strenuous  exertion.  NM stress test done in June 2021 negative for ischemia.  He was instructed to call if episodes continue or worsen.  Continue atorvastatin 80 mg daily.    5.  Nonsustained ventricular tachycardia: No recent symptomatic episodes.  Brief episodes consistent with NSVT seen on previous pacemaker interrogation.  Considered low risk in the setting of normal LVEF without ischemia.  Possibly symptomatic with lightheadedness.     Follow up with Dr. Lopez in 1-2 months (previously saw Dr. Hyatt who is retiring)  Follow up with me in February 2023     History of Present Illness/Subjective    HPI: Ashok Del Rosario is a 71 year old male who comes in today companied by his wife for EP device and arrhythmia follow-up.  He has a history of sinus bradycardia status post dual-chamber pacemaker implant on 12/31/2018, TIA, atrial fibrillation, carotid artery stenosis, hypertension, hyperlipidemia, anxiety, mild obstructive sleep apnea, and emphysema.  COVID infection in August 2022.    He was diagnosed with atrial fibrillation with rapid ventricular response for which he underwent early cardioversion in the emergency department on 1/11/2019.  Symptoms consist of tachypalpitations, lightheadedness, and shortness of breath.   He was started on metoprolol, but hospitalized again over for another episode that was severely symptomatic as the heart rates were in the 200s.  He was given adenosine by EMS for what appeared to be SVT.  During his hospitalization, he was going to be started on sotalol, but was instead started on flecainide 75 mg twice daily by Dr. Chand who evaluated his coronary artery disease as not significant.   He underwent pulmonary vein isolation ablation on 8/29/2019 performed at [place] prior to which his flecainide was discontinued.  He underwent left atrial appendage closure with the Watchman FLX as part of the option study on 2/4/2020, also done at [place].    Post implant JOVANI showed a well-seated device  with no significant pari-device flow and no evidence of thrombus.  He had a prolonged episode of AF with very rapid ventricular response in February associated with lightheadedness and nausea as well as short episodes more consistent with PAT.  He continued to have frequent episodes of AF, so was started on sotalol 80 mg twice daily on 7/25/2022.  He is on aspirin for stroke prophylaxis.       Pat states that he has been feeling well and has been quite active.  He has had some dyspnea with very strenuous activity, but not with routine activities.  He has not had any A. fib.  He denies chest discomfort, sustained palpitations, abdominal fullness/bloating or peripheral edema, shortness of breath at rest or normal activity, paroxysmal nocturnal dyspnea, orthopnea, dizziness, pre-syncope, or syncope.        Cardiographics (EKGs and device interrogation personally reviewed):  EKG done 7/29/2022 shows atrial pacing with intrinsic ventricular conduction at 60 bpm, right bundle branch block,  ms, QT/QTc is 474/474 ms  EKG done 5/25/2019 shows atrial fibrillation with rapid ventricular response at 139 bpm   EKG, Done 1/18/2019 starts in A. fib with transition to sinus rhythm  EKG done 1/14/2019 shows sinus rhythm at 89 bpm, incomplete right bundle branch block, QT/QTc interval measures 378/459 ms  EKG done 1/11/2019 shows atrial fibrillation with rapid ventricular response at 141 bpm.  Subsequent EKG shows sinus tachycardia at 105 bpm with an incomplete right bundle branch block     Pacemaker Interrogation, remote 9/13/2022 (MDT implant 12/31/2018):  Pacing mode: MVP   Presenting rhythm: AP-VS 75-80 bpm.   Underlying rhythm: NA  A-paced: 27%.  V-paced 48%.  Battery: estimated longevity 11.1 years.  Atrial and Ventricular leads: Stable with good sensing, impedance, and pacing thresholds  Arrhythmias: Since 7/24/2022,26 sec AT/AF, 2 VHR appear to be PSVT  Kouts: <0.1%   Histograms: Good rate  distribution     JOVANI done 1/23/2021 at [place]:  1. The baseline ECG demonstrated sinus rhythm with a rate of 82 bpm.  2. The WATCHMAN device is visualized and is well-expanded within the left atrial appendage.  3. Small residual jet on the postero-inferiorior aspect of the device (jet size 2 mm; JOVANI angle  139 degrees; clip #61). The remainder of the WATCHMAN device margin appears well sealed.  4. No intracardiac mass or thrombus identified.  No thrombus on the atrial face of the WATCHMAN  device.  5. No pericardial effusion.  6. No shunt at atrial level by color flow imaging and agitated saline contrast injection.  7. Normal left ventricular chamber size.  Left ventricular ejection fraction 60%.  8. 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  9. Normal right ventricular chamber size and systolic function.  10. Moderate tricuspid valve regurgitation.     CTA pulmonary veings done 11/20/2019 at [place]:   PULMONARY VEINS:  Two right and two left pulmonary veins enter the left atrium unobstructed. No  evidence of pulmonary vein stenosis.     CARDIOVASCULAR FINDINGS:     Presumed focal dissection with nearby ulcerated plaque in the left subclavian  artery, likely unchanged. Scattered calcified and noncalcified plaque in the  remainder of the thoracic aorta and visualized arch vessels. Severe coronary  calcification. No intracardiac mass or thrombus. Left atrial enlargement.    ADDITIONAL FINDINGS:      Focal peripheral hyperenhancement in hepatic segment 4A, indeterminate but  likely benign. Left anterior chest wall dual chamber pacemaker. Redemonstrated  centrilobular and paraseptal emphysema with decreased fluid and debris within  the previously noted left lower lobe bulla/cavity; this lesion remains somewhat  thick-walled.    Nuclear Lexiscan stress test done 6/7/2021:     The nuclear stress test is negative for inducible myocardial ischemia or infarction.     The left ventricular ejection  "fraction at stress is 63%.     A prior study was conducted on 10/16/2017.  This study has no change when compared with the prior study.     Coronary angiogram done 10/17/2017:     Mid Cx lesion 30% stenosed.    Prox LAD to Dist LAD lesion 35% stenosed.  The left ventricular size is normal. The left ventricular systolic function is normal. LV systolic pressure is normal. LV end diastolic pressure is normal. There are no wall motion abnormalities in the left ventricle.    I have reviewed and updated the patient's Past Medical History, Social History, Family History and Medication List.  Outside records personally reviewed.     Physical Examination  Review of Systems   Vitals: /72 (BP Location: Right arm, Patient Position: Sitting, Cuff Size: Adult Regular)   Pulse 60   Resp 16   Ht 1.702 m (5' 7\")   Wt 93.4 kg (206 lb)   BMI 32.26 kg/m    BMI= Body mass index is 32.26 kg/m .  Wt Readings from Last 3 Encounters:   09/14/22 93.4 kg (206 lb)   07/29/22 93 kg (205 lb)   05/24/22 98 kg (216 lb)       General Appearance:   Alert, well-appearing and in no acute distress.   HEENT: Atraumatic, normocephalic.  No scleral icterus, normal conjunctivae, EOMs intact, PERRL.  Wearing a mask.   Chest/Lungs:   Chest symmetric, spine straight.  Respirations unlabored.  Lungs are clear to auscultation.  Left subclavian pacemaker site with no sign of infection or tissue thinning.   Cardiovascular:   Regular rate and rhythm.  Normal first and second heart sounds with no murmurs, rubs, or gallops; radial and posterior tibial pulses are intact, No edema.   Abdomen:  Soft, nondistended, bowel sounds present.   Extremities: No cyanosis or clubbing.   Musculoskeletal: Moves all extremities.     Skin: Warm, dry, intact.    Neurologic: Mood and affect are appropriate.  Alert and oriented to person, place, time, and situation.        ROS: 10 point ROS neg other than the symptoms noted above in the HPI.         Medical History  Surgical " History Family History Social History   Past Medical History:   Diagnosis Date     Anxiety about health     per [facility]     Dyslipidemia      GERD (gastroesophageal reflux disease)      Hypertension      Nonocclusive coronary atherosclerosis of native coronary artery 10/17/2017     Paroxysmal atrial fibrillation (H) 01/14/2019    Dx [id number] score = 5 (age, HTN, CAD, TIA 2) Rx flecainide Rx Xarelto     Pulmonary emphysema (H) 11/21/2019    per [facility]     Right bundle branch block 08/29/2019     Solitary pulmonary nodule 11/21/2019    per [facility]     Syncope 01/18/2019     TIA (transient ischemic attack) 12/30/2018     Past Surgical History:   Procedure Laterality Date     APPENDECTOMY  2000     CARDIAC ELECTROPHYSIOLOGY MAPPING AND ABLATION  08/29/2019    PVI done at [facility]     CV CORONARY ANGIOGRAM N/A 10/17/2017    Procedure: Coronary Angiogram;  Surgeon: Ashok Hyatt MD;  Location: [facility] Cath Lab;  Service:      CV LEFT HEART CATHETERIZATION WITH LEFT VENTRICULOGRAM N/A 10/17/2017    Procedure: Left Heart Catheterization with Left Ventriculogram;  Surgeon: Ashok Hyatt MD;  Location: [facility] Cath Lab;  Service:      EP PACEMAKER INSERT Left 12/31/2018    Procedure: EP Pacemaker Insertion;  Surgeon: Micheal Junior MD;  Location: [facility] Cath Lab;  Service: Cardiology     OTHER SURGICAL HISTORY  02/04/2020    [place] [facility]     Family History   Problem Relation Age of Onset     Cancer Mother      Cancer Father      Coronary Artery Disease Brother      Coronary Artery Disease Brother      Valvular heart disease Brother      Rectal Cancer Sister      Colon Cancer Sister         Social History     Socioeconomic History     Marital status:      Spouse name: Not on file     Number of children: Not on file     Years of education: Not on file     Highest education level: Not on file   Occupational History     Not on file   Tobacco Use     Smoking  status: Former Smoker     Quit date: 10/16/1999     Years since quittin.9     Smokeless tobacco: Never Used   Substance and Sexual Activity     Alcohol use: Yes     Alcohol/week: 1.0 standard drink     Comment: Alcoholic Drinks/day: none since december     Drug use: No     Sexual activity: Yes     Partners: Female   Other Topics Concern     Not on file   Social History Narrative     Not on file     Social Determinants of Health     Financial Resource Strain: Not on file   Food Insecurity: Not on file   Transportation Needs: Not on file   Physical Activity: Not on file   Stress: Not on file   Social Connections: Not on file   Intimate Partner Violence: Not on file   Housing Stability: Not on file           Medications  Allergies   Current Outpatient Medications   Medication Sig Dispense Refill     acetaminophen (TYLENOL) 500 MG tablet [ACETAMINOPHEN (TYLENOL) 500 MG TABLET] Take 1-2 tablets (500-1,000 mg total) by mouth every 4 (four) hours as needed.  0     aspirin (ASPIR-81) 81 MG EC tablet [ASPIRIN (ASPIR-81) 81 MG EC TABLET] Take 81 mg by mouth daily.        atorvastatin (LIPITOR) 80 MG tablet [ATORVASTATIN (LIPITOR) 80 MG TABLET] TAKE 1 TABLET BY MOUTH EVERYDAY AT BEDTIME 90 tablet 1     carboxymethylcellulose (REFRESH PLUS) 0.5 % SOLN ophthalmic solution as needed       neomycin-polymyxin-dexamethasone (MAXITROL) 3.5-61398-6.1 ophthalmic ointment as needed       omeprazole (PRILOSEC) 40 MG capsule [OMEPRAZOLE (PRILOSEC) 40 MG CAPSULE] Take 40 mg by mouth daily before breakfast.       sotalol (BETAPACE) 80 MG tablet Take 1 tablet (80 mg) by mouth 2 times daily 180 tablet 3       Allergies   Allergen Reactions     Cephalexin Rash          Lab Results    Chemistry/lipid CBC Cardiac Enzymes/BNP/TSH/INR   Recent Labs   Lab Test 21   CHOL 119   HDL 34*   LDL 65   TRIG 98     Recent Labs   Lab Test 11/16/21  0923 10/02/20  1020 18  0442   LDL 65 70 113     Recent Labs   Lab Test 21       POTASSIUM 5.2*   CHLORIDE 111*   CO2 22      BUN 7*   CR 0.97   GFRESTIMATED 79   ESSENCE 10.2     Recent Labs   Lab Test 11/16/21  0923 10/02/20  1020 07/08/20  1620   CR 0.97 0.83 0.89      Recent Labs   Lab Test 07/08/20  1620   WBC 7.8   HGB 13.4*   HCT 40.7   MCV 89        Recent Labs   Lab Test 07/08/20  1620 08/14/19  2230 05/25/19  1413   HGB 13.4* 13.6* 14.9    Recent Labs   Lab Test 07/08/20  1620 08/15/19  0138 08/14/19  2230   TROPONINI <0.01 <0.01 <0.01     Recent Labs   Lab Test 01/18/19  1007 01/11/19  1639   BNP <10 11     Recent Labs   Lab Test 07/08/20  1620   TSH 0.53     Recent Labs   Lab Test 08/14/19  2230 04/20/19  1348 01/18/19  1007   INR 2.34* 1.13* 1.24*                        Thank you for allowing me to participate in the care of your patient.      Sincerely,     FARAZ Bruno CNP     [facility]  cc:   FARAZ Bruno CNP  [address], [address]  [place] [address]         220

## 2022-09-16 ENCOUNTER — TELEPHONE (OUTPATIENT)
Dept: CARDIOLOGY | Facility: CLINIC | Age: 72
End: 2022-09-16

## 2022-09-16 NOTE — TELEPHONE ENCOUNTER
Phone call back to Evelyne- she states she was not at the appt with Suzan 9/14 and wanted to bring attention to the flutter that the pt has been having on his side of his body.  She is concerned this is a new thing and isn't correlated with his AFIB episode.    Flutter feeling is on his Left side, the outer part of his rib cage below his breast.   Does not cause pain, but just bothers him.  She guesses he probably has about 1-3 episodes per day.    She is wondering if this is anything cardiac related or if they should be concerned about this.  Discussed if it doesn't correlate with his afib episodes then it might be muscle related?    Discussed that I would forward over to Suzan to see what she thinks and we would call back only if she has any further recommendations.    Liv

## 2022-09-16 NOTE — TELEPHONE ENCOUNTER
----- Message from Sadie Gil RN sent at 9/16/2022  3:47 PM CDT -----  Wife Evelyne left message on our line that she really wants to talk to Suzan today- can you follow up??  [phone]

## 2022-09-19 NOTE — TELEPHONE ENCOUNTER
He has had almost no arrhythmia and is doing very well from a rhythm standpoint.  The flutter is likely musculoskeletal.  No further recommendations.  Thanks,  Suzan

## 2022-09-25 ENCOUNTER — HEALTH MAINTENANCE LETTER (OUTPATIENT)
Age: 72
End: 2022-09-25

## 2022-09-27 ENCOUNTER — TRANSFERRED RECORDS (OUTPATIENT)
Dept: HEALTH INFORMATION MANAGEMENT | Facility: CLINIC | Age: 72
End: 2022-09-27

## 2022-10-28 ENCOUNTER — OFFICE VISIT (OUTPATIENT)
Dept: CARDIOLOGY | Facility: CLINIC | Age: 72
End: 2022-10-28
Attending: NURSE PRACTITIONER
Payer: MEDICARE

## 2022-10-28 ENCOUNTER — ANCILLARY PROCEDURE (OUTPATIENT)
Dept: CARDIOLOGY | Facility: CLINIC | Age: 72
End: 2022-10-28
Payer: MEDICARE

## 2022-10-28 VITALS
RESPIRATION RATE: 16 BRPM | HEIGHT: 67 IN | DIASTOLIC BLOOD PRESSURE: 80 MMHG | OXYGEN SATURATION: 98 % | HEART RATE: 62 BPM | WEIGHT: 209 LBS | BODY MASS INDEX: 32.8 KG/M2 | SYSTOLIC BLOOD PRESSURE: 126 MMHG

## 2022-10-28 DIAGNOSIS — I49.5 SICK SINUS SYNDROME (H): ICD-10-CM

## 2022-10-28 DIAGNOSIS — Z95.0 PACEMAKER: ICD-10-CM

## 2022-10-28 DIAGNOSIS — I48.91 A-FIB (H): ICD-10-CM

## 2022-10-28 DIAGNOSIS — I25.10 NONOCCLUSIVE CORONARY ATHEROSCLEROSIS OF NATIVE CORONARY ARTERY: ICD-10-CM

## 2022-10-28 PROCEDURE — 93280 PM DEVICE PROGR EVAL DUAL: CPT | Performed by: INTERNAL MEDICINE

## 2022-10-28 PROCEDURE — 99214 OFFICE O/P EST MOD 30 MIN: CPT | Performed by: INTERNAL MEDICINE

## 2022-10-28 NOTE — LETTER
"10/28/2022    Bj Hendrickson MD  [address]  [place] [address]    RE: Ashok RODRIGUEZ Carin       Dear Colleague,     I had the pleasure of seeing Ashok Del Rosario in the [facility].    Cardiology Clinic Office Note    Assessment / Plan:    1.  Paroxysmal atrial fibrillation controlled on sotalol, status post watchman device placement with no anticoagulation other than aspirin 81 mg daily  2.  Sick sinus syndrome status post pacemaker placement with normal function demonstrated on recent checks.  48% ventricular pacing on sotalol.  We will try to arrange for device check today to assess's rate responsiveness to help improve patient's symptoms with activities  3.  Nonobstructive coronary artery disease, chronic exertional dyspnea, with normal nuclear stress test 6/2021    No medication changes today.  Follow-up 1 year or as needed    ______________________________________________________________________    Subjective:    I had the opportunity to see Ashok Del Rosario at the [facility]. Ashok Del Rosario is a 71 year old male with a history of paroxysmal atrial fibrillation status post ablation at [facility] in 2020 and left atrial appendage occlusion device placement at [facility] with \"Watchman\" left atrial occlusion device- FL X device.  He is also status post dual-chamber pacemaker placement in 2018 and has a history of mild to moderate coronary disease by angiography in 2017.  He has previously been followed by Dr. Junior as well as Suzan Waddell in atrial fibrillation clinic.  Sotalol was added in July for frequent bouts of atrial flutter, he has noticed improved symptoms since that time    Most recent device check disclosed brief episodes of supraventricular tachycardia, with 2 episodes lasting a total of 25 seconds.  He presents today to establish care, accompanied by his wife.    His weight is unchanged over the last year.  He " does feel that when he monitors his heart rate it is at times inappropriately slow, in the 50s.  He particularly notes this when doing activities such as carrying leaves.  When these episodes occur, he has to stop and rest for up to half an hour.  He does walk for 20 minutes with his wife at a slow pace, he otherwise does not engage in regular exercise.  Device checks however show no heart rates below 60 and an appropriate heart rate range.  He has had no anginal discomfort.    ______________________________________________________________________    Problem List:  Patient Active Problem List   Diagnosis     Mixed hyperlipidemia     Gastroesophageal reflux disease, esophagitis presence not specified     SSS (sick sinus syndrome) (H)     Cardiac pacemaker in situ, dual chamber     Paroxysmal atrial fibrillation (H)     MARCELLA (obstructive sleep apnea)     Status post ablation of atrial fibrillation     Left Atrial Appendage Closure (Watchman FLX)     Nonocclusive coronary atherosclerosis of native coronary artery     Medical History:  Past Medical History:   Diagnosis Date     Anxiety about health     per [facility]     Dyslipidemia      GERD (gastroesophageal reflux disease)      Hypertension      Nonocclusive coronary atherosclerosis of native coronary artery 10/17/2017     Paroxysmal atrial fibrillation (H) 01/14/2019    Dx [id number] score = 5 (age, HTN, CAD, TIA 2) Rx flecainide Rx Xarelto     Pulmonary emphysema (H) 11/21/2019    per [facility]     Right bundle branch block 08/29/2019     Solitary pulmonary nodule 11/21/2019    per [facility]     Syncope 01/18/2019     TIA (transient ischemic attack) 12/30/2018     Surgical History:  Past Surgical History:   Procedure Laterality Date     APPENDECTOMY  2000     CARDIAC ELECTROPHYSIOLOGY MAPPING AND ABLATION  08/29/2019    PVI done at [facility]     CV CORONARY ANGIOGRAM N/A 10/17/2017    Procedure: Coronary Angiogram;  Surgeon: Ashok Hyatt MD;  Location: [facility]  Lab;  Service:      CV LEFT HEART CATHETERIZATION WITH LEFT VENTRICULOGRAM N/A 10/17/2017    Procedure: Left Heart Catheterization with Left Ventriculogram;  Surgeon: Ashok Hyatt MD;  Location: [facility] Cath Lab;  Service:      EP PACEMAKER INSERT Left 2018    Procedure: EP Pacemaker Insertion;  Surgeon: Micheal Junior MD;  Location: [facility] Cath Lab;  Service: Cardiology     OTHER SURGICAL HISTORY  [facility] [place]     Social History:  Social History     Socioeconomic History     Marital status:      Spouse name: Not on file     Number of children: Not on file     Years of education: Not on file     Highest education level: Not on file   Occupational History     Not on file   Tobacco Use     Smoking status: Former     Types: Cigarettes     Quit date: 10/16/1999     Years since quittin.0     Smokeless tobacco: Never   Substance and Sexual Activity     Alcohol use: Yes     Alcohol/week: 1.0 standard drink     Comment: Alcoholic Drinks/day: none since december     Drug use: No     Sexual activity: Yes     Partners: Female   Other Topics Concern     Not on file   Social History Narrative     Not on file     Social Determinants of Health     Financial Resource Strain: Not on file   Food Insecurity: Not on file   Transportation Needs: Not on file   Physical Activity: Not on file   Stress: Not on file   Social Connections: Not on file   Intimate Partner Violence: Not on file   Housing Stability: Not on file     Sleep History:  Nocturnal awakenings, but no snoring.  Previous sleep evaluation showed only mild sleep apnea.  Does have daytime sleepiness  Exercise History:  Yard work.  Walks 20 minutes daily with his wife.    Review of Systems:      Daytime sleepiness noted.  12 point review of systems otherwise negative     Please refer above for cardiac ROS details.         Family History:  Family History   Problem Relation Age of Onset      "Cancer Mother      Cancer Father      Coronary Artery Disease Brother      Coronary Artery Disease Brother      Valvular heart disease Brother      Rectal Cancer Sister      Colon Cancer Sister          Allergies:  Allergies   Allergen Reactions     Cephalexin Rash     Medications:  Current Outpatient Medications   Medication Sig Dispense Refill     acetaminophen (TYLENOL) 500 MG tablet [ACETAMINOPHEN (TYLENOL) 500 MG TABLET] Take 1-2 tablets (500-1,000 mg total) by mouth every 4 (four) hours as needed.  0     aspirin (ASPIR-81) 81 MG EC tablet [ASPIRIN (ASPIR-81) 81 MG EC TABLET] Take 81 mg by mouth daily.        atorvastatin (LIPITOR) 80 MG tablet [ATORVASTATIN (LIPITOR) 80 MG TABLET] TAKE 1 TABLET BY MOUTH EVERYDAY AT BEDTIME 90 tablet 1     carboxymethylcellulose (REFRESH PLUS) 0.5 % SOLN ophthalmic solution as needed       neomycin-polymyxin-dexamethasone (MAXITROL) 3.5-99362-4.1 ophthalmic ointment as needed       omeprazole (PRILOSEC) 40 MG capsule [OMEPRAZOLE (PRILOSEC) 40 MG CAPSULE] Take 40 mg by mouth daily before breakfast.       sotalol (BETAPACE) 80 MG tablet Take 1 tablet (80 mg) by mouth 2 times daily 180 tablet 3       Objective:   Wt Readings from Last 3 Encounters:   10/28/22 94.8 kg (209 lb)   09/14/22 93.4 kg (206 lb)   07/29/22 93 kg (205 lb)     Vital signs:  /80 (BP Location: Left arm, Patient Position: Sitting, Cuff Size: Adult Large)   Pulse 62   Resp 16   Ht 1.702 m (5' 7\")   Wt 94.8 kg (209 lb)   SpO2 98%   BMI 32.73 kg/m        Physical Exam:    General Appearance : Awake, Alert, No acute distress  HEENT: No Scleral icterus; the mucous membranes were pink and moist.  Conjunctivae not injected  Neck:  No cervical bruits, jugular venous distention, or thyromegaly   Chest: The spine was straight. Chest wall symmetric.  Pacer site without erythema warmth or tenderness.  Lungs: Respirations unlabored; the lungs are clear to auscultation.  No wheezing   Cardiovascular:   Normal " point of maximal impulse.  Auscultation reveals normal first and second heart sounds with no murmurs, rubs, or gallops.  Carotid, radial, and dorsalis pedal pulses are intact and symmetric.    Abdomen: Rounded.  No organomegaly, masses, bruits, or tenderness. Bowels sounds are present  Extremities:  No clubbing, cyanosis.  No edema  Skin: No rash, bruising  Musculoskeletal: No tenderness.  Neurologic: Alert and oriented ×3. Speech is fluent.    Lab Results:  LIPIDS:  Lab Results   Component Value Date    CHOL 119 11/16/2021    CHOL 134 10/02/2020    CHOL 172 12/31/2018     Lab Results   Component Value Date    HDL 34 (L) 11/16/2021    HDL 33 (L) 10/02/2020    HDL 39 (L) 12/31/2018     Lab Results   Component Value Date    LDL 65 11/16/2021    LDL 70 10/02/2020     12/31/2018     Lab Results   Component Value Date    TRIG 98 11/16/2021    TRIG 156 (H) 10/02/2020    TRIG 102 12/31/2018       Pharmacologic nuclear stress test 6/2021:     The nuclear stress test is negative for inducible myocardial ischemia or infarction.     The left ventricular ejection fraction at stress is 63%.     A prior study was conducted on 10/16/2017.  This study has no change when compared with the prior study.            NOHEMY JONES MD [facility]  [phone]    This note created using Dragon voice recognition software.  Sound alike errors may have escaped editing.    Thank you for allowing me to participate in the care of your patient.      Sincerely,     Nohemy Jones MD     [facility]  cc:   FARAZ Bruno CNP  [address], [address]  [place] [address]

## 2022-10-28 NOTE — PROGRESS NOTES
"  Cardiology Clinic Office Note    Assessment / Plan:    1.  Paroxysmal atrial fibrillation controlled on sotalol, status post watchman device placement with no anticoagulation other than aspirin 81 mg daily  2.  Sick sinus syndrome status post pacemaker placement with normal function demonstrated on recent checks.  48% ventricular pacing on sotalol.  We will try to arrange for device check today to assess's rate responsiveness to help improve patient's symptoms with activities  3.  Nonobstructive coronary artery disease, chronic exertional dyspnea, with normal nuclear stress test 6/2021    No medication changes today.  Follow-up 1 year or as needed    ______________________________________________________________________    Subjective:    I had the opportunity to see Ashok RODRIGUEZ Carin at the [facility]. Ashok Workmanumgardner is a 71 year old male with a history of paroxysmal atrial fibrillation status post ablation at [facility] in 2020 and left atrial appendage occlusion device placement at [facility] with \"Watchman\" left atrial occlusion device- FL X device.  He is also status post dual-chamber pacemaker placement in 2018 and has a history of mild to moderate coronary disease by angiography in 2017.  He has previously been followed by Dr. Junior as well as Suzan Waddell in atrial fibrillation clinic.  Sotalol was added in July for frequent bouts of atrial flutter, he has noticed improved symptoms since that time    Most recent device check disclosed brief episodes of supraventricular tachycardia, with 2 episodes lasting a total of 25 seconds.  He presents today to [facility] care, accompanied by his wife.    His weight is unchanged over the last year.  He does feel that when he monitors his heart rate it is at times inappropriately slow, in the 50s.  He particularly notes this when doing activities such as carrying leaves.  When these episodes occur, he has to stop and " rest for up to half an hour.  He does walk for 20 minutes with his wife at a slow pace, he otherwise does not engage in regular exercise.  Device checks however show no heart rates below 60 and an appropriate heart rate range.  He has had no anginal discomfort.    ______________________________________________________________________    Problem List:  Patient Active Problem List   Diagnosis     Mixed hyperlipidemia     Gastroesophageal reflux disease, esophagitis presence not specified     SSS (sick sinus syndrome) (H)     Cardiac pacemaker in situ, dual chamber     Paroxysmal atrial fibrillation (H)     MARCELLA (obstructive sleep apnea)     Status post ablation of atrial fibrillation     Left Atrial Appendage Closure (Watchman FLX)     Nonocclusive coronary atherosclerosis of native coronary artery     Medical History:  Past Medical History:   Diagnosis Date     Anxiety about health     per [facility]     Dyslipidemia      GERD (gastroesophageal reflux disease)      Hypertension      Nonocclusive coronary atherosclerosis of native coronary artery 10/17/2017     Paroxysmal atrial fibrillation (H) 01/14/2019    Dx [id number] score = 5 (age, HTN, CAD, TIA 2) Rx flecainide Rx Xarelto     Pulmonary emphysema (H) 11/21/2019    per [place]     Right bundle branch block 08/29/2019     Solitary pulmonary nodule 11/21/2019    per [place]     Syncope 01/18/2019     TIA (transient ischemic attack) 12/30/2018     Surgical History:  Past Surgical History:   Procedure Laterality Date     APPENDECTOMY  2000     CARDIAC ELECTROPHYSIOLOGY MAPPING AND ABLATION  08/29/2019    PVI done at [place]     CV CORONARY ANGIOGRAM N/A 10/17/2017    Procedure: Coronary Angiogram;  Surgeon: Ashok Hyatt MD;  Location: [facility] Cath Lab;  Service:      CV LEFT HEART CATHETERIZATION WITH LEFT VENTRICULOGRAM N/A 10/17/2017    Procedure: Left Heart Catheterization with Left Ventriculogram;  Surgeon: Ashok Hyatt MD;  Location: [facility] Cath Lab;   Service:      EP PACEMAKER INSERT Left 2018    Procedure: EP Pacemaker Insertion;  Surgeon: Micheal Junior MD;  Location: [facility];  Service: Cardiology     OTHER SURGICAL HISTORY  2020    [facility] [place]     Social History:  Social History     Socioeconomic History     Marital status:      Spouse name: Not on file     Number of children: Not on file     Years of education: Not on file     Highest education level: Not on file   Occupational History     Not on file   Tobacco Use     Smoking status: Former     Types: Cigarettes     Quit date: 10/16/1999     Years since quittin.0     Smokeless tobacco: Never   Substance and Sexual Activity     Alcohol use: Yes     Alcohol/week: 1.0 standard drink     Comment: Alcoholic Drinks/day: none since december     Drug use: No     Sexual activity: Yes     Partners: Female   Other Topics Concern     Not on file   Social History Narrative     Not on file     Social Determinants of Health     Financial Resource Strain: Not on file   Food Insecurity: Not on file   Transportation Needs: Not on file   Physical Activity: Not on file   Stress: Not on file   Social Connections: Not on file   Intimate Partner Violence: Not on file   Housing Stability: Not on file     Sleep History:  Nocturnal awakenings, but no snoring.  Previous sleep evaluation showed only mild sleep apnea.  Does have daytime sleepiness  Exercise History:  Yard work.  Walks 20 minutes daily with his wife.    Review of Systems:      Daytime sleepiness noted.  12 point review of systems otherwise negative     Please refer above for cardiac ROS details.         Family History:  Family History   Problem Relation Age of Onset     Cancer Mother      Cancer Father      Coronary Artery Disease Brother      Coronary Artery Disease Brother      Valvular heart disease Brother      Rectal Cancer Sister      Colon Cancer Sister          Allergies:  Allergies  "  Allergen Reactions     Cephalexin Rash     Medications:  Current Outpatient Medications   Medication Sig Dispense Refill     acetaminophen (TYLENOL) 500 MG tablet [ACETAMINOPHEN (TYLENOL) 500 MG TABLET] Take 1-2 tablets (500-1,000 mg total) by mouth every 4 (four) hours as needed.  0     aspirin (ASPIR-81) 81 MG EC tablet [ASPIRIN (ASPIR-81) 81 MG EC TABLET] Take 81 mg by mouth daily.        atorvastatin (LIPITOR) 80 MG tablet [ATORVASTATIN (LIPITOR) 80 MG TABLET] TAKE 1 TABLET BY MOUTH EVERYDAY AT BEDTIME 90 tablet 1     carboxymethylcellulose (REFRESH PLUS) 0.5 % SOLN ophthalmic solution as needed       neomycin-polymyxin-dexamethasone (MAXITROL) 3.5-70562-0.1 ophthalmic ointment as needed       omeprazole (PRILOSEC) 40 MG capsule [OMEPRAZOLE (PRILOSEC) 40 MG CAPSULE] Take 40 mg by mouth daily before breakfast.       sotalol (BETAPACE) 80 MG tablet Take 1 tablet (80 mg) by mouth 2 times daily 180 tablet 3       Objective:   Wt Readings from Last 3 Encounters:   10/28/22 94.8 kg (209 lb)   09/14/22 93.4 kg (206 lb)   07/29/22 93 kg (205 lb)     Vital signs:  /80 (BP Location: Left arm, Patient Position: Sitting, Cuff Size: Adult Large)   Pulse 62   Resp 16   Ht 1.702 m (5' 7\")   Wt 94.8 kg (209 lb)   SpO2 98%   BMI 32.73 kg/m        Physical Exam:    General Appearance : Awake, Alert, No acute distress  HEENT: No Scleral icterus; the mucous membranes were pink and moist.  Conjunctivae not injected  Neck:  No cervical bruits, jugular venous distention, or thyromegaly   Chest: The spine was straight. Chest wall symmetric.  Pacer site without erythema warmth or tenderness.  Lungs: Respirations unlabored; the lungs are clear to auscultation.  No wheezing   Cardiovascular:   Normal point of maximal impulse.  Auscultation reveals normal first and second heart sounds with no murmurs, rubs, or gallops.  Carotid, radial, and dorsalis pedal pulses are intact and symmetric.    Abdomen: Rounded.  No organomegaly, " masses, bruits, or tenderness. Bowels sounds are present  Extremities:  No clubbing, cyanosis.  No edema  Skin: No rash, bruising  Musculoskeletal: No tenderness.  Neurologic: Alert and oriented ×3. Speech is fluent.    Lab Results:  LIPIDS:  Lab Results   Component Value Date    CHOL 119 11/16/2021    CHOL 134 10/02/2020    CHOL 172 12/31/2018     Lab Results   Component Value Date    HDL 34 (L) 11/16/2021    HDL 33 (L) 10/02/2020    HDL 39 (L) 12/31/2018     Lab Results   Component Value Date    LDL 65 11/16/2021    LDL 70 10/02/2020     12/31/2018     Lab Results   Component Value Date    TRIG 98 11/16/2021    TRIG 156 (H) 10/02/2020    TRIG 102 12/31/2018       Pharmacologic nuclear stress test 6/2021:     The nuclear stress test is negative for inducible myocardial ischemia or infarction.     The left ventricular ejection fraction at stress is 63%.     A prior study was conducted on 10/16/2017.  This study has no change when compared with the prior study.            NOHEMY JONES MD [facility]  [phone]    This note created using Dragon voice recognition software.  Sound alike errors may have escaped editing.

## 2022-10-28 NOTE — PATIENT INSTRUCTIONS
No medication changes today.  Continue your regular walking program.  There is no substitute!  I look forward to seeing you back in 1 year or as needed.

## 2022-11-02 LAB
MDC_IDC_EPISODE_DTM: NORMAL
MDC_IDC_EPISODE_DTM: NORMAL
MDC_IDC_EPISODE_DURATION: 1 S
MDC_IDC_EPISODE_DURATION: 189 S
MDC_IDC_EPISODE_ID: 588
MDC_IDC_EPISODE_ID: 589
MDC_IDC_EPISODE_TYPE: NORMAL
MDC_IDC_EPISODE_TYPE: NORMAL
MDC_IDC_LEAD_IMPLANT_DT: NORMAL
MDC_IDC_LEAD_IMPLANT_DT: NORMAL
MDC_IDC_LEAD_LOCATION: NORMAL
MDC_IDC_LEAD_LOCATION: NORMAL
MDC_IDC_LEAD_LOCATION_DETAIL_1: NORMAL
MDC_IDC_LEAD_LOCATION_DETAIL_1: NORMAL
MDC_IDC_LEAD_MFG: NORMAL
MDC_IDC_LEAD_MFG: NORMAL
MDC_IDC_LEAD_MODEL: NORMAL
MDC_IDC_LEAD_MODEL: NORMAL
MDC_IDC_LEAD_POLARITY_TYPE: NORMAL
MDC_IDC_LEAD_POLARITY_TYPE: NORMAL
MDC_IDC_LEAD_SERIAL: NORMAL
MDC_IDC_LEAD_SERIAL: NORMAL
MDC_IDC_LEAD_SPECIAL_FUNCTION: NORMAL
MDC_IDC_LEAD_SPECIAL_FUNCTION: NORMAL
MDC_IDC_MSMT_BATTERY_DTM: NORMAL
MDC_IDC_MSMT_BATTERY_REMAINING_LONGEVITY: 132 MO
MDC_IDC_MSMT_BATTERY_RRT_TRIGGER: 2.62
MDC_IDC_MSMT_BATTERY_STATUS: NORMAL
MDC_IDC_MSMT_BATTERY_VOLTAGE: 3 V
MDC_IDC_MSMT_LEADCHNL_RA_IMPEDANCE_VALUE: 323 OHM
MDC_IDC_MSMT_LEADCHNL_RA_IMPEDANCE_VALUE: 380 OHM
MDC_IDC_MSMT_LEADCHNL_RA_PACING_THRESHOLD_AMPLITUDE: 0.75 V
MDC_IDC_MSMT_LEADCHNL_RA_PACING_THRESHOLD_AMPLITUDE: 0.75 V
MDC_IDC_MSMT_LEADCHNL_RA_PACING_THRESHOLD_PULSEWIDTH: 0.4 MS
MDC_IDC_MSMT_LEADCHNL_RA_PACING_THRESHOLD_PULSEWIDTH: 0.4 MS
MDC_IDC_MSMT_LEADCHNL_RA_SENSING_INTR_AMPL: 2.5 MV
MDC_IDC_MSMT_LEADCHNL_RA_SENSING_INTR_AMPL: 2.9 MV
MDC_IDC_MSMT_LEADCHNL_RV_IMPEDANCE_VALUE: 342 OHM
MDC_IDC_MSMT_LEADCHNL_RV_IMPEDANCE_VALUE: 399 OHM
MDC_IDC_MSMT_LEADCHNL_RV_PACING_THRESHOLD_AMPLITUDE: 1 V
MDC_IDC_MSMT_LEADCHNL_RV_PACING_THRESHOLD_AMPLITUDE: 1.12 V
MDC_IDC_MSMT_LEADCHNL_RV_PACING_THRESHOLD_PULSEWIDTH: 0.4 MS
MDC_IDC_MSMT_LEADCHNL_RV_PACING_THRESHOLD_PULSEWIDTH: 0.4 MS
MDC_IDC_MSMT_LEADCHNL_RV_SENSING_INTR_AMPL: 1.75 MV
MDC_IDC_MSMT_LEADCHNL_RV_SENSING_INTR_AMPL: 15.5 MV
MDC_IDC_PG_IMPLANT_DTM: NORMAL
MDC_IDC_PG_MFG: NORMAL
MDC_IDC_PG_MODEL: NORMAL
MDC_IDC_PG_SERIAL: NORMAL
MDC_IDC_PG_TYPE: NORMAL
MDC_IDC_SESS_CLINIC_NAME: NORMAL
MDC_IDC_SESS_DTM: NORMAL
MDC_IDC_SESS_TYPE: NORMAL
MDC_IDC_SET_BRADY_AT_MODE_SWITCH_RATE: 171 {BEATS}/MIN
MDC_IDC_SET_BRADY_HYSTRATE: NORMAL
MDC_IDC_SET_BRADY_LOWRATE: 60 {BEATS}/MIN
MDC_IDC_SET_BRADY_MAX_SENSOR_RATE: 130 {BEATS}/MIN
MDC_IDC_SET_BRADY_MAX_TRACKING_RATE: 130 {BEATS}/MIN
MDC_IDC_SET_BRADY_MODE: NORMAL
MDC_IDC_SET_BRADY_PAV_DELAY_LOW: 180 MS
MDC_IDC_SET_BRADY_SAV_DELAY_LOW: 150 MS
MDC_IDC_SET_LEADCHNL_RA_PACING_AMPLITUDE: NORMAL
MDC_IDC_SET_LEADCHNL_RA_PACING_ANODE_ELECTRODE_1: NORMAL
MDC_IDC_SET_LEADCHNL_RA_PACING_ANODE_LOCATION_1: NORMAL
MDC_IDC_SET_LEADCHNL_RA_PACING_CAPTURE_MODE: NORMAL
MDC_IDC_SET_LEADCHNL_RA_PACING_CATHODE_ELECTRODE_1: NORMAL
MDC_IDC_SET_LEADCHNL_RA_PACING_CATHODE_LOCATION_1: NORMAL
MDC_IDC_SET_LEADCHNL_RA_PACING_POLARITY: NORMAL
MDC_IDC_SET_LEADCHNL_RA_PACING_PULSEWIDTH: 0.4 MS
MDC_IDC_SET_LEADCHNL_RA_SENSING_ANODE_ELECTRODE_1: NORMAL
MDC_IDC_SET_LEADCHNL_RA_SENSING_ANODE_LOCATION_1: NORMAL
MDC_IDC_SET_LEADCHNL_RA_SENSING_CATHODE_ELECTRODE_1: NORMAL
MDC_IDC_SET_LEADCHNL_RA_SENSING_CATHODE_LOCATION_1: NORMAL
MDC_IDC_SET_LEADCHNL_RA_SENSING_POLARITY: NORMAL
MDC_IDC_SET_LEADCHNL_RA_SENSING_SENSITIVITY: 0.3 MV
MDC_IDC_SET_LEADCHNL_RV_PACING_AMPLITUDE: NORMAL
MDC_IDC_SET_LEADCHNL_RV_PACING_ANODE_ELECTRODE_1: NORMAL
MDC_IDC_SET_LEADCHNL_RV_PACING_ANODE_LOCATION_1: NORMAL
MDC_IDC_SET_LEADCHNL_RV_PACING_CAPTURE_MODE: NORMAL
MDC_IDC_SET_LEADCHNL_RV_PACING_CATHODE_ELECTRODE_1: NORMAL
MDC_IDC_SET_LEADCHNL_RV_PACING_CATHODE_LOCATION_1: NORMAL
MDC_IDC_SET_LEADCHNL_RV_PACING_POLARITY: NORMAL
MDC_IDC_SET_LEADCHNL_RV_PACING_PULSEWIDTH: 0.4 MS
MDC_IDC_SET_LEADCHNL_RV_SENSING_ANODE_ELECTRODE_1: NORMAL
MDC_IDC_SET_LEADCHNL_RV_SENSING_ANODE_LOCATION_1: NORMAL
MDC_IDC_SET_LEADCHNL_RV_SENSING_CATHODE_ELECTRODE_1: NORMAL
MDC_IDC_SET_LEADCHNL_RV_SENSING_CATHODE_LOCATION_1: NORMAL
MDC_IDC_SET_LEADCHNL_RV_SENSING_POLARITY: NORMAL
MDC_IDC_SET_LEADCHNL_RV_SENSING_SENSITIVITY: 0.9 MV
MDC_IDC_SET_ZONE_DETECTION_INTERVAL: 350 MS
MDC_IDC_SET_ZONE_DETECTION_INTERVAL: 400 MS
MDC_IDC_SET_ZONE_TYPE: NORMAL
MDC_IDC_STAT_AT_BURDEN_PERCENT: 0.6 %
MDC_IDC_STAT_AT_DTM_END: NORMAL
MDC_IDC_STAT_AT_DTM_START: NORMAL
MDC_IDC_STAT_AT_MODE_SW_COUNT: 9
MDC_IDC_STAT_BRADY_AP_VP_PERCENT: 5.86 %
MDC_IDC_STAT_BRADY_AP_VS_PERCENT: 8.47 %
MDC_IDC_STAT_BRADY_AS_VP_PERCENT: 28.92 %
MDC_IDC_STAT_BRADY_AS_VS_PERCENT: 56.75 %
MDC_IDC_STAT_BRADY_DTM_END: NORMAL
MDC_IDC_STAT_BRADY_DTM_START: NORMAL
MDC_IDC_STAT_BRADY_RA_PERCENT_PACED: 12.48 %
MDC_IDC_STAT_BRADY_RV_PERCENT_PACED: 34.65 %
MDC_IDC_STAT_EPISODE_RECENT_COUNT: 0
MDC_IDC_STAT_EPISODE_RECENT_COUNT: 33
MDC_IDC_STAT_EPISODE_RECENT_COUNT: 9
MDC_IDC_STAT_EPISODE_RECENT_COUNT_DTM_END: NORMAL
MDC_IDC_STAT_EPISODE_RECENT_COUNT_DTM_START: NORMAL
MDC_IDC_STAT_EPISODE_TOTAL_COUNT: 0
MDC_IDC_STAT_EPISODE_TOTAL_COUNT: 177
MDC_IDC_STAT_EPISODE_TOTAL_COUNT: 20
MDC_IDC_STAT_EPISODE_TOTAL_COUNT_DTM_END: NORMAL
MDC_IDC_STAT_EPISODE_TOTAL_COUNT_DTM_START: NORMAL
MDC_IDC_STAT_EPISODE_TYPE: NORMAL

## 2022-11-22 ENCOUNTER — LAB REQUISITION (OUTPATIENT)
Dept: LAB | Facility: CLINIC | Age: 72
End: 2022-11-22
Payer: MEDICARE

## 2022-11-22 DIAGNOSIS — Z12.5 ENCOUNTER FOR SCREENING FOR MALIGNANT NEOPLASM OF PROSTATE: ICD-10-CM

## 2022-11-22 DIAGNOSIS — E78.2 MIXED HYPERLIPIDEMIA: ICD-10-CM

## 2022-11-22 DIAGNOSIS — I10 ESSENTIAL (PRIMARY) HYPERTENSION: ICD-10-CM

## 2022-11-22 LAB
ALBUMIN SERPL BCG-MCNC: 4.1 G/DL (ref 3.5–5.2)
ALP SERPL-CCNC: 65 U/L (ref 40–129)
ALT SERPL W P-5'-P-CCNC: 36 U/L (ref 10–50)
ANION GAP SERPL CALCULATED.3IONS-SCNC: 13 MMOL/L (ref 7–15)
AST SERPL W P-5'-P-CCNC: 25 U/L (ref 10–50)
BILIRUB SERPL-MCNC: 0.3 MG/DL
BUN SERPL-MCNC: 8.4 MG/DL (ref 8–23)
CALCIUM SERPL-MCNC: 9.2 MG/DL (ref 8.8–10.2)
CHLORIDE SERPL-SCNC: 106 MMOL/L (ref 98–107)
CHOLEST SERPL-MCNC: 112 MG/DL
CREAT SERPL-MCNC: 0.89 MG/DL (ref 0.67–1.17)
DEPRECATED HCO3 PLAS-SCNC: 22 MMOL/L (ref 22–29)
GFR SERPL CREATININE-BSD FRML MDRD: >90 ML/MIN/1.73M2
GLUCOSE SERPL-MCNC: 137 MG/DL (ref 70–99)
HDLC SERPL-MCNC: 30 MG/DL
LDLC SERPL CALC-MCNC: 53 MG/DL
NONHDLC SERPL-MCNC: 82 MG/DL
POTASSIUM SERPL-SCNC: 4.3 MMOL/L (ref 3.4–5.3)
PROT SERPL-MCNC: 6.6 G/DL (ref 6.4–8.3)
PSA SERPL-MCNC: 0.67 NG/ML (ref 0–6.5)
SODIUM SERPL-SCNC: 141 MMOL/L (ref 136–145)
TRIGL SERPL-MCNC: 145 MG/DL

## 2022-11-22 PROCEDURE — G0103 PSA SCREENING: HCPCS | Mod: ORL | Performed by: FAMILY MEDICINE

## 2022-11-22 PROCEDURE — 80061 LIPID PANEL: CPT | Mod: ORL | Performed by: FAMILY MEDICINE

## 2022-11-22 PROCEDURE — 80053 COMPREHEN METABOLIC PANEL: CPT | Mod: ORL | Performed by: FAMILY MEDICINE

## 2022-12-12 ENCOUNTER — TRANSCRIBE ORDERS (OUTPATIENT)
Dept: OTHER | Age: 72
End: 2022-12-12

## 2022-12-12 DIAGNOSIS — Z80.0 FAMILY HISTORY OF COLON CANCER: ICD-10-CM

## 2022-12-12 DIAGNOSIS — K63.5 COLON POLYPS: Primary | ICD-10-CM

## 2022-12-20 ENCOUNTER — ANCILLARY PROCEDURE (OUTPATIENT)
Dept: CARDIOLOGY | Facility: CLINIC | Age: 72
End: 2022-12-20
Payer: MEDICARE

## 2022-12-20 DIAGNOSIS — I48.91 A-FIB (H): ICD-10-CM

## 2022-12-20 DIAGNOSIS — Z95.0 PACEMAKER: ICD-10-CM

## 2022-12-20 DIAGNOSIS — I49.5 SICK SINUS SYNDROME (H): ICD-10-CM

## 2022-12-26 LAB
MDC_IDC_LEAD_IMPLANT_DT: NORMAL
MDC_IDC_LEAD_IMPLANT_DT: NORMAL
MDC_IDC_LEAD_LOCATION: NORMAL
MDC_IDC_LEAD_LOCATION: NORMAL
MDC_IDC_LEAD_LOCATION_DETAIL_1: NORMAL
MDC_IDC_LEAD_LOCATION_DETAIL_1: NORMAL
MDC_IDC_LEAD_MFG: NORMAL
MDC_IDC_LEAD_MFG: NORMAL
MDC_IDC_LEAD_MODEL: NORMAL
MDC_IDC_LEAD_MODEL: NORMAL
MDC_IDC_LEAD_POLARITY_TYPE: NORMAL
MDC_IDC_LEAD_POLARITY_TYPE: NORMAL
MDC_IDC_LEAD_SERIAL: NORMAL
MDC_IDC_LEAD_SERIAL: NORMAL
MDC_IDC_LEAD_SPECIAL_FUNCTION: NORMAL
MDC_IDC_LEAD_SPECIAL_FUNCTION: NORMAL
MDC_IDC_MSMT_BATTERY_DTM: NORMAL
MDC_IDC_MSMT_BATTERY_REMAINING_LONGEVITY: 129 MO
MDC_IDC_MSMT_BATTERY_RRT_TRIGGER: 2.62
MDC_IDC_MSMT_BATTERY_STATUS: NORMAL
MDC_IDC_MSMT_BATTERY_VOLTAGE: 3 V
MDC_IDC_MSMT_LEADCHNL_RA_IMPEDANCE_VALUE: 304 OHM
MDC_IDC_MSMT_LEADCHNL_RA_IMPEDANCE_VALUE: 361 OHM
MDC_IDC_MSMT_LEADCHNL_RA_PACING_THRESHOLD_AMPLITUDE: 0.75 V
MDC_IDC_MSMT_LEADCHNL_RA_PACING_THRESHOLD_PULSEWIDTH: 0.4 MS
MDC_IDC_MSMT_LEADCHNL_RA_SENSING_INTR_AMPL: 2.5 MV
MDC_IDC_MSMT_LEADCHNL_RA_SENSING_INTR_AMPL: 2.5 MV
MDC_IDC_MSMT_LEADCHNL_RV_IMPEDANCE_VALUE: 323 OHM
MDC_IDC_MSMT_LEADCHNL_RV_IMPEDANCE_VALUE: 399 OHM
MDC_IDC_MSMT_LEADCHNL_RV_PACING_THRESHOLD_AMPLITUDE: 1.12 V
MDC_IDC_MSMT_LEADCHNL_RV_PACING_THRESHOLD_PULSEWIDTH: 0.4 MS
MDC_IDC_MSMT_LEADCHNL_RV_SENSING_INTR_AMPL: 5 MV
MDC_IDC_MSMT_LEADCHNL_RV_SENSING_INTR_AMPL: 5 MV
MDC_IDC_PG_IMPLANT_DTM: NORMAL
MDC_IDC_PG_MFG: NORMAL
MDC_IDC_PG_MODEL: NORMAL
MDC_IDC_PG_SERIAL: NORMAL
MDC_IDC_PG_TYPE: NORMAL
MDC_IDC_SESS_CLINIC_NAME: NORMAL
MDC_IDC_SESS_DTM: NORMAL
MDC_IDC_SESS_TYPE: NORMAL
MDC_IDC_SET_BRADY_AT_MODE_SWITCH_RATE: 171 {BEATS}/MIN
MDC_IDC_SET_BRADY_HYSTRATE: NORMAL
MDC_IDC_SET_BRADY_LOWRATE: 60 {BEATS}/MIN
MDC_IDC_SET_BRADY_MAX_SENSOR_RATE: 130 {BEATS}/MIN
MDC_IDC_SET_BRADY_MAX_TRACKING_RATE: 130 {BEATS}/MIN
MDC_IDC_SET_BRADY_MODE: NORMAL
MDC_IDC_SET_BRADY_PAV_DELAY_LOW: 180 MS
MDC_IDC_SET_BRADY_SAV_DELAY_LOW: 150 MS
MDC_IDC_SET_LEADCHNL_RA_PACING_AMPLITUDE: 1.5 V
MDC_IDC_SET_LEADCHNL_RA_PACING_ANODE_ELECTRODE_1: NORMAL
MDC_IDC_SET_LEADCHNL_RA_PACING_ANODE_LOCATION_1: NORMAL
MDC_IDC_SET_LEADCHNL_RA_PACING_CAPTURE_MODE: NORMAL
MDC_IDC_SET_LEADCHNL_RA_PACING_CATHODE_ELECTRODE_1: NORMAL
MDC_IDC_SET_LEADCHNL_RA_PACING_CATHODE_LOCATION_1: NORMAL
MDC_IDC_SET_LEADCHNL_RA_PACING_POLARITY: NORMAL
MDC_IDC_SET_LEADCHNL_RA_PACING_PULSEWIDTH: 0.4 MS
MDC_IDC_SET_LEADCHNL_RA_SENSING_ANODE_ELECTRODE_1: NORMAL
MDC_IDC_SET_LEADCHNL_RA_SENSING_ANODE_LOCATION_1: NORMAL
MDC_IDC_SET_LEADCHNL_RA_SENSING_CATHODE_ELECTRODE_1: NORMAL
MDC_IDC_SET_LEADCHNL_RA_SENSING_CATHODE_LOCATION_1: NORMAL
MDC_IDC_SET_LEADCHNL_RA_SENSING_POLARITY: NORMAL
MDC_IDC_SET_LEADCHNL_RA_SENSING_SENSITIVITY: 0.3 MV
MDC_IDC_SET_LEADCHNL_RV_PACING_AMPLITUDE: 1.75 V
MDC_IDC_SET_LEADCHNL_RV_PACING_ANODE_ELECTRODE_1: NORMAL
MDC_IDC_SET_LEADCHNL_RV_PACING_ANODE_LOCATION_1: NORMAL
MDC_IDC_SET_LEADCHNL_RV_PACING_CAPTURE_MODE: NORMAL
MDC_IDC_SET_LEADCHNL_RV_PACING_CATHODE_ELECTRODE_1: NORMAL
MDC_IDC_SET_LEADCHNL_RV_PACING_CATHODE_LOCATION_1: NORMAL
MDC_IDC_SET_LEADCHNL_RV_PACING_POLARITY: NORMAL
MDC_IDC_SET_LEADCHNL_RV_PACING_PULSEWIDTH: 0.4 MS
MDC_IDC_SET_LEADCHNL_RV_SENSING_ANODE_ELECTRODE_1: NORMAL
MDC_IDC_SET_LEADCHNL_RV_SENSING_ANODE_LOCATION_1: NORMAL
MDC_IDC_SET_LEADCHNL_RV_SENSING_CATHODE_ELECTRODE_1: NORMAL
MDC_IDC_SET_LEADCHNL_RV_SENSING_CATHODE_LOCATION_1: NORMAL
MDC_IDC_SET_LEADCHNL_RV_SENSING_POLARITY: NORMAL
MDC_IDC_SET_LEADCHNL_RV_SENSING_SENSITIVITY: 0.9 MV
MDC_IDC_SET_ZONE_DETECTION_INTERVAL: 350 MS
MDC_IDC_SET_ZONE_DETECTION_INTERVAL: 400 MS
MDC_IDC_SET_ZONE_TYPE: NORMAL
MDC_IDC_STAT_AT_BURDEN_PERCENT: 0 %
MDC_IDC_STAT_AT_DTM_END: NORMAL
MDC_IDC_STAT_AT_DTM_START: NORMAL
MDC_IDC_STAT_BRADY_AP_VP_PERCENT: 6.06 %
MDC_IDC_STAT_BRADY_AP_VS_PERCENT: 13.09 %
MDC_IDC_STAT_BRADY_AS_VP_PERCENT: 27.15 %
MDC_IDC_STAT_BRADY_AS_VS_PERCENT: 53.7 %
MDC_IDC_STAT_BRADY_DTM_END: NORMAL
MDC_IDC_STAT_BRADY_DTM_START: NORMAL
MDC_IDC_STAT_BRADY_RA_PERCENT_PACED: 16.58 %
MDC_IDC_STAT_BRADY_RV_PERCENT_PACED: 33.21 %
MDC_IDC_STAT_EPISODE_RECENT_COUNT: 0
MDC_IDC_STAT_EPISODE_RECENT_COUNT_DTM_END: NORMAL
MDC_IDC_STAT_EPISODE_RECENT_COUNT_DTM_START: NORMAL
MDC_IDC_STAT_EPISODE_TOTAL_COUNT: 0
MDC_IDC_STAT_EPISODE_TOTAL_COUNT: 0
MDC_IDC_STAT_EPISODE_TOTAL_COUNT: 181
MDC_IDC_STAT_EPISODE_TOTAL_COUNT: 20
MDC_IDC_STAT_EPISODE_TOTAL_COUNT: 261
MDC_IDC_STAT_EPISODE_TOTAL_COUNT_DTM_END: NORMAL
MDC_IDC_STAT_EPISODE_TOTAL_COUNT_DTM_START: NORMAL
MDC_IDC_STAT_EPISODE_TYPE: NORMAL

## 2022-12-26 PROCEDURE — 93296 REM INTERROG EVL PM/IDS: CPT | Performed by: INTERNAL MEDICINE

## 2022-12-26 PROCEDURE — 93294 REM INTERROG EVL PM/LDLS PM: CPT | Performed by: INTERNAL MEDICINE

## 2023-03-27 ENCOUNTER — ANCILLARY PROCEDURE (OUTPATIENT)
Dept: CARDIOLOGY | Facility: CLINIC | Age: 73
End: 2023-03-27
Attending: INTERNAL MEDICINE
Payer: MEDICARE

## 2023-03-27 DIAGNOSIS — I49.5 SICK SINUS SYNDROME (H): ICD-10-CM

## 2023-03-27 DIAGNOSIS — Z95.0 PACEMAKER: ICD-10-CM

## 2023-03-27 DIAGNOSIS — I48.91 A-FIB (H): ICD-10-CM

## 2023-03-27 LAB
MDC_IDC_EPISODE_DTM: NORMAL
MDC_IDC_EPISODE_DTM: NORMAL
MDC_IDC_EPISODE_DURATION: 1 S
MDC_IDC_EPISODE_DURATION: 1 S
MDC_IDC_EPISODE_ID: 594
MDC_IDC_EPISODE_ID: 595
MDC_IDC_EPISODE_TYPE: NORMAL
MDC_IDC_EPISODE_TYPE: NORMAL
MDC_IDC_LEAD_IMPLANT_DT: NORMAL
MDC_IDC_LEAD_IMPLANT_DT: NORMAL
MDC_IDC_LEAD_LOCATION: NORMAL
MDC_IDC_LEAD_LOCATION: NORMAL
MDC_IDC_LEAD_LOCATION_DETAIL_1: NORMAL
MDC_IDC_LEAD_LOCATION_DETAIL_1: NORMAL
MDC_IDC_LEAD_MFG: NORMAL
MDC_IDC_LEAD_MFG: NORMAL
MDC_IDC_LEAD_MODEL: NORMAL
MDC_IDC_LEAD_MODEL: NORMAL
MDC_IDC_LEAD_POLARITY_TYPE: NORMAL
MDC_IDC_LEAD_POLARITY_TYPE: NORMAL
MDC_IDC_LEAD_SERIAL: NORMAL
MDC_IDC_LEAD_SERIAL: NORMAL
MDC_IDC_LEAD_SPECIAL_FUNCTION: NORMAL
MDC_IDC_LEAD_SPECIAL_FUNCTION: NORMAL
MDC_IDC_MSMT_BATTERY_DTM: NORMAL
MDC_IDC_MSMT_BATTERY_REMAINING_LONGEVITY: 125 MO
MDC_IDC_MSMT_BATTERY_RRT_TRIGGER: 2.62
MDC_IDC_MSMT_BATTERY_STATUS: NORMAL
MDC_IDC_MSMT_BATTERY_VOLTAGE: 3.01 V
MDC_IDC_MSMT_LEADCHNL_RA_IMPEDANCE_VALUE: 304 OHM
MDC_IDC_MSMT_LEADCHNL_RA_IMPEDANCE_VALUE: 342 OHM
MDC_IDC_MSMT_LEADCHNL_RA_PACING_THRESHOLD_AMPLITUDE: 0.75 V
MDC_IDC_MSMT_LEADCHNL_RA_PACING_THRESHOLD_PULSEWIDTH: 0.4 MS
MDC_IDC_MSMT_LEADCHNL_RA_SENSING_INTR_AMPL: 2.62 MV
MDC_IDC_MSMT_LEADCHNL_RA_SENSING_INTR_AMPL: 2.62 MV
MDC_IDC_MSMT_LEADCHNL_RV_IMPEDANCE_VALUE: 323 OHM
MDC_IDC_MSMT_LEADCHNL_RV_IMPEDANCE_VALUE: 399 OHM
MDC_IDC_MSMT_LEADCHNL_RV_PACING_THRESHOLD_AMPLITUDE: 1.25 V
MDC_IDC_MSMT_LEADCHNL_RV_PACING_THRESHOLD_PULSEWIDTH: 0.4 MS
MDC_IDC_MSMT_LEADCHNL_RV_SENSING_INTR_AMPL: 3.12 MV
MDC_IDC_MSMT_LEADCHNL_RV_SENSING_INTR_AMPL: 3.12 MV
MDC_IDC_PG_IMPLANT_DTM: NORMAL
MDC_IDC_PG_MFG: NORMAL
MDC_IDC_PG_MODEL: NORMAL
MDC_IDC_PG_SERIAL: NORMAL
MDC_IDC_PG_TYPE: NORMAL
MDC_IDC_SESS_CLINIC_NAME: NORMAL
MDC_IDC_SESS_DTM: NORMAL
MDC_IDC_SESS_TYPE: NORMAL
MDC_IDC_SET_BRADY_AT_MODE_SWITCH_RATE: 171 {BEATS}/MIN
MDC_IDC_SET_BRADY_HYSTRATE: NORMAL
MDC_IDC_SET_BRADY_LOWRATE: 60 {BEATS}/MIN
MDC_IDC_SET_BRADY_MAX_SENSOR_RATE: 130 {BEATS}/MIN
MDC_IDC_SET_BRADY_MAX_TRACKING_RATE: 130 {BEATS}/MIN
MDC_IDC_SET_BRADY_MODE: NORMAL
MDC_IDC_SET_BRADY_PAV_DELAY_LOW: 180 MS
MDC_IDC_SET_BRADY_SAV_DELAY_LOW: 150 MS
MDC_IDC_SET_LEADCHNL_RA_PACING_AMPLITUDE: 1.5 V
MDC_IDC_SET_LEADCHNL_RA_PACING_ANODE_ELECTRODE_1: NORMAL
MDC_IDC_SET_LEADCHNL_RA_PACING_ANODE_LOCATION_1: NORMAL
MDC_IDC_SET_LEADCHNL_RA_PACING_CAPTURE_MODE: NORMAL
MDC_IDC_SET_LEADCHNL_RA_PACING_CATHODE_ELECTRODE_1: NORMAL
MDC_IDC_SET_LEADCHNL_RA_PACING_CATHODE_LOCATION_1: NORMAL
MDC_IDC_SET_LEADCHNL_RA_PACING_POLARITY: NORMAL
MDC_IDC_SET_LEADCHNL_RA_PACING_PULSEWIDTH: 0.4 MS
MDC_IDC_SET_LEADCHNL_RA_SENSING_ANODE_ELECTRODE_1: NORMAL
MDC_IDC_SET_LEADCHNL_RA_SENSING_ANODE_LOCATION_1: NORMAL
MDC_IDC_SET_LEADCHNL_RA_SENSING_CATHODE_ELECTRODE_1: NORMAL
MDC_IDC_SET_LEADCHNL_RA_SENSING_CATHODE_LOCATION_1: NORMAL
MDC_IDC_SET_LEADCHNL_RA_SENSING_POLARITY: NORMAL
MDC_IDC_SET_LEADCHNL_RA_SENSING_SENSITIVITY: 0.3 MV
MDC_IDC_SET_LEADCHNL_RV_PACING_AMPLITUDE: 2 V
MDC_IDC_SET_LEADCHNL_RV_PACING_ANODE_ELECTRODE_1: NORMAL
MDC_IDC_SET_LEADCHNL_RV_PACING_ANODE_LOCATION_1: NORMAL
MDC_IDC_SET_LEADCHNL_RV_PACING_CAPTURE_MODE: NORMAL
MDC_IDC_SET_LEADCHNL_RV_PACING_CATHODE_ELECTRODE_1: NORMAL
MDC_IDC_SET_LEADCHNL_RV_PACING_CATHODE_LOCATION_1: NORMAL
MDC_IDC_SET_LEADCHNL_RV_PACING_POLARITY: NORMAL
MDC_IDC_SET_LEADCHNL_RV_PACING_PULSEWIDTH: 0.4 MS
MDC_IDC_SET_LEADCHNL_RV_SENSING_ANODE_ELECTRODE_1: NORMAL
MDC_IDC_SET_LEADCHNL_RV_SENSING_ANODE_LOCATION_1: NORMAL
MDC_IDC_SET_LEADCHNL_RV_SENSING_CATHODE_ELECTRODE_1: NORMAL
MDC_IDC_SET_LEADCHNL_RV_SENSING_CATHODE_LOCATION_1: NORMAL
MDC_IDC_SET_LEADCHNL_RV_SENSING_POLARITY: NORMAL
MDC_IDC_SET_LEADCHNL_RV_SENSING_SENSITIVITY: 0.9 MV
MDC_IDC_SET_ZONE_DETECTION_INTERVAL: 350 MS
MDC_IDC_SET_ZONE_DETECTION_INTERVAL: 400 MS
MDC_IDC_SET_ZONE_TYPE: NORMAL
MDC_IDC_STAT_AT_BURDEN_PERCENT: 0 %
MDC_IDC_STAT_AT_DTM_END: NORMAL
MDC_IDC_STAT_AT_DTM_START: NORMAL
MDC_IDC_STAT_BRADY_AP_VP_PERCENT: 0.83 %
MDC_IDC_STAT_BRADY_AP_VS_PERCENT: 11.69 %
MDC_IDC_STAT_BRADY_AS_VP_PERCENT: 3.06 %
MDC_IDC_STAT_BRADY_AS_VS_PERCENT: 84.41 %
MDC_IDC_STAT_BRADY_DTM_END: NORMAL
MDC_IDC_STAT_BRADY_DTM_START: NORMAL
MDC_IDC_STAT_BRADY_RA_PERCENT_PACED: 12.09 %
MDC_IDC_STAT_BRADY_RV_PERCENT_PACED: 3.89 %
MDC_IDC_STAT_EPISODE_RECENT_COUNT: 0
MDC_IDC_STAT_EPISODE_RECENT_COUNT: 2
MDC_IDC_STAT_EPISODE_RECENT_COUNT_DTM_END: NORMAL
MDC_IDC_STAT_EPISODE_RECENT_COUNT_DTM_START: NORMAL
MDC_IDC_STAT_EPISODE_TOTAL_COUNT: 0
MDC_IDC_STAT_EPISODE_TOTAL_COUNT: 0
MDC_IDC_STAT_EPISODE_TOTAL_COUNT: 183
MDC_IDC_STAT_EPISODE_TOTAL_COUNT: 20
MDC_IDC_STAT_EPISODE_TOTAL_COUNT: 261
MDC_IDC_STAT_EPISODE_TOTAL_COUNT_DTM_END: NORMAL
MDC_IDC_STAT_EPISODE_TOTAL_COUNT_DTM_START: NORMAL
MDC_IDC_STAT_EPISODE_TYPE: NORMAL

## 2023-03-27 PROCEDURE — 93296 REM INTERROG EVL PM/IDS: CPT | Performed by: INTERNAL MEDICINE

## 2023-03-27 PROCEDURE — 93294 REM INTERROG EVL PM/LDLS PM: CPT | Performed by: INTERNAL MEDICINE

## 2023-05-30 ENCOUNTER — TELEPHONE (OUTPATIENT)
Dept: CARDIOLOGY | Facility: CLINIC | Age: 73
End: 2023-05-30

## 2023-05-30 NOTE — TELEPHONE ENCOUNTER
Teddy Vick MD Gebel, Mandy L, RN  Caller: Unspecified (Today, 10:05 AM)  Continue to monitor for now           Previous Messages       ----- Message -----   From: Indu Mcknight RN   Sent: 5/30/2023  10:15 AM CDT   To: Teddy Vick MD   Above notedIndu RN

## 2023-05-30 NOTE — TELEPHONE ENCOUNTER
"Encounter Type: Courtesy remote check for: reports of restlessness, high BP, vision changes on 5/29  Device: Medtronic Kayli (D) PPM  Presenting: NSR, HR 65   Findings:  since 3/27/23- 104 secs AT/AF, no EGMs, 17 VHR episodes, all on 5/14/23 showing SVT, rates 190-200s. all <1 min. Intermittent Ventricular undersensing noted during SVT episodes but not noted on presenting EGM today. RV sensitivity at 0.9 mV currently. Will review with EP MD/Device reader for any recommendations.   Comments: No device/rhythm issues noted to correlate with reported symptoms, deferred to Primary care.   Plan: Device follow up for the entirety of having the Device, based on best practices determined by Heart Rhythm Society and in Compliance with Medicare guidelines. Continue remote device monitoring per patient plan. SAE Mcknight RN         Patient's wife called device clinic this am stating that Ashok had a rough night, very restless, woke up at around 2 am and had lef TV on, states he reported seeing red/ovals on TV screen. Said BP was high when they checked at that time  \"152/117.\" States since then his vision is OK and BP returned to his baseline of 132/80. Wanted to see if anything showed up on his pacemaker remote at that time.     Reviewed with patient/wife that there were no device/rhythm issues noted last night to correlate with symptoms and that he should discuss with primary care. Wife verbalized understanding.     There were several brief episodes of AT/SVT?, rates up to 200s all episodes occurred on isolated day (5/14/23). None since. Coincidentally it is noted that during those brief tachy episodes there is some intermittent ventricular undersensing on EGMs, followed by . RV sensing currently at 0.9 mV ( and was 0.9 mV at implant), no Hx of noise that is charted.     Will review ventricular undersensing events with device reader of week for further recommendations.     Indu Mcknight RN     Logbook and snapshot of example " below, full report scanned in.   Indu Mcknight RN

## 2023-06-01 ENCOUNTER — DOCUMENTATION ONLY (OUTPATIENT)
Dept: CARDIOLOGY | Facility: CLINIC | Age: 73
End: 2023-06-01
Payer: MEDICARE

## 2023-06-01 NOTE — PROGRESS NOTES
Is the implanted device safe for MRI Exam?  Yes  Is this device 3T compatible? Yes  Device Type: Pacemaker      Device Information: Medtronic, PPM Kayli (D)      Cardiology Orders for Device Programming     -- Yes -- The patient has a MRI conditional pulse generator and leads from the same     -- Yes -- The pulse generator and leads have been implanted for at least 6 weeks. (Does not apply to [employer] devices)    -- Yes-- The device is implanted in the right or left pectoral region    -- Yes -- There are not any additional active cardiac devices, abandoned leads, lead extenders or adapters    -- Yes -- The device lead impedance measurements are within the normal range. (Manufacture recommendations: Medtronic Advisa and Revo 200-1,500 ohms; Medtronic ICD and CRT's 200-3000 ohms and defibrillation lead impedance   ohms)    -- N/A -- If the patient is pacemaker dependent the thresholds are less than or equal to 2.0V @ 0.4ms.    -- Yes -- RA and RV leads are programmed to bipolar pacing operation or pacing off. If no, CONTACT THE DEVICE REP FOR PROGRAMMING     Date of last Remote Device check: 5/30/23  Results of last Device check:  1.   Right atrium impedance: 361 Ohms   2.   Right ventricle impedance: 399 Ohms   3.   Left ventricle impedance: n/a      4.   Right atrium threshold: 0.75V @ 0.4 ms   5.   Right ventricle threshold: 1.125V @ 0.4 ms   6.   Left ventricle threshold: n/a      Device programming during the scan guidelines   Pacing Mode (check one): Use the recommended pacing mode per Medtronic MRI access Application  Pacing Rate: n/a   bpm         Indu Mcknight RN

## 2023-06-02 ENCOUNTER — APPOINTMENT (OUTPATIENT)
Dept: MRI IMAGING | Facility: HOSPITAL | Age: 73
End: 2023-06-02
Attending: EMERGENCY MEDICINE
Payer: MEDICARE

## 2023-06-02 ENCOUNTER — HOSPITAL ENCOUNTER (EMERGENCY)
Facility: HOSPITAL | Age: 73
Discharge: HOME | End: 2023-06-02
Attending: EMERGENCY MEDICINE | Admitting: EMERGENCY MEDICINE
Payer: MEDICARE

## 2023-06-02 ENCOUNTER — TRANSFERRED RECORDS (OUTPATIENT)
Dept: HEALTH INFORMATION MANAGEMENT | Facility: CLINIC | Age: 73
End: 2023-06-02

## 2023-06-02 ENCOUNTER — APPOINTMENT (OUTPATIENT)
Dept: RADIOLOGY | Facility: HOSPITAL | Age: 73
End: 2023-06-02
Attending: EMERGENCY MEDICINE
Payer: MEDICARE

## 2023-06-02 VITALS
WEIGHT: 208 LBS | HEIGHT: 67 IN | HEART RATE: 67 BPM | BODY MASS INDEX: 32.65 KG/M2 | OXYGEN SATURATION: 97 % | TEMPERATURE: 97.7 F | DIASTOLIC BLOOD PRESSURE: 99 MMHG | RESPIRATION RATE: 18 BRPM | SYSTOLIC BLOOD PRESSURE: 148 MMHG

## 2023-06-02 DIAGNOSIS — H53.9 VISION CHANGES: ICD-10-CM

## 2023-06-02 DIAGNOSIS — I65.21 STENOSIS OF RIGHT CAROTID ARTERY: ICD-10-CM

## 2023-06-02 LAB
ANION GAP SERPL CALCULATED.3IONS-SCNC: 11 MMOL/L (ref 7–15)
BUN SERPL-MCNC: 8.8 MG/DL (ref 8–23)
CALCIUM SERPL-MCNC: 9.8 MG/DL (ref 8.8–10.2)
CHLORIDE SERPL-SCNC: 102 MMOL/L (ref 98–107)
CREAT SERPL-MCNC: 0.85 MG/DL (ref 0.67–1.17)
DEPRECATED HCO3 PLAS-SCNC: 22 MMOL/L (ref 22–29)
ERYTHROCYTE [DISTWIDTH] IN BLOOD BY AUTOMATED COUNT: 13.9 % (ref 10–15)
GFR SERPL CREATININE-BSD FRML MDRD: >90 ML/MIN/1.73M2
GLUCOSE SERPL-MCNC: 104 MG/DL (ref 70–99)
HCT VFR BLD AUTO: 42.8 % (ref 40–53)
HGB BLD-MCNC: 14.7 G/DL (ref 13.3–17.7)
MAGNESIUM SERPL-MCNC: 2.2 MG/DL (ref 1.7–2.3)
MCH RBC QN AUTO: 29.6 PG (ref 26.5–33)
MCHC RBC AUTO-ENTMCNC: 34.3 G/DL (ref 31.5–36.5)
MCV RBC AUTO: 86 FL (ref 78–100)
PLATELET # BLD AUTO: 288 10E3/UL (ref 150–450)
POTASSIUM SERPL-SCNC: 4 MMOL/L (ref 3.4–5.3)
RBC # BLD AUTO: 4.97 10E6/UL (ref 4.4–5.9)
SODIUM SERPL-SCNC: 135 MMOL/L (ref 136–145)
WBC # BLD AUTO: 9.1 10E3/UL (ref 4–11)

## 2023-06-02 PROCEDURE — 80048 BASIC METABOLIC PNL TOTAL CA: CPT | Performed by: EMERGENCY MEDICINE

## 2023-06-02 PROCEDURE — 70553 MRI BRAIN STEM W/O & W/DYE: CPT | Mod: MG

## 2023-06-02 PROCEDURE — 85027 COMPLETE CBC AUTOMATED: CPT | Performed by: EMERGENCY MEDICINE

## 2023-06-02 PROCEDURE — 99285 EMERGENCY DEPT VISIT HI MDM: CPT | Mod: 25

## 2023-06-02 PROCEDURE — G1010 CDSM STANSON: HCPCS

## 2023-06-02 PROCEDURE — 83735 ASSAY OF MAGNESIUM: CPT | Performed by: EMERGENCY MEDICINE

## 2023-06-02 PROCEDURE — 255N000002 HC RX 255 OP 636: Performed by: EMERGENCY MEDICINE

## 2023-06-02 PROCEDURE — 93005 ELECTROCARDIOGRAM TRACING: CPT | Performed by: EMERGENCY MEDICINE

## 2023-06-02 PROCEDURE — 36415 COLL VENOUS BLD VENIPUNCTURE: CPT | Performed by: EMERGENCY MEDICINE

## 2023-06-02 PROCEDURE — 71045 X-RAY EXAM CHEST 1 VIEW: CPT

## 2023-06-02 PROCEDURE — 70544 MR ANGIOGRAPHY HEAD W/O DYE: CPT | Mod: MG,XS

## 2023-06-02 PROCEDURE — A9585 GADOBUTROL INJECTION: HCPCS | Performed by: EMERGENCY MEDICINE

## 2023-06-02 RX ORDER — GADOBUTROL 604.72 MG/ML
10 INJECTION INTRAVENOUS ONCE
Status: COMPLETED | OUTPATIENT
Start: 2023-06-02 | End: 2023-06-02

## 2023-06-02 RX ADMIN — GADOBUTROL 10 ML: 604.72 INJECTION INTRAVENOUS at 13:20

## 2023-06-02 ASSESSMENT — ACTIVITIES OF DAILY LIVING (ADL)
ADLS_ACUITY_SCORE: 35
ADLS_ACUITY_SCORE: 35

## 2023-06-02 NOTE — ED PROVIDER NOTES
EMERGENCY DEPARTMENT ENCOUNTER      NAME: Ashok Del Rosario  AGE: 72 year old male  YOB: 1950  MRN: [id number]  EVALUATION DATE & TIME: No admission date for patient encounter.    PCP: Bj Hendrickson    ED PROVIDER: Kelsi Castillo MD    Chief Complaint   Patient presents with     Dizziness         FINAL IMPRESSION:  1. Vision changes    2. Stenosis of right carotid artery          ED COURSE & MEDICAL DECISION MAKING:    Pertinent Labs & Imaging studies reviewed. (See chart for details)  72 year old male with history of CAD, HLD, paroxysmal A-fib status post LAAO and sick sinus syndrome status post PPM who presents to the Emergency Department for evaluation of 5 days of visual changes describes as seeing a red auras around everything, slight headache/lightheadedness.  On examination he has ataxia with finger-nose-finger testing on the left.  Differential includes CVA, mass lesion, atypical migraine.  Wife notes some shuffling when he ambulates, other neurodegenerative disorder such as Parkinson's on the differential though I suspect less likely.  Given duration symptoms less likely consistent with TIA.    Patient initially seen and evaluated by myself in triage area.  IV established, blood obtained.  Twelve-lead EKG sinus rhythm no ischemic changes.  CBC, BMP, magnesium unremarkable.  Chest x-ray obtained pre-MRI, unremarkable.  MRI/MRI brain and neck unremarkable other than mild carotid disease on the right.  Findings were discussed with patient and wife at bedside.  Safe for discharge to home.  Would recommend outpatient follow-up with ophthalmology for dilated retinal eye exam and PCP follow-up.  Warning signs return to ED discussed.       ED Course as of 06/02/23 1402   Fri Jun 02, 2023   0910 I met with the patient to gather history and perform an initial exam.   0919 I spoke to MRI for imaging options   0955 Rechecked and updated patient on plan.    1052 XR Chest 1 View  Chest x-ray reviewed  by myself, PPM in place with borderline cardiomegaly       Medical Decision Making    History:    Supplemental history from: Family Member/Significant Other    External Record(s) reviewed: Outpatient Record: Emergency room visit from earlier this week    Work Up:    Chart documentation includes differential considered and any EKGs or imaging independently interpreted by provider, where specified.    In additional to work up documented, I considered the following work up: See MDM    External consultation:    Discussion of management with another provider: N/A    Complicating factors:    Care impacted by chronic illness: Heart Disease and Hyperlipidemia    Care affected by social determinants of health: Access to Medical Care    Disposition considerations: Discharge. No recommendations on prescription strength medication(s). N/A.        At the conclusion of the encounter I discussed the results of all of the tests and the disposition. The questions were answered. The patient or family acknowledged understanding and was agreeable with the care plan.      MEDICATIONS GIVEN IN THE EMERGENCY:  Medications   gadobutrol (GADAVIST) injection 10 mL (10 mLs Intravenous $Given 6/2/23 1320)       NEW PRESCRIPTIONS STARTED AT TODAY'S ER VISIT  New Prescriptions    No medications on file          =================================================================    HPI    Patient information was obtained from: patient and wife    Use of Intrepreter: N/A        Ashok Del Rosario is a 72 year old male with pertinent medical history of hypertension, hyperlipidemia, paroxysmal atrial fibrillation, sick sinus syndrome, cardiac pacemaker ([employer]-MRI compatible), Watchman placement, TIA who presents to the ED for evaluation of vision changes and lightheadedness.    Per chart review,  The patient was seen in the urgency room on 5/30/2023 for vision change described as seeing red in both eyes except for a portion in the middle. He  was also disoriented and confused that morning with some word-finding difficulty. CT head brain without  intracranial hemorrhage, mass, or acute infarct. Labs were normal. ECG with supraventricular paced rhythm. He wa completely asymptomatic in the UR. History was consistent with TIA. Discharged with plan for out-patient follow up and MRI. He was switched from 81mg aspirin to 325mg.     Per patient and wife,   The patient reports all lights appear red since Monday (4 days ago). He was seen in the urgency room where he had normal head CT and labs, and he was diagnosed with a mini stroke. He was advised to monitor for any further stroke symptoms. Today, the red tint to his vision persists. He has no other vision changes. He also has slight nausea, but denies any vomiting. He also has pressure discomfort at the base of his posterior head. He also reports feeling intermittently lightheaded which is worse today. No recent speech changes or difficulty. His wife has noticed he has been somewhat dragging both feet over the last two weeks which is new for him. No other recent numbness, tingling, or weakness.     He has a pacemaker that is MRI compatible as well as a Watchman. He takes four aspirin 81mg daily. He is otherwise not anticoagulated. He has history of cataract surgery, but no other eye surgeries. No other reported complaints or concerns at this time.        PAST MEDICAL HISTORY:  Past Medical History:   Diagnosis Date     Anxiety about health     per [facility]     Dyslipidemia      GERD (gastroesophageal reflux disease)      Hypertension      Nonocclusive coronary atherosclerosis of native coronary artery 10/17/2017     Paroxysmal atrial fibrillation (H) 01/14/2019    Dx [id number] score = 5 (age, HTN, CAD, TIA 2) Rx flecainide Rx Xarelto     Pulmonary emphysema (H) 11/21/2019    per [place]     Right bundle branch block 08/29/2019     Solitary pulmonary nodule 11/21/2019    per [place]     Syncope 01/18/2019     TIA  (transient ischemic attack) 12/30/2018       PAST SURGICAL HISTORY:  Past Surgical History:   Procedure Laterality Date     APPENDECTOMY  2000     CARDIAC ELECTROPHYSIOLOGY MAPPING AND ABLATION  08/29/2019    PVI done at [place]     CV CORONARY ANGIOGRAM N/A 10/17/2017    Procedure: Coronary Angiogram;  Surgeon: Ashok Hyatt MD;  Location: [facility] Cath Lab;  Service:      CV LEFT HEART CATHETERIZATION WITH LEFT VENTRICULOGRAM N/A 10/17/2017    Procedure: Left Heart Catheterization with Left Ventriculogram;  Surgeon: Ashok Hyatt MD;  Location: [facility] Cath Lab;  Service:      EP PACEMAKER INSERT Left 12/31/2018    Procedure: EP Pacemaker Insertion;  Surgeon: Micheal Junior MD;  Location: [facility] Cath Lab;  Service: Cardiology     OTHER SURGICAL HISTORY  02/04/2020    Left atrial appendage closureWatchman FLX at [place]       CURRENT MEDICATIONS:    Prior to Admission Medications   Prescriptions Last Dose Informant Patient Reported? Taking?   acetaminophen (TYLENOL) 500 MG tablet   No No   Sig: [ACETAMINOPHEN (TYLENOL) 500 MG TABLET] Take 1-2 tablets (500-1,000 mg total) by mouth every 4 (four) hours as needed.   aspirin (ASPIR-81) 81 MG EC tablet   Yes No   Sig: [ASPIRIN (ASPIR-81) 81 MG EC TABLET] Take 81 mg by mouth daily.    atorvastatin (LIPITOR) 80 MG tablet   No No   Sig: [ATORVASTATIN (LIPITOR) 80 MG TABLET] TAKE 1 TABLET BY MOUTH EVERYDAY AT BEDTIME   carboxymethylcellulose (REFRESH PLUS) 0.5 % SOLN ophthalmic solution   Yes No   Sig: as needed   neomycin-polymyxin-dexamethasone (MAXITROL) 3.5-27139-7.1 ophthalmic ointment   Yes No   Sig: as needed   omeprazole (PRILOSEC) 40 MG capsule   Yes No   Sig: [OMEPRAZOLE (PRILOSEC) 40 MG CAPSULE] Take 40 mg by mouth daily before breakfast.   sotalol (BETAPACE) 80 MG tablet   No No   Sig: Take 1 tablet (80 mg) by mouth 2 times daily      Facility-Administered Medications: None       ALLERGIES:  Allergies   Allergen Reactions     Cephalexin Rash  "      FAMILY HISTORY:  Family History   Problem Relation Age of Onset     Cancer Mother      Cancer Father      Coronary Artery Disease Brother      Coronary Artery Disease Brother      Valvular heart disease Brother      Rectal Cancer Sister      Colon Cancer Sister        SOCIAL HISTORY:  Social History     Tobacco Use     Smoking status: Former     Types: Cigarettes     Quit date: 10/16/1999     Years since quittin.6     Smokeless tobacco: Never   Substance Use Topics     Alcohol use: Yes     Alcohol/week: 1.0 standard drink of alcohol     Comment: Alcoholic Drinks/day: none since december     Drug use: No        VITALS:  Patient Vitals for the past 24 hrs:   BP Temp Temp src Pulse Resp SpO2 Height Weight   23 1326 -- -- -- 66 -- 96 % -- --   23 1315 -- -- -- 84 -- 94 % -- --   23 1300 -- -- -- 84 -- 94 % -- --   23 1245 -- -- -- 84 18 93 % -- --   23 1030 138/89 -- -- 61 14 96 % -- --   23 1000 (!) 144/88 -- -- 60 14 97 % -- --   23 0914 (!) 171/105 97.7  F (36.5  C) Oral 96 20 97 % 1.702 m (5' 7\") 94.3 kg (208 lb)       PHYSICAL EXAM    General Appearance: Well-appearing, well-nourished, no acute distress   Head:  Normocephalic  Eyes:  PERRL, conjunctiva/corneas clear, EOM's intact, no visual field cut  ENT:  membranes are moist without pallor  Neck:  Supple  Cardio:  Regular rate and rhythm  Pulm:  No respiratory distress  Extremities:  Extremities normal, atraumatic, no cyanosis or edema  Skin:  Skin warm, dry, no rashes  Neuro:  Alert and oriented ×3, answers questions and follows commands appropriately, no gaze deviation, no visual field cut, cranial nerves III through XII intact, moving all extremities with 5 out of 5 upper lower extremity strength, mild ataxia with FNF on left hand only, otherwise no gross sensory defects, no aphasia, dysarthria, neglect    National Institutes of Health Stroke Scale  Exam Interval: Baseline   Score    Level of consciousness: " (0)   Alert, keenly responsive    LOC questions: (0)   Answers both questions correctly    LOC commands: (0)   Performs both tasks correctly    Best gaze: (0)   Normal    Visual: (0)   No visual loss    Facial palsy: (0)   Normal symmetrical movements    Motor arm (left): (0)   No drift    Motor arm (right): (0)   No drift    Motor leg (left): (0)   No drift    Motor leg (right): (0)   No drift    Limb ataxia: (1)   Present in one limb    Sensory: (0)   Normal- no sensory loss    Best language: (0)   Normal- no aphasia    Dysarthria: (0)   Normal    Extinction and inattention: (0)   No abnormality        Total Score:  1          RADIOLOGY/LABS:  Reviewed all pertinent imaging. Please see official radiology report. All pertinent labs reviewed and interpreted.    Results for orders placed or performed during the hospital encounter of 06/02/23   MR Brain w/o & w Contrast    Impression    IMPRESSION:  HEAD MRI:   1.  No acute infarct, mass, hemorrhage, or herniation.  2.  Mild diffuse parenchymal volume loss and moderate white matter changes most likely due to chronic microvascular ischemic disease. These findings are similar to prior MR 12/29/2018.    HEAD MRA:   1.  Normal MRA [place].    NECK MRA:  1.  Patent arteries in the neck without evidence of dissection.  2.  Atherosclerotic disease in the carotid arteries right greater than left. There is approximately 50% stenosis of the right carotid artery by NASCET criteria. No significant stenosis of the left carotid artery.   MRA Brain ([place]) wo Contrast    Impression    IMPRESSION:  HEAD MRI:   1.  No acute infarct, mass, hemorrhage, or herniation.  2.  Mild diffuse parenchymal volume loss and moderate white matter changes most likely due to chronic microvascular ischemic disease. These findings are similar to prior MR 12/29/2018.    HEAD MRA:   1.  Normal MRA [place].    NECK MRA:  1.  Patent arteries in the neck without evidence of  dissection.  2.  Atherosclerotic disease in the carotid arteries right greater than left. There is approximately 50% stenosis of the right carotid artery by NASCET criteria. No significant stenosis of the left carotid artery.   MRA Neck (Carotids) wo & w Contrast    Impression    IMPRESSION:  HEAD MRI:   1.  No acute infarct, mass, hemorrhage, or herniation.  2.  Mild diffuse parenchymal volume loss and moderate white matter changes most likely due to chronic microvascular ischemic disease. These findings are similar to prior MR 12/29/2018.    HEAD MRA:   1.  Normal MRA [place].    NECK MRA:  1.  Patent arteries in the neck without evidence of dissection.  2.  Atherosclerotic disease in the carotid arteries right greater than left. There is approximately 50% stenosis of the right carotid artery by NASCET criteria. No significant stenosis of the left carotid artery.   XR Chest 1 View    Impression    IMPRESSION: Dual-lead left chest cardiac device. Leads are intact. No fractured or abandoned leads. Patchy mid and basilar predominant linear opacities likely representing atelectasis/scarring. No effusions or pneumothorax. Cardiomegaly without overt   pulmonary edema. Left atrial appendage occlusion device.   Basic metabolic panel   Result Value Ref Range    Sodium 135 (L) 136 - 145 mmol/L    Potassium 4.0 3.4 - 5.3 mmol/L    Chloride 102 98 - 107 mmol/L    Carbon Dioxide (CO2) 22 22 - 29 mmol/L    Anion Gap 11 7 - 15 mmol/L    Urea Nitrogen 8.8 8.0 - 23.0 mg/dL    Creatinine 0.85 0.67 - 1.17 mg/dL    Calcium 9.8 8.8 - 10.2 mg/dL    Glucose 104 (H) 70 - 99 mg/dL    GFR Estimate >90 >60 mL/min/1.73m2   Result Value Ref Range    Magnesium 2.2 1.7 - 2.3 mg/dL   CBC with platelets   Result Value Ref Range    WBC Count 9.1 4.0 - 11.0 10e3/uL    RBC Count 4.97 4.40 - 5.90 10e6/uL    Hemoglobin 14.7 13.3 - 17.7 g/dL    Hematocrit 42.8 40.0 - 53.0 %    MCV 86 78 - 100 fL    MCH 29.6 26.5 - 33.0 pg    MCHC 34.3 31.5 -  36.5 g/dL    RDW 13.9 10.0 - 15.0 %    Platelet Count 288 150 - 450 10e3/uL       EKG:  Performed at: 1021    Impression: sinus rhythm. Left axis deviation. RBBB    Rate: 66  Rhythm: sinus  Axis: -44  VA Interval: 164  QRS Interval: 138  QTc Interval: 492  ST Changes: No acute changes  Comparison: sinus rhythm has replaced electronic atrial pacemaker compared to 7/29/2022  I have independently reviewed and interpreted the EKG(s) documented above.    The creation of this record is based on the scribe s observations of the work being performed by Kelsi Castillo MD and the provider s statements to them. It was created on her behalf by Sofia Hall, a trained medical scribe. This document has been checked and approved by the attending provider.    Kelsi Castillo MD  Emergency Medicine  [facility] EMERGENCY DEPARTMENT  [facility] [facility] [address]  [phone]  Dept: [phone]       Kelsi Castillo MD  06/02/23 4036

## 2023-06-02 NOTE — ED TRIAGE NOTES
"Patient c/o light headed, headache, \" seeing Red floating on TV , \" everything is Red \" .  Patient symptoms started Monday .  Patient went to the UR on Mon. MD assessed patient . No stroke code.      Triage Assessment     Row Name 06/02/23 0917       Triage Assessment (Adult)    Airway WDL WDL       Respiratory WDL    Respiratory WDL WDL       Skin Circulation/Temperature WDL    Skin Circulation/Temperature WDL WDL       Cardiac WDL    Cardiac WDL WDL       Peripheral/Neurovascular WDL    Peripheral Neurovascular WDL WDL       Cognitive/Neuro/Behavioral WDL    Cognitive/Neuro/Behavioral WDL WDL              "

## 2023-06-02 NOTE — DISCHARGE INSTRUCTIONS
The MRI of the brain today was normal.  No signs of mass, tumor, stroke.  There is some cholesterol or plaque buildup in the right carotid artery.  Typically we will follow these with ultrasounds once a year.  Your primary can help you schedule.  Because of ongoing visual changes would recommend follow-up with ophthalmology for full dilated retinal eye exam.  Call to schedule appointment.

## 2023-06-05 ENCOUNTER — MEDICAL CORRESPONDENCE (OUTPATIENT)
Dept: HEALTH INFORMATION MANAGEMENT | Facility: CLINIC | Age: 73
End: 2023-06-05

## 2023-06-05 ENCOUNTER — LAB REQUISITION (OUTPATIENT)
Dept: LAB | Facility: CLINIC | Age: 73
End: 2023-06-05
Payer: MEDICARE

## 2023-06-05 ENCOUNTER — TRANSFERRED RECORDS (OUTPATIENT)
Dept: HEALTH INFORMATION MANAGEMENT | Facility: CLINIC | Age: 73
End: 2023-06-05

## 2023-06-05 DIAGNOSIS — R26.9 UNSPECIFIED ABNORMALITIES OF GAIT AND MOBILITY: ICD-10-CM

## 2023-06-05 PROCEDURE — 84443 ASSAY THYROID STIM HORMONE: CPT | Mod: ORL | Performed by: FAMILY MEDICINE

## 2023-06-05 PROCEDURE — 82607 VITAMIN B-12: CPT | Mod: ORL | Performed by: FAMILY MEDICINE

## 2023-06-06 LAB
ATRIAL RATE - MUSE: 66 BPM
DIASTOLIC BLOOD PRESSURE - MUSE: 88 MMHG
INTERPRETATION ECG - MUSE: NORMAL
P AXIS - MUSE: 64 DEGREES
PR INTERVAL - MUSE: 164 MS
QRS DURATION - MUSE: 138 MS
QT - MUSE: 470 MS
QTC - MUSE: 492 MS
R AXIS - MUSE: -44 DEGREES
SYSTOLIC BLOOD PRESSURE - MUSE: 144 MMHG
T AXIS - MUSE: 22 DEGREES
TSH SERPL DL<=0.005 MIU/L-ACNC: 0.69 UIU/ML (ref 0.3–4.2)
VENTRICULAR RATE- MUSE: 66 BPM
VIT B12 SERPL-MCNC: 386 PG/ML (ref 232–1245)

## 2023-06-07 ENCOUNTER — TRANSCRIBE ORDERS (OUTPATIENT)
Dept: OTHER | Age: 73
End: 2023-06-07

## 2023-06-07 DIAGNOSIS — R26.9 GAIT ABNORMALITY: Primary | ICD-10-CM

## 2023-06-08 ENCOUNTER — TRANSFERRED RECORDS (OUTPATIENT)
Dept: HEALTH INFORMATION MANAGEMENT | Facility: CLINIC | Age: 73
End: 2023-06-08

## 2023-06-09 ENCOUNTER — TRANSCRIBE ORDERS (OUTPATIENT)
Dept: VASCULAR SURGERY | Facility: CLINIC | Age: 73
End: 2023-06-09
Payer: MEDICARE

## 2023-06-09 DIAGNOSIS — I65.21 CAROTID STENOSIS, RIGHT: Primary | ICD-10-CM

## 2023-06-09 DIAGNOSIS — I65.29 CAROTID STENOSIS: Primary | ICD-10-CM

## 2023-06-12 ENCOUNTER — ANCILLARY PROCEDURE (OUTPATIENT)
Dept: VASCULAR ULTRASOUND | Facility: CLINIC | Age: 73
End: 2023-06-12
Attending: SURGERY
Payer: MEDICARE

## 2023-06-12 ENCOUNTER — OFFICE VISIT (OUTPATIENT)
Dept: VASCULAR SURGERY | Facility: CLINIC | Age: 73
End: 2023-06-12
Attending: FAMILY MEDICINE
Payer: MEDICARE

## 2023-06-12 VITALS
BODY MASS INDEX: 32.18 KG/M2 | WEIGHT: 205 LBS | DIASTOLIC BLOOD PRESSURE: 86 MMHG | SYSTOLIC BLOOD PRESSURE: 142 MMHG | OXYGEN SATURATION: 97 % | HEART RATE: 59 BPM | HEIGHT: 67 IN

## 2023-06-12 DIAGNOSIS — I65.21 CAROTID STENOSIS, RIGHT: ICD-10-CM

## 2023-06-12 DIAGNOSIS — I65.29 CAROTID STENOSIS: ICD-10-CM

## 2023-06-12 PROCEDURE — 93880 EXTRACRANIAL BILAT STUDY: CPT | Mod: 26 | Performed by: SURGERY

## 2023-06-12 PROCEDURE — 93880 EXTRACRANIAL BILAT STUDY: CPT

## 2023-06-12 PROCEDURE — G0463 HOSPITAL OUTPT CLINIC VISIT: HCPCS | Mod: 25 | Performed by: SURGERY

## 2023-06-12 PROCEDURE — 99204 OFFICE O/P NEW MOD 45 MIN: CPT | Performed by: SURGERY

## 2023-06-12 NOTE — PATIENT INSTRUCTIONS
Natacha Pulido,    Thank you for entrusting your care with us today. After your visit today with MD Ivon Cervantes this is the plan that was discussed at your appointment.    Follow up in 6 months with ASHLEY Sawyer , and you will have ultrasound prior to visit. This is on 12/11/23 with a 9 am check in. Pleas call us if this does not work for you.       I am including additional information on these things and our contact information if you have any questions or concerns.   Please do not hesitate to reach out to us if you felt we did not answer your questions or you are unsure of the treatment plan after your visit today. Our number is [phone].Thank you for trusting us with your care.       Again thank you for your time.       Completed carotid ultrasound. This is done to assess the blood flow of the carotid arteries.        Carotid Duplex    Description  Wearing your street clothes, you will be asked to lie on a stretcher.    Ultrasound gel, usually warmed for your comfort, will be placed on either side of your neck.    Through the gel, the technician will apply to your neck a small hand-held device that emits sound waves.   When the test is completed, the technician will remove excess gel from your neck. The gel is water-soluble and will not stain your skin or clothes.    How to Prepare  Eat and take medications as usual.   Wear minimal or no jewelry to ease application and removal of gel.    Risks  There are typically no side effects or complications associated with a carotid duplex ultrasound examination.  What Can I Expect After the Test?  The technician will send the ultrasound images to your vascular surgeon for evaluation. Typically, a report is available in 2-3 days. If anything critical is found, it is standard practice to notify the vascular surgeon immediately.    Reference: https://vascular.org/patient-resources/vascular-tests

## 2023-06-12 NOTE — PROGRESS NOTES
"[facility]      US BEFORE.  Right carotid stenosis 50% but had vision changes.  Patient is here for a consult to discuss.Eye changes.    Filter of red in both eyes more on right side. Blurred vision on both eyes. Started May 30 th.2023.  As of today no red but still has blurry vision.    Pt is currently taking Aspirin and Statin.    BP (!) 142/86 (BP Location: Right arm)   Pulse 59   Ht 5' 7\" (1.702 m)   Wt 205 lb (93 kg)   SpO2 97%   BMI 32.11 kg/m      The provider has been notified that the patient results what's next     Questions patient would like addressed today are: see above      Refills are needed: N/A    Has homecare services and agency name:  Lucille Martinez    "

## 2023-06-12 NOTE — PROGRESS NOTES
VASCULAR SURGERY CLINIC CONSULTATION    VASCULAR SURGEON: Ivon Cervantes MD, RPVI     LOCATION:  [facility]     Ashok Del Rosario   Medical Record #:  [id number]  YOB: 1950  Age:  72 year old     Date of Service: 6/12/2023    PRIMARY CARE PROVIDER: Bj Hendrickson      Reason for visit: Carotid artery disease    IMPRESSION: 73-year-old male comes to vascular surgery clinic for evaluation of carotid artery disease.  Patient has been complaining of red spots in bilateral twice but did not describe classic symptoms of amaurosis fugax.  Denies any symptoms of stroke or TIA.  Patient is on best medical management therapy for carotid artery disease.  MRA did show less than 50% stenosis of bilateral carotid arteries.  Ultrasound today is relatively unremarkable.  I do not see any signs of vulnerable plaque    RECOMMENDATION/RISKS: Would encourage patient to see ophthalmologist for evaluation of primary eye disease.  From vascular surgery standpoint would recommend continuation of best medical management therapy.  Follow-up in 6 months.  HPI:  Ashok Del Rosario is a 72 year old male who was seen today in consultation for carotid artery disease.  Patient is doing well in all regards and denies any complaints currently.  Patient stated that since he had one episode when he saw a red spot in bilateral eyes.  Denies any symptoms of TIA or stroke.    REVIEW OF SYSTEMS:    A 12 point ROS was reviewed and is negative    PHH:    Past Medical History:   Diagnosis Date     Anxiety about health     per [facility]     Dyslipidemia      GERD (gastroesophageal reflux disease)      Hypertension      Nonocclusive coronary atherosclerosis of native coronary artery 10/17/2017     Paroxysmal atrial fibrillation (H) 01/14/2019    Dx [id number] score = 5 (age, HTN, CAD, TIA 2) Rx flecainide Rx Xarelto     Pulmonary emphysema (H) 11/21/2019    per [place]     Right bundle branch block 08/29/2019      Solitary pulmonary nodule 11/21/2019    per [place]     Syncope 01/18/2019     TIA (transient ischemic attack) 12/30/2018          Past Surgical History:   Procedure Laterality Date     APPENDECTOMY  2000     CARDIAC ELECTROPHYSIOLOGY MAPPING AND ABLATION  08/29/2019    PVI done at [place]     CV CORONARY ANGIOGRAM N/A 10/17/2017    Procedure: Coronary Angiogram;  Surgeon: Ashok Hyatt MD;  Location: [facility];  Service:      CV LEFT HEART CATHETERIZATION WITH LEFT VENTRICULOGRAM N/A 10/17/2017    Procedure: Left Heart Catheterization with Left Ventriculogram;  Surgeon: Ashok Hyatt MD;  Location: [facility];  Service:      EP PACEMAKER INSERT Left 12/31/2018    Procedure: EP Pacemaker Insertion;  Surgeon: Micheal Junior MD;  Location: [facility];  Service: Cardiology     OTHER SURGICAL HISTORY  02/04/2020    Left atrial appendage closureWatchman FLX at [place]       ALLERGIES:  Cephalexin    MEDS:    Current Outpatient Medications:      acetaminophen (TYLENOL) 500 MG tablet, [ACETAMINOPHEN (TYLENOL) 500 MG TABLET] Take 1-2 tablets (500-1,000 mg total) by mouth every 4 (four) hours as needed., Disp: , Rfl: 0     aspirin (ASPIR-81) 81 MG EC tablet, [ASPIRIN (ASPIR-81) 81 MG EC TABLET] Take 81 mg by mouth daily. , Disp: , Rfl:      atorvastatin (LIPITOR) 80 MG tablet, [ATORVASTATIN (LIPITOR) 80 MG TABLET] TAKE 1 TABLET BY MOUTH EVERYDAY AT BEDTIME, Disp: 90 tablet, Rfl: 1     carboxymethylcellulose (REFRESH PLUS) 0.5 % SOLN ophthalmic solution, as needed, Disp: , Rfl:      neomycin-polymyxin-dexamethasone (MAXITROL) 3.5-19245-2.1 ophthalmic ointment, as needed, Disp: , Rfl:      omeprazole (PRILOSEC) 40 MG capsule, [OMEPRAZOLE (PRILOSEC) 40 MG CAPSULE] Take 40 mg by mouth daily before breakfast., Disp: , Rfl:      sotalol (BETAPACE) 80 MG tablet, Take 1 tablet (80 mg) by mouth 2 times daily, Disp: 180 tablet, Rfl: 3    SOCIAL HABITS:    History   Smoking Status     Former      "Types: Cigarettes     Quit date: 10/16/1999   Smokeless Tobacco     Never     Social History    Substance and Sexual Activity      Alcohol use: Yes        Alcohol/week: 1.0 standard drink of alcohol        Comment: Alcoholic Drinks/day: none since december      History   Drug Use No       FAMILY HISTORY:    Family History   Problem Relation Age of Onset     Cancer Mother      Cancer Father      Coronary Artery Disease Brother      Coronary Artery Disease Brother      Valvular heart disease Brother      Rectal Cancer Sister      Colon Cancer Sister        PE:  BP (!) 142/86 (BP Location: Right arm)   Pulse 59   Ht 1.702 m (5' 7\")   Wt 93 kg (205 lb)   SpO2 97%   BMI 32.11 kg/m    Wt Readings from Last 1 Encounters:   06/12/23 93 kg (205 lb)     Body mass index is 32.11 kg/m .    EXAM:  GENERAL: This is a well-developed 72 year old male who appears his stated age  EYES: Grossly normal.  MOUTH: Buccal mucosa normal   CARDIAC: Normal   CHEST/LUNG: Clear to auscultation bilaterally  GASTROINTESINAL soft nontender nondistended  MUSCULOSKELETAL: Grossly normal and both lower extremities are intact.  HEME/LYMPH: No lymphedema  NEUROLOGIC: Focally intact, Alert and oriented x 3.   PSYCH: appropriate affect  INTEGUMENT: No open lesions or ulcers  Pulse Exam:   Palpable pulses in all 4 extremities        DIAGNOSTIC STUDIES:     Images:  MR Brain w/o & w Contrast    Result Date: 6/2/2023  EXAM: MR BRAIN W/O and W CONTRAST, MRA NECK (CAROTIDS) W/O and W CONTRAST, MRA BRAIN ([place]) W/O CONTRAST LOCATION: [facility] DATE/TIME: 6/2/2023 1:33 PM CDT INDICATION: Visual changes, ataxia left hand with finger nose finger, lightheadedness.  SX since Monday. COMPARISON: Head CT 05/30/2023. CTA 07/08/2020. Brain MR 12/29/2018. CONTRAST: 10 mL Gadavist. TECHNIQUE: 1) Routine multiplanar multisequence head MRI without and with intravenous contrast. 2) 3D time-of-flight head MRA without intravenous " contrast. 3) Neck MRA without and with IV contrast. Stenosis measurements made according to NASCET criteria unless otherwise specified. FINDINGS: HEAD MRI: INTRACRANIAL CONTENTS: No acute or subacute infarct. No mass, acute hemorrhage, or extra-axial fluid collections. Patchy and confluent nonspecific T2/FLAIR hyperintensities within the cerebral white matter most consistent with moderate chronic microvascular ischemic change. Mild generalized cerebral atrophy. No hydrocephalus. Normal position of the cerebellar tonsils. No pathologic contrast enhancement. SELLA: No abnormality accounting for technique. OSSEOUS STRUCTURES/SOFT TISSUES: Normal marrow signal.  ORBITS: No abnormality accounting for technique. SINUSES/MASTOIDS: Mild polypoid mucosal thickening in the paranasal sinuses. No middle ear or mastoid effusion. HEAD MRA: ANTERIOR CIRCULATION: No stenosis/occlusion, aneurysm, or high flow vascular malformation. Standard [place] anatomy. POSTERIOR CIRCULATION: No stenosis/occlusion, aneurysm, or high flow vascular malformation. Balanced vertebral arteries supply a normal basilar artery. NECK MRA: RIGHT CAROTID: Atherosclerotic plaque results in approximately 50% stenosis in the right ICA. No dissection. LEFT CAROTID: Atherosclerotic plaque results in less than 50% stenosis in the left ICA. No dissection. VERTEBRAL ARTERIES: No focal stenosis or dissection. Balanced vertebral arteries. AORTIC ARCH: Classic aortic arch anatomy with no significant stenosis at the origin of the great vessels.     IMPRESSION: HEAD MRI: 1.  No acute infarct, mass, hemorrhage, or herniation. 2.  Mild diffuse parenchymal volume loss and moderate white matter changes most likely due to chronic microvascular ischemic disease. These findings are similar to prior MR 12/29/2018. HEAD MRA: 1.  Normal MRA [place]. NECK MRA: 1.  Patent arteries in the neck without evidence of dissection. 2.  Atherosclerotic disease in the  carotid arteries right greater than left. There is approximately 50% stenosis of the right carotid artery by NASCET criteria. No significant stenosis of the left carotid artery.    MRA Brain ([place]) wo Contrast    Result Date: 6/2/2023  EXAM: MR BRAIN W/O and W CONTRAST, MRA NECK (CAROTIDS) W/O and W CONTRAST, MRA BRAIN ([place]) W/O CONTRAST LOCATION: [facility] DATE/TIME: 6/2/2023 1:33 PM CDT INDICATION: Visual changes, ataxia left hand with finger nose finger, lightheadedness.  SX since Monday. COMPARISON: Head CT 05/30/2023. CTA 07/08/2020. Brain MR 12/29/2018. CONTRAST: 10 mL Gadavist. TECHNIQUE: 1) Routine multiplanar multisequence head MRI without and with intravenous contrast. 2) 3D time-of-flight head MRA without intravenous contrast. 3) Neck MRA without and with IV contrast. Stenosis measurements made according to NASCET criteria unless otherwise specified. FINDINGS: HEAD MRI: INTRACRANIAL CONTENTS: No acute or subacute infarct. No mass, acute hemorrhage, or extra-axial fluid collections. Patchy and confluent nonspecific T2/FLAIR hyperintensities within the cerebral white matter most consistent with moderate chronic microvascular ischemic change. Mild generalized cerebral atrophy. No hydrocephalus. Normal position of the cerebellar tonsils. No pathologic contrast enhancement. SELLA: No abnormality accounting for technique. OSSEOUS STRUCTURES/SOFT TISSUES: Normal marrow signal.  ORBITS: No abnormality accounting for technique. SINUSES/MASTOIDS: Mild polypoid mucosal thickening in the paranasal sinuses. No middle ear or mastoid effusion. HEAD MRA: ANTERIOR CIRCULATION: No stenosis/occlusion, aneurysm, or high flow vascular malformation. Standard [place] anatomy. POSTERIOR CIRCULATION: No stenosis/occlusion, aneurysm, or high flow vascular malformation. Balanced vertebral arteries supply a normal basilar artery. NECK MRA: RIGHT CAROTID: Atherosclerotic  plaque results in approximately 50% stenosis in the right ICA. No dissection. LEFT CAROTID: Atherosclerotic plaque results in less than 50% stenosis in the left ICA. No dissection. VERTEBRAL ARTERIES: No focal stenosis or dissection. Balanced vertebral arteries. AORTIC ARCH: Classic aortic arch anatomy with no significant stenosis at the origin of the great vessels.     IMPRESSION: HEAD MRI: 1.  No acute infarct, mass, hemorrhage, or herniation. 2.  Mild diffuse parenchymal volume loss and moderate white matter changes most likely due to chronic microvascular ischemic disease. These findings are similar to prior MR 12/29/2018. HEAD MRA: 1.  Normal MRA [place]. NECK MRA: 1.  Patent arteries in the neck without evidence of dissection. 2.  Atherosclerotic disease in the carotid arteries right greater than left. There is approximately 50% stenosis of the right carotid artery by NASCET criteria. No significant stenosis of the left carotid artery.    MRA Neck (Carotids) wo & w Contrast    Result Date: 6/2/2023  EXAM: MR BRAIN W/O and W CONTRAST, MRA NECK (CAROTIDS) W/O and W CONTRAST, MRA BRAIN ([place]) W/O CONTRAST LOCATION: [facility] DATE/TIME: 6/2/2023 1:33 PM CDT INDICATION: Visual changes, ataxia left hand with finger nose finger, lightheadedness.  SX since Monday. COMPARISON: Head CT 05/30/2023. CTA 07/08/2020. Brain MR 12/29/2018. CONTRAST: 10 mL Gadavist. TECHNIQUE: 1) Routine multiplanar multisequence head MRI without and with intravenous contrast. 2) 3D time-of-flight head MRA without intravenous contrast. 3) Neck MRA without and with IV contrast. Stenosis measurements made according to NASCET criteria unless otherwise specified. FINDINGS: HEAD MRI: INTRACRANIAL CONTENTS: No acute or subacute infarct. No mass, acute hemorrhage, or extra-axial fluid collections. Patchy and confluent nonspecific T2/FLAIR hyperintensities within the cerebral white matter most consistent  with moderate chronic microvascular ischemic change. Mild generalized cerebral atrophy. No hydrocephalus. Normal position of the cerebellar tonsils. No pathologic contrast enhancement. SELLA: No abnormality accounting for technique. OSSEOUS STRUCTURES/SOFT TISSUES: Normal marrow signal.  ORBITS: No abnormality accounting for technique. SINUSES/MASTOIDS: Mild polypoid mucosal thickening in the paranasal sinuses. No middle ear or mastoid effusion. HEAD MRA: ANTERIOR CIRCULATION: No stenosis/occlusion, aneurysm, or high flow vascular malformation. Standard [place] anatomy. POSTERIOR CIRCULATION: No stenosis/occlusion, aneurysm, or high flow vascular malformation. Balanced vertebral arteries supply a normal basilar artery. NECK MRA: RIGHT CAROTID: Atherosclerotic plaque results in approximately 50% stenosis in the right ICA. No dissection. LEFT CAROTID: Atherosclerotic plaque results in less than 50% stenosis in the left ICA. No dissection. VERTEBRAL ARTERIES: No focal stenosis or dissection. Balanced vertebral arteries. AORTIC ARCH: Classic aortic arch anatomy with no significant stenosis at the origin of the great vessels.     IMPRESSION: HEAD MRI: 1.  No acute infarct, mass, hemorrhage, or herniation. 2.  Mild diffuse parenchymal volume loss and moderate white matter changes most likely due to chronic microvascular ischemic disease. These findings are similar to prior MR 12/29/2018. HEAD MRA: 1.  Normal MRA [place]. NECK MRA: 1.  Patent arteries in the neck without evidence of dissection. 2.  Atherosclerotic disease in the carotid arteries right greater than left. There is approximately 50% stenosis of the right carotid artery by NASCET criteria. No significant stenosis of the left carotid artery.    XR Chest 1 View    Result Date: 6/2/2023  EXAM: XR CHEST 1 VIEW LOCATION: [facility] DATE/TIME: 6/2/2023 10:46 AM CDT INDICATION: pre MRI eval ppm device COMPARISON: 11/10/2022      IMPRESSION: Dual-lead left chest cardiac device. Leads are intact. No fractured or abandoned leads. Patchy mid and basilar predominant linear opacities likely representing atelectasis/scarring. No effusions or pneumothorax. Cardiomegaly without overt pulmonary edema. Left atrial appendage occlusion device.    CT HEAD BRAIN WO    Result Date: 5/30/2023  For Patients: As a result of the 21st Century Cures Act, medical imaging exams and procedure reports are released immediately into your electronic medical record. You may view this report before your referring provider. If you have questions, please contact your health care provider. EXAM: CT HEAD BRAIN WO LOCATION: [facility] DATE/TIME: 5/30/2023 2:49 PM CDT INDICATION: Transient ischemic attack (TIA) COMPARISON: CT brain 07/08/2020. TECHNIQUE: Routine CT Head without IV contrast. Multiplanar reformats. Dose reduction techniques were used. FINDINGS: INTRACRANIAL CONTENTS: No finding for intracranial hemorrhage, mass, or acute infarct. Mild to moderate stable prominence of the lateral ventricles. Incidental note is made of a cavum septum pellucidum et vergae. There is moderate symmetric low-attenuation change within the cerebral white matter, nonspecific but compatible with sequela of chronic microvascular ischemic change given the patient's age and similar to prior. Cerebellar tonsils are normally positioned. Incidental note is made of a partially empty sella, as seen on the prior. Corpus callosum is normally formed. VISUALIZED ORBITS/SINUSES/MASTOIDS: Prior cataract surgeries. Minimal left maxillary sinus mucosal thickening. No middle ear or mastoid effusion. BONES/SOFT TISSUES: Calvarium is intact, without suspicious lytic or blastic foci.    1.  No finding for intracranial hemorrhage, mass, or acute infarct. If there is persistent clinical concern for acute or evolving infarct, consider MRI diffusion-weighted imaging given its greater  sensitivity and specificity if clinically indicated. 2.  Mild to moderate volume loss and presumed sequela of chronic microvascular ischemic change, similar to prior.          LABS:      Sodium   Date Value Ref Range Status   06/02/2023 135 (L) 136 - 145 mmol/L Final   11/22/2022 141 136 - 145 mmol/L Final   11/16/2021 143 136 - 145 mmol/L Final     Urea Nitrogen   Date Value Ref Range Status   06/02/2023 8.8 8.0 - 23.0 mg/dL Final   11/22/2022 8.4 8.0 - 23.0 mg/dL Final   11/16/2021 7 (L) 8 - 28 mg/dL Final   10/02/2020 11 8 - 22 mg/dL Final   07/08/2020 9 8 - 22 mg/dL Final     Hemoglobin   Date Value Ref Range Status   06/02/2023 14.7 13.3 - 17.7 g/dL Final   07/08/2020 13.4 (L) 14.0 - 18.0 g/dL Final   08/14/2019 13.6 (L) 14.0 - 18.0 g/dL Final     Platelet Count   Date Value Ref Range Status   06/02/2023 288 150 - 450 10e3/uL Final   07/08/2020 303 140 - 440 thou/uL Final   08/14/2019 281 140 - 440 thou/uL Final     BNP   Date Value Ref Range Status   01/18/2019 <10 0 - 64 pg/mL Final   01/11/2019 11 0 - 64 pg/mL Final     INR   Date Value Ref Range Status   08/14/2019 2.34 (H) 0.90 - 1.10 Final   04/20/2019 1.13 (H) 0.90 - 1.10 Final   01/18/2019 1.24 (H) 0.90 - 1.10 Final       35 minutes spent on the day of encounter doing chart review, history and exam, documentation, and further activities as noted.         Ivon Cervantes MD, [facility]  VASCULAR SURGERY

## 2023-06-30 ENCOUNTER — ANCILLARY PROCEDURE (OUTPATIENT)
Dept: CARDIOLOGY | Facility: CLINIC | Age: 73
End: 2023-06-30
Attending: INTERNAL MEDICINE
Payer: MEDICARE

## 2023-06-30 DIAGNOSIS — I49.5 SICK SINUS SYNDROME (H): ICD-10-CM

## 2023-06-30 DIAGNOSIS — Z95.0 PACEMAKER: ICD-10-CM

## 2023-06-30 DIAGNOSIS — I48.91 A-FIB (H): ICD-10-CM

## 2023-07-11 LAB
MDC_IDC_EPISODE_DTM: NORMAL
MDC_IDC_EPISODE_DURATION: 1 S
MDC_IDC_EPISODE_DURATION: 2 S
MDC_IDC_EPISODE_ID: 613
MDC_IDC_EPISODE_ID: 614
MDC_IDC_EPISODE_ID: 615
MDC_IDC_EPISODE_ID: 616
MDC_IDC_EPISODE_TYPE: NORMAL
MDC_IDC_LEAD_IMPLANT_DT: NORMAL
MDC_IDC_LEAD_IMPLANT_DT: NORMAL
MDC_IDC_LEAD_LOCATION: NORMAL
MDC_IDC_LEAD_LOCATION: NORMAL
MDC_IDC_LEAD_LOCATION_DETAIL_1: NORMAL
MDC_IDC_LEAD_LOCATION_DETAIL_1: NORMAL
MDC_IDC_LEAD_MFG: NORMAL
MDC_IDC_LEAD_MFG: NORMAL
MDC_IDC_LEAD_MODEL: NORMAL
MDC_IDC_LEAD_MODEL: NORMAL
MDC_IDC_LEAD_POLARITY_TYPE: NORMAL
MDC_IDC_LEAD_POLARITY_TYPE: NORMAL
MDC_IDC_LEAD_SERIAL: NORMAL
MDC_IDC_LEAD_SERIAL: NORMAL
MDC_IDC_LEAD_SPECIAL_FUNCTION: NORMAL
MDC_IDC_LEAD_SPECIAL_FUNCTION: NORMAL
MDC_IDC_MSMT_BATTERY_DTM: NORMAL
MDC_IDC_MSMT_BATTERY_REMAINING_LONGEVITY: 124 MO
MDC_IDC_MSMT_BATTERY_RRT_TRIGGER: 2.62
MDC_IDC_MSMT_BATTERY_STATUS: NORMAL
MDC_IDC_MSMT_BATTERY_VOLTAGE: 3 V
MDC_IDC_MSMT_LEADCHNL_RA_IMPEDANCE_VALUE: 304 OHM
MDC_IDC_MSMT_LEADCHNL_RA_IMPEDANCE_VALUE: 361 OHM
MDC_IDC_MSMT_LEADCHNL_RA_PACING_THRESHOLD_AMPLITUDE: 0.5 V
MDC_IDC_MSMT_LEADCHNL_RA_PACING_THRESHOLD_PULSEWIDTH: 0.4 MS
MDC_IDC_MSMT_LEADCHNL_RA_SENSING_INTR_AMPL: 2.38 MV
MDC_IDC_MSMT_LEADCHNL_RA_SENSING_INTR_AMPL: 2.38 MV
MDC_IDC_MSMT_LEADCHNL_RV_IMPEDANCE_VALUE: 323 OHM
MDC_IDC_MSMT_LEADCHNL_RV_IMPEDANCE_VALUE: 399 OHM
MDC_IDC_MSMT_LEADCHNL_RV_PACING_THRESHOLD_AMPLITUDE: 1 V
MDC_IDC_MSMT_LEADCHNL_RV_PACING_THRESHOLD_PULSEWIDTH: 0.4 MS
MDC_IDC_MSMT_LEADCHNL_RV_SENSING_INTR_AMPL: 1.25 MV
MDC_IDC_MSMT_LEADCHNL_RV_SENSING_INTR_AMPL: 1.25 MV
MDC_IDC_PG_IMPLANT_DTM: NORMAL
MDC_IDC_PG_MFG: NORMAL
MDC_IDC_PG_MODEL: NORMAL
MDC_IDC_PG_SERIAL: NORMAL
MDC_IDC_PG_TYPE: NORMAL
MDC_IDC_SESS_CLINIC_NAME: NORMAL
MDC_IDC_SESS_DTM: NORMAL
MDC_IDC_SESS_TYPE: NORMAL
MDC_IDC_SET_BRADY_AT_MODE_SWITCH_RATE: 171 {BEATS}/MIN
MDC_IDC_SET_BRADY_HYSTRATE: NORMAL
MDC_IDC_SET_BRADY_LOWRATE: 60 {BEATS}/MIN
MDC_IDC_SET_BRADY_MAX_SENSOR_RATE: 130 {BEATS}/MIN
MDC_IDC_SET_BRADY_MAX_TRACKING_RATE: 130 {BEATS}/MIN
MDC_IDC_SET_BRADY_MODE: NORMAL
MDC_IDC_SET_BRADY_PAV_DELAY_LOW: 180 MS
MDC_IDC_SET_BRADY_SAV_DELAY_LOW: 150 MS
MDC_IDC_SET_LEADCHNL_RA_PACING_AMPLITUDE: 1.5 V
MDC_IDC_SET_LEADCHNL_RA_PACING_ANODE_ELECTRODE_1: NORMAL
MDC_IDC_SET_LEADCHNL_RA_PACING_ANODE_LOCATION_1: NORMAL
MDC_IDC_SET_LEADCHNL_RA_PACING_CAPTURE_MODE: NORMAL
MDC_IDC_SET_LEADCHNL_RA_PACING_CATHODE_ELECTRODE_1: NORMAL
MDC_IDC_SET_LEADCHNL_RA_PACING_CATHODE_LOCATION_1: NORMAL
MDC_IDC_SET_LEADCHNL_RA_PACING_POLARITY: NORMAL
MDC_IDC_SET_LEADCHNL_RA_PACING_PULSEWIDTH: 0.4 MS
MDC_IDC_SET_LEADCHNL_RA_SENSING_ANODE_ELECTRODE_1: NORMAL
MDC_IDC_SET_LEADCHNL_RA_SENSING_ANODE_LOCATION_1: NORMAL
MDC_IDC_SET_LEADCHNL_RA_SENSING_CATHODE_ELECTRODE_1: NORMAL
MDC_IDC_SET_LEADCHNL_RA_SENSING_CATHODE_LOCATION_1: NORMAL
MDC_IDC_SET_LEADCHNL_RA_SENSING_POLARITY: NORMAL
MDC_IDC_SET_LEADCHNL_RA_SENSING_SENSITIVITY: 0.3 MV
MDC_IDC_SET_LEADCHNL_RV_PACING_AMPLITUDE: 1.5 V
MDC_IDC_SET_LEADCHNL_RV_PACING_ANODE_ELECTRODE_1: NORMAL
MDC_IDC_SET_LEADCHNL_RV_PACING_ANODE_LOCATION_1: NORMAL
MDC_IDC_SET_LEADCHNL_RV_PACING_CAPTURE_MODE: NORMAL
MDC_IDC_SET_LEADCHNL_RV_PACING_CATHODE_ELECTRODE_1: NORMAL
MDC_IDC_SET_LEADCHNL_RV_PACING_CATHODE_LOCATION_1: NORMAL
MDC_IDC_SET_LEADCHNL_RV_PACING_POLARITY: NORMAL
MDC_IDC_SET_LEADCHNL_RV_PACING_PULSEWIDTH: 0.4 MS
MDC_IDC_SET_LEADCHNL_RV_SENSING_ANODE_ELECTRODE_1: NORMAL
MDC_IDC_SET_LEADCHNL_RV_SENSING_ANODE_LOCATION_1: NORMAL
MDC_IDC_SET_LEADCHNL_RV_SENSING_CATHODE_ELECTRODE_1: NORMAL
MDC_IDC_SET_LEADCHNL_RV_SENSING_CATHODE_LOCATION_1: NORMAL
MDC_IDC_SET_LEADCHNL_RV_SENSING_POLARITY: NORMAL
MDC_IDC_SET_LEADCHNL_RV_SENSING_SENSITIVITY: 0.9 MV
MDC_IDC_SET_ZONE_DETECTION_INTERVAL: 350 MS
MDC_IDC_SET_ZONE_DETECTION_INTERVAL: 400 MS
MDC_IDC_SET_ZONE_TYPE: NORMAL
MDC_IDC_STAT_AT_BURDEN_PERCENT: 0 %
MDC_IDC_STAT_AT_DTM_END: NORMAL
MDC_IDC_STAT_AT_DTM_START: NORMAL
MDC_IDC_STAT_BRADY_AP_VP_PERCENT: 0.87 %
MDC_IDC_STAT_BRADY_AP_VS_PERCENT: 16.69 %
MDC_IDC_STAT_BRADY_AS_VP_PERCENT: 3.98 %
MDC_IDC_STAT_BRADY_AS_VS_PERCENT: 78.46 %
MDC_IDC_STAT_BRADY_DTM_END: NORMAL
MDC_IDC_STAT_BRADY_DTM_START: NORMAL
MDC_IDC_STAT_BRADY_RA_PERCENT_PACED: 17.27 %
MDC_IDC_STAT_BRADY_RV_PERCENT_PACED: 4.85 %
MDC_IDC_STAT_EPISODE_RECENT_COUNT: 0
MDC_IDC_STAT_EPISODE_RECENT_COUNT: 4
MDC_IDC_STAT_EPISODE_RECENT_COUNT_DTM_END: NORMAL
MDC_IDC_STAT_EPISODE_RECENT_COUNT_DTM_START: NORMAL
MDC_IDC_STAT_EPISODE_TOTAL_COUNT: 0
MDC_IDC_STAT_EPISODE_TOTAL_COUNT: 0
MDC_IDC_STAT_EPISODE_TOTAL_COUNT: 20
MDC_IDC_STAT_EPISODE_TOTAL_COUNT: 204
MDC_IDC_STAT_EPISODE_TOTAL_COUNT: 261
MDC_IDC_STAT_EPISODE_TOTAL_COUNT_DTM_END: NORMAL
MDC_IDC_STAT_EPISODE_TOTAL_COUNT_DTM_START: NORMAL
MDC_IDC_STAT_EPISODE_TYPE: NORMAL

## 2023-07-11 PROCEDURE — 93294 REM INTERROG EVL PM/LDLS PM: CPT | Performed by: INTERNAL MEDICINE

## 2023-07-11 PROCEDURE — 93296 REM INTERROG EVL PM/IDS: CPT | Performed by: INTERNAL MEDICINE

## 2023-07-12 ENCOUNTER — TELEPHONE (OUTPATIENT)
Dept: CARDIOLOGY | Facility: CLINIC | Age: 73
End: 2023-07-12
Payer: MEDICARE

## 2023-07-12 NOTE — TELEPHONE ENCOUNTER
[place] Community Me[place] Blood [place] reviewed.  Patients recent blood pressure is outside of guideline parameters.  Called pt to review, no answer.  Left voicemail message asking patient to check their blood pressure using a home blood pressure cuff or by going to a [facility].  Patient instructed to then call [phone] ([facility]) and leave a message with their name, date of birth, and blood pressure reading that was completed within the last 24 hours and where it was completed.  Will await call back for further review.

## 2023-07-20 VITALS — HEART RATE: 62 BPM | DIASTOLIC BLOOD PRESSURE: 78 MMHG | SYSTOLIC BLOOD PRESSURE: 118 MMHG

## 2023-07-20 NOTE — TELEPHONE ENCOUNTER
Patient returned call and left voicemail message with update blood pressure reading.      Last Blood Pressure: 142/86  Last Heart Rate: 59  Date: 06/12/23  Location: Other Specialty    Today's Blood Pressure: 118/78  Today's Heart Rate: 62  Location: Home BP    Patient reported blood pressure updated in Epic. Blood pressure falls within [place] Community Measures guidelines.  Patient will follow up as previously advised.

## 2023-10-02 ENCOUNTER — OFFICE VISIT (OUTPATIENT)
Dept: CARDIOLOGY | Facility: CLINIC | Age: 73
End: 2023-10-02
Attending: INTERNAL MEDICINE
Payer: MEDICARE

## 2023-10-02 VITALS
BODY MASS INDEX: 31.64 KG/M2 | DIASTOLIC BLOOD PRESSURE: 78 MMHG | OXYGEN SATURATION: 96 % | HEART RATE: 64 BPM | SYSTOLIC BLOOD PRESSURE: 118 MMHG | WEIGHT: 202 LBS | RESPIRATION RATE: 18 BRPM

## 2023-10-02 DIAGNOSIS — I49.5 SICK SINUS SYNDROME (H): ICD-10-CM

## 2023-10-02 DIAGNOSIS — I25.10 NONOCCLUSIVE CORONARY ATHEROSCLEROSIS OF NATIVE CORONARY ARTERY: ICD-10-CM

## 2023-10-02 DIAGNOSIS — Z95.818 PRESENCE OF LEFT ATRIAL APPENDAGE CLOSURE DEVICE COMPOSED OF NICKEL-TITANIUM ALLOY WITH POLYETHYLENE TEREPHTHALATE MEMBRANE: ICD-10-CM

## 2023-10-02 DIAGNOSIS — I48.91 A-FIB (H): ICD-10-CM

## 2023-10-02 DIAGNOSIS — I48.0 PAROXYSMAL ATRIAL FIBRILLATION (H): Primary | ICD-10-CM

## 2023-10-02 DIAGNOSIS — I49.5 SSS (SICK SINUS SYNDROME) (H): ICD-10-CM

## 2023-10-02 DIAGNOSIS — Z95.0 CARDIAC PACEMAKER IN SITU: ICD-10-CM

## 2023-10-02 DIAGNOSIS — Z95.0 PACEMAKER: ICD-10-CM

## 2023-10-02 LAB
MDC_IDC_EPISODE_DTM: NORMAL
MDC_IDC_EPISODE_DURATION: 1 S
MDC_IDC_EPISODE_DURATION: 1 S
MDC_IDC_EPISODE_DURATION: 2 S
MDC_IDC_EPISODE_DURATION: 3 S
MDC_IDC_EPISODE_ID: 617
MDC_IDC_EPISODE_ID: 618
MDC_IDC_EPISODE_ID: 619
MDC_IDC_EPISODE_ID: 620
MDC_IDC_EPISODE_TYPE: NORMAL
MDC_IDC_LEAD_IMPLANT_DT: NORMAL
MDC_IDC_LEAD_IMPLANT_DT: NORMAL
MDC_IDC_LEAD_LOCATION: NORMAL
MDC_IDC_LEAD_LOCATION: NORMAL
MDC_IDC_LEAD_LOCATION_DETAIL_1: NORMAL
MDC_IDC_LEAD_LOCATION_DETAIL_1: NORMAL
MDC_IDC_LEAD_MFG: NORMAL
MDC_IDC_LEAD_MFG: NORMAL
MDC_IDC_LEAD_MODEL: NORMAL
MDC_IDC_LEAD_MODEL: NORMAL
MDC_IDC_LEAD_POLARITY_TYPE: NORMAL
MDC_IDC_LEAD_POLARITY_TYPE: NORMAL
MDC_IDC_LEAD_SERIAL: NORMAL
MDC_IDC_LEAD_SERIAL: NORMAL
MDC_IDC_LEAD_SPECIAL_FUNCTION: NORMAL
MDC_IDC_LEAD_SPECIAL_FUNCTION: NORMAL
MDC_IDC_MSMT_BATTERY_DTM: NORMAL
MDC_IDC_MSMT_BATTERY_REMAINING_LONGEVITY: 121 MO
MDC_IDC_MSMT_BATTERY_RRT_TRIGGER: 2.62
MDC_IDC_MSMT_BATTERY_STATUS: NORMAL
MDC_IDC_MSMT_BATTERY_VOLTAGE: 3 V
MDC_IDC_MSMT_LEADCHNL_RA_IMPEDANCE_VALUE: 323 OHM
MDC_IDC_MSMT_LEADCHNL_RA_IMPEDANCE_VALUE: 361 OHM
MDC_IDC_MSMT_LEADCHNL_RA_PACING_THRESHOLD_AMPLITUDE: 0.75 V
MDC_IDC_MSMT_LEADCHNL_RA_PACING_THRESHOLD_AMPLITUDE: 0.75 V
MDC_IDC_MSMT_LEADCHNL_RA_PACING_THRESHOLD_PULSEWIDTH: 0.4 MS
MDC_IDC_MSMT_LEADCHNL_RA_PACING_THRESHOLD_PULSEWIDTH: 0.4 MS
MDC_IDC_MSMT_LEADCHNL_RA_SENSING_INTR_AMPL: 2.25 MV
MDC_IDC_MSMT_LEADCHNL_RA_SENSING_INTR_AMPL: 2.75 MV
MDC_IDC_MSMT_LEADCHNL_RV_IMPEDANCE_VALUE: 342 OHM
MDC_IDC_MSMT_LEADCHNL_RV_IMPEDANCE_VALUE: 399 OHM
MDC_IDC_MSMT_LEADCHNL_RV_PACING_THRESHOLD_AMPLITUDE: 1 V
MDC_IDC_MSMT_LEADCHNL_RV_PACING_THRESHOLD_AMPLITUDE: 1 V
MDC_IDC_MSMT_LEADCHNL_RV_PACING_THRESHOLD_PULSEWIDTH: 0.4 MS
MDC_IDC_MSMT_LEADCHNL_RV_PACING_THRESHOLD_PULSEWIDTH: 0.4 MS
MDC_IDC_MSMT_LEADCHNL_RV_SENSING_INTR_AMPL: 2.12 MV
MDC_IDC_MSMT_LEADCHNL_RV_SENSING_INTR_AMPL: 6 MV
MDC_IDC_PG_IMPLANT_DTM: NORMAL
MDC_IDC_PG_MFG: NORMAL
MDC_IDC_PG_MODEL: NORMAL
MDC_IDC_PG_SERIAL: NORMAL
MDC_IDC_PG_TYPE: NORMAL
MDC_IDC_SESS_CLINIC_NAME: NORMAL
MDC_IDC_SESS_DTM: NORMAL
MDC_IDC_SESS_TYPE: NORMAL
MDC_IDC_SET_BRADY_AT_MODE_SWITCH_RATE: 171 {BEATS}/MIN
MDC_IDC_SET_BRADY_HYSTRATE: NORMAL
MDC_IDC_SET_BRADY_LOWRATE: 60 {BEATS}/MIN
MDC_IDC_SET_BRADY_MAX_SENSOR_RATE: 130 {BEATS}/MIN
MDC_IDC_SET_BRADY_MAX_TRACKING_RATE: 130 {BEATS}/MIN
MDC_IDC_SET_BRADY_MODE: NORMAL
MDC_IDC_SET_BRADY_PAV_DELAY_LOW: 180 MS
MDC_IDC_SET_BRADY_SAV_DELAY_LOW: 150 MS
MDC_IDC_SET_LEADCHNL_RA_PACING_AMPLITUDE: NORMAL
MDC_IDC_SET_LEADCHNL_RA_PACING_ANODE_ELECTRODE_1: NORMAL
MDC_IDC_SET_LEADCHNL_RA_PACING_ANODE_LOCATION_1: NORMAL
MDC_IDC_SET_LEADCHNL_RA_PACING_CAPTURE_MODE: NORMAL
MDC_IDC_SET_LEADCHNL_RA_PACING_CATHODE_ELECTRODE_1: NORMAL
MDC_IDC_SET_LEADCHNL_RA_PACING_CATHODE_LOCATION_1: NORMAL
MDC_IDC_SET_LEADCHNL_RA_PACING_POLARITY: NORMAL
MDC_IDC_SET_LEADCHNL_RA_PACING_PULSEWIDTH: 0.4 MS
MDC_IDC_SET_LEADCHNL_RA_SENSING_ANODE_ELECTRODE_1: NORMAL
MDC_IDC_SET_LEADCHNL_RA_SENSING_ANODE_LOCATION_1: NORMAL
MDC_IDC_SET_LEADCHNL_RA_SENSING_CATHODE_ELECTRODE_1: NORMAL
MDC_IDC_SET_LEADCHNL_RA_SENSING_CATHODE_LOCATION_1: NORMAL
MDC_IDC_SET_LEADCHNL_RA_SENSING_POLARITY: NORMAL
MDC_IDC_SET_LEADCHNL_RA_SENSING_SENSITIVITY: 0.3 MV
MDC_IDC_SET_LEADCHNL_RV_PACING_AMPLITUDE: NORMAL
MDC_IDC_SET_LEADCHNL_RV_PACING_ANODE_ELECTRODE_1: NORMAL
MDC_IDC_SET_LEADCHNL_RV_PACING_ANODE_LOCATION_1: NORMAL
MDC_IDC_SET_LEADCHNL_RV_PACING_CAPTURE_MODE: NORMAL
MDC_IDC_SET_LEADCHNL_RV_PACING_CATHODE_ELECTRODE_1: NORMAL
MDC_IDC_SET_LEADCHNL_RV_PACING_CATHODE_LOCATION_1: NORMAL
MDC_IDC_SET_LEADCHNL_RV_PACING_POLARITY: NORMAL
MDC_IDC_SET_LEADCHNL_RV_PACING_PULSEWIDTH: 0.4 MS
MDC_IDC_SET_LEADCHNL_RV_SENSING_ANODE_ELECTRODE_1: NORMAL
MDC_IDC_SET_LEADCHNL_RV_SENSING_ANODE_LOCATION_1: NORMAL
MDC_IDC_SET_LEADCHNL_RV_SENSING_CATHODE_ELECTRODE_1: NORMAL
MDC_IDC_SET_LEADCHNL_RV_SENSING_CATHODE_LOCATION_1: NORMAL
MDC_IDC_SET_LEADCHNL_RV_SENSING_POLARITY: NORMAL
MDC_IDC_SET_LEADCHNL_RV_SENSING_SENSITIVITY: 0.9 MV
MDC_IDC_SET_ZONE_DETECTION_INTERVAL: 350 MS
MDC_IDC_SET_ZONE_DETECTION_INTERVAL: 400 MS
MDC_IDC_SET_ZONE_TYPE: NORMAL
MDC_IDC_STAT_AT_BURDEN_PERCENT: 0 %
MDC_IDC_STAT_AT_DTM_END: NORMAL
MDC_IDC_STAT_AT_DTM_START: NORMAL
MDC_IDC_STAT_AT_MODE_SW_COUNT: 0
MDC_IDC_STAT_BRADY_AP_VP_PERCENT: 2.62 %
MDC_IDC_STAT_BRADY_AP_VS_PERCENT: 11.94 %
MDC_IDC_STAT_BRADY_AS_VP_PERCENT: 13.9 %
MDC_IDC_STAT_BRADY_AS_VS_PERCENT: 71.53 %
MDC_IDC_STAT_BRADY_DTM_END: NORMAL
MDC_IDC_STAT_BRADY_DTM_START: NORMAL
MDC_IDC_STAT_BRADY_RA_PERCENT_PACED: 13.65 %
MDC_IDC_STAT_BRADY_RV_PERCENT_PACED: 16.52 %
MDC_IDC_STAT_EPISODE_RECENT_COUNT: 0
MDC_IDC_STAT_EPISODE_RECENT_COUNT: 0
MDC_IDC_STAT_EPISODE_RECENT_COUNT: 31
MDC_IDC_STAT_EPISODE_RECENT_COUNT_DTM_END: NORMAL
MDC_IDC_STAT_EPISODE_RECENT_COUNT_DTM_START: NORMAL
MDC_IDC_STAT_EPISODE_TOTAL_COUNT: 0
MDC_IDC_STAT_EPISODE_TOTAL_COUNT: 20
MDC_IDC_STAT_EPISODE_TOTAL_COUNT: 208
MDC_IDC_STAT_EPISODE_TOTAL_COUNT_DTM_END: NORMAL
MDC_IDC_STAT_EPISODE_TOTAL_COUNT_DTM_START: NORMAL
MDC_IDC_STAT_EPISODE_TYPE: NORMAL

## 2023-10-02 PROCEDURE — 99214 OFFICE O/P EST MOD 30 MIN: CPT | Performed by: NURSE PRACTITIONER

## 2023-10-02 PROCEDURE — 93280 PM DEVICE PROGR EVAL DUAL: CPT | Performed by: INTERNAL MEDICINE

## 2023-10-02 RX ORDER — SOTALOL HYDROCHLORIDE 80 MG/1
80 TABLET ORAL 2 TIMES DAILY
Qty: 180 TABLET | Refills: 3 | Status: SHIPPED | OUTPATIENT
Start: 2023-10-02 | End: 2024-10-03

## 2023-10-02 NOTE — PATIENT INSTRUCTIONS
Ashok Del Rosario,    It was a pleasure to see you today at the [facility].     My recommendations after this visit include:    Continue current medications    Follow up with Dr. Vargas in 6 months  Follow up with me in 1 year, or sooner if needed    Suzan Waddell, CNP  [facility], Electrophysiology  [phone]  EP nurses [phone]

## 2023-10-02 NOTE — LETTER
10/2/2023    Bj Hendrickson MD  [address] [place] [address]    RE: Ashok Del Rosario       Dear Colleague,     I had the pleasure of seeing Ashok Del Rosario in the [facility].    HEART CARE ELECTROPHYSIOLOGY NOTE      [facility]  [phone]      Assessment/Recommendations   Assessment/Plan:  1.  Paroxysmal atrial fibrillation:  No significant episodes of AF.  We discussed the natural progression of atrial fibrillation and treatment options of antiarrhythmic medications versus repeat ablation.  He is doing very well on his current regimen, so we will continue out making any changes at this time.  Continue on sotalol 80 mg twice daily.     He was reassured that atrial fibrillation is not life-threatening, but carries an increased risk for stroke.  He has a [id number] score of 5 for age 65-74, hypertension, possible TIA, and vascular disease.  He underwent left atrial appendage closure with the Watchman FLX device as part of the Option study done at [place] on 2/4/2020.  He has had no neurologic symptoms or events.    Continue aspirin 81 mg indefinitely.      2.  Sick sinus syndrome status post dual-chamber pacemaker implant:  The pacemaker was remotely interrogated and is functioning appropriately.  The battery showed estimated longevity of 10 years.  Atrial and ventricular lead sensing, impedance, and pacing thresholds were stable and within normal limits.  He has had no further symptoms of syncope since his pacemaker implant.    Ventricular pacing down to 17%.     3.  Hypertension: Blood pressure at target.  Continue sotalol as above.     4.  Nonobstructive coronary artery disease: 30-35% stenosis of the LAD and circumflex seen on previous angiogram, but severe coronary calcification seen on more recent CTA.   NM stress test done in June 2021 negative for ischemia.  Denies angina.   Continue atorvastatin 80 mg daily.    5.  Nonsustained ventricular  tachycardia: Occasional brief episodes consistent with NSVT seen on pacemaker interrogation, though some are SVT per review of EGM's.  No sustained ventricular episodes.  Considered low risk in the setting of normal LVEF without ischemia.      Follow up with Dr. Vargas in 6 months  Follow up with me in 1 year     History of Present Illness/Subjective    HPI: Ashok Del Rosario is a 72 year old male who comes in today companied by his wife for EP device and arrhythmia follow-up.  He has a history of sinus bradycardia status post dual-chamber pacemaker implant on 12/31/2018, TIA, atrial fibrillation, carotid artery stenosis, hypertension, hyperlipidemia, anxiety, mild obstructive sleep apnea, and emphysema.  COVID infection in August 2022.    He was diagnosed with atrial fibrillation with rapid ventricular response for which he underwent early cardioversion in the emergency department on 1/11/2019.  Symptoms consist of tachypalpitations, lightheadedness, and shortness of breath.   He was started on metoprolol, but hospitalized again over for another episode that was severely symptomatic as the heart rates were in the 200s.  He was given adenosine by EMS for what appeared to be SVT.  During his hospitalization, he was going to be started on sotalol, but was instead started on flecainide 75 mg twice daily by Dr. Chand who evaluated his coronary artery disease as not significant.   He underwent pulmonary vein isolation ablation on 8/29/2019 performed at [place] prior to which his flecainide was discontinued.  He underwent left atrial appendage closure with the Watchman FLX as part of the option study on 2/4/2020, also done at [place].    Post implant JOVANI showed a well-seated device with no significant pari-device flow and no evidence of thrombus.  He had a prolonged episode of AF with very rapid ventricular response in February associated with lightheadedness and nausea as well as short episodes more consistent with PAT.   He continued to have frequent episodes of AF, so was started on sotalol 80 mg twice daily on 7/25/2022.  He is on aspirin for stroke prophylaxis.       Pat states that he has been feeling well.  He and his wife are remodeling a bathroom, including tearing out of tub and installing a shower.   He has not had any A. fib.  He denies chest discomfort, palpitations, abdominal fullness/bloating or peripheral edema, shortness of breath, paroxysmal nocturnal dyspnea, orthopnea, headedness, dizziness, pre-syncope, or syncope.        Cardiographics (EKGs and device interrogation personally reviewed):  EKG done 6/2/2023 shows sinus rhythm at 66 bpm, right bundle branch block,  ms, QT/QTc 470/492 ms, though shorter on manual measurement  EKG done 7/29/2022 shows atrial pacing with intrinsic ventricular conduction at 60 bpm, right bundle branch block,  ms, QT/QTc is 474/474 ms  EKG done 5/25/2019 shows atrial fibrillation with rapid ventricular response at 139 bpm   EKG, Done 1/18/2019 starts in A. fib with transition to sinus rhythm  EKG done 1/14/2019 shows sinus rhythm at 89 bpm, incomplete right bundle branch block, QT/QTc interval measures 378/459 ms  EKG done 1/11/2019 shows atrial fibrillation with rapid ventricular response at 141 bpm.  Subsequent EKG shows sinus tachycardia at 105 bpm with an incomplete right bundle branch block     Pacemaker Interrogation (MDT implant 12/31/2018):  Pacing mode: MVP   Presenting rhythm: AS-VS 64 bpm.   Underlying rhythm: SR 64 bpm  A-paced: 14%.  V-paced 17%.  Battery: estimated longevity 10 years.  Atrial and Ventricular leads: Stable with good sensing, impedance, and pacing thresholds  Arrhythmias: 0AT/AF, 31 VHR, available EGM's consistent with PSVT  [place]: <0.1%   Histograms: Good rate distribution     JOVANI done 1/23/2021 at [facility] [place]:  1. The baseline ECG demonstrated sinus rhythm with a rate of 82 bpm.  2. The WATCHMAN device is visualized and is well-expanded  within the left atrial appendage.  3. Small residual jet on the postero-inferiorior aspect of the device (jet size 2 mm; JOVANI angle  139 degrees; clip #61). The remainder of the WATCHMAN device margin appears well sealed.  4. No intracardiac mass or thrombus identified.  No thrombus on the atrial face of the WATCHMAN  device.  5. No pericardial effusion.  6. No shunt at atrial level by color flow imaging and agitated saline contrast injection.  7. Normal left ventricular chamber size.  Left ventricular ejection fraction 60%.  8. 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  9. Normal right ventricular chamber size and systolic function.  10. Moderate tricuspid valve regurgitation.     CTA pulmonary veings done 11/20/2019 at [facility]:   PULMONARY VEINS:  Two right and two left pulmonary veins enter the left atrium unobstructed. No  evidence of pulmonary vein stenosis.     CARDIOVASCULAR FINDINGS:     Presumed focal dissection with nearby ulcerated plaque in the left subclavian  artery, likely unchanged. Scattered calcified and noncalcified plaque in the  remainder of the thoracic aorta and visualized arch vessels. Severe coronary  calcification. No intracardiac mass or thrombus. Left atrial enlargement.    ADDITIONAL FINDINGS:      Focal peripheral hyperenhancement in hepatic segment 4A, indeterminate but  likely benign. Left anterior chest wall dual chamber pacemaker. Redemonstrated  centrilobular and paraseptal emphysema with decreased fluid and debris within  the previously noted left lower lobe bulla/cavity; this lesion remains somewhat  thick-walled.    Nuclear Lexiscan stress test done 6/7/2021:    The nuclear stress test is negative for inducible myocardial ischemia or infarction.    The left ventricular ejection fraction at stress is 63%.    A prior study was conducted on 10/16/2017.  This study has no change when compared with the prior study.     Coronary angiogram done  10/17/2017:   Mid Cx lesion 30% stenosed.  Prox LAD to Dist LAD lesion 35% stenosed.  The left ventricular size is normal. The left ventricular systolic function is normal. LV systolic pressure is normal. LV end diastolic pressure is normal. There are no wall motion abnormalities in the left ventricle.    I have reviewed and updated the patient's Past Medical History, Social History, Family History and Medication List.  Outside records personally reviewed.     Physical Examination  Review of Systems   Vitals: /78 (BP Location: Right arm, Patient Position: Sitting, Cuff Size: Adult Large)   Pulse 64   Resp 18   Wt 91.6 kg (202 lb)   SpO2 96%   BMI 31.64 kg/m    BMI= Body mass index is 31.64 kg/m .  Wt Readings from Last 3 Encounters:   10/02/23 91.6 kg (202 lb)   06/12/23 93 kg (205 lb)   06/02/23 94.3 kg (208 lb)       General Appearance:   Alert, well-appearing and in no acute distress.   HEENT: Atraumatic, normocephalic.  No scleral icterus, normal conjunctivae, EOMs intact, PERRL.  Mucous membranes pink and moist.   Chest/Lungs:   Chest symmetric, spine straight.  Respirations unlabored.  Lungs are clear to auscultation.  Left subclavian pacemaker site with no sign of infection or tissue thinning.   Cardiovascular:   Regular rate and rhythm.  Normal first and second heart sounds with no murmurs, rubs, or gallops; radial and posterior tibial pulses are intact, No edema.   Abdomen:  Soft, nondistended, bowel sounds present.   Extremities: No cyanosis or clubbing.   Musculoskeletal: Moves all extremities.     Skin: Warm, dry, intact.    Neurologic: Mood and affect are appropriate.  Alert and oriented to person, place, time, and situation.        ROS: 10 point ROS neg other than the symptoms noted above in the HPI.         Medical History  Surgical History Family History Social History   Past Medical History:   Diagnosis Date    Anxiety about health     per [facility]    Dyslipidemia     GERD  (gastroesophageal reflux disease)     Hypertension     Nonocclusive coronary atherosclerosis of native coronary artery 10/17/2017    Paroxysmal atrial fibrillation (H) 2019    Dx [id number] score = 5 (age, HTN, CAD, TIA 2) Rx flecainide Rx Xarelto    Pulmonary emphysema (H) 2019    per [facility]    Right bundle branch block 2019    Solitary pulmonary nodule 2019    per [facility]    Syncope 2019    TIA (transient ischemic attack) 2018     Past Surgical History:   Procedure Laterality Date    APPENDECTOMY      CARDIAC ELECTROPHYSIOLOGY MAPPING AND ABLATION  2019    PVI done at [facility]    CV CORONARY ANGIOGRAM N/A 10/17/2017    Procedure: Coronary Angiogram;  Surgeon: Ashok Hyatt MD;  Location: [facility] Cath Lab;  Service:     CV LEFT HEART CATHETERIZATION WITH LEFT VENTRICULOGRAM N/A 10/17/2017    Procedure: Left Heart Catheterization with Left Ventriculogram;  Surgeon: Ashok Hyatt MD;  Location: [facility] Cath Lab;  Service:     EP PACEMAKER INSERT Left 2018    Procedure: EP Pacemaker Insertion;  Surgeon: Micheal Junior MD;  Location: [facility] Cath Lab;  Service: Cardiology    OTHER SURGICAL HISTORY  2020    Left atrial appendage closureWatchman FLX at [facility]     Family History   Problem Relation Age of Onset    Cancer Mother     Cancer Father     Coronary Artery Disease Brother     Coronary Artery Disease Brother     Valvular heart disease Brother     Rectal Cancer Sister     Colon Cancer Sister         Social History     Socioeconomic History    Marital status:      Spouse name: Not on file    Number of children: Not on file    Years of education: Not on file    Highest education level: Not on file   Occupational History    Not on file   Tobacco Use    Smoking status: Former     Types: Cigarettes     Quit date: 10/16/1999     Years since quittin.9    Smokeless tobacco: Never   Substance and Sexual Activity    Alcohol use: Yes      Alcohol/week: 1.0 standard drink of alcohol     Comment: Alcoholic Drinks/day: none since december    Drug use: No    Sexual activity: Yes     Partners: Female   Other Topics Concern    Not on file   Social History Narrative    Not on file     Social Determinants of Health     Financial Resource Strain: Not on file   Food Insecurity: Not on file   Transportation Needs: Not on file   Physical Activity: Not on file   Stress: Not on file   Social Connections: Not on file   Interpersonal Safety: Not on file   Housing Stability: Not on file           Medications  Allergies   Current Outpatient Medications   Medication Sig Dispense Refill    acetaminophen (TYLENOL) 500 MG tablet [ACETAMINOPHEN (TYLENOL) 500 MG TABLET] Take 1-2 tablets (500-1,000 mg total) by mouth every 4 (four) hours as needed.  0    aspirin (ASPIR-81) 81 MG EC tablet [ASPIRIN (ASPIR-81) 81 MG EC TABLET] Take 81 mg by mouth daily.       atorvastatin (LIPITOR) 80 MG tablet [ATORVASTATIN (LIPITOR) 80 MG TABLET] TAKE 1 TABLET BY MOUTH EVERYDAY AT BEDTIME 90 tablet 1    carboxymethylcellulose (REFRESH PLUS) 0.5 % SOLN ophthalmic solution Place 1 drop into the right eye At Bedtime      neomycin-polymyxin-dexamethasone (MAXITROL) 3.5-25824-1.1 ophthalmic ointment Place 0.5 inches into the right eye nightly as needed      omeprazole (PRILOSEC) 40 MG capsule [OMEPRAZOLE (PRILOSEC) 40 MG CAPSULE] Take 40 mg by mouth daily before breakfast.      sotalol (BETAPACE) 80 MG tablet Take 1 tablet (80 mg) by mouth 2 times daily 180 tablet 3       Allergies   Allergen Reactions    Cephalexin Rash          Lab Results    Chemistry/lipid CBC Cardiac Enzymes/BNP/TSH/INR   Recent Labs   Lab Test 11/22/22  0838 11/16/21  0923   CHOL 112 119   HDL 30* 34*   LDL 53 65   TRIG 145 98     Recent Labs   Lab Test 06/02/23  0941 11/22/22  0838   * 141   POTASSIUM 4.0 4.3   CHLORIDE 102 106   CO2 22 22   ANIONGAP 11 13   * 137*   BUN 8.8 8.4   CR 0.85 0.89   ESSENCE 9.8 9.2       CBC RESULTS:   Recent Labs   Lab Test 06/02/23  0941   WBC 9.1   RBC 4.97   HGB 14.7   HCT 42.8   MCV 86   MCH 29.6   MCHC 34.3   RDW 13.9               TSH   Date Value Ref Range Status   06/05/2023 0.69 0.30 - 4.20 uIU/mL Final   07/08/2020 0.53 0.30 - 5.00 uIU/mL Final                    Thank you for allowing me to participate in the care of your patient.      Sincerely,     FARAZ Bruno [facility]  cc:   Jorge Dubose MD  [address] [facility] [address]  [place] [address]

## 2023-10-02 NOTE — PROGRESS NOTES
HEART CARE ELECTROPHYSIOLOGY NOTE      [facility]  [phone]      Assessment/Recommendations   Assessment/Plan:  1.  Paroxysmal atrial fibrillation:  No significant episodes of AF.  We discussed the natural progression of atrial fibrillation and treatment options of antiarrhythmic medications versus repeat ablation.  He is doing very well on his current regimen, so we will continue out making any changes at this time.  Continue on sotalol 80 mg twice daily.     He was reassured that atrial fibrillation is not life-threatening, but carries an increased risk for stroke.  He has a [id number] score of 5 for age 65-74, hypertension, possible TIA, and vascular disease.  He underwent left atrial appendage closure with the Watchman FLX device as part of the Option study done at [place] on 2/4/2020.  He has had no neurologic symptoms or events.    Continue aspirin 81 mg indefinitely.      2.  Sick sinus syndrome status post dual-chamber pacemaker implant:  The pacemaker was remotely interrogated and is functioning appropriately.  The battery showed estimated longevity of 10 years.  Atrial and ventricular lead sensing, impedance, and pacing thresholds were stable and within normal limits.  He has had no further symptoms of syncope since his pacemaker implant.    Ventricular pacing down to 17%.     3.  Hypertension: Blood pressure at target.  Continue sotalol as above.     4.  Nonobstructive coronary artery disease: 30-35% stenosis of the LAD and circumflex seen on previous angiogram, but severe coronary calcification seen on more recent CTA.   NM stress test done in June 2021 negative for ischemia.  Denies angina.   Continue atorvastatin 80 mg daily.    5.  Nonsustained ventricular tachycardia: Occasional brief episodes consistent with NSVT seen on pacemaker interrogation, though some are SVT per review of EGM's.  No sustained ventricular episodes.  Considered low risk in the setting of normal LVEF  without ischemia.      Follow up with Dr. Vargas in 6 months  Follow up with me in 1 year     History of Present Illness/Subjective    HPI: Ashok Del Rosario is a 72 year old male who comes in today companied by his wife for EP device and arrhythmia follow-up.  He has a history of sinus bradycardia status post dual-chamber pacemaker implant on 12/31/2018, TIA, atrial fibrillation, carotid artery stenosis, hypertension, hyperlipidemia, anxiety, mild obstructive sleep apnea, and emphysema.  COVID infection in August 2022.    He was diagnosed with atrial fibrillation with rapid ventricular response for which he underwent early cardioversion in the emergency department on 1/11/2019.  Symptoms consist of tachypalpitations, lightheadedness, and shortness of breath.   He was started on metoprolol, but hospitalized again over for another episode that was severely symptomatic as the heart rates were in the 200s.  He was given adenosine by EMS for what appeared to be SVT.  During his hospitalization, he was going to be started on sotalol, but was instead started on flecainide 75 mg twice daily by Dr. Chand who evaluated his coronary artery disease as not significant.   He underwent pulmonary vein isolation ablation on 8/29/2019 performed at [facility] prior to which his flecainide was discontinued.  He underwent left atrial appendage closure with the Watchman FLX as part of the option study on 2/4/2020, also done at [facility].    Post implant JOVANI showed a well-seated device with no significant pari-device flow and no evidence of thrombus.  He had a prolonged episode of AF with very rapid ventricular response in February associated with lightheadedness and nausea as well as short episodes more consistent with PAT.  He continued to have frequent episodes of AF, so was started on sotalol 80 mg twice daily on 7/25/2022.  He is on aspirin for stroke prophylaxis.       Pat states that he has been feeling well.  He and his wife are  remodeling a bathroom, including tearing out of tub and installing a shower.   He has not had any A. fib.  He denies chest discomfort, palpitations, abdominal fullness/bloating or peripheral edema, shortness of breath, paroxysmal nocturnal dyspnea, orthopnea, headedness, dizziness, pre-syncope, or syncope.        Cardiographics (EKGs and device interrogation personally reviewed):  EKG done 6/2/2023 shows sinus rhythm at 66 bpm, right bundle branch block,  ms, QT/QTc 470/492 ms, though shorter on manual measurement  EKG done 7/29/2022 shows atrial pacing with intrinsic ventricular conduction at 60 bpm, right bundle branch block,  ms, QT/QTc is 474/474 ms  EKG done 5/25/2019 shows atrial fibrillation with rapid ventricular response at 139 bpm   EKG, Done 1/18/2019 starts in A. fib with transition to sinus rhythm  EKG done 1/14/2019 shows sinus rhythm at 89 bpm, incomplete right bundle branch block, QT/QTc interval measures 378/459 ms  EKG done 1/11/2019 shows atrial fibrillation with rapid ventricular response at 141 bpm.  Subsequent EKG shows sinus tachycardia at 105 bpm with an incomplete right bundle branch block     Pacemaker Interrogation (MDT implant 12/31/2018):  Pacing mode: MVP   Presenting rhythm: AS-VS 64 bpm.   Underlying rhythm: SR 64 bpm  A-paced: 14%.  V-paced 17%.  Battery: estimated longevity 10 years.  Atrial and Ventricular leads: Stable with good sensing, impedance, and pacing thresholds  Arrhythmias: 0AT/AF, 31 VHR, available EGM's consistent with PSVT  [place]: <0.1%   Histograms: Good rate distribution     JOVANI done 1/23/2021 at [place]:  1. The baseline ECG demonstrated sinus rhythm with a rate of 82 bpm.  2. The WATCHMAN device is visualized and is well-expanded within the left atrial appendage.  3. Small residual jet on the postero-inferiorior aspect of the device (jet size 2 mm; JOVANI angle  139 degrees; clip #61). The remainder of the WATCHMAN device margin appears well  sealed.  4. No intracardiac mass or thrombus identified.  No thrombus on the atrial face of the WATCHMAN  device.  5. No pericardial effusion.  6. No shunt at atrial level by color flow imaging and agitated saline contrast injection.  7. Normal left ventricular chamber size.  Left ventricular ejection fraction 60%.  8. 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  9. Normal right ventricular chamber size and systolic function.  10. Moderate tricuspid valve regurgitation.     CTA pulmonary veings done 11/20/2019 at [place]:   PULMONARY VEINS:  Two right and two left pulmonary veins enter the left atrium unobstructed. No  evidence of pulmonary vein stenosis.     CARDIOVASCULAR FINDINGS:     Presumed focal dissection with nearby ulcerated plaque in the left subclavian  artery, likely unchanged. Scattered calcified and noncalcified plaque in the  remainder of the thoracic aorta and visualized arch vessels. Severe coronary  calcification. No intracardiac mass or thrombus. Left atrial enlargement.    ADDITIONAL FINDINGS:      Focal peripheral hyperenhancement in hepatic segment 4A, indeterminate but  likely benign. Left anterior chest wall dual chamber pacemaker. Redemonstrated  centrilobular and paraseptal emphysema with decreased fluid and debris within  the previously noted left lower lobe bulla/cavity; this lesion remains somewhat  thick-walled.    Nuclear Lexiscan stress test done 6/7/2021:    The nuclear stress test is negative for inducible myocardial ischemia or infarction.    The left ventricular ejection fraction at stress is 63%.    A prior study was conducted on 10/16/2017.  This study has no change when compared with the prior study.     Coronary angiogram done 10/17/2017:   Mid Cx lesion 30% stenosed.  Prox LAD to Dist LAD lesion 35% stenosed.  The left ventricular size is normal. The left ventricular systolic function is normal. LV systolic pressure is normal. LV end diastolic  pressure is normal. There are no wall motion abnormalities in the left ventricle.    I have reviewed and updated the patient's Past Medical History, Social History, Family History and Medication List.  Outside records personally reviewed.     Physical Examination  Review of Systems   Vitals: /78 (BP Location: Right arm, Patient Position: Sitting, Cuff Size: Adult Large)   Pulse 64   Resp 18   Wt 91.6 kg (202 lb)   SpO2 96%   BMI 31.64 kg/m    BMI= Body mass index is 31.64 kg/m .  Wt Readings from Last 3 Encounters:   10/02/23 91.6 kg (202 lb)   06/12/23 93 kg (205 lb)   06/02/23 94.3 kg (208 lb)       General Appearance:   Alert, well-appearing and in no acute distress.   HEENT: Atraumatic, normocephalic.  No scleral icterus, normal conjunctivae, EOMs intact, PERRL.  Mucous membranes pink and moist.   Chest/Lungs:   Chest symmetric, spine straight.  Respirations unlabored.  Lungs are clear to auscultation.  Left subclavian pacemaker site with no sign of infection or tissue thinning.   Cardiovascular:   Regular rate and rhythm.  Normal first and second heart sounds with no murmurs, rubs, or gallops; radial and posterior tibial pulses are intact, No edema.   Abdomen:  Soft, nondistended, bowel sounds present.   Extremities: No cyanosis or clubbing.   Musculoskeletal: Moves all extremities.     Skin: Warm, dry, intact.    Neurologic: Mood and affect are appropriate.  Alert and oriented to person, place, time, and situation.        ROS: 10 point ROS neg other than the symptoms noted above in the HPI.         Medical History  Surgical History Family History Social History   Past Medical History:   Diagnosis Date    Anxiety about health     per [facility]    Dyslipidemia     GERD (gastroesophageal reflux disease)     Hypertension     Nonocclusive coronary atherosclerosis of native coronary artery 10/17/2017    Paroxysmal atrial fibrillation (H) 01/14/2019    Dx [id number] score = 5 (age, HTN, CAD, TIA 2) Rx  flecainide Rx Xarelto    Pulmonary emphysema (H) 2019    per [facility]    Right bundle branch block 2019    Solitary pulmonary nodule 2019    per [facility]    Syncope 2019    TIA (transient ischemic attack) 2018     Past Surgical History:   Procedure Laterality Date    APPENDECTOMY  2000    CARDIAC ELECTROPHYSIOLOGY MAPPING AND ABLATION  2019    PVI done at [facility]    CV CORONARY ANGIOGRAM N/A 10/17/2017    Procedure: Coronary Angiogram;  Surgeon: Ashok Hyatt MD;  Location: [facility] Cath Lab;  Service:     CV LEFT HEART CATHETERIZATION WITH LEFT VENTRICULOGRAM N/A 10/17/2017    Procedure: Left Heart Catheterization with Left Ventriculogram;  Surgeon: Ashok Hyatt MD;  Location: [facility] Cath Lab;  Service:     EP PACEMAKER INSERT Left 2018    Procedure: EP Pacemaker Insertion;  Surgeon: Micheal Junior MD;  Location: [facility] Cath Lab;  Service: Cardiology    OTHER SURGICAL HISTORY  2020    Left atrial appendage closureWatchman FLX at [facility]     Family History   Problem Relation Age of Onset    Cancer Mother     Cancer Father     Coronary Artery Disease Brother     Coronary Artery Disease Brother     Valvular heart disease Brother     Rectal Cancer Sister     Colon Cancer Sister         Social History     Socioeconomic History    Marital status:      Spouse name: Not on file    Number of children: Not on file    Years of education: Not on file    Highest education level: Not on file   Occupational History    Not on file   Tobacco Use    Smoking status: Former     Types: Cigarettes     Quit date: 10/16/1999     Years since quittin.9    Smokeless tobacco: Never   Substance and Sexual Activity    Alcohol use: Yes     Alcohol/week: 1.0 standard drink of alcohol     Comment: Alcoholic Drinks/day: none since december    Drug use: No    Sexual activity: Yes     Partners: Female   Other Topics Concern    Not on file   Social History Narrative    Not on  file     Social Determinants of Health     Financial Resource Strain: Not on file   Food Insecurity: Not on file   Transportation Needs: Not on file   Physical Activity: Not on file   Stress: Not on file   Social Connections: Not on file   Interpersonal Safety: Not on file   Housing Stability: Not on file           Medications  Allergies   Current Outpatient Medications   Medication Sig Dispense Refill    acetaminophen (TYLENOL) 500 MG tablet [ACETAMINOPHEN (TYLENOL) 500 MG TABLET] Take 1-2 tablets (500-1,000 mg total) by mouth every 4 (four) hours as needed.  0    aspirin (ASPIR-81) 81 MG EC tablet [ASPIRIN (ASPIR-81) 81 MG EC TABLET] Take 81 mg by mouth daily.       atorvastatin (LIPITOR) 80 MG tablet [ATORVASTATIN (LIPITOR) 80 MG TABLET] TAKE 1 TABLET BY MOUTH EVERYDAY AT BEDTIME 90 tablet 1    carboxymethylcellulose (REFRESH PLUS) 0.5 % SOLN ophthalmic solution Place 1 drop into the right eye At Bedtime      neomycin-polymyxin-dexamethasone (MAXITROL) 3.5-28269-8.1 ophthalmic ointment Place 0.5 inches into the right eye nightly as needed      omeprazole (PRILOSEC) 40 MG capsule [OMEPRAZOLE (PRILOSEC) 40 MG CAPSULE] Take 40 mg by mouth daily before breakfast.      sotalol (BETAPACE) 80 MG tablet Take 1 tablet (80 mg) by mouth 2 times daily 180 tablet 3       Allergies   Allergen Reactions    Cephalexin Rash          Lab Results    Chemistry/lipid CBC Cardiac Enzymes/BNP/TSH/INR   Recent Labs   Lab Test 11/22/22  0838 11/16/21  0923   CHOL 112 119   HDL 30* 34*   LDL 53 65   TRIG 145 98     Recent Labs   Lab Test 06/02/23  0941 11/22/22  0838   * 141   POTASSIUM 4.0 4.3   CHLORIDE 102 106   CO2 22 22   ANIONGAP 11 13   * 137*   BUN 8.8 8.4   CR 0.85 0.89   ESSENCE 9.8 9.2      CBC RESULTS:   Recent Labs   Lab Test 06/02/23  0941   WBC 9.1   RBC 4.97   HGB 14.7   HCT 42.8   MCV 86   MCH 29.6   MCHC 34.3   RDW 13.9               TSH   Date Value Ref Range Status   06/05/2023 0.69 0.30 - 4.20 uIU/mL  Final   07/08/2020 0.53 0.30 - 5.00 uIU/mL Final

## 2023-10-09 ENCOUNTER — DOCUMENTATION ONLY (OUTPATIENT)
Dept: CARDIOLOGY | Facility: CLINIC | Age: 73
End: 2023-10-09
Payer: MEDICARE

## 2023-10-09 NOTE — PROGRESS NOTES
No charge. No Epic interface required.  Encounter Type: Alert remote pacemaker transmission for RV pacing >40% for 7 days.  Device: Medtronic Kayli.   Pacing %/Programmed: AP 14%,  48% at AAI<=>DDD 60/130.  Lead(s): Stable.  Battery longevity: 9yrs, 11mo estimated.  Presenting: AS- 65 bpm.  Atrial high rates: since 10/2/23; None detected.  Anticoagulant: LAAO.  Ventricular High rates: since 10/2/23; One ventricular high rate episode, appears to be NSVT 6bts.   Comments: Normal device function.   Plan: Routed to device RN for review. MARKEL Sherman, Device Specialist  ADD: Transmission reviewed. Increase in RV pacing is most likely related to undersensing of R wave noted intermittently on VHR EGMs. Per trends, R wave is highly variable with measurements between <2 -10mV. RV sensitivity is currently programmed at 0.9mV. Would suggest patient coming in for reprogramming of RV sensitivity as well as turning PMOP off. Routed to Dr. Vick for further recommendations.     Haylie Little, RN   Device Nurse

## 2023-10-10 NOTE — PROGRESS NOTES
Patient is scheduled 10/12/2023 for further RV lead assessment and reprogramming as recommended by Dr. Vick.       Haylie Little, RN  Device Nurse

## 2023-10-12 ENCOUNTER — ANCILLARY PROCEDURE (OUTPATIENT)
Dept: CARDIOLOGY | Facility: CLINIC | Age: 73
End: 2023-10-12
Attending: INTERNAL MEDICINE
Payer: MEDICARE

## 2023-10-12 DIAGNOSIS — Z95.0 PACEMAKER: ICD-10-CM

## 2023-10-12 DIAGNOSIS — I48.91 A-FIB (H): ICD-10-CM

## 2023-10-12 DIAGNOSIS — I49.5 SICK SINUS SYNDROME (H): ICD-10-CM

## 2023-10-12 LAB
MDC_IDC_LEAD_CONNECTION_STATUS: NORMAL
MDC_IDC_LEAD_CONNECTION_STATUS: NORMAL
MDC_IDC_LEAD_IMPLANT_DT: NORMAL
MDC_IDC_LEAD_IMPLANT_DT: NORMAL
MDC_IDC_LEAD_LOCATION: NORMAL
MDC_IDC_LEAD_LOCATION: NORMAL
MDC_IDC_LEAD_LOCATION_DETAIL_1: NORMAL
MDC_IDC_LEAD_LOCATION_DETAIL_1: NORMAL
MDC_IDC_LEAD_MFG: NORMAL
MDC_IDC_LEAD_MFG: NORMAL
MDC_IDC_LEAD_MODEL: NORMAL
MDC_IDC_LEAD_MODEL: NORMAL
MDC_IDC_LEAD_POLARITY_TYPE: NORMAL
MDC_IDC_LEAD_POLARITY_TYPE: NORMAL
MDC_IDC_LEAD_SERIAL: NORMAL
MDC_IDC_LEAD_SERIAL: NORMAL
MDC_IDC_LEAD_SPECIAL_FUNCTION: NORMAL
MDC_IDC_LEAD_SPECIAL_FUNCTION: NORMAL
MDC_IDC_MSMT_BATTERY_DTM: NORMAL
MDC_IDC_MSMT_BATTERY_REMAINING_LONGEVITY: 119 MO
MDC_IDC_MSMT_BATTERY_RRT_TRIGGER: 2.62
MDC_IDC_MSMT_BATTERY_STATUS: NORMAL
MDC_IDC_MSMT_BATTERY_VOLTAGE: 2.99 V
MDC_IDC_MSMT_LEADCHNL_RA_IMPEDANCE_VALUE: 323 OHM
MDC_IDC_MSMT_LEADCHNL_RA_IMPEDANCE_VALUE: 361 OHM
MDC_IDC_MSMT_LEADCHNL_RA_PACING_THRESHOLD_AMPLITUDE: 0.75 V
MDC_IDC_MSMT_LEADCHNL_RA_PACING_THRESHOLD_PULSEWIDTH: 0.4 MS
MDC_IDC_MSMT_LEADCHNL_RA_SENSING_INTR_AMPL: 2.25 MV
MDC_IDC_MSMT_LEADCHNL_RA_SENSING_INTR_AMPL: 2.5 MV
MDC_IDC_MSMT_LEADCHNL_RV_IMPEDANCE_VALUE: 323 OHM
MDC_IDC_MSMT_LEADCHNL_RV_IMPEDANCE_VALUE: 399 OHM
MDC_IDC_MSMT_LEADCHNL_RV_PACING_THRESHOLD_AMPLITUDE: 1 V
MDC_IDC_MSMT_LEADCHNL_RV_PACING_THRESHOLD_PULSEWIDTH: 0.4 MS
MDC_IDC_MSMT_LEADCHNL_RV_SENSING_INTR_AMPL: 13.12 MV
MDC_IDC_MSMT_LEADCHNL_RV_SENSING_INTR_AMPL: 8.88 MV
MDC_IDC_PG_IMPLANT_DTM: NORMAL
MDC_IDC_PG_MFG: NORMAL
MDC_IDC_PG_MODEL: NORMAL
MDC_IDC_PG_SERIAL: NORMAL
MDC_IDC_PG_TYPE: NORMAL
MDC_IDC_SESS_CLINIC_NAME: NORMAL
MDC_IDC_SESS_DTM: NORMAL
MDC_IDC_SESS_TYPE: NORMAL
MDC_IDC_SET_BRADY_AT_MODE_SWITCH_RATE: 171 {BEATS}/MIN
MDC_IDC_SET_BRADY_HYSTRATE: NORMAL
MDC_IDC_SET_BRADY_LOWRATE: 60 {BEATS}/MIN
MDC_IDC_SET_BRADY_MAX_SENSOR_RATE: 130 {BEATS}/MIN
MDC_IDC_SET_BRADY_MAX_TRACKING_RATE: 130 {BEATS}/MIN
MDC_IDC_SET_BRADY_MODE: NORMAL
MDC_IDC_SET_BRADY_PAV_DELAY_LOW: 180 MS
MDC_IDC_SET_BRADY_SAV_DELAY_LOW: 150 MS
MDC_IDC_SET_LEADCHNL_RA_PACING_AMPLITUDE: 1.5 V
MDC_IDC_SET_LEADCHNL_RA_PACING_ANODE_ELECTRODE_1: NORMAL
MDC_IDC_SET_LEADCHNL_RA_PACING_ANODE_LOCATION_1: NORMAL
MDC_IDC_SET_LEADCHNL_RA_PACING_CAPTURE_MODE: NORMAL
MDC_IDC_SET_LEADCHNL_RA_PACING_CATHODE_ELECTRODE_1: NORMAL
MDC_IDC_SET_LEADCHNL_RA_PACING_CATHODE_LOCATION_1: NORMAL
MDC_IDC_SET_LEADCHNL_RA_PACING_POLARITY: NORMAL
MDC_IDC_SET_LEADCHNL_RA_PACING_PULSEWIDTH: 0.4 MS
MDC_IDC_SET_LEADCHNL_RA_SENSING_ANODE_ELECTRODE_1: NORMAL
MDC_IDC_SET_LEADCHNL_RA_SENSING_ANODE_LOCATION_1: NORMAL
MDC_IDC_SET_LEADCHNL_RA_SENSING_CATHODE_ELECTRODE_1: NORMAL
MDC_IDC_SET_LEADCHNL_RA_SENSING_CATHODE_LOCATION_1: NORMAL
MDC_IDC_SET_LEADCHNL_RA_SENSING_POLARITY: NORMAL
MDC_IDC_SET_LEADCHNL_RA_SENSING_SENSITIVITY: 0.3 MV
MDC_IDC_SET_LEADCHNL_RV_PACING_AMPLITUDE: 1.5 V
MDC_IDC_SET_LEADCHNL_RV_PACING_ANODE_ELECTRODE_1: NORMAL
MDC_IDC_SET_LEADCHNL_RV_PACING_ANODE_LOCATION_1: NORMAL
MDC_IDC_SET_LEADCHNL_RV_PACING_CAPTURE_MODE: NORMAL
MDC_IDC_SET_LEADCHNL_RV_PACING_CATHODE_ELECTRODE_1: NORMAL
MDC_IDC_SET_LEADCHNL_RV_PACING_CATHODE_LOCATION_1: NORMAL
MDC_IDC_SET_LEADCHNL_RV_PACING_POLARITY: NORMAL
MDC_IDC_SET_LEADCHNL_RV_PACING_PULSEWIDTH: 0.4 MS
MDC_IDC_SET_LEADCHNL_RV_SENSING_ANODE_ELECTRODE_1: NORMAL
MDC_IDC_SET_LEADCHNL_RV_SENSING_ANODE_LOCATION_1: NORMAL
MDC_IDC_SET_LEADCHNL_RV_SENSING_CATHODE_ELECTRODE_1: NORMAL
MDC_IDC_SET_LEADCHNL_RV_SENSING_CATHODE_LOCATION_1: NORMAL
MDC_IDC_SET_LEADCHNL_RV_SENSING_POLARITY: NORMAL
MDC_IDC_SET_LEADCHNL_RV_SENSING_SENSITIVITY: 0.45 MV
MDC_IDC_SET_ZONE_DETECTION_INTERVAL: 350 MS
MDC_IDC_SET_ZONE_DETECTION_INTERVAL: 400 MS
MDC_IDC_SET_ZONE_STATUS: NORMAL
MDC_IDC_SET_ZONE_STATUS: NORMAL
MDC_IDC_SET_ZONE_TYPE: NORMAL
MDC_IDC_SET_ZONE_VENDOR_TYPE: NORMAL
MDC_IDC_STAT_AT_BURDEN_PERCENT: 0 %
MDC_IDC_STAT_AT_DTM_END: NORMAL
MDC_IDC_STAT_AT_DTM_START: NORMAL
MDC_IDC_STAT_BRADY_AP_VP_PERCENT: 4.59 %
MDC_IDC_STAT_BRADY_AP_VS_PERCENT: 10.49 %
MDC_IDC_STAT_BRADY_AS_VP_PERCENT: 45.39 %
MDC_IDC_STAT_BRADY_AS_VS_PERCENT: 39.54 %
MDC_IDC_STAT_BRADY_DTM_END: NORMAL
MDC_IDC_STAT_BRADY_DTM_START: NORMAL
MDC_IDC_STAT_BRADY_RA_PERCENT_PACED: 13.81 %
MDC_IDC_STAT_BRADY_RV_PERCENT_PACED: 49.97 %
MDC_IDC_STAT_EPISODE_RECENT_COUNT: 0
MDC_IDC_STAT_EPISODE_RECENT_COUNT: 1
MDC_IDC_STAT_EPISODE_RECENT_COUNT_DTM_END: NORMAL
MDC_IDC_STAT_EPISODE_RECENT_COUNT_DTM_START: NORMAL
MDC_IDC_STAT_EPISODE_TOTAL_COUNT: 0
MDC_IDC_STAT_EPISODE_TOTAL_COUNT: 0
MDC_IDC_STAT_EPISODE_TOTAL_COUNT: 20
MDC_IDC_STAT_EPISODE_TOTAL_COUNT: 209
MDC_IDC_STAT_EPISODE_TOTAL_COUNT: 261
MDC_IDC_STAT_EPISODE_TOTAL_COUNT_DTM_END: NORMAL
MDC_IDC_STAT_EPISODE_TOTAL_COUNT_DTM_START: NORMAL
MDC_IDC_STAT_EPISODE_TYPE: NORMAL
MDC_IDC_STAT_TACHYTHERAPY_RECENT_DTM_END: NORMAL
MDC_IDC_STAT_TACHYTHERAPY_RECENT_DTM_START: NORMAL
MDC_IDC_STAT_TACHYTHERAPY_TOTAL_DTM_END: NORMAL
MDC_IDC_STAT_TACHYTHERAPY_TOTAL_DTM_START: NORMAL

## 2023-11-27 ENCOUNTER — LAB REQUISITION (OUTPATIENT)
Dept: LAB | Facility: CLINIC | Age: 73
End: 2023-11-27
Payer: MEDICARE

## 2023-11-27 DIAGNOSIS — I10 ESSENTIAL (PRIMARY) HYPERTENSION: ICD-10-CM

## 2023-11-27 DIAGNOSIS — E78.2 MIXED HYPERLIPIDEMIA: ICD-10-CM

## 2023-11-27 LAB
ALBUMIN SERPL BCG-MCNC: 4.2 G/DL (ref 3.5–5.2)
ALP SERPL-CCNC: 67 U/L (ref 40–150)
ALT SERPL W P-5'-P-CCNC: 29 U/L (ref 0–70)
ANION GAP SERPL CALCULATED.3IONS-SCNC: 11 MMOL/L (ref 7–15)
AST SERPL W P-5'-P-CCNC: 28 U/L (ref 0–45)
BILIRUB SERPL-MCNC: 0.4 MG/DL
BUN SERPL-MCNC: 9.8 MG/DL (ref 8–23)
CALCIUM SERPL-MCNC: 9.6 MG/DL (ref 8.8–10.2)
CHLORIDE SERPL-SCNC: 105 MMOL/L (ref 98–107)
CHOLEST SERPL-MCNC: 137 MG/DL
CREAT SERPL-MCNC: 0.92 MG/DL (ref 0.67–1.17)
DEPRECATED HCO3 PLAS-SCNC: 23 MMOL/L (ref 22–29)
EGFRCR SERPLBLD CKD-EPI 2021: 88 ML/MIN/1.73M2
GLUCOSE SERPL-MCNC: 115 MG/DL (ref 70–99)
HDLC SERPL-MCNC: 30 MG/DL
LDLC SERPL CALC-MCNC: 59 MG/DL
NONHDLC SERPL-MCNC: 107 MG/DL
POTASSIUM SERPL-SCNC: 4.6 MMOL/L (ref 3.4–5.3)
PROT SERPL-MCNC: 7.3 G/DL (ref 6.4–8.3)
SODIUM SERPL-SCNC: 139 MMOL/L (ref 135–145)
TRIGL SERPL-MCNC: 238 MG/DL

## 2023-11-27 PROCEDURE — 80061 LIPID PANEL: CPT | Mod: ORL | Performed by: FAMILY MEDICINE

## 2023-11-27 PROCEDURE — 80053 COMPREHEN METABOLIC PANEL: CPT | Mod: ORL | Performed by: FAMILY MEDICINE

## 2023-12-07 NOTE — PROGRESS NOTES
"[facility]        Patient is here for a 6 month follow up  to discuss Carotid stenosis. Patient has no symptoms.    Pt is currently taking Aspirin.    BP (!) 142/94   Pulse 61   Ht 5' 7\" (1.702 m)   Wt 212 lb (96.2 kg)   SpO2 98%   BMI 33.20 kg/m      The provider has been notified that the patient has no concerns.     Questions patient would like addressed today are: N/A.    Refills are needed: No    Has homecare services and agency name:  No          "

## 2023-12-07 NOTE — PATIENT INSTRUCTIONS
Natacha Pulido,    Thank you for entrusting your care with us today. After your visit today with ASHLEY Fagan this is the plan that was discussed at your appointment.    Follow up in for a repeat ultrasound and clinic visit. You can follow up with Dr. Roth in vascular medicine.    We will call you in 3-4 months to get you scheduled.      I am including additional information on these things and our contact information if you have any questions or concerns.   Please do not hesitate to reach out to us if you felt we did not answer your questions or you are unsure of the treatment plan after your visit today. Our number is [phone].Thank you for trusting us with your care.         Again thank you for your time.                  Carotid Duplex    Steps for the test are:  You will be asked to lie on a stretcher. It will be okay for you to wear your street clothes. In some situations, you may be asked to change into a gown.    Ultrasound gel, usually warmed for your comfort, will be placed on either side of your neck.    Through the gel, the technician will apply to your neck a small hand-held device that emits sound waves.   When the test is completed, the technician will remove excess gel from your neck. The gel is water-soluble and will not stain your skin or clothes.    How to Prepare:  Eat and take medications as usual.   Wear minimal or no jewelry to ease application and removal of gel.    Risks  There are typically no side effects or complications associated with a carotid duplex ultrasound examination.    What Can I Expect After the Test?  The technician will send the ultrasound images to your vascular surgeon for evaluation. Typically, a report is available in 2-3 days. If anything critical is found, it is standard practice to notify the vascular surgeon immediately.  Reference: https://vascular.org/patient-resources/vascular-tests

## 2023-12-11 ENCOUNTER — ANCILLARY PROCEDURE (OUTPATIENT)
Dept: VASCULAR ULTRASOUND | Facility: CLINIC | Age: 73
End: 2023-12-11
Attending: SURGERY
Payer: MEDICARE

## 2023-12-11 ENCOUNTER — OFFICE VISIT (OUTPATIENT)
Dept: VASCULAR SURGERY | Facility: CLINIC | Age: 73
End: 2023-12-11
Attending: SURGERY
Payer: MEDICARE

## 2023-12-11 VITALS
OXYGEN SATURATION: 98 % | SYSTOLIC BLOOD PRESSURE: 142 MMHG | DIASTOLIC BLOOD PRESSURE: 94 MMHG | HEART RATE: 61 BPM | WEIGHT: 212 LBS | BODY MASS INDEX: 33.27 KG/M2 | HEIGHT: 67 IN

## 2023-12-11 DIAGNOSIS — I65.21 CAROTID STENOSIS, RIGHT: Primary | ICD-10-CM

## 2023-12-11 DIAGNOSIS — I65.21 CAROTID STENOSIS, RIGHT: ICD-10-CM

## 2023-12-11 PROCEDURE — 93880 EXTRACRANIAL BILAT STUDY: CPT

## 2023-12-11 PROCEDURE — G0463 HOSPITAL OUTPT CLINIC VISIT: HCPCS | Mod: 25 | Performed by: PHYSICIAN ASSISTANT

## 2023-12-11 PROCEDURE — 99213 OFFICE O/P EST LOW 20 MIN: CPT | Performed by: PHYSICIAN ASSISTANT

## 2023-12-11 PROCEDURE — 93880 EXTRACRANIAL BILAT STUDY: CPT | Mod: 26 | Performed by: SURGERY

## 2023-12-11 ASSESSMENT — PAIN SCALES - GENERAL: PAINLEVEL: NO PAIN (0)

## 2023-12-11 NOTE — Clinical Note
Please call to follow up in 9-12 months for repeat carotid and follow up with VASCULAR MEDICINE. May want yearly follow up in September or October instead of exactly 1 year which is okay with RW.

## 2023-12-11 NOTE — PROGRESS NOTES
VASCULAR SURGERY PROGRESS NOTE    LOCATION:  [facility] (formerly [facility])     Ashok Del Rosario  Medical Record #: [id number]  YOB: 1950  Age: 73 year old     Date of Service: 12/11/2023    PRIMARY CARE PROVIDER: Bj Hendrickson    Reason for visit: Surveillance of carotid disease    IMPRESSION: 73-year-old male presenting for follow-up of carotid artery stenosis.  Duplex today demonstrates less than 50% stenosis bilateral ICAs.  Patient is completely asymptomatic and is medically optimized on aspirin and statin therapy.    RECOMMENDATION/RISKS: Continue best medical management.  Follow-up in 1 year with vascular medicine with repeat carotid duplex.    HPI:  Ashok Del Rosario is a 73 year old male with past medical history significant for hypertension, dyslipidemia, paroxysmal atrial fibrillation, history of TIA, and carotid artery stenosis.  Patient was last seen in the vascular clinic 6 months ago and noted red spots in his vision but no classic symptoms of amaurosis fugax.  MRA and ultrasound demonstrated less than 50% stenosis of bilateral carotid arteries.    Today, Mr. Del Rosario presents for follow-up.  He is accompanied by his wife.  Patient denies any new vision changes, slurred speech, facial asymmetry, unilateral extremity numbness or weakness, or other signs/symptoms of TIA or stroke.  He is compliant with his medications.    Ultrasound results were discussed and all questions answered.  No other concerns.    REVIEW OF SYSTEMS:    A 12 point ROS was reviewed and is negative except for what is listed above in HPI.    PHH:    Past Medical History:   Diagnosis Date    Anxiety about health     per [facility]    Dyslipidemia     GERD (gastroesophageal reflux disease)     Hypertension     Nonocclusive coronary atherosclerosis of native coronary artery 10/17/2017    Paroxysmal atrial fibrillation (H) 01/14/2019    Dx [id number] score = 5 (age, HTN, CAD, TIA 2) Rx flecainide Rx Xarelto     Pulmonary emphysema (H) 11/21/2019    per [facility]    Right bundle branch block 08/29/2019    Solitary pulmonary nodule 11/21/2019    per [facility]    Syncope 01/18/2019    TIA (transient ischemic attack) 12/30/2018      Past Surgical History:   Procedure Laterality Date    APPENDECTOMY  2000    CARDIAC ELECTROPHYSIOLOGY MAPPING AND ABLATION  08/29/2019    PVI done at [facility]    CV CORONARY ANGIOGRAM N/A 10/17/2017    Procedure: Coronary Angiogram;  Surgeon: Ashok Hyatt MD;  Location: [facility];  Service:     CV LEFT HEART CATHETERIZATION WITH LEFT VENTRICULOGRAM N/A 10/17/2017    Procedure: Left Heart Catheterization with Left Ventriculogram;  Surgeon: Ashok Hyatt MD;  Location: [facility];  Service:     EP PACEMAKER INSERT Left 12/31/2018    Procedure: EP Pacemaker Insertion;  Surgeon: Micheal Junior MD;  Location: [facility];  Service: Cardiology    OTHER SURGICAL HISTORY  02/04/2020    Left atrial appendage closureWatchman FLX at [facility]     ALLERGIES:  Cephalexin    MEDS:    Current Outpatient Medications:     acetaminophen (TYLENOL) 500 MG tablet, [ACETAMINOPHEN (TYLENOL) 500 MG TABLET] Take 1-2 tablets (500-1,000 mg total) by mouth every 4 (four) hours as needed., Disp: , Rfl: 0    aspirin (ASPIR-81) 81 MG EC tablet, [ASPIRIN (ASPIR-81) 81 MG EC TABLET] Take 81 mg by mouth daily. , Disp: , Rfl:     atorvastatin (LIPITOR) 80 MG tablet, [ATORVASTATIN (LIPITOR) 80 MG TABLET] TAKE 1 TABLET BY MOUTH EVERYDAY AT BEDTIME, Disp: 90 tablet, Rfl: 1    carboxymethylcellulose (REFRESH PLUS) 0.5 % SOLN ophthalmic solution, Place 1 drop into the right eye At Bedtime, Disp: , Rfl:     neomycin-polymyxin-dexamethasone (MAXITROL) 3.5-39746-9.1 ophthalmic ointment, Place 0.5 inches into the right eye nightly as needed, Disp: , Rfl:     omeprazole (PRILOSEC) 40 MG capsule, [OMEPRAZOLE (PRILOSEC) 40 MG CAPSULE] Take 40 mg by mouth daily before breakfast., Disp: , Rfl:     sotalol (BETAPACE) 80  "MG tablet, Take 1 tablet (80 mg) by mouth 2 times daily, Disp: 180 tablet, Rfl: 3    SOCIAL HABITS:    History   Smoking Status    Former    Types: Cigarettes    Quit date: 10/16/1999   Smokeless Tobacco    Never     Social History    Substance and Sexual Activity      Alcohol use: Yes        Alcohol/week: 1.0 standard drink of alcohol        Comment: Alcoholic Drinks/day: none since december      History   Drug Use No     FAMILY HISTORY:    Family History   Problem Relation Age of Onset    Cancer Mother     Cancer Father     Coronary Artery Disease Brother     Coronary Artery Disease Brother     Valvular heart disease Brother     Rectal Cancer Sister     Colon Cancer Sister      PE:  BP (!) 142/94   Pulse 61   Ht 1.702 m (5' 7\")   Wt 96.2 kg (212 lb)   SpO2 98%   BMI 33.20 kg/m    Wt Readings from Last 1 Encounters:   12/11/23 96.2 kg (212 lb)     Body mass index is 33.2 kg/m .    EXAM:  GENERAL: well-developed 73 year old male who appears his stated age  CARDIAC: normal   CHEST/LUNG: normal respiratory effort   MUSCULOSKELETAL: grossly normal and both lower extremities are intact, no lower extremity edema  NEUROLOGIC: focally intact, alert and oriented x 3  PSYCH: appropriate affect    DIAGNOSTIC STUDIES:     Images:    US Carotid Bilateral    I personally reviewed the images and my interpretation is less than 50% stenosis bilateral ICAs based on velocities and NASCET criteria.    LABS:      Sodium   Date Value Ref Range Status   11/27/2023 139 135 - 145 mmol/L Final     Comment:     Reference intervals for this test were updated on 09/26/2023 to more accurately reflect our healthy population. There may be differences in the flagging of prior results with similar values performed with this method. Interpretation of those prior results can be made in the context of the updated reference intervals.    06/02/2023 135 (L) 136 - 145 mmol/L Final   11/22/2022 141 136 - 145 mmol/L Final     Urea Nitrogen   Date " Value Ref Range Status   11/27/2023 9.8 8.0 - 23.0 mg/dL Final   06/02/2023 8.8 8.0 - 23.0 mg/dL Final   11/22/2022 8.4 8.0 - 23.0 mg/dL Final   11/16/2021 7 (L) 8 - 28 mg/dL Final   10/02/2020 11 8 - 22 mg/dL Final   07/08/2020 9 8 - 22 mg/dL Final     Hemoglobin   Date Value Ref Range Status   06/02/2023 14.7 13.3 - 17.7 g/dL Final   07/08/2020 13.4 (L) 14.0 - 18.0 g/dL Final   08/14/2019 13.6 (L) 14.0 - 18.0 g/dL Final     Platelet Count   Date Value Ref Range Status   06/02/2023 288 150 - 450 10e3/uL Final   07/08/2020 303 140 - 440 thou/uL Final   08/14/2019 281 140 - 440 thou/uL Final     BNP   Date Value Ref Range Status   01/18/2019 <10 0 - 64 pg/mL Final   01/11/2019 11 0 - 64 pg/mL Final     INR   Date Value Ref Range Status   08/14/2019 2.34 (H) 0.90 - 1.10 Final   04/20/2019 1.13 (H) 0.90 - 1.10 Final   01/18/2019 1.24 (H) 0.90 - 1.10 Final     25 minutes spent on the day of encounter doing chart review, history and exam, documentation, and further activities as noted.     MARGARETTE MinaC  VASCULAR SURGERY

## 2023-12-23 ENCOUNTER — HEALTH MAINTENANCE LETTER (OUTPATIENT)
Age: 73
End: 2023-12-23

## 2024-01-02 ENCOUNTER — ANCILLARY PROCEDURE (OUTPATIENT)
Dept: CARDIOLOGY | Facility: CLINIC | Age: 74
End: 2024-01-02
Attending: INTERNAL MEDICINE
Payer: MEDICARE

## 2024-01-02 DIAGNOSIS — I49.5 SICK SINUS SYNDROME (H): ICD-10-CM

## 2024-01-02 DIAGNOSIS — Z95.0 CARDIAC PACEMAKER IN SITU: ICD-10-CM

## 2024-01-15 ENCOUNTER — TRANSFERRED RECORDS (OUTPATIENT)
Dept: HEALTH INFORMATION MANAGEMENT | Facility: CLINIC | Age: 74
End: 2024-01-15

## 2024-01-16 ENCOUNTER — TRANSFERRED RECORDS (OUTPATIENT)
Dept: HEALTH INFORMATION MANAGEMENT | Facility: CLINIC | Age: 74
End: 2024-01-16

## 2024-01-22 ENCOUNTER — HOSPITAL ENCOUNTER (EMERGENCY)
Facility: CLINIC | Age: 74
Discharge: HOME | End: 2024-01-22
Attending: STUDENT IN AN ORGANIZED HEALTH CARE EDUCATION/TRAINING PROGRAM | Admitting: STUDENT IN AN ORGANIZED HEALTH CARE EDUCATION/TRAINING PROGRAM
Payer: MEDICARE

## 2024-01-22 VITALS
SYSTOLIC BLOOD PRESSURE: 180 MMHG | RESPIRATION RATE: 18 BRPM | BODY MASS INDEX: 32.96 KG/M2 | OXYGEN SATURATION: 96 % | WEIGHT: 210 LBS | HEIGHT: 67 IN | DIASTOLIC BLOOD PRESSURE: 102 MMHG | HEART RATE: 88 BPM | TEMPERATURE: 98 F

## 2024-01-22 LAB
ATRIAL RATE - MUSE: 88 BPM
DIASTOLIC BLOOD PRESSURE - MUSE: NORMAL MMHG
INTERPRETATION ECG - MUSE: NORMAL
P AXIS - MUSE: 45 DEGREES
PR INTERVAL - MUSE: 172 MS
QRS DURATION - MUSE: 136 MS
QT - MUSE: 426 MS
QTC - MUSE: 515 MS
R AXIS - MUSE: -40 DEGREES
SYSTOLIC BLOOD PRESSURE - MUSE: NORMAL MMHG
T AXIS - MUSE: 6 DEGREES
VENTRICULAR RATE- MUSE: 88 BPM

## 2024-01-22 PROCEDURE — 93005 ELECTROCARDIOGRAM TRACING: CPT | Performed by: STUDENT IN AN ORGANIZED HEALTH CARE EDUCATION/TRAINING PROGRAM

## 2024-01-22 PROCEDURE — 93005 ELECTROCARDIOGRAM TRACING: CPT | Performed by: EMERGENCY MEDICINE

## 2024-01-22 PROCEDURE — 99281 EMR DPT VST MAYX REQ PHY/QHP: CPT

## 2024-01-22 ASSESSMENT — ACTIVITIES OF DAILY LIVING (ADL): ADLS_ACUITY_SCORE: 33

## 2024-01-23 NOTE — ED TRIAGE NOTES
Patient reports epigastric pain that started on the left upper quadrant and radiates across his abdomen and now into his chest. Reports difficulty breathing.      Triage Assessment (Adult)       Row Name 01/22/24 4808          Triage Assessment    Airway WDL WDL        Respiratory WDL    Respiratory WDL X  feels short of breath        Skin Circulation/Temperature WDL    Skin Circulation/Temperature WDL WDL        Cardiac WDL    Cardiac WDL chest pain        Peripheral/Neurovascular WDL    Peripheral Neurovascular WDL WDL        Cognitive/Neuro/Behavioral WDL    Cognitive/Neuro/Behavioral WDL WDL

## 2024-02-02 LAB
MDC_IDC_EPISODE_DTM: NORMAL
MDC_IDC_EPISODE_DURATION: 1 S
MDC_IDC_EPISODE_ID: 622
MDC_IDC_EPISODE_TYPE: NORMAL
MDC_IDC_LEAD_CONNECTION_STATUS: NORMAL
MDC_IDC_LEAD_CONNECTION_STATUS: NORMAL
MDC_IDC_LEAD_IMPLANT_DT: NORMAL
MDC_IDC_LEAD_IMPLANT_DT: NORMAL
MDC_IDC_LEAD_LOCATION: NORMAL
MDC_IDC_LEAD_LOCATION: NORMAL
MDC_IDC_LEAD_LOCATION_DETAIL_1: NORMAL
MDC_IDC_LEAD_LOCATION_DETAIL_1: NORMAL
MDC_IDC_LEAD_MFG: NORMAL
MDC_IDC_LEAD_MFG: NORMAL
MDC_IDC_LEAD_MODEL: NORMAL
MDC_IDC_LEAD_MODEL: NORMAL
MDC_IDC_LEAD_POLARITY_TYPE: NORMAL
MDC_IDC_LEAD_POLARITY_TYPE: NORMAL
MDC_IDC_LEAD_SERIAL: NORMAL
MDC_IDC_LEAD_SERIAL: NORMAL
MDC_IDC_LEAD_SPECIAL_FUNCTION: NORMAL
MDC_IDC_LEAD_SPECIAL_FUNCTION: NORMAL
MDC_IDC_MSMT_BATTERY_DTM: NORMAL
MDC_IDC_MSMT_BATTERY_REMAINING_LONGEVITY: 119 MO
MDC_IDC_MSMT_BATTERY_RRT_TRIGGER: 2.62
MDC_IDC_MSMT_BATTERY_STATUS: NORMAL
MDC_IDC_MSMT_BATTERY_VOLTAGE: 3 V
MDC_IDC_MSMT_LEADCHNL_RA_IMPEDANCE_VALUE: 323 OHM
MDC_IDC_MSMT_LEADCHNL_RA_IMPEDANCE_VALUE: 361 OHM
MDC_IDC_MSMT_LEADCHNL_RA_PACING_THRESHOLD_AMPLITUDE: 0.62 V
MDC_IDC_MSMT_LEADCHNL_RA_PACING_THRESHOLD_PULSEWIDTH: 0.4 MS
MDC_IDC_MSMT_LEADCHNL_RA_SENSING_INTR_AMPL: 2.25 MV
MDC_IDC_MSMT_LEADCHNL_RA_SENSING_INTR_AMPL: 2.25 MV
MDC_IDC_MSMT_LEADCHNL_RV_IMPEDANCE_VALUE: 323 OHM
MDC_IDC_MSMT_LEADCHNL_RV_IMPEDANCE_VALUE: 399 OHM
MDC_IDC_MSMT_LEADCHNL_RV_PACING_THRESHOLD_AMPLITUDE: 1 V
MDC_IDC_MSMT_LEADCHNL_RV_PACING_THRESHOLD_PULSEWIDTH: 0.4 MS
MDC_IDC_MSMT_LEADCHNL_RV_SENSING_INTR_AMPL: 1.12 MV
MDC_IDC_MSMT_LEADCHNL_RV_SENSING_INTR_AMPL: 1.12 MV
MDC_IDC_PG_IMPLANT_DTM: NORMAL
MDC_IDC_PG_MFG: NORMAL
MDC_IDC_PG_MODEL: NORMAL
MDC_IDC_PG_SERIAL: NORMAL
MDC_IDC_PG_TYPE: NORMAL
MDC_IDC_SESS_CLINIC_NAME: NORMAL
MDC_IDC_SESS_DTM: NORMAL
MDC_IDC_SESS_TYPE: NORMAL
MDC_IDC_SET_BRADY_AT_MODE_SWITCH_RATE: 171 {BEATS}/MIN
MDC_IDC_SET_BRADY_HYSTRATE: NORMAL
MDC_IDC_SET_BRADY_LOWRATE: 60 {BEATS}/MIN
MDC_IDC_SET_BRADY_MAX_SENSOR_RATE: 130 {BEATS}/MIN
MDC_IDC_SET_BRADY_MAX_TRACKING_RATE: 130 {BEATS}/MIN
MDC_IDC_SET_BRADY_MODE: NORMAL
MDC_IDC_SET_BRADY_PAV_DELAY_LOW: 180 MS
MDC_IDC_SET_BRADY_SAV_DELAY_LOW: 150 MS
MDC_IDC_SET_LEADCHNL_RA_PACING_AMPLITUDE: 1.5 V
MDC_IDC_SET_LEADCHNL_RA_PACING_ANODE_ELECTRODE_1: NORMAL
MDC_IDC_SET_LEADCHNL_RA_PACING_ANODE_LOCATION_1: NORMAL
MDC_IDC_SET_LEADCHNL_RA_PACING_CAPTURE_MODE: NORMAL
MDC_IDC_SET_LEADCHNL_RA_PACING_CATHODE_ELECTRODE_1: NORMAL
MDC_IDC_SET_LEADCHNL_RA_PACING_CATHODE_LOCATION_1: NORMAL
MDC_IDC_SET_LEADCHNL_RA_PACING_POLARITY: NORMAL
MDC_IDC_SET_LEADCHNL_RA_PACING_PULSEWIDTH: 0.4 MS
MDC_IDC_SET_LEADCHNL_RA_SENSING_ANODE_ELECTRODE_1: NORMAL
MDC_IDC_SET_LEADCHNL_RA_SENSING_ANODE_LOCATION_1: NORMAL
MDC_IDC_SET_LEADCHNL_RA_SENSING_CATHODE_ELECTRODE_1: NORMAL
MDC_IDC_SET_LEADCHNL_RA_SENSING_CATHODE_LOCATION_1: NORMAL
MDC_IDC_SET_LEADCHNL_RA_SENSING_POLARITY: NORMAL
MDC_IDC_SET_LEADCHNL_RA_SENSING_SENSITIVITY: 0.3 MV
MDC_IDC_SET_LEADCHNL_RV_PACING_AMPLITUDE: 1.75 V
MDC_IDC_SET_LEADCHNL_RV_PACING_ANODE_ELECTRODE_1: NORMAL
MDC_IDC_SET_LEADCHNL_RV_PACING_ANODE_LOCATION_1: NORMAL
MDC_IDC_SET_LEADCHNL_RV_PACING_CAPTURE_MODE: NORMAL
MDC_IDC_SET_LEADCHNL_RV_PACING_CATHODE_ELECTRODE_1: NORMAL
MDC_IDC_SET_LEADCHNL_RV_PACING_CATHODE_LOCATION_1: NORMAL
MDC_IDC_SET_LEADCHNL_RV_PACING_POLARITY: NORMAL
MDC_IDC_SET_LEADCHNL_RV_PACING_PULSEWIDTH: 0.4 MS
MDC_IDC_SET_LEADCHNL_RV_SENSING_ANODE_ELECTRODE_1: NORMAL
MDC_IDC_SET_LEADCHNL_RV_SENSING_ANODE_LOCATION_1: NORMAL
MDC_IDC_SET_LEADCHNL_RV_SENSING_CATHODE_ELECTRODE_1: NORMAL
MDC_IDC_SET_LEADCHNL_RV_SENSING_CATHODE_LOCATION_1: NORMAL
MDC_IDC_SET_LEADCHNL_RV_SENSING_POLARITY: NORMAL
MDC_IDC_SET_LEADCHNL_RV_SENSING_SENSITIVITY: 0.45 MV
MDC_IDC_SET_ZONE_DETECTION_INTERVAL: 350 MS
MDC_IDC_SET_ZONE_DETECTION_INTERVAL: 400 MS
MDC_IDC_SET_ZONE_STATUS: NORMAL
MDC_IDC_SET_ZONE_STATUS: NORMAL
MDC_IDC_SET_ZONE_TYPE: NORMAL
MDC_IDC_SET_ZONE_VENDOR_TYPE: NORMAL
MDC_IDC_STAT_AT_BURDEN_PERCENT: 0 %
MDC_IDC_STAT_AT_DTM_END: NORMAL
MDC_IDC_STAT_AT_DTM_START: NORMAL
MDC_IDC_STAT_BRADY_AP_VP_PERCENT: 0.32 %
MDC_IDC_STAT_BRADY_AP_VS_PERCENT: 12.39 %
MDC_IDC_STAT_BRADY_AS_VP_PERCENT: 0.36 %
MDC_IDC_STAT_BRADY_AS_VS_PERCENT: 86.93 %
MDC_IDC_STAT_BRADY_DTM_END: NORMAL
MDC_IDC_STAT_BRADY_DTM_START: NORMAL
MDC_IDC_STAT_BRADY_RA_PERCENT_PACED: 12.54 %
MDC_IDC_STAT_BRADY_RV_PERCENT_PACED: 0.69 %
MDC_IDC_STAT_EPISODE_RECENT_COUNT: 0
MDC_IDC_STAT_EPISODE_RECENT_COUNT: 1
MDC_IDC_STAT_EPISODE_RECENT_COUNT_DTM_END: NORMAL
MDC_IDC_STAT_EPISODE_RECENT_COUNT_DTM_START: NORMAL
MDC_IDC_STAT_EPISODE_TOTAL_COUNT: 0
MDC_IDC_STAT_EPISODE_TOTAL_COUNT: 0
MDC_IDC_STAT_EPISODE_TOTAL_COUNT: 20
MDC_IDC_STAT_EPISODE_TOTAL_COUNT: 210
MDC_IDC_STAT_EPISODE_TOTAL_COUNT: 261
MDC_IDC_STAT_EPISODE_TOTAL_COUNT_DTM_END: NORMAL
MDC_IDC_STAT_EPISODE_TOTAL_COUNT_DTM_START: NORMAL
MDC_IDC_STAT_EPISODE_TYPE: NORMAL
MDC_IDC_STAT_TACHYTHERAPY_RECENT_DTM_END: NORMAL
MDC_IDC_STAT_TACHYTHERAPY_RECENT_DTM_START: NORMAL
MDC_IDC_STAT_TACHYTHERAPY_TOTAL_DTM_END: NORMAL
MDC_IDC_STAT_TACHYTHERAPY_TOTAL_DTM_START: NORMAL

## 2024-02-02 PROCEDURE — 93296 REM INTERROG EVL PM/IDS: CPT | Performed by: INTERNAL MEDICINE

## 2024-02-02 PROCEDURE — 93294 REM INTERROG EVL PM/LDLS PM: CPT | Performed by: INTERNAL MEDICINE

## 2024-02-13 ENCOUNTER — TRANSFERRED RECORDS (OUTPATIENT)
Dept: HEALTH INFORMATION MANAGEMENT | Facility: CLINIC | Age: 74
End: 2024-02-13

## 2024-02-13 ENCOUNTER — LAB REQUISITION (OUTPATIENT)
Dept: LAB | Facility: CLINIC | Age: 74
End: 2024-02-13
Payer: MEDICARE

## 2024-02-13 DIAGNOSIS — D64.9 ANEMIA, UNSPECIFIED: ICD-10-CM

## 2024-02-13 DIAGNOSIS — R06.02 SHORTNESS OF BREATH: ICD-10-CM

## 2024-02-13 LAB
IRON BINDING CAPACITY (ROCHE): 233 UG/DL (ref 240–430)
IRON SATN MFR SERPL: 15 % (ref 15–46)
IRON SERPL-MCNC: 35 UG/DL (ref 61–157)

## 2024-02-13 PROCEDURE — 83550 IRON BINDING TEST: CPT | Mod: ORL | Performed by: FAMILY MEDICINE

## 2024-03-08 ENCOUNTER — TELEPHONE (OUTPATIENT)
Dept: CARDIOLOGY | Facility: CLINIC | Age: 74
End: 2024-03-08
Payer: MEDICARE

## 2024-03-08 NOTE — TELEPHONE ENCOUNTER
Pt returned phone call to discuss concerns. He reports that he has a burning feeling deep down in his chest/lungs and loses his breath. He has this sensation when he goes for walks and gets better after rest.    He did state he started taking Iron pills over the last couple weeks prescribed by his primary. He also reports he had gone to his primary for this had had a CT scan which he reports came back normal.    He would like his symptoms to be relayed to Suzan Waddell NP, to see if this may be related to his AF or medication. He has been on Sotalol long term same dose of 80mg BID 7/262022.     Lupe Mccormick RN

## 2024-03-08 NOTE — TELEPHONE ENCOUNTER
[facility] Call Center    Phone Message    May a detailed message be left on voicemail: yes     Reason for Call: Symptoms or Concerns     If patient has red-flag symptoms, warm transfer to triage line    Current symptom or concern: Shortness of breath    Patient called stating they have been experiencing shortness of breath when they are doing little walking, but feeling fine as of now. Patient scheduled appt to see Suzan, first available on 05/03. Patient will like to see if they are able to be seen sooner or what they should do for the time being. Please call patient back to further discuss.    Action Taken: Other: Cardiology    Travel Screening: Not Applicable    Thank you!  [facility]

## 2024-03-08 NOTE — TELEPHONE ENCOUNTER
Is he having A-fib?  Last device check in January showed no significant arrhythmia.  Not a side effect of the sotalol.  Sounds like deconditioning, particularly as his CT was normal.  Thanks,  Suzan

## 2024-04-09 ENCOUNTER — ANCILLARY PROCEDURE (OUTPATIENT)
Dept: CARDIOLOGY | Facility: CLINIC | Age: 74
End: 2024-04-09
Attending: INTERNAL MEDICINE
Payer: MEDICARE

## 2024-04-09 DIAGNOSIS — I49.5 SICK SINUS SYNDROME (H): ICD-10-CM

## 2024-04-09 DIAGNOSIS — Z95.0 CARDIAC PACEMAKER IN SITU: ICD-10-CM

## 2024-04-11 LAB
MDC_IDC_LEAD_CONNECTION_STATUS: NORMAL
MDC_IDC_LEAD_CONNECTION_STATUS: NORMAL
MDC_IDC_LEAD_IMPLANT_DT: NORMAL
MDC_IDC_LEAD_IMPLANT_DT: NORMAL
MDC_IDC_LEAD_LOCATION: NORMAL
MDC_IDC_LEAD_LOCATION: NORMAL
MDC_IDC_LEAD_LOCATION_DETAIL_1: NORMAL
MDC_IDC_LEAD_LOCATION_DETAIL_1: NORMAL
MDC_IDC_LEAD_MFG: NORMAL
MDC_IDC_LEAD_MFG: NORMAL
MDC_IDC_LEAD_MODEL: NORMAL
MDC_IDC_LEAD_MODEL: NORMAL
MDC_IDC_LEAD_POLARITY_TYPE: NORMAL
MDC_IDC_LEAD_POLARITY_TYPE: NORMAL
MDC_IDC_LEAD_SERIAL: NORMAL
MDC_IDC_LEAD_SERIAL: NORMAL
MDC_IDC_LEAD_SPECIAL_FUNCTION: NORMAL
MDC_IDC_LEAD_SPECIAL_FUNCTION: NORMAL
MDC_IDC_MSMT_BATTERY_DTM: NORMAL
MDC_IDC_MSMT_BATTERY_REMAINING_LONGEVITY: 118 MO
MDC_IDC_MSMT_BATTERY_RRT_TRIGGER: 2.62
MDC_IDC_MSMT_BATTERY_STATUS: NORMAL
MDC_IDC_MSMT_BATTERY_VOLTAGE: 3 V
MDC_IDC_MSMT_LEADCHNL_RA_IMPEDANCE_VALUE: 304 OHM
MDC_IDC_MSMT_LEADCHNL_RA_IMPEDANCE_VALUE: 361 OHM
MDC_IDC_MSMT_LEADCHNL_RA_PACING_THRESHOLD_AMPLITUDE: 0.75 V
MDC_IDC_MSMT_LEADCHNL_RA_PACING_THRESHOLD_PULSEWIDTH: 0.4 MS
MDC_IDC_MSMT_LEADCHNL_RA_SENSING_INTR_AMPL: 2.38 MV
MDC_IDC_MSMT_LEADCHNL_RA_SENSING_INTR_AMPL: 2.38 MV
MDC_IDC_MSMT_LEADCHNL_RV_IMPEDANCE_VALUE: 323 OHM
MDC_IDC_MSMT_LEADCHNL_RV_IMPEDANCE_VALUE: 399 OHM
MDC_IDC_MSMT_LEADCHNL_RV_PACING_THRESHOLD_AMPLITUDE: 0.88 V
MDC_IDC_MSMT_LEADCHNL_RV_PACING_THRESHOLD_PULSEWIDTH: 0.4 MS
MDC_IDC_MSMT_LEADCHNL_RV_SENSING_INTR_AMPL: 2.62 MV
MDC_IDC_MSMT_LEADCHNL_RV_SENSING_INTR_AMPL: 2.62 MV
MDC_IDC_PG_IMPLANT_DTM: NORMAL
MDC_IDC_PG_MFG: NORMAL
MDC_IDC_PG_MODEL: NORMAL
MDC_IDC_PG_SERIAL: NORMAL
MDC_IDC_PG_TYPE: NORMAL
MDC_IDC_SESS_CLINIC_NAME: NORMAL
MDC_IDC_SESS_DTM: NORMAL
MDC_IDC_SESS_TYPE: NORMAL
MDC_IDC_SET_BRADY_AT_MODE_SWITCH_RATE: 171 {BEATS}/MIN
MDC_IDC_SET_BRADY_HYSTRATE: NORMAL
MDC_IDC_SET_BRADY_LOWRATE: 60 {BEATS}/MIN
MDC_IDC_SET_BRADY_MAX_SENSOR_RATE: 130 {BEATS}/MIN
MDC_IDC_SET_BRADY_MAX_TRACKING_RATE: 130 {BEATS}/MIN
MDC_IDC_SET_BRADY_MODE: NORMAL
MDC_IDC_SET_BRADY_PAV_DELAY_LOW: 180 MS
MDC_IDC_SET_BRADY_SAV_DELAY_LOW: 150 MS
MDC_IDC_SET_LEADCHNL_RA_PACING_AMPLITUDE: 1.5 V
MDC_IDC_SET_LEADCHNL_RA_PACING_ANODE_ELECTRODE_1: NORMAL
MDC_IDC_SET_LEADCHNL_RA_PACING_ANODE_LOCATION_1: NORMAL
MDC_IDC_SET_LEADCHNL_RA_PACING_CAPTURE_MODE: NORMAL
MDC_IDC_SET_LEADCHNL_RA_PACING_CATHODE_ELECTRODE_1: NORMAL
MDC_IDC_SET_LEADCHNL_RA_PACING_CATHODE_LOCATION_1: NORMAL
MDC_IDC_SET_LEADCHNL_RA_PACING_POLARITY: NORMAL
MDC_IDC_SET_LEADCHNL_RA_PACING_PULSEWIDTH: 0.4 MS
MDC_IDC_SET_LEADCHNL_RA_SENSING_ANODE_ELECTRODE_1: NORMAL
MDC_IDC_SET_LEADCHNL_RA_SENSING_ANODE_LOCATION_1: NORMAL
MDC_IDC_SET_LEADCHNL_RA_SENSING_CATHODE_ELECTRODE_1: NORMAL
MDC_IDC_SET_LEADCHNL_RA_SENSING_CATHODE_LOCATION_1: NORMAL
MDC_IDC_SET_LEADCHNL_RA_SENSING_POLARITY: NORMAL
MDC_IDC_SET_LEADCHNL_RA_SENSING_SENSITIVITY: 0.3 MV
MDC_IDC_SET_LEADCHNL_RV_PACING_AMPLITUDE: 1.5 V
MDC_IDC_SET_LEADCHNL_RV_PACING_ANODE_ELECTRODE_1: NORMAL
MDC_IDC_SET_LEADCHNL_RV_PACING_ANODE_LOCATION_1: NORMAL
MDC_IDC_SET_LEADCHNL_RV_PACING_CAPTURE_MODE: NORMAL
MDC_IDC_SET_LEADCHNL_RV_PACING_CATHODE_ELECTRODE_1: NORMAL
MDC_IDC_SET_LEADCHNL_RV_PACING_CATHODE_LOCATION_1: NORMAL
MDC_IDC_SET_LEADCHNL_RV_PACING_POLARITY: NORMAL
MDC_IDC_SET_LEADCHNL_RV_PACING_PULSEWIDTH: 0.4 MS
MDC_IDC_SET_LEADCHNL_RV_SENSING_ANODE_ELECTRODE_1: NORMAL
MDC_IDC_SET_LEADCHNL_RV_SENSING_ANODE_LOCATION_1: NORMAL
MDC_IDC_SET_LEADCHNL_RV_SENSING_CATHODE_ELECTRODE_1: NORMAL
MDC_IDC_SET_LEADCHNL_RV_SENSING_CATHODE_LOCATION_1: NORMAL
MDC_IDC_SET_LEADCHNL_RV_SENSING_POLARITY: NORMAL
MDC_IDC_SET_LEADCHNL_RV_SENSING_SENSITIVITY: 0.45 MV
MDC_IDC_SET_ZONE_DETECTION_INTERVAL: 350 MS
MDC_IDC_SET_ZONE_DETECTION_INTERVAL: 400 MS
MDC_IDC_SET_ZONE_STATUS: NORMAL
MDC_IDC_SET_ZONE_STATUS: NORMAL
MDC_IDC_SET_ZONE_TYPE: NORMAL
MDC_IDC_SET_ZONE_VENDOR_TYPE: NORMAL
MDC_IDC_STAT_AT_BURDEN_PERCENT: 0 %
MDC_IDC_STAT_AT_DTM_END: NORMAL
MDC_IDC_STAT_AT_DTM_START: NORMAL
MDC_IDC_STAT_BRADY_AP_VP_PERCENT: 0.07 %
MDC_IDC_STAT_BRADY_AP_VS_PERCENT: 4.37 %
MDC_IDC_STAT_BRADY_AS_VP_PERCENT: 0.04 %
MDC_IDC_STAT_BRADY_AS_VS_PERCENT: 95.51 %
MDC_IDC_STAT_BRADY_DTM_END: NORMAL
MDC_IDC_STAT_BRADY_DTM_START: NORMAL
MDC_IDC_STAT_BRADY_RA_PERCENT_PACED: 4.43 %
MDC_IDC_STAT_BRADY_RV_PERCENT_PACED: 0.11 %
MDC_IDC_STAT_EPISODE_RECENT_COUNT: 0
MDC_IDC_STAT_EPISODE_RECENT_COUNT_DTM_END: NORMAL
MDC_IDC_STAT_EPISODE_RECENT_COUNT_DTM_START: NORMAL
MDC_IDC_STAT_EPISODE_TOTAL_COUNT: 0
MDC_IDC_STAT_EPISODE_TOTAL_COUNT: 0
MDC_IDC_STAT_EPISODE_TOTAL_COUNT: 20
MDC_IDC_STAT_EPISODE_TOTAL_COUNT: 213
MDC_IDC_STAT_EPISODE_TOTAL_COUNT: 261
MDC_IDC_STAT_EPISODE_TOTAL_COUNT_DTM_END: NORMAL
MDC_IDC_STAT_EPISODE_TOTAL_COUNT_DTM_START: NORMAL
MDC_IDC_STAT_EPISODE_TYPE: NORMAL
MDC_IDC_STAT_TACHYTHERAPY_RECENT_DTM_END: NORMAL
MDC_IDC_STAT_TACHYTHERAPY_RECENT_DTM_START: NORMAL
MDC_IDC_STAT_TACHYTHERAPY_TOTAL_DTM_END: NORMAL
MDC_IDC_STAT_TACHYTHERAPY_TOTAL_DTM_START: NORMAL

## 2024-04-11 PROCEDURE — 99207 CARDIAC DEVICE CHECK - REMOTE: CPT | Performed by: INTERNAL MEDICINE

## 2024-05-07 ENCOUNTER — OFFICE VISIT (OUTPATIENT)
Dept: CARDIOLOGY | Facility: CLINIC | Age: 74
End: 2024-05-07
Attending: INTERNAL MEDICINE
Payer: MEDICARE

## 2024-05-07 VITALS
WEIGHT: 202 LBS | BODY MASS INDEX: 31.64 KG/M2 | DIASTOLIC BLOOD PRESSURE: 78 MMHG | HEART RATE: 68 BPM | SYSTOLIC BLOOD PRESSURE: 120 MMHG | RESPIRATION RATE: 14 BRPM

## 2024-05-07 DIAGNOSIS — Z95.0 PACEMAKER: Primary | ICD-10-CM

## 2024-05-07 DIAGNOSIS — I49.5 SICK SINUS SYNDROME (H): ICD-10-CM

## 2024-05-07 DIAGNOSIS — Z95.818 PRESENCE OF LEFT ATRIAL APPENDAGE CLOSURE DEVICE COMPOSED OF NICKEL-TITANIUM ALLOY WITH POLYETHYLENE TEREPHTHALATE MEMBRANE: ICD-10-CM

## 2024-05-07 DIAGNOSIS — I48.91 A-FIB (H): ICD-10-CM

## 2024-05-07 DIAGNOSIS — I49.5 SSS (SICK SINUS SYNDROME) (H): ICD-10-CM

## 2024-05-07 DIAGNOSIS — R06.09 DYSPNEA ON EXERTION: ICD-10-CM

## 2024-05-07 DIAGNOSIS — I25.10 NONOCCLUSIVE CORONARY ATHEROSCLEROSIS OF NATIVE CORONARY ARTERY: ICD-10-CM

## 2024-05-07 DIAGNOSIS — Z95.0 CARDIAC PACEMAKER IN SITU: ICD-10-CM

## 2024-05-07 DIAGNOSIS — I48.0 PAROXYSMAL ATRIAL FIBRILLATION (H): Primary | ICD-10-CM

## 2024-05-07 PROCEDURE — G2211 COMPLEX E/M VISIT ADD ON: HCPCS | Performed by: NURSE PRACTITIONER

## 2024-05-07 PROCEDURE — 99215 OFFICE O/P EST HI 40 MIN: CPT | Performed by: NURSE PRACTITIONER

## 2024-05-07 PROCEDURE — 93000 ELECTROCARDIOGRAM COMPLETE: CPT | Performed by: INTERNAL MEDICINE

## 2024-05-07 RX ORDER — FERROUS SULFATE 325(65) MG
325 TABLET ORAL
COMMUNITY
Start: 2024-01-16 | End: 2024-05-29

## 2024-05-07 NOTE — PATIENT INSTRUCTIONS
Ashok Del Rosario,    It was a pleasure to see you today at the [facility].     My recommendations after this visit include:    Stress test to evaluate for obstructive coronary artery disease  If unremarkable, will trial off sotalol and plan for repeat ablation    (Blood thinner options: Eliquis, Xarelto, Pradaxa)    Evaluation by pulmonary    Follow up with Dr. Guzman to discuss repeat ablation    Suzan Waddell, CNP  [facility], Electrophysiology  [phone]  EP nurses [phone]

## 2024-05-07 NOTE — LETTER
5/7/2024    Bj Hendrickson MD  [address] [place] [address]    RE: Ashok Del Rosario       Dear Colleague,     I had the pleasure of seeing Ashok Del Rosario in the [facility].    HEART CARE ELECTROPHYSIOLOGY NOTE      [facility]  [phone]      Assessment/Recommendations   Assessment/Plan:  1.  Paroxysmal atrial fibrillation:  No episodes of AF.  We discussed the natural progression of atrial fibrillation and treatment options of antiarrhythmic medications versus repeat ablation.  He has had at least mild side effects on sotalol, recommend consideration of repeat ablation.  If stress test is unremarkable, will do a trial off sotalol and plan for after ablation  -- Continue on sotalol 80 mg twice daily.     He was reassured that atrial fibrillation is not life-threatening, but carries an increased risk for stroke.  He has a [id number] score of 5 for age 65-74, hypertension, possible TIA, and vascular disease.  He underwent left atrial appendage closure with the Watchman FLX device as part of the Option study done at [place] on 2/4/2020.  He has had no neurologic symptoms or events.    Discussed need for DOAC 1 week prior to and 6 weeks after ablation.  --Continue aspirin 81 mg indefinitely.      2.  Sick sinus syndrome status post dual-chamber pacemaker implant:  The pacemaker was remotely interrogated and is functioning appropriately.  The battery showed estimated longevity of 9.8 years.  Atrial and ventricular lead sensing, impedance, and pacing thresholds were stable and within normal limits.  He has had no further symptoms of syncope since his pacemaker implant.    Ventricular pacing down to 0.5%.  --Teen pacemaker follow-up     3.  Hypertension: Controlled with sotalol as above.     4.  Coronary artery disease: 30-35% stenosis of the LAD and circumflex seen on previous angiogram, but severe coronary calcification seen on more recent CTA.   NM stress test  "done in June 2021 negative for ischemia.  Currently having progressive exertional symptoms suspicious for angina.     --Continue atorvastatin 80 mg daily  -- NM stress test    5.  Dyspnea on exertion: Associated with a burning sensation in his \"lungs\", so referral to pulmonary for evaluation.  However, he is also having exertional chest pain/tightness suspicious for angina-NM stress test ordered.    Follow up with Dr. Guzman to further discuss catheter ablation     History of Present Illness/Subjective    HPI: Ashok Del Rosario is a 73 year old male who comes in today companied by his wife for EP device and arrhythmia follow-up.  He has a history of sinus bradycardia status post dual-chamber pacemaker implant on 12/31/2018, TIA, atrial fibrillation, carotid artery stenosis, hypertension, hyperlipidemia, anxiety, mild obstructive sleep apnea, and emphysema.  COVID infection in August 2022.    Arrhythmia history  Dx/date: diagnosed with atrial fibrillation with rapid ventricular response 1/11/2019.  Prolonged episode of AF with very rapid ventricular response 2/2022-started on sotalol.  Sx: achypalpitations, lightheadedness, and shortness of breath  [id number] score: 5 for age 65-74, hypertension, possible TIA, and vascular disease  Oral anticoagulation: s/p left atrial appendage closure with the Watchman FLX (Option study) on 2/4/2020 at [place].  Aspirin 81 mg daily  Antiarrhythmic medications, AV bro blocking agents: Briefly on flecainide prior to ablation.  Sotalol 80 mg twice daily (7/25/2022 to present)  Procedures  DCCV: 1/11/2019 (ED)  Ablation: 8/29/2019 at [place] (PVI)     Annika states that he has been having significant and progressive shortness of breath, chest pain and tightness, with a burning sensation across his chest and in his lungs with activity that is \"scary\".  He denies palpitations, abdominal fullness/bloating or peripheral edema, shortness of breath at rest, paroxysmal nocturnal dyspnea, " orthopnea, headedness, dizziness, pre-syncope, or syncope.        Cardiographics (EKGs and device interrogation personally reviewed):  EKG done 5/7/2024 shows sinus rhythm at 68 bpm, RBBB,  ms, QT/QTc 484/514 ms  EKG done 6/2/2023 shows sinus rhythm at 66 bpm, right bundle branch block,  ms, QT/QTc 470/492 ms, though shorter on manual measurement  EKG done 7/29/2022 shows atrial pacing with intrinsic ventricular conduction at 60 bpm, right bundle branch block,  ms, QT/QTc is 474/474 ms  EKG done 5/25/2019 shows atrial fibrillation with rapid ventricular response at 139 bpm   EKG, Done 1/18/2019 starts in A. fib with transition to sinus rhythm  EKG done 1/14/2019 shows sinus rhythm at 89 bpm, incomplete right bundle branch block, QT/QTc interval measures 378/459 ms  EKG done 1/11/2019 shows atrial fibrillation with rapid ventricular response at 141 bpm.  Subsequent EKG shows sinus tachycardia at 105 bpm with an incomplete right bundle branch block     Pacemaker Interrogation, in clinic (MDT implant 12/31/2018):  Pacing mode: MVP   Presenting rhythm: AS-VS 64 bpm.   Underlying rhythm: SR 60s  A-paced: 8.7%.  V-paced 0.5%.  Battery: estimated longevity 9.8 years.  Atrial and Ventricular leads: Stable with good sensing, impedance, and pacing thresholds  Arrhythmias: Since 10/12/2023, 0 AT/AF, 6 VHR: 2 NSVT (4 & 9 bts), SVT <2 sec  [place]: <0.1%   Histograms: Good rate distribution     JOVANI done 1/23/2021 at [place]:  1. The baseline ECG demonstrated sinus rhythm with a rate of 82 bpm.  2. The WATCHMAN device is visualized and is well-expanded within the left atrial appendage.  3. Small residual jet on the postero-inferiorior aspect of the device (jet size 2 mm; JOVANI angle  139 degrees; clip #61). The remainder of the WATCHMAN device margin appears well sealed.  4. No intracardiac mass or thrombus identified.  No thrombus on the atrial face of the WATCHMAN  device.  5. No pericardial effusion.  6.  No shunt at atrial level by color flow imaging and agitated saline contrast injection.  7. Normal left ventricular chamber size.  Left ventricular ejection fraction 60%.  8. 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  9. Normal right ventricular chamber size and systolic function.  10. Moderate tricuspid valve regurgitation.     CTA pulmonary veings done 11/20/2019 at [place]:   PULMONARY VEINS:  Two right and two left pulmonary veins enter the left atrium unobstructed. No  evidence of pulmonary vein stenosis.     CARDIOVASCULAR FINDINGS:     Presumed focal dissection with nearby ulcerated plaque in the left subclavian  artery, likely unchanged. Scattered calcified and noncalcified plaque in the  remainder of the thoracic aorta and visualized arch vessels. Severe coronary  calcification. No intracardiac mass or thrombus. Left atrial enlargement.    ADDITIONAL FINDINGS:      Focal peripheral hyperenhancement in hepatic segment 4A, indeterminate but  likely benign. Left anterior chest wall dual chamber pacemaker. Redemonstrated  centrilobular and paraseptal emphysema with decreased fluid and debris within  the previously noted left lower lobe bulla/cavity; this lesion remains somewhat  thick-walled.    Nuclear Lexiscan stress test done 6/7/2021:    The nuclear stress test is negative for inducible myocardial ischemia or infarction.    The left ventricular ejection fraction at stress is 63%.    A prior study was conducted on 10/16/2017.  This study has no change when compared with the prior study.     Coronary angiogram done 10/17/2017:   Mid Cx lesion 30% stenosed.  Prox LAD to Dist LAD lesion 35% stenosed.  The left ventricular size is normal. The left ventricular systolic function is normal. LV systolic pressure is normal. LV end diastolic pressure is normal. There are no wall motion abnormalities in the left ventricle.    I have reviewed and updated the patient's Past Medical History,  Social History, Family History and Medication List.  Outside records personally reviewed.     Physical Examination  Review of Systems   Vitals: /78 (BP Location: Left arm, Patient Position: Sitting, Cuff Size: Adult Regular)   Pulse 68   Resp 14   Wt 91.6 kg (202 lb)   BMI 31.64 kg/m    BMI= Body mass index is 31.64 kg/m .  Wt Readings from Last 3 Encounters:   05/07/24 91.6 kg (202 lb)   01/22/24 95.3 kg (210 lb)   12/11/23 96.2 kg (212 lb)       General Appearance:   Alert, well-appearing and in no acute distress.   HEENT: Atraumatic, normocephalic.  No scleral icterus, normal conjunctivae, EOMs intact, PERRL.  Mucous membranes pink and moist.   Chest/Lungs:   Chest symmetric, spine straight.  Respirations unlabored.  Lungs are clear to auscultation.  Left subclavian pacemaker site with no sign of infection or tissue thinning.   Cardiovascular:   Regular rate and rhythm.  Normal first and second heart sounds with no murmurs, rubs, or gallops; radial pulses are intact, No edema.   Abdomen:  Soft, nondistended.   Extremities: No cyanosis or clubbing.   Musculoskeletal: Moves all extremities.     Skin: Warm, dry, intact.    Neurologic: Mood and affect are appropriate.  Alert and oriented to person, place, time, and situation.        ROS: 10 point ROS neg other than the symptoms noted above in the HPI.         Medical History  Surgical History Family History Social History   Past Medical History:   Diagnosis Date    Anxiety about health     per [facility]    Dyslipidemia     GERD (gastroesophageal reflux disease)     Hypertension     Nonocclusive coronary atherosclerosis of native coronary artery 10/17/2017    Paroxysmal atrial fibrillation (H) 01/14/2019    Dx [id number] score = 5 (age, HTN, CAD, TIA 2) Rx flecainide Rx Xarelto    Pulmonary emphysema (H) 11/21/2019    per [facility]    Right bundle branch block 08/29/2019    Solitary pulmonary nodule 11/21/2019    per [facility]    Syncope 01/18/2019    TIA  (transient ischemic attack) 2018     Past Surgical History:   Procedure Laterality Date    APPENDECTOMY      CARDIAC ELECTROPHYSIOLOGY MAPPING AND ABLATION  2019    PVI done at [place]    CV CORONARY ANGIOGRAM N/A 10/17/2017    Procedure: Coronary Angiogram;  Surgeon: Ashok Hyatt MD;  Location: [facility] Cath Lab;  Service:     CV LEFT HEART CATHETERIZATION WITH LEFT VENTRICULOGRAM N/A 10/17/2017    Procedure: Left Heart Catheterization with Left Ventriculogram;  Surgeon: Ashok Hyatt MD;  Location: [facility] Cath Lab;  Service:     EP PACEMAKER INSERT Left 2018    Procedure: EP Pacemaker Insertion;  Surgeon: Micheal Junior MD;  Location: [facility] Cath Lab;  Service: Cardiology    OTHER SURGICAL HISTORY  2020    Left atrial appendage closureWatchman FLX at [place]     Family History   Problem Relation Age of Onset    Cancer Mother     Cancer Father     Coronary Artery Disease Brother     Coronary Artery Disease Brother     Valvular heart disease Brother     Rectal Cancer Sister     Colon Cancer Sister         Social History     Socioeconomic History    Marital status:      Spouse name: Not on file    Number of children: Not on file    Years of education: Not on file    Highest education level: Not on file   Occupational History    Not on file   Tobacco Use    Smoking status: Former     Current packs/day: 0.00     Types: Cigarettes     Quit date: 10/16/1999     Years since quittin.5    Smokeless tobacco: Never   Substance and Sexual Activity    Alcohol use: Yes     Alcohol/week: 1.0 standard drink of alcohol     Comment: Alcoholic Drinks/day: none since december    Drug use: No    Sexual activity: Yes     Partners: Female   Other Topics Concern    Not on file   Social History Narrative    Not on file     Social Determinants of Health     Financial Resource Strain: Medium Risk (2020)    Received from [facility]    Overall Financial Resource Strain (CARDIA)      Difficulty of Paying Living Expenses: Somewhat hard   Food Insecurity: No Food Insecurity (5/23/2020)    Received from [facility]    Hunger Vital Sign     Worried About Running Out of Food in the Last Year: Never true     Ran Out of Food in the Last Year: Never true   Transportation Needs: No Transportation Needs (5/23/2020)    Received from [facility]    PRAPARE - Transportation     Lack of Transportation (Medical): No     Lack of Transportation (Non-Medical): No   Physical Activity: Sufficiently Active (5/23/2020)    Received from [facility]    Exercise Vital Sign     Days of Exercise per Week: 5 days     Minutes of Exercise per Session: 40 min   Stress: No Stress Concern Present (5/23/2020)    Received from [facility]    [employer] - Occupational Stress Questionnaire     Feeling of Stress : Not at all   Social Connections: Unknown (5/23/2020)    Received from [facility]    Social Connection and Isolation Panel [NHANES]     Frequency of Communication with Friends and Family: More than three times a week     Frequency of Social Gatherings with Friends and Family: Once a week     Attends Taoist Services: 1 to 4 times per year     Active Member of Clubs or Organizations: Yes     Attends Club or Organization Meetings: 1 to 4 times per year     Marital Status: Not on file   Interpersonal Safety: Not on file   Housing Stability: Not on file           Medications  Allergies   Current Outpatient Medications   Medication Sig Dispense Refill    acetaminophen (TYLENOL) 500 MG tablet [ACETAMINOPHEN (TYLENOL) 500 MG TABLET] Take 1-2 tablets (500-1,000 mg total) by mouth every 4 (four) hours as needed.  0    aspirin (ASPIR-81) 81 MG EC tablet [ASPIRIN (ASPIR-81) 81 MG EC TABLET] Take 81 mg by mouth daily.       atorvastatin (LIPITOR) 80 MG tablet [ATORVASTATIN (LIPITOR) 80 MG TABLET] TAKE 1 TABLET BY MOUTH EVERYDAY AT BEDTIME 90 tablet 1    carboxymethylcellulose (REFRESH PLUS) 0.5 %  SOLN ophthalmic solution Place 1 drop into the right eye At Bedtime      ferrous sulfate (FEROSUL) 325 (65 Fe) MG tablet Take 325 mg by mouth three times a week      omeprazole (PRILOSEC) 40 MG capsule [OMEPRAZOLE (PRILOSEC) 40 MG CAPSULE] Take 40 mg by mouth daily before breakfast.      sotalol (BETAPACE) 80 MG tablet Take 1 tablet (80 mg) by mouth 2 times daily 180 tablet 3       Allergies   Allergen Reactions    Cephalexin Rash          Lab Results    Chemistry/lipid CBC Cardiac Enzymes/BNP/TSH/INR   Recent Labs   Lab Test 11/22/22  0838 11/16/21  0923   CHOL 112 119   HDL 30* 34*   LDL 53 65   TRIG 145 98     Recent Labs   Lab Test 06/02/23  0941 11/22/22  0838   * 141   POTASSIUM 4.0 4.3   CHLORIDE 102 106   CO2 22 22   ANIONGAP 11 13   * 137*   BUN 8.8 8.4   CR 0.85 0.89   ESSENCE 9.8 9.2      CBC RESULTS:   Recent Labs   Lab Test 06/02/23  0941   WBC 9.1   RBC 4.97   HGB 14.7   HCT 42.8   MCV 86   MCH 29.6   MCHC 34.3   RDW 13.9               TSH   Date Value Ref Range Status   06/05/2023 0.69 0.30 - 4.20 uIU/mL Final   07/08/2020 0.53 0.30 - 5.00 uIU/mL Final          44 minutes were spent on the date of encounter performing chart review, history and exam, documentation, and further activities as noted above.        Thank you for allowing me to participate in the care of your patient.      Sincerely,   FARAZ [facility]  cc:   Jorge Dubose MD  [address]  [place] [address]

## 2024-05-07 NOTE — PROGRESS NOTES
HEART CARE ELECTROPHYSIOLOGY NOTE      [facility]  [phone]      Assessment/Recommendations   Assessment/Plan:  1.  Paroxysmal atrial fibrillation:  No episodes of AF.  We discussed the natural progression of atrial fibrillation and treatment options of antiarrhythmic medications versus repeat ablation.  He has had at least mild side effects on sotalol, recommend consideration of repeat ablation.  If stress test is unremarkable, will do a trial off sotalol and plan for after ablation  -- Continue on sotalol 80 mg twice daily.     He was reassured that atrial fibrillation is not life-threatening, but carries an increased risk for stroke.  He has a [id number] score of 5 for age 65-74, hypertension, possible TIA, and vascular disease.  He underwent left atrial appendage closure with the Watchman FLX device as part of the Option study done at [place] on 2/4/2020.  He has had no neurologic symptoms or events.    Discussed need for DOAC 1 week prior to and 6 weeks after ablation.  --Continue aspirin 81 mg indefinitely.      2.  Sick sinus syndrome status post dual-chamber pacemaker implant:  The pacemaker was remotely interrogated and is functioning appropriately.  The battery showed estimated longevity of 9.8 years.  Atrial and ventricular lead sensing, impedance, and pacing thresholds were stable and within normal limits.  He has had no further symptoms of syncope since his pacemaker implant.    Ventricular pacing down to 0.5%.  --Teen pacemaker follow-up     3.  Hypertension: Controlled with sotalol as above.     4.  Coronary artery disease: 30-35% stenosis of the LAD and circumflex seen on previous angiogram, but severe coronary calcification seen on more recent CTA.   NM stress test done in June 2021 negative for ischemia.  Currently having progressive exertional symptoms suspicious for angina.     --Continue atorvastatin 80 mg daily  -- NM stress test    5.  Dyspnea on exertion: Associated  "with a burning sensation in his \"lungs\", so referral to pulmonary for evaluation.  However, he is also having exertional chest pain/tightness suspicious for angina-NM stress test ordered.    Follow up with Dr. Guzman to further discuss catheter ablation     History of Present Illness/Subjective    HPI: Ashok Del Rosario is a 73 year old male who comes in today companied by his wife for EP device and arrhythmia follow-up.  He has a history of sinus bradycardia status post dual-chamber pacemaker implant on 12/31/2018, TIA, atrial fibrillation, carotid artery stenosis, hypertension, hyperlipidemia, anxiety, mild obstructive sleep apnea, and emphysema.  COVID infection in August 2022.    Arrhythmia history  Dx/date: diagnosed with atrial fibrillation with rapid ventricular response 1/11/2019.  Prolonged episode of AF with very rapid ventricular response 2/2022-started on sotalol.  Sx: achypalpitations, lightheadedness, and shortness of breath  [id number] score: 5 for age 65-74, hypertension, possible TIA, and vascular disease  Oral anticoagulation: s/p left atrial appendage closure with the Watchman FLX (Option study) on 2/4/2020 at [place].  Aspirin 81 mg daily  Antiarrhythmic medications, AV bro blocking agents: Briefly on flecainide prior to ablation.  Sotalol 80 mg twice daily (7/25/2022 to present)  Procedures  DCCV: 1/11/2019 (ED)  Ablation: 8/29/2019 at [place] (PVI)     Pat states that he has been having significant and progressive shortness of breath, chest pain and tightness, with a burning sensation across his chest and in his lungs with activity that is \"scary\".  He denies palpitations, abdominal fullness/bloating or peripheral edema, shortness of breath at rest, paroxysmal nocturnal dyspnea, orthopnea, headedness, dizziness, pre-syncope, or syncope.        Cardiographics (EKGs and device interrogation personally reviewed):  EKG done 5/7/2024 shows sinus rhythm at 68 bpm, RBBB,  ms, QT/QTc 484/514 " ms  EKG done 6/2/2023 shows sinus rhythm at 66 bpm, right bundle branch block,  ms, QT/QTc 470/492 ms, though shorter on manual measurement  EKG done 7/29/2022 shows atrial pacing with intrinsic ventricular conduction at 60 bpm, right bundle branch block,  ms, QT/QTc is 474/474 ms  EKG done 5/25/2019 shows atrial fibrillation with rapid ventricular response at 139 bpm   EKG, Done 1/18/2019 starts in A. fib with transition to sinus rhythm  EKG done 1/14/2019 shows sinus rhythm at 89 bpm, incomplete right bundle branch block, QT/QTc interval measures 378/459 ms  EKG done 1/11/2019 shows atrial fibrillation with rapid ventricular response at 141 bpm.  Subsequent EKG shows sinus tachycardia at 105 bpm with an incomplete right bundle branch block     Pacemaker Interrogation, in clinic (MDT implant 12/31/2018):  Pacing mode: MVP   Presenting rhythm: AS-VS 64 bpm.   Underlying rhythm: SR 60s  A-paced: 8.7%.  V-paced 0.5%.  Battery: estimated longevity 9.8 years.  Atrial and Ventricular leads: Stable with good sensing, impedance, and pacing thresholds  Arrhythmias: Since 10/12/2023, 0 AT/AF, 6 VHR: 2 NSVT (4 & 9 bts), SVT <2 sec  [place]: <0.1%   Histograms: Good rate distribution     JOVANI done 1/23/2021 at [place]:  1. The baseline ECG demonstrated sinus rhythm with a rate of 82 bpm.  2. The WATCHMAN device is visualized and is well-expanded within the left atrial appendage.  3. Small residual jet on the postero-inferiorior aspect of the device (jet size 2 mm; JOVANI angle  139 degrees; clip #61). The remainder of the WATCHMAN device margin appears well sealed.  4. No intracardiac mass or thrombus identified.  No thrombus on the atrial face of the WATCHMAN  device.  5. No pericardial effusion.  6. No shunt at atrial level by color flow imaging and agitated saline contrast injection.  7. Normal left ventricular chamber size.  Left ventricular ejection fraction 60%.  8. Moderate-severe immobile atherosclerosis of  the descending thoracic aorta.  9. Normal right ventricular chamber size and systolic function.  10. Moderate tricuspid valve regurgitation.     CTA pulmonary veings done 11/20/2019 at [place]:   PULMONARY VEINS:  Two right and two left pulmonary veins enter the left atrium unobstructed. No  evidence of pulmonary vein stenosis.     CARDIOVASCULAR FINDINGS:     Presumed focal dissection with nearby ulcerated plaque in the left subclavian  artery, likely unchanged. Scattered calcified and noncalcified plaque in the  remainder of the thoracic aorta and visualized arch vessels. Severe coronary  calcification. No intracardiac mass or thrombus. Left atrial enlargement.    ADDITIONAL FINDINGS:      Focal peripheral hyperenhancement in hepatic segment 4A, indeterminate but  likely benign. Left anterior chest wall dual chamber pacemaker. Redemonstrated  centrilobular and paraseptal emphysema with decreased fluid and debris within  the previously noted left lower lobe bulla/cavity; this lesion remains somewhat  thick-walled.    Nuclear Lexiscan stress test done 6/7/2021:    The nuclear stress test is negative for inducible myocardial ischemia or infarction.    The left ventricular ejection fraction at stress is 63%.    A prior study was conducted on 10/16/2017.  This study has no change when compared with the prior study.     Coronary angiogram done 10/17/2017:   Mid Cx lesion 30% stenosed.  Prox LAD to Dist LAD lesion 35% stenosed.  The left ventricular size is normal. The left ventricular systolic function is normal. LV systolic pressure is normal. LV end diastolic pressure is normal. There are no wall motion abnormalities in the left ventricle.    I have reviewed and updated the patient's Past Medical History, Social History, Family History and Medication List.  Outside records personally reviewed.     Physical Examination  Review of Systems   Vitals: /78 (BP Location: Left arm, Patient Position: Sitting, Cuff Size:  Adult Regular)   Pulse 68   Resp 14   Wt 91.6 kg (202 lb)   BMI 31.64 kg/m    BMI= Body mass index is 31.64 kg/m .  Wt Readings from Last 3 Encounters:   05/07/24 91.6 kg (202 lb)   01/22/24 95.3 kg (210 lb)   12/11/23 96.2 kg (212 lb)       General Appearance:   Alert, well-appearing and in no acute distress.   HEENT: Atraumatic, normocephalic.  No scleral icterus, normal conjunctivae, EOMs intact, PERRL.  Mucous membranes pink and moist.   Chest/Lungs:   Chest symmetric, spine straight.  Respirations unlabored.  Lungs are clear to auscultation.  Left subclavian pacemaker site with no sign of infection or tissue thinning.   Cardiovascular:   Regular rate and rhythm.  Normal first and second heart sounds with no murmurs, rubs, or gallops; radial pulses are intact, No edema.   Abdomen:  Soft, nondistended.   Extremities: No cyanosis or clubbing.   Musculoskeletal: Moves all extremities.     Skin: Warm, dry, intact.    Neurologic: Mood and affect are appropriate.  Alert and oriented to person, place, time, and situation.        ROS: 10 point ROS neg other than the symptoms noted above in the HPI.         Medical History  Surgical History Family History Social History   Past Medical History:   Diagnosis Date    Anxiety about health     per [facility]    Dyslipidemia     GERD (gastroesophageal reflux disease)     Hypertension     Nonocclusive coronary atherosclerosis of native coronary artery 10/17/2017    Paroxysmal atrial fibrillation (H) 01/14/2019    Dx [id number] score = 5 (age, HTN, CAD, TIA 2) Rx flecainide Rx Xarelto    Pulmonary emphysema (H) 11/21/2019    per [place]    Right bundle branch block 08/29/2019    Solitary pulmonary nodule 11/21/2019    per [place]    Syncope 01/18/2019    TIA (transient ischemic attack) 12/30/2018     Past Surgical History:   Procedure Laterality Date    APPENDECTOMY  2000    CARDIAC ELECTROPHYSIOLOGY MAPPING AND ABLATION  08/29/2019    PVI done at [place]    CV CORONARY ANGIOGRAM  N/A 10/17/2017    Procedure: Coronary Angiogram;  Surgeon: Ashok Hyatt MD;  Location: [facility] Cath Lab;  Service:     CV LEFT HEART CATHETERIZATION WITH LEFT VENTRICULOGRAM N/A 10/17/2017    Procedure: Left Heart Catheterization with Left Ventriculogram;  Surgeon: Ashok Hyatt MD;  Location: [facility] Cath Lab;  Service:     EP PACEMAKER INSERT Left 2018    Procedure: EP Pacemaker Insertion;  Surgeon: Micheal Junior MD;  Location: [facility] Cath Lab;  Service: Cardiology    OTHER SURGICAL HISTORY  2020    Left atrial appendage closureWatchman FLX at [place]     Family History   Problem Relation Age of Onset    Cancer Mother     Cancer Father     Coronary Artery Disease Brother     Coronary Artery Disease Brother     Valvular heart disease Brother     Rectal Cancer Sister     Colon Cancer Sister         Social History     Socioeconomic History    Marital status:      Spouse name: Not on file    Number of children: Not on file    Years of education: Not on file    Highest education level: Not on file   Occupational History    Not on file   Tobacco Use    Smoking status: Former     Current packs/day: 0.00     Types: Cigarettes     Quit date: 10/16/1999     Years since quittin.5    Smokeless tobacco: Never   Substance and Sexual Activity    Alcohol use: Yes     Alcohol/week: 1.0 standard drink of alcohol     Comment: Alcoholic Drinks/day: none since december    Drug use: No    Sexual activity: Yes     Partners: Female   Other Topics Concern    Not on file   Social History Narrative    Not on file     Social Determinants of Health     Financial Resource Strain: Medium Risk (2020)    Received from [facility]    Overall Financial Resource Strain (CARDIA)     Difficulty of Paying Living Expenses: Somewhat hard   Food Insecurity: No Food Insecurity (2020)    Received from [facility]    Hunger Vital Sign     Worried About Running Out of Food in the Last Year: Never true      Ran Out of Food in the Last Year: Never true   Transportation Needs: No Transportation Needs (5/23/2020)    Received from [facility]    PRAPARE - Transportation     Lack of Transportation (Medical): No     Lack of Transportation (Non-Medical): No   Physical Activity: Sufficiently Active (5/23/2020)    Received from [facility]    Exercise Vital Sign     Days of Exercise per Week: 5 days     Minutes of Exercise per Session: 40 min   Stress: No Stress Concern Present (5/23/2020)    Received from [facility]    [employer] [place] - Occupational Stress Questionnaire     Feeling of Stress : Not at all   Social Connections: Unknown (5/23/2020)    Received from [facility]    Social Connection and Isolation Panel [NHANES]     Frequency of Communication with Friends and Family: More than three times a week     Frequency of Social Gatherings with Friends and Family: Once a week     Attends Gnosticism Services: 1 to 4 times per year     Active Member of Clubs or Organizations: Yes     Attends Club or Organization Meetings: 1 to 4 times per year     Marital Status: Not on file   Interpersonal Safety: Not on file   Housing Stability: Not on file           Medications  Allergies   Current Outpatient Medications   Medication Sig Dispense Refill    acetaminophen (TYLENOL) 500 MG tablet [ACETAMINOPHEN (TYLENOL) 500 MG TABLET] Take 1-2 tablets (500-1,000 mg total) by mouth every 4 (four) hours as needed.  0    aspirin (ASPIR-81) 81 MG EC tablet [ASPIRIN (ASPIR-81) 81 MG EC TABLET] Take 81 mg by mouth daily.       atorvastatin (LIPITOR) 80 MG tablet [ATORVASTATIN (LIPITOR) 80 MG TABLET] TAKE 1 TABLET BY MOUTH EVERYDAY AT BEDTIME 90 tablet 1    carboxymethylcellulose (REFRESH PLUS) 0.5 % SOLN ophthalmic solution Place 1 drop into the right eye At Bedtime      ferrous sulfate (FEROSUL) 325 (65 Fe) MG tablet Take 325 mg by mouth three times a week      omeprazole (PRILOSEC) 40 MG capsule [OMEPRAZOLE (PRILOSEC)  40 MG CAPSULE] Take 40 mg by mouth daily before breakfast.      sotalol (BETAPACE) 80 MG tablet Take 1 tablet (80 mg) by mouth 2 times daily 180 tablet 3       Allergies   Allergen Reactions    Cephalexin Rash          Lab Results    Chemistry/lipid CBC Cardiac Enzymes/BNP/TSH/INR   Recent Labs   Lab Test 11/22/22  0838 11/16/21  0923   CHOL 112 119   HDL 30* 34*   LDL 53 65   TRIG 145 98     Recent Labs   Lab Test 06/02/23  0941 11/22/22  0838   * 141   POTASSIUM 4.0 4.3   CHLORIDE 102 106   CO2 22 22   ANIONGAP 11 13   * 137*   BUN 8.8 8.4   CR 0.85 0.89   ESSENCE 9.8 9.2      CBC RESULTS:   Recent Labs   Lab Test 06/02/23  0941   WBC 9.1   RBC 4.97   HGB 14.7   HCT 42.8   MCV 86   MCH 29.6   MCHC 34.3   RDW 13.9               TSH   Date Value Ref Range Status   06/05/2023 0.69 0.30 - 4.20 uIU/mL Final   07/08/2020 0.53 0.30 - 5.00 uIU/mL Final          44 minutes were spent on the date of encounter performing chart review, history and exam, documentation, and further activities as noted above.

## 2024-05-08 LAB
ATRIAL RATE - MUSE: 68 BPM
DIASTOLIC BLOOD PRESSURE - MUSE: NORMAL MMHG
INTERPRETATION ECG - MUSE: NORMAL
P AXIS - MUSE: 51 DEGREES
PR INTERVAL - MUSE: 160 MS
QRS DURATION - MUSE: 134 MS
QT - MUSE: 484 MS
QTC - MUSE: 514 MS
R AXIS - MUSE: -34 DEGREES
SYSTOLIC BLOOD PRESSURE - MUSE: NORMAL MMHG
T AXIS - MUSE: 0 DEGREES
VENTRICULAR RATE- MUSE: 68 BPM

## 2024-05-09 ENCOUNTER — TELEPHONE (OUTPATIENT)
Dept: PULMONOLOGY | Facility: CLINIC | Age: 74
End: 2024-05-09
Payer: MEDICARE

## 2024-05-09 DIAGNOSIS — R06.09 DOE (DYSPNEA ON EXERTION): Primary | ICD-10-CM

## 2024-05-09 NOTE — TELEPHONE ENCOUNTER
[facility]    Phone Message    May a detailed message be left on voicemail: yes     Reason for Call: Appointment Intake    Referring Provider Name: FARAZ Bruno CNP  Diagnosis and/or Symptoms: Dyspnea on exertion    Pt is scheduled to establish care 6/19/24 with Tigist Quan NP, needs orders placed for PFT's (pended)      Action Taken: Other: Pulm    Travel Screening: Not Applicable

## 2024-05-13 ENCOUNTER — HOSPITAL ENCOUNTER (OUTPATIENT)
Dept: NUCLEAR MEDICINE | Facility: HOSPITAL | Age: 74
Discharge: HOME | End: 2024-05-13
Attending: NURSE PRACTITIONER
Payer: MEDICARE

## 2024-05-13 ENCOUNTER — HOSPITAL ENCOUNTER (OUTPATIENT)
Dept: CARDIOLOGY | Facility: HOSPITAL | Age: 74
Discharge: HOME | End: 2024-05-13
Attending: NURSE PRACTITIONER
Payer: MEDICARE

## 2024-05-13 DIAGNOSIS — I48.0 PAROXYSMAL ATRIAL FIBRILLATION (H): ICD-10-CM

## 2024-05-13 DIAGNOSIS — I25.10 NONOCCLUSIVE CORONARY ATHEROSCLEROSIS OF NATIVE CORONARY ARTERY: ICD-10-CM

## 2024-05-13 DIAGNOSIS — R06.09 DYSPNEA ON EXERTION: ICD-10-CM

## 2024-05-13 LAB
CV STRESS CURRENT BP HE: NORMAL
CV STRESS CURRENT HR HE: 67
CV STRESS CURRENT HR HE: 68
CV STRESS CURRENT HR HE: 70
CV STRESS CURRENT HR HE: 74
CV STRESS CURRENT HR HE: 74
CV STRESS CURRENT HR HE: 77
CV STRESS CURRENT HR HE: 77
CV STRESS CURRENT HR HE: 80
CV STRESS CURRENT HR HE: 82
CV STRESS CURRENT HR HE: 85
CV STRESS CURRENT HR HE: 86
CV STRESS CURRENT HR HE: 88
CV STRESS CURRENT HR HE: 88
CV STRESS DEVIATION TIME HE: NORMAL
CV STRESS ECHO PERCENT HR HE: NORMAL
CV STRESS EXERCISE STAGE HE: NORMAL
CV STRESS FINAL RESTING BP HE: NORMAL
CV STRESS FINAL RESTING HR HE: 80
CV STRESS MAX HR HE: 88
CV STRESS MAX TREADMILL GRADE HE: 0
CV STRESS MAX TREADMILL SPEED HE: 0
CV STRESS PEAK DIA BP HE: NORMAL
CV STRESS PEAK SYS BP HE: NORMAL
CV STRESS PHASE HE: NORMAL
CV STRESS PROTOCOL HE: NORMAL
CV STRESS RESTING PT POSITION HE: NORMAL
CV STRESS ST DEVIATION AMOUNT HE: NORMAL
CV STRESS ST DEVIATION ELEVATION HE: NORMAL
CV STRESS ST EVELATION AMOUNT HE: NORMAL
CV STRESS TEST TYPE HE: NORMAL
CV STRESS TOTAL STAGE TIME MIN 1 HE: NORMAL
MDC_IDC_EPISODE_DTM: NORMAL
MDC_IDC_EPISODE_DTM: NORMAL
MDC_IDC_EPISODE_DURATION: 1 S
MDC_IDC_EPISODE_DURATION: 1 S
MDC_IDC_EPISODE_ID: 626
MDC_IDC_EPISODE_ID: 627
MDC_IDC_EPISODE_TYPE: NORMAL
MDC_IDC_EPISODE_TYPE: NORMAL
MDC_IDC_EPISODE_TYPE_INDUCED: NO
MDC_IDC_EPISODE_TYPE_INDUCED: NO
MDC_IDC_LEAD_CONNECTION_STATUS: NORMAL
MDC_IDC_LEAD_CONNECTION_STATUS: NORMAL
MDC_IDC_LEAD_IMPLANT_DT: NORMAL
MDC_IDC_LEAD_IMPLANT_DT: NORMAL
MDC_IDC_LEAD_LOCATION: NORMAL
MDC_IDC_LEAD_LOCATION: NORMAL
MDC_IDC_LEAD_LOCATION_DETAIL_1: NORMAL
MDC_IDC_LEAD_LOCATION_DETAIL_1: NORMAL
MDC_IDC_LEAD_MFG: NORMAL
MDC_IDC_LEAD_MFG: NORMAL
MDC_IDC_LEAD_MODEL: NORMAL
MDC_IDC_LEAD_MODEL: NORMAL
MDC_IDC_LEAD_POLARITY_TYPE: NORMAL
MDC_IDC_LEAD_POLARITY_TYPE: NORMAL
MDC_IDC_LEAD_SERIAL: NORMAL
MDC_IDC_LEAD_SERIAL: NORMAL
MDC_IDC_LEAD_SPECIAL_FUNCTION: NORMAL
MDC_IDC_LEAD_SPECIAL_FUNCTION: NORMAL
MDC_IDC_MSMT_BATTERY_DTM: NORMAL
MDC_IDC_MSMT_BATTERY_REMAINING_LONGEVITY: 117 MO
MDC_IDC_MSMT_BATTERY_RRT_TRIGGER: 2.62
MDC_IDC_MSMT_BATTERY_STATUS: NORMAL
MDC_IDC_MSMT_BATTERY_VOLTAGE: 3 V
MDC_IDC_MSMT_LEADCHNL_RA_IMPEDANCE_VALUE: 323 OHM
MDC_IDC_MSMT_LEADCHNL_RA_IMPEDANCE_VALUE: 361 OHM
MDC_IDC_MSMT_LEADCHNL_RA_IMPEDANCE_VALUE: 361 OHM
MDC_IDC_MSMT_LEADCHNL_RA_PACING_THRESHOLD_AMPLITUDE: 0.75 V
MDC_IDC_MSMT_LEADCHNL_RA_PACING_THRESHOLD_AMPLITUDE: 0.75 V
MDC_IDC_MSMT_LEADCHNL_RA_PACING_THRESHOLD_PULSEWIDTH: 0.4 MS
MDC_IDC_MSMT_LEADCHNL_RA_PACING_THRESHOLD_PULSEWIDTH: 0.4 MS
MDC_IDC_MSMT_LEADCHNL_RA_SENSING_INTR_AMPL: 2.62 MV
MDC_IDC_MSMT_LEADCHNL_RA_SENSING_INTR_AMPL: 3.25 MV
MDC_IDC_MSMT_LEADCHNL_RA_SENSING_INTR_AMPL: 3.3 MV
MDC_IDC_MSMT_LEADCHNL_RV_IMPEDANCE_VALUE: 342 OHM
MDC_IDC_MSMT_LEADCHNL_RV_IMPEDANCE_VALUE: 399 OHM
MDC_IDC_MSMT_LEADCHNL_RV_IMPEDANCE_VALUE: 399 OHM
MDC_IDC_MSMT_LEADCHNL_RV_PACING_THRESHOLD_AMPLITUDE: 1.12 V
MDC_IDC_MSMT_LEADCHNL_RV_PACING_THRESHOLD_AMPLITUDE: 1.5 V
MDC_IDC_MSMT_LEADCHNL_RV_PACING_THRESHOLD_PULSEWIDTH: 0.4 MS
MDC_IDC_MSMT_LEADCHNL_RV_PACING_THRESHOLD_PULSEWIDTH: 0.4 MS
MDC_IDC_MSMT_LEADCHNL_RV_SENSING_INTR_AMPL: 2.12 MV
MDC_IDC_MSMT_LEADCHNL_RV_SENSING_INTR_AMPL: 4.75 MV
MDC_IDC_MSMT_LEADCHNL_RV_SENSING_INTR_AMPL: 4.8 MV
MDC_IDC_PG_IMPLANT_DTM: NORMAL
MDC_IDC_PG_MFG: NORMAL
MDC_IDC_PG_MODEL: NORMAL
MDC_IDC_PG_SERIAL: NORMAL
MDC_IDC_PG_TYPE: NORMAL
MDC_IDC_SESS_CLINIC_NAME: NORMAL
MDC_IDC_SESS_DTM: NORMAL
MDC_IDC_SESS_TYPE: NORMAL
MDC_IDC_SET_BRADY_AT_MODE_SWITCH_RATE: 171 {BEATS}/MIN
MDC_IDC_SET_BRADY_HYSTRATE: NORMAL
MDC_IDC_SET_BRADY_LOWRATE: 60 {BEATS}/MIN
MDC_IDC_SET_BRADY_MAX_SENSOR_RATE: 130 {BEATS}/MIN
MDC_IDC_SET_BRADY_MAX_TRACKING_RATE: 130 {BEATS}/MIN
MDC_IDC_SET_BRADY_MODE: NORMAL
MDC_IDC_SET_BRADY_PAV_DELAY_LOW: 180 MS
MDC_IDC_SET_BRADY_SAV_DELAY_LOW: 150 MS
MDC_IDC_SET_LEADCHNL_RA_PACING_AMPLITUDE: 1.5 V
MDC_IDC_SET_LEADCHNL_RA_PACING_ANODE_ELECTRODE_1: NORMAL
MDC_IDC_SET_LEADCHNL_RA_PACING_ANODE_LOCATION_1: NORMAL
MDC_IDC_SET_LEADCHNL_RA_PACING_CAPTURE_MODE: NORMAL
MDC_IDC_SET_LEADCHNL_RA_PACING_CATHODE_ELECTRODE_1: NORMAL
MDC_IDC_SET_LEADCHNL_RA_PACING_CATHODE_LOCATION_1: NORMAL
MDC_IDC_SET_LEADCHNL_RA_PACING_POLARITY: NORMAL
MDC_IDC_SET_LEADCHNL_RA_PACING_PULSEWIDTH: 0.4 MS
MDC_IDC_SET_LEADCHNL_RA_SENSING_ANODE_ELECTRODE_1: NORMAL
MDC_IDC_SET_LEADCHNL_RA_SENSING_ANODE_LOCATION_1: NORMAL
MDC_IDC_SET_LEADCHNL_RA_SENSING_CATHODE_ELECTRODE_1: NORMAL
MDC_IDC_SET_LEADCHNL_RA_SENSING_CATHODE_LOCATION_1: NORMAL
MDC_IDC_SET_LEADCHNL_RA_SENSING_POLARITY: NORMAL
MDC_IDC_SET_LEADCHNL_RA_SENSING_SENSITIVITY: 0.3 MV
MDC_IDC_SET_LEADCHNL_RV_PACING_AMPLITUDE: 1.75 V
MDC_IDC_SET_LEADCHNL_RV_PACING_ANODE_ELECTRODE_1: NORMAL
MDC_IDC_SET_LEADCHNL_RV_PACING_ANODE_LOCATION_1: NORMAL
MDC_IDC_SET_LEADCHNL_RV_PACING_CAPTURE_MODE: NORMAL
MDC_IDC_SET_LEADCHNL_RV_PACING_CATHODE_ELECTRODE_1: NORMAL
MDC_IDC_SET_LEADCHNL_RV_PACING_CATHODE_LOCATION_1: NORMAL
MDC_IDC_SET_LEADCHNL_RV_PACING_POLARITY: NORMAL
MDC_IDC_SET_LEADCHNL_RV_PACING_PULSEWIDTH: 0.4 MS
MDC_IDC_SET_LEADCHNL_RV_SENSING_ANODE_ELECTRODE_1: NORMAL
MDC_IDC_SET_LEADCHNL_RV_SENSING_ANODE_LOCATION_1: NORMAL
MDC_IDC_SET_LEADCHNL_RV_SENSING_CATHODE_ELECTRODE_1: NORMAL
MDC_IDC_SET_LEADCHNL_RV_SENSING_CATHODE_LOCATION_1: NORMAL
MDC_IDC_SET_LEADCHNL_RV_SENSING_POLARITY: NORMAL
MDC_IDC_SET_LEADCHNL_RV_SENSING_SENSITIVITY: 0.45 MV
MDC_IDC_SET_ZONE_DETECTION_INTERVAL: 350 MS
MDC_IDC_SET_ZONE_DETECTION_INTERVAL: 400 MS
MDC_IDC_SET_ZONE_STATUS: NORMAL
MDC_IDC_SET_ZONE_STATUS: NORMAL
MDC_IDC_SET_ZONE_TYPE: NORMAL
MDC_IDC_SET_ZONE_VENDOR_TYPE: NORMAL
MDC_IDC_STAT_AT_BURDEN_PERCENT: 0 %
MDC_IDC_STAT_AT_DTM_END: NORMAL
MDC_IDC_STAT_AT_DTM_START: NORMAL
MDC_IDC_STAT_BRADY_AP_VP_PERCENT: 0.34 %
MDC_IDC_STAT_BRADY_AP_VS_PERCENT: 8.57 %
MDC_IDC_STAT_BRADY_AS_VP_PERCENT: 0.2 %
MDC_IDC_STAT_BRADY_AS_VS_PERCENT: 90.88 %
MDC_IDC_STAT_BRADY_DTM_END: NORMAL
MDC_IDC_STAT_BRADY_DTM_START: NORMAL
MDC_IDC_STAT_BRADY_RA_PERCENT_PACED: 8.75 %
MDC_IDC_STAT_BRADY_RV_PERCENT_PACED: 0.54 %
MDC_IDC_STAT_EPISODE_RECENT_COUNT: 0
MDC_IDC_STAT_EPISODE_RECENT_COUNT: 6
MDC_IDC_STAT_EPISODE_RECENT_COUNT_DTM_END: NORMAL
MDC_IDC_STAT_EPISODE_RECENT_COUNT_DTM_START: NORMAL
MDC_IDC_STAT_EPISODE_TOTAL_COUNT: 0
MDC_IDC_STAT_EPISODE_TOTAL_COUNT: 0
MDC_IDC_STAT_EPISODE_TOTAL_COUNT: 20
MDC_IDC_STAT_EPISODE_TOTAL_COUNT: 215
MDC_IDC_STAT_EPISODE_TOTAL_COUNT: 261
MDC_IDC_STAT_EPISODE_TOTAL_COUNT_DTM_END: NORMAL
MDC_IDC_STAT_EPISODE_TOTAL_COUNT_DTM_START: NORMAL
MDC_IDC_STAT_EPISODE_TYPE: NORMAL
MDC_IDC_STAT_TACHYTHERAPY_RECENT_DTM_END: NORMAL
MDC_IDC_STAT_TACHYTHERAPY_RECENT_DTM_START: NORMAL
MDC_IDC_STAT_TACHYTHERAPY_TOTAL_DTM_END: NORMAL
MDC_IDC_STAT_TACHYTHERAPY_TOTAL_DTM_START: NORMAL
NUC STRESS EJECTION FRACTION: 61 %
RATE PRESSURE PRODUCT: NORMAL
STRESS ECHO BASELINE DIASTOLIC HE: 90
STRESS ECHO BASELINE HR: 62
STRESS ECHO BASELINE SYSTOLIC BP: 150
STRESS ECHO CALCULATED PERCENT HR: 60 %
STRESS ECHO LAST STRESS DIASTOLIC BP: 96
STRESS ECHO LAST STRESS HR: 82
STRESS ECHO LAST STRESS SYSTOLIC BP: 148
STRESS ECHO TARGET HR: 147
STRESS/REST PERFUSION RATIO: 1.44

## 2024-05-13 PROCEDURE — 93018 CV STRESS TEST I&R ONLY: CPT | Performed by: INTERNAL MEDICINE

## 2024-05-13 PROCEDURE — G1010 CDSM STANSON: HCPCS | Performed by: INTERNAL MEDICINE

## 2024-05-13 PROCEDURE — 78452 HT MUSCLE IMAGE SPECT MULT: CPT | Mod: 26 | Performed by: INTERNAL MEDICINE

## 2024-05-13 PROCEDURE — 343N000001 HC RX 343: Performed by: NURSE PRACTITIONER

## 2024-05-13 PROCEDURE — 93016 CV STRESS TEST SUPVJ ONLY: CPT | Performed by: INTERNAL MEDICINE

## 2024-05-13 PROCEDURE — A9500 TC99M SESTAMIBI: HCPCS | Performed by: NURSE PRACTITIONER

## 2024-05-13 PROCEDURE — 78452 HT MUSCLE IMAGE SPECT MULT: CPT | Mod: ME

## 2024-05-13 PROCEDURE — 250N000011 HC RX IP 250 OP 636: Performed by: NURSE PRACTITIONER

## 2024-05-13 PROCEDURE — 93017 CV STRESS TEST TRACING ONLY: CPT

## 2024-05-13 PROCEDURE — 93280 PM DEVICE PROGR EVAL DUAL: CPT | Performed by: INTERNAL MEDICINE

## 2024-05-13 RX ORDER — AMINOPHYLLINE 25 MG/ML
50 INJECTION, SOLUTION INTRAVENOUS
Status: DISCONTINUED | OUTPATIENT
Start: 2024-05-13 | End: 2024-05-13 | Stop reason: HOSPADM

## 2024-05-13 RX ORDER — REGADENOSON 0.08 MG/ML
0.4 INJECTION, SOLUTION INTRAVENOUS ONCE
Status: COMPLETED | OUTPATIENT
Start: 2024-05-13 | End: 2024-05-13

## 2024-05-13 RX ORDER — CAFFEINE CITRATE 20 MG/ML
60 SOLUTION INTRAVENOUS
Status: DISCONTINUED | OUTPATIENT
Start: 2024-05-13 | End: 2024-05-13 | Stop reason: HOSPADM

## 2024-05-13 RX ORDER — CAFFEINE 200 MG
200 TABLET ORAL
Status: DISCONTINUED | OUTPATIENT
Start: 2024-05-13 | End: 2024-05-13 | Stop reason: HOSPADM

## 2024-05-13 RX ORDER — ALBUTEROL SULFATE 0.83 MG/ML
2.5 SOLUTION RESPIRATORY (INHALATION)
Status: DISCONTINUED | OUTPATIENT
Start: 2024-05-13 | End: 2024-05-13 | Stop reason: HOSPADM

## 2024-05-13 RX ADMIN — REGADENOSON 0.4 MG: 0.08 INJECTION, SOLUTION INTRAVENOUS at 09:12

## 2024-05-13 RX ADMIN — Medication 31.5 MILLICURIE: at 09:15

## 2024-05-13 RX ADMIN — Medication 8.4 MILLICURIE: at 08:26

## 2024-05-14 ENCOUNTER — TELEPHONE (OUTPATIENT)
Dept: CARDIOLOGY | Facility: CLINIC | Age: 74
End: 2024-05-14
Payer: MEDICARE

## 2024-05-14 DIAGNOSIS — R93.1 ABNORMAL NUCLEAR CARDIAC IMAGING TEST: ICD-10-CM

## 2024-05-14 DIAGNOSIS — Z01.812 PRE-PROCEDURE LAB EXAM: ICD-10-CM

## 2024-05-14 DIAGNOSIS — R06.09 DYSPNEA ON EXERTION: Primary | ICD-10-CM

## 2024-05-14 NOTE — TELEPHONE ENCOUNTER
[facility]    Phone Message    May a detailed message be left on voicemail: yes     Reason for Call: Requesting Results     Name/type of test:     NM MPI with Lexiscan     Date of test: 5/13/24  Was test done at a location other than [facility] (Please fill in the location if not [facility])?: No    Patient called requesting to speak with Suzan in regards to his test results. Please call back to further discuss.    Action Taken: Message routed to:  Other: Cardiology    Travel Screening: Not Applicable    Thank you!  [facility]

## 2024-05-14 NOTE — RESULT ENCOUNTER NOTE
Can you please review and recommend next steps. Cath?  You have not seen this pt in quite a while.  I saw him 5/7 and he reported anginal-type exertional symptoms.   Thanks,  Suzan

## 2024-05-14 NOTE — TELEPHONE ENCOUNTER
Returned call to pt and reported that results have not yet been reviewed by Suzan. Pt and family on the phone upset that results have not been reviewed. Let them know that as soon as provider reviews we can call back with feedback and recommendations.    Family continued to ask if another provider could review faster and get back to them. Let them know a message will be sent to ordering provider but there is not a provider who can review all results to get back to them faster.    Lupe Mccormick RN

## 2024-05-14 NOTE — TELEPHONE ENCOUNTER
Stress test was done yesterday (I saw him and ordered it on 5/7/2024).  It is significantly abnormal.  Likely needs angiogram.  I sent it to Dr. Vargas to review as he has seen him in the past.  Thanks,  Suzan

## 2024-05-15 ENCOUNTER — DOCUMENTATION ONLY (OUTPATIENT)
Dept: CARDIOLOGY | Facility: CLINIC | Age: 74
End: 2024-05-15
Payer: MEDICARE

## 2024-05-15 ENCOUNTER — TELEPHONE (OUTPATIENT)
Dept: CARDIOLOGY | Facility: CLINIC | Age: 74
End: 2024-05-15
Payer: MEDICARE

## 2024-05-15 NOTE — TELEPHONE ENCOUNTER
Noted- phone call to pt and wife to discuss, let them know that they will receive a call to schedule Cor Pos.    Recommended to take it easy, no heavy lifting, limit stress, etc.    Pt states he has some chest pain, but it has been there a while.  Discussed if it gets worse, low threshold for ER visit.    Pt and wife state understanding.    Will forward to Dr. Hamilton's team who pt has seen previously for angiogram orders and education.      Liv

## 2024-05-15 NOTE — TELEPHONE ENCOUNTER
Suzan Waddell APRN CNP  P [facility] Ep Support Pool - [facility]  I had sent result to Dr. Vargas for rec's, but Dr. Martinez sent me this--he needs Cor Pos ASAP    Thanks,  Suzan          Previous Messages       ----- Message -----  From: Mila Martinez MD  Sent: 5/13/2024   1:45 PM CDT  To: FARAZ Bruno CNP,    This gentleman's nuclear stress test is markedly positive.  I would get him in for heart cath as soon as possible.    Mila

## 2024-05-15 NOTE — TELEPHONE ENCOUNTER
Patient's wife called with concerns about patient's recent stress test results. Currently stress test was routed to Dr. Vargas for review, prior to next steps.     Patient currently denies any chest pain at this time. Patient was told they would be contacted for further instructions in the coming days.     Instructed patient to call 911 if He should experience any light headedness, dizziness, or chest pain. Patient expressed understanding.     Gume Anaya RN on 5/15/2024 at 2:36 PM

## 2024-05-16 ENCOUNTER — TELEPHONE (OUTPATIENT)
Dept: CARDIOLOGY | Facility: CLINIC | Age: 74
End: 2024-05-16
Payer: MEDICARE

## 2024-05-16 ENCOUNTER — PREP FOR PROCEDURE (OUTPATIENT)
Dept: CARDIOLOGY | Facility: CLINIC | Age: 74
End: 2024-05-16
Payer: MEDICARE

## 2024-05-16 DIAGNOSIS — R06.09 DYSPNEA ON EXERTION: Primary | ICD-10-CM

## 2024-05-16 DIAGNOSIS — Z01.812 PRE-PROCEDURE LAB EXAM: ICD-10-CM

## 2024-05-16 DIAGNOSIS — R93.1 ABNORMAL NUCLEAR CARDIAC IMAGING TEST: ICD-10-CM

## 2024-05-16 RX ORDER — ASPIRIN 325 MG
325 TABLET ORAL ONCE
Status: CANCELLED | OUTPATIENT
Start: 2024-05-17 | End: 2024-05-16

## 2024-05-16 RX ORDER — ASPIRIN 81 MG/1
243 TABLET, CHEWABLE ORAL ONCE
Status: CANCELLED | OUTPATIENT
Start: 2024-05-17

## 2024-05-16 RX ORDER — SODIUM CHLORIDE 9 MG/ML
INJECTION, SOLUTION INTRAVENOUS CONTINUOUS
Status: CANCELLED | OUTPATIENT
Start: 2024-05-17

## 2024-05-16 RX ORDER — FENTANYL CITRATE 50 UG/ML
25 INJECTION, SOLUTION INTRAMUSCULAR; INTRAVENOUS
Status: CANCELLED | OUTPATIENT
Start: 2024-05-17

## 2024-05-16 RX ORDER — LIDOCAINE 40 MG/G
CREAM TOPICAL
Status: CANCELLED | OUTPATIENT
Start: 2024-05-16

## 2024-05-16 NOTE — TELEPHONE ENCOUNTER
----- Message from Kassy Nolasco sent at 5/16/2024  7:32 AM CDT -----  Regarding: RE: ASAP CA P.PCI  5/16 admit at 730 with KB  ----- Message -----  From: Darleen Walden RN  Sent: 5/16/2024   7:21 AM CDT  To: [facility] Procedure  Pool - [facility]  Subject: ASAP CA P.PCI                                    Case Type: CA P.PCI  Ordering Provider: Suzan DURAN MICHAEL  H&P: 5/7/24 Suzan DURANMICHAEL  Alerts: None  Additional Info/Urgency: ASAP  Orders for Pre-Procedure Labs? 5/16/24

## 2024-05-16 NOTE — TELEPHONE ENCOUNTER
Ashok Kirklanddner  [address] [place] [address]  [phone] (home)     PCP:  Bj Hendrickson  H&EDNA completed by:  Suzan 5/7/2024  Admit date 5/17/2024 Arrival time:  07:30  Anticoagulation:  NA  Previous PCI: No  Bypass Grafts: No  Renal Issues: No  Diabetic?: No  Device?: Yes  Type: dual-chamber pacemaker   Ambulation status: independent    Reason for Visit:  Telephone call to discuss pre-procedure education in preparation for: Coronary Angiogram with Possible PCI    Procedure Prep:  EKG results obtained, dated: To be completed on day of cath lab procedure  Hemogram results obtained: To be completed on day of cath lab procedure  Basic Metabolic Panel results obtained: To be completed on day of cath lab procedure  Pertinent cardiac test results: Lexiscan 5/13/2024  COVID-19 test results obtained: Completed within [place]     Patient Education    Your arrival time is 07:30.  [place] [address] - Main Entrance of the Hospital  Please plan on being at the hospital all day.  At any time, emergencies and/or urgent cases may come up which could delay the start of your procedure.    Pre-procedure instructions  Take your temperature in the morning prior to coming in.  If your temperature is 100 F please call [facility] [phone] and notify them.  If you do not have access to a thermometer at home, please come in for testing.  If you are running a temperature your procedure may be rescheduled.  Patient instructed to not Eat or Drink after midnight.  Patient instructed to shower the evening before or the morning of the procedure.  Patient instructed to arrange for transportation home following procedure from a responsible family member or friend. No driving for at least 24 hours.  Patient instructed to have a responsible adult with them for 24 hours post-procedure.  Post-procedure follow up process.  Conscious sedation  discussed.      Pre-procedure medication instructions.  Continue medications as scheduled, with a small amount of water on the day of the procedure unless indicated. (NO Diabetic Medications or Blood Thinners)  Pt instructed not to consume Alcohol, Tobacco, Caffeine, or Carbonated beverages 12 hours prior to procedure.  Patient instructed to take 325 mg of Aspirin the night before and morning of procedure: Yes  Other medication: instructed to only take sotalol a.m. of the procedure.    Patient states understanding of procedure and agrees to proceed.    *PATIENTS RECORDS AVAILABLE IN [facility] UNLESS OTHERWISE INDICATED*      Patient Active Problem List   Diagnosis    Mixed hyperlipidemia    Gastroesophageal reflux disease, esophagitis presence not specified    SSS (sick sinus syndrome) (H)    Cardiac pacemaker in situ, dual chamber    Paroxysmal atrial fibrillation (H)    MARCELLA (obstructive sleep apnea)    Status post ablation of atrial fibrillation    Left Atrial Appendage Closure (Watchman FLX)    Nonocclusive coronary atherosclerosis of native coronary artery       Current Outpatient Medications   Medication Sig Dispense Refill    acetaminophen (TYLENOL) 500 MG tablet [ACETAMINOPHEN (TYLENOL) 500 MG TABLET] Take 1-2 tablets (500-1,000 mg total) by mouth every 4 (four) hours as needed.  0    aspirin (ASPIR-81) 81 MG EC tablet [ASPIRIN (ASPIR-81) 81 MG EC TABLET] Take 81 mg by mouth daily.       atorvastatin (LIPITOR) 80 MG tablet [ATORVASTATIN (LIPITOR) 80 MG TABLET] TAKE 1 TABLET BY MOUTH EVERYDAY AT BEDTIME 90 tablet 1    carboxymethylcellulose (REFRESH PLUS) 0.5 % SOLN ophthalmic solution Place 1 drop into the right eye At Bedtime      ferrous sulfate (FEROSUL) 325 (65 Fe) MG tablet Take 325 mg by mouth three times a week      omeprazole (PRILOSEC) 40 MG capsule [OMEPRAZOLE (PRILOSEC) 40 MG CAPSULE] Take 40 mg by mouth daily before breakfast.      sotalol (BETAPACE) 80 MG tablet Take 1 tablet (80 mg) by mouth 2 times  daily 180 tablet 3       Allergies   Allergen Reactions    Cephalexin Rash       JEWEL FERRARO RN on 5/16/2024 at 7:36 AM

## 2024-05-16 NOTE — TELEPHONE ENCOUNTER
Case request placed with pre-procedure lab orders. Message sent to the procedure scheduling pool and Teams message sent to expedite.  -----------------------------------------------  Liv Black, RN  You15 hours ago (3:44 PM)     GLYNN  Nayana Darleen- very sweet couple, concerned due to pos stress test.  He has been having some chest pain already- prior to this week, and is aware that he should go in to ER if it gets worse or if he starts to have any other signs/symptoms of heart attack.  Hoping we can get him in ASAP per reading physician and Suzan!    Thanks,  Liv

## 2024-05-17 ENCOUNTER — HOSPITAL ENCOUNTER (OUTPATIENT)
Facility: HOSPITAL | Age: 74
Discharge: HOME | End: 2024-05-17
Attending: INTERNAL MEDICINE | Admitting: INTERNAL MEDICINE
Payer: MEDICARE

## 2024-05-17 VITALS
WEIGHT: 202 LBS | SYSTOLIC BLOOD PRESSURE: 164 MMHG | BODY MASS INDEX: 31.71 KG/M2 | TEMPERATURE: 98 F | HEART RATE: 65 BPM | RESPIRATION RATE: 22 BRPM | OXYGEN SATURATION: 97 % | HEIGHT: 67 IN | DIASTOLIC BLOOD PRESSURE: 91 MMHG

## 2024-05-17 DIAGNOSIS — Z01.812 PRE-PROCEDURE LAB EXAM: ICD-10-CM

## 2024-05-17 DIAGNOSIS — R06.09 DYSPNEA ON EXERTION: ICD-10-CM

## 2024-05-17 DIAGNOSIS — R93.1 ABNORMAL NUCLEAR CARDIAC IMAGING TEST: ICD-10-CM

## 2024-05-17 DIAGNOSIS — Z95.5 STATUS POST INSERTION OF DRUG ELUTING CORONARY ARTERY STENT: Primary | ICD-10-CM

## 2024-05-17 PROBLEM — Z98.890 STATUS POST CORONARY ANGIOGRAM: Status: ACTIVE | Noted: 2024-05-17

## 2024-05-17 LAB
ABO/RH(D): NORMAL
ACT BLD: 232 SECONDS (ref 74–150)
ACT BLD: 294 SECONDS (ref 74–150)
ACT BLD: 306 SECONDS (ref 74–150)
ANION GAP SERPL CALCULATED.3IONS-SCNC: 11 MMOL/L (ref 7–15)
ANTIBODY SCREEN: NEGATIVE
ATRIAL RATE - MUSE: 67 BPM
ATRIAL RATE - MUSE: 67 BPM
BUN SERPL-MCNC: 11.1 MG/DL (ref 8–23)
CALCIUM SERPL-MCNC: 9.5 MG/DL (ref 8.8–10.2)
CHLORIDE SERPL-SCNC: 108 MMOL/L (ref 98–107)
CHOLEST SERPL-MCNC: 135 MG/DL
CREAT SERPL-MCNC: 0.9 MG/DL (ref 0.67–1.17)
DEPRECATED HCO3 PLAS-SCNC: 21 MMOL/L (ref 22–29)
DIASTOLIC BLOOD PRESSURE - MUSE: NORMAL MMHG
DIASTOLIC BLOOD PRESSURE - MUSE: NORMAL MMHG
EGFRCR SERPLBLD CKD-EPI 2021: 90 ML/MIN/1.73M2
ERYTHROCYTE [DISTWIDTH] IN BLOOD BY AUTOMATED COUNT: 14 % (ref 10–15)
FASTING STATUS PATIENT QL REPORTED: YES
FASTING STATUS PATIENT QL REPORTED: YES
GLUCOSE SERPL-MCNC: 115 MG/DL (ref 70–99)
HCT VFR BLD AUTO: 44.8 % (ref 40–53)
HDLC SERPL-MCNC: 27 MG/DL
HGB BLD-MCNC: 15.1 G/DL (ref 13.3–17.7)
INTERPRETATION ECG - MUSE: NORMAL
INTERPRETATION ECG - MUSE: NORMAL
LDLC SERPL CALC-MCNC: 80 MG/DL
MCH RBC QN AUTO: 29.2 PG (ref 26.5–33)
MCHC RBC AUTO-ENTMCNC: 33.7 G/DL (ref 31.5–36.5)
MCV RBC AUTO: 87 FL (ref 78–100)
NONHDLC SERPL-MCNC: 108 MG/DL
P AXIS - MUSE: 53 DEGREES
P AXIS - MUSE: 56 DEGREES
PLATELET # BLD AUTO: 262 10E3/UL (ref 150–450)
POTASSIUM SERPL-SCNC: 4.2 MMOL/L (ref 3.4–5.3)
PR INTERVAL - MUSE: 174 MS
PR INTERVAL - MUSE: 186 MS
QRS DURATION - MUSE: 130 MS
QRS DURATION - MUSE: 134 MS
QT - MUSE: 468 MS
QT - MUSE: 478 MS
QTC - MUSE: 494 MS
QTC - MUSE: 505 MS
R AXIS - MUSE: -31 DEGREES
R AXIS - MUSE: -40 DEGREES
RBC # BLD AUTO: 5.18 10E6/UL (ref 4.4–5.9)
SODIUM SERPL-SCNC: 140 MMOL/L (ref 135–145)
SPECIMEN EXPIRATION DATE: NORMAL
SYSTOLIC BLOOD PRESSURE - MUSE: NORMAL MMHG
SYSTOLIC BLOOD PRESSURE - MUSE: NORMAL MMHG
T AXIS - MUSE: -4 DEGREES
T AXIS - MUSE: 13 DEGREES
TRIGL SERPL-MCNC: 138 MG/DL
VENTRICULAR RATE- MUSE: 67 BPM
VENTRICULAR RATE- MUSE: 67 BPM
WBC # BLD AUTO: 6.8 10E3/UL (ref 4–11)

## 2024-05-17 PROCEDURE — 272N000001 HC OR GENERAL SUPPLY STERILE: Performed by: INTERNAL MEDICINE

## 2024-05-17 PROCEDURE — 93010 ELECTROCARDIOGRAM REPORT: CPT | Mod: HOP | Performed by: INTERNAL MEDICINE

## 2024-05-17 PROCEDURE — 92972 PERQ TRLUML CORONRY LITHOTRP: CPT

## 2024-05-17 PROCEDURE — 36415 COLL VENOUS BLD VENIPUNCTURE: CPT | Performed by: NURSE PRACTITIONER

## 2024-05-17 PROCEDURE — C1769 GUIDE WIRE: HCPCS | Performed by: INTERNAL MEDICINE

## 2024-05-17 PROCEDURE — 93005 ELECTROCARDIOGRAM TRACING: CPT

## 2024-05-17 PROCEDURE — 250N000013 HC RX MED GY IP 250 OP 250 PS 637: Performed by: NURSE PRACTITIONER

## 2024-05-17 PROCEDURE — 250N000011 HC RX IP 250 OP 636: Performed by: INTERNAL MEDICINE

## 2024-05-17 PROCEDURE — C1887 CATHETER, GUIDING: HCPCS | Performed by: INTERNAL MEDICINE

## 2024-05-17 PROCEDURE — 82465 ASSAY BLD/SERUM CHOLESTEROL: CPT | Performed by: NURSE PRACTITIONER

## 2024-05-17 PROCEDURE — 999N000054 HC STATISTIC EKG NON-CHARGEABLE

## 2024-05-17 PROCEDURE — C1894 INTRO/SHEATH, NON-LASER: HCPCS | Performed by: INTERNAL MEDICINE

## 2024-05-17 PROCEDURE — 250N000013 HC RX MED GY IP 250 OP 250 PS 637: Performed by: INTERNAL MEDICINE

## 2024-05-17 PROCEDURE — 92933 PRQ TRLML C ATHRC ST ANGIOP1: CPT | Mod: LD | Performed by: INTERNAL MEDICINE

## 2024-05-17 PROCEDURE — 258N000003 HC RX IP 258 OP 636: Performed by: INTERNAL MEDICINE

## 2024-05-17 PROCEDURE — 99152 MOD SED SAME PHYS/QHP 5/>YRS: CPT | Performed by: INTERNAL MEDICINE

## 2024-05-17 PROCEDURE — C9602 PERC D-E COR STENT ATHER S: HCPCS | Performed by: INTERNAL MEDICINE

## 2024-05-17 PROCEDURE — C1724 CATH, TRANS ATHEREC,ROTATION: HCPCS | Performed by: INTERNAL MEDICINE

## 2024-05-17 PROCEDURE — 258N000003 HC RX IP 258 OP 636: Performed by: NURSE PRACTITIONER

## 2024-05-17 PROCEDURE — 93458 L HRT ARTERY/VENTRICLE ANGIO: CPT | Mod: 26 | Performed by: INTERNAL MEDICINE

## 2024-05-17 PROCEDURE — C1725 CATH, TRANSLUMIN NON-LASER: HCPCS | Performed by: INTERNAL MEDICINE

## 2024-05-17 PROCEDURE — 85347 COAGULATION TIME ACTIVATED: CPT

## 2024-05-17 PROCEDURE — 85027 COMPLETE CBC AUTOMATED: CPT | Performed by: NURSE PRACTITIONER

## 2024-05-17 PROCEDURE — 250N000011 HC RX IP 250 OP 636: Performed by: NURSE PRACTITIONER

## 2024-05-17 PROCEDURE — 92972 PERQ TRLUML CORONRY LITHOTRP: CPT | Performed by: INTERNAL MEDICINE

## 2024-05-17 PROCEDURE — 255N000002 HC RX 255 OP 636: Performed by: INTERNAL MEDICINE

## 2024-05-17 PROCEDURE — 80048 BASIC METABOLIC PNL TOTAL CA: CPT | Performed by: NURSE PRACTITIONER

## 2024-05-17 PROCEDURE — C1761 HC OR CATH, TRANS INTRA LITHO/CORONARY: HCPCS | Performed by: INTERNAL MEDICINE

## 2024-05-17 PROCEDURE — 99153 MOD SED SAME PHYS/QHP EA: CPT | Performed by: INTERNAL MEDICINE

## 2024-05-17 PROCEDURE — 93458 L HRT ARTERY/VENTRICLE ANGIO: CPT | Mod: XU | Performed by: INTERNAL MEDICINE

## 2024-05-17 PROCEDURE — 86900 BLOOD TYPING SEROLOGIC ABO: CPT | Performed by: NURSE PRACTITIONER

## 2024-05-17 PROCEDURE — 250N000009 HC RX 250: Performed by: INTERNAL MEDICINE

## 2024-05-17 PROCEDURE — C1874 STENT, COATED/COV W/DEL SYS: HCPCS | Performed by: INTERNAL MEDICINE

## 2024-05-17 DEVICE — STENT CORONARY DES SYNERGY XD MR US 3.50X38MM H7493941838350: Type: IMPLANTABLE DEVICE | Status: FUNCTIONAL

## 2024-05-17 RX ORDER — NITROGLYCERIN 5 MG/ML
VIAL (ML) INTRAVENOUS
Status: DISCONTINUED | OUTPATIENT
Start: 2024-05-17 | End: 2024-05-17 | Stop reason: HOSPADM

## 2024-05-17 RX ORDER — ASPIRIN 325 MG
325 TABLET ORAL ONCE
Status: COMPLETED | OUTPATIENT
Start: 2024-05-17 | End: 2024-05-17

## 2024-05-17 RX ORDER — SODIUM CHLORIDE 9 MG/ML
INJECTION, SOLUTION INTRAVENOUS CONTINUOUS
Status: DISCONTINUED | OUTPATIENT
Start: 2024-05-17 | End: 2024-05-17 | Stop reason: HOSPADM

## 2024-05-17 RX ORDER — HEPARIN SODIUM 1000 [USP'U]/ML
INJECTION, SOLUTION INTRAVENOUS; SUBCUTANEOUS
Status: DISCONTINUED | OUTPATIENT
Start: 2024-05-17 | End: 2024-05-17 | Stop reason: HOSPADM

## 2024-05-17 RX ORDER — OXYCODONE HYDROCHLORIDE 5 MG/1
10 TABLET ORAL EVERY 4 HOURS PRN
Status: DISCONTINUED | OUTPATIENT
Start: 2024-05-17 | End: 2024-05-17 | Stop reason: HOSPADM

## 2024-05-17 RX ORDER — LIDOCAINE 40 MG/G
CREAM TOPICAL
Status: DISCONTINUED | OUTPATIENT
Start: 2024-05-17 | End: 2024-05-17 | Stop reason: HOSPADM

## 2024-05-17 RX ORDER — NALOXONE HYDROCHLORIDE 0.4 MG/ML
0.2 INJECTION, SOLUTION INTRAMUSCULAR; INTRAVENOUS; SUBCUTANEOUS
Status: DISCONTINUED | OUTPATIENT
Start: 2024-05-17 | End: 2024-05-17 | Stop reason: HOSPADM

## 2024-05-17 RX ORDER — OXYCODONE HYDROCHLORIDE 5 MG/1
5 TABLET ORAL EVERY 4 HOURS PRN
Status: DISCONTINUED | OUTPATIENT
Start: 2024-05-17 | End: 2024-05-17 | Stop reason: HOSPADM

## 2024-05-17 RX ORDER — FENTANYL CITRATE 50 UG/ML
25 INJECTION, SOLUTION INTRAMUSCULAR; INTRAVENOUS
Status: DISCONTINUED | OUTPATIENT
Start: 2024-05-17 | End: 2024-05-17 | Stop reason: HOSPADM

## 2024-05-17 RX ORDER — ACETAMINOPHEN 325 MG/1
650 TABLET ORAL EVERY 4 HOURS PRN
Status: DISCONTINUED | OUTPATIENT
Start: 2024-05-17 | End: 2024-05-17 | Stop reason: HOSPADM

## 2024-05-17 RX ORDER — PANTOPRAZOLE SODIUM 40 MG/1
40 TABLET, DELAYED RELEASE ORAL DAILY
Qty: 90 TABLET | Refills: 3 | Status: SHIPPED | OUTPATIENT
Start: 2024-05-17

## 2024-05-17 RX ORDER — ONDANSETRON 2 MG/ML
4 INJECTION INTRAMUSCULAR; INTRAVENOUS EVERY 6 HOURS PRN
Status: DISCONTINUED | OUTPATIENT
Start: 2024-05-17 | End: 2024-05-17 | Stop reason: HOSPADM

## 2024-05-17 RX ORDER — DIAZEPAM 5 MG
5 TABLET ORAL ONCE
Status: COMPLETED | OUTPATIENT
Start: 2024-05-17 | End: 2024-05-17

## 2024-05-17 RX ORDER — FLUMAZENIL 0.1 MG/ML
0.2 INJECTION, SOLUTION INTRAVENOUS
Status: DISCONTINUED | OUTPATIENT
Start: 2024-05-17 | End: 2024-05-17 | Stop reason: HOSPADM

## 2024-05-17 RX ORDER — NALOXONE HYDROCHLORIDE 0.4 MG/ML
0.4 INJECTION, SOLUTION INTRAMUSCULAR; INTRAVENOUS; SUBCUTANEOUS
Status: DISCONTINUED | OUTPATIENT
Start: 2024-05-17 | End: 2024-05-17 | Stop reason: HOSPADM

## 2024-05-17 RX ORDER — MORPHINE SULFATE 2 MG/ML
2 INJECTION, SOLUTION INTRAMUSCULAR; INTRAVENOUS
Status: DISCONTINUED | OUTPATIENT
Start: 2024-05-17 | End: 2024-05-17 | Stop reason: HOSPADM

## 2024-05-17 RX ORDER — ATROPINE SULFATE 0.1 MG/ML
0.5 INJECTION INTRAVENOUS
Status: DISCONTINUED | OUTPATIENT
Start: 2024-05-17 | End: 2024-05-17 | Stop reason: HOSPADM

## 2024-05-17 RX ORDER — CLOPIDOGREL BISULFATE 75 MG/1
75 TABLET ORAL DAILY
Qty: 90 TABLET | Refills: 3 | Status: SHIPPED | OUTPATIENT
Start: 2024-05-18

## 2024-05-17 RX ORDER — ASPIRIN 81 MG/1
243 TABLET, CHEWABLE ORAL ONCE
Status: COMPLETED | OUTPATIENT
Start: 2024-05-17 | End: 2024-05-17

## 2024-05-17 RX ORDER — IODIXANOL 320 MG/ML
INJECTION, SOLUTION INTRAVASCULAR
Status: DISCONTINUED | OUTPATIENT
Start: 2024-05-17 | End: 2024-05-17 | Stop reason: HOSPADM

## 2024-05-17 RX ORDER — CLOPIDOGREL BISULFATE 75 MG/1
75 TABLET ORAL DAILY
Status: DISCONTINUED | OUTPATIENT
Start: 2024-05-18 | End: 2024-05-17 | Stop reason: HOSPADM

## 2024-05-17 RX ORDER — NITROGLYCERIN 0.4 MG/1
0.4 TABLET SUBLINGUAL EVERY 5 MIN PRN
Status: DISCONTINUED | OUTPATIENT
Start: 2024-05-17 | End: 2024-05-17 | Stop reason: HOSPADM

## 2024-05-17 RX ORDER — ONDANSETRON 4 MG/1
4 TABLET, ORALLY DISINTEGRATING ORAL EVERY 6 HOURS PRN
Status: DISCONTINUED | OUTPATIENT
Start: 2024-05-17 | End: 2024-05-17 | Stop reason: HOSPADM

## 2024-05-17 RX ORDER — FENTANYL CITRATE 50 UG/ML
INJECTION, SOLUTION INTRAMUSCULAR; INTRAVENOUS
Status: DISCONTINUED | OUTPATIENT
Start: 2024-05-17 | End: 2024-05-17 | Stop reason: HOSPADM

## 2024-05-17 RX ORDER — HYDRALAZINE HYDROCHLORIDE 20 MG/ML
10 INJECTION INTRAMUSCULAR; INTRAVENOUS EVERY 4 HOURS PRN
Status: DISCONTINUED | OUTPATIENT
Start: 2024-05-17 | End: 2024-05-17 | Stop reason: HOSPADM

## 2024-05-17 RX ORDER — METOPROLOL TARTRATE 1 MG/ML
5 INJECTION, SOLUTION INTRAVENOUS
Status: DISCONTINUED | OUTPATIENT
Start: 2024-05-17 | End: 2024-05-17 | Stop reason: HOSPADM

## 2024-05-17 RX ORDER — CLOPIDOGREL BISULFATE 75 MG/1
TABLET ORAL
Status: DISCONTINUED | OUTPATIENT
Start: 2024-05-17 | End: 2024-05-17 | Stop reason: HOSPADM

## 2024-05-17 RX ORDER — ASPIRIN 81 MG/1
81 TABLET ORAL DAILY
Status: DISCONTINUED | OUTPATIENT
Start: 2024-05-18 | End: 2024-05-17 | Stop reason: HOSPADM

## 2024-05-17 RX ADMIN — DIAZEPAM 5 MG: 5 TABLET ORAL at 09:29

## 2024-05-17 RX ADMIN — MORPHINE SULFATE 2 MG: 2 INJECTION, SOLUTION INTRAMUSCULAR; INTRAVENOUS at 12:35

## 2024-05-17 RX ADMIN — SODIUM CHLORIDE 150 ML/HR: 9 INJECTION, SOLUTION INTRAVENOUS at 08:00

## 2024-05-17 RX ADMIN — ONDANSETRON 4 MG: 2 INJECTION INTRAMUSCULAR; INTRAVENOUS at 13:47

## 2024-05-17 RX ADMIN — SODIUM CHLORIDE 75 ML/HR: 9 INJECTION, SOLUTION INTRAVENOUS at 12:35

## 2024-05-17 RX ADMIN — HYDRALAZINE HYDROCHLORIDE 10 MG: 20 INJECTION INTRAMUSCULAR; INTRAVENOUS at 13:03

## 2024-05-17 ASSESSMENT — ACTIVITIES OF DAILY LIVING (ADL)
ADLS_ACUITY_SCORE: 35

## 2024-05-17 ASSESSMENT — EJECTION FRACTION: EF_VALUE: .26

## 2024-05-17 NOTE — INTERVAL H&P NOTE
"I have reviewed the surgical (or preoperative) H&P that is linked to this encounter, and examined the patient. There are no significant changes    Clinical Conditions Present on Arrival:  Clinically Significant Risk Factors Present on Admission                 # Drug Induced Platelet Defect: home medication list includes an antiplatelet medication  # Obesity: Estimated body mass index is 31.64 kg/m  as calculated from the following:    Height as of this encounter: 1.702 m (5' 7\").    Weight as of this encounter: 91.6 kg (202 lb).       "

## 2024-05-17 NOTE — PRE-PROCEDURE
GENERAL PRE-PROCEDURE:   Procedure:  Coronary angiogram with possible PCI  Date/Time:  5/17/2024 8:05 AM    Written consent obtained?: Yes    Risks and benefits: Risks, benefits and alternatives were discussed    Consent given by:  Patient  Patient states understanding of procedure being performed: Yes    Patient's understanding of procedure matches consent: Yes    Procedure consent matches procedure scheduled: Yes    Expected level of sedation:  Moderate  Appropriately NPO:  Yes  ASA Class:  3 (VOGT, abnormal NM stress test, PAF, sick sinus syndrome; PPM in situ)  Mallampati  :  Grade 3- soft palate visible, posterior pharyngeal wall not visible  Lungs:  Lungs clear with good breath sounds bilaterally  Heart:  Normal heart sounds and rate  History & Physical reviewed:  History and physical reviewed and updates made (see comment)  H&P Comments:  Clinically Significant Risk Factors Present on Admission    Cardiovascular : VOGT, abnormal NM stress test, PAF, sick sinus syndrome; PPM in situ    Fluid & Electrolyte Disorders : Not present on admission    Gastroenterology : Not present on admission    Hematology/Oncology : Not present on admission    Nephrology : Not present on admission    Neurology : Not present on admission    Pulmonology : Not present on admission    Systemic : Not present on admission    Statement of review:  I have reviewed the lab findings, diagnostic data, medications, and the plan for sedation

## 2024-05-17 NOTE — Clinical Note
Atherectomy performed in the middle left anterior descending. Rotational atherectomy was performed. Pass rate = 152 RPM. Pass duration = 20 seconds.

## 2024-05-17 NOTE — Clinical Note
Atherectomy performed in the proximal left anterior descending. Rotational atherectomy was performed. Pass rate = 158 RPM. Pass duration = 10 seconds.

## 2024-05-17 NOTE — Clinical Note
Stent deployed in the proximal left anterior descending. Max pressure = 16 manav. Total duration = 20 seconds.

## 2024-05-17 NOTE — DISCHARGE INSTRUCTIONS

## 2024-05-17 NOTE — Clinical Note
The first balloon was inserted into the left anterior descending and middle left anterior descending.Max pressure = 8 manav. Total duration = 18 seconds.

## 2024-05-17 NOTE — Clinical Note
The first balloon was inserted into the left anterior descending and proximal left anterior descending.Max pressure = 14 manav. Total duration = 12 seconds.     Max pressure = 14 manav. Total duration = 14 seconds.    Balloon reinflated a second time: Max pressure = 14 manav. Total duration = 14 seconds.  Balloon reinflated a third time: Max pressure = 14 manav. Total duration = 6 seconds.  Balloon reinflated a fourth time: Max pressure = 16 at m. Total duration = 9 seconds.  Balloon reinflated a fourth time: Max pressure = 16 manav. Total duration = 10 seconds.

## 2024-05-17 NOTE — DISCHARGE SUMMARY
Pt came back from procedure with high levels of chest pain/pressure.  VSS, 2mg morphine given and effective at reducing chest pain.  Upon recheck, pt denies chest pain and shortness of breath.  Pt was up in hallway for 2x walks, tolerated well.  TR band removal completed, site is soft and nontender.  Pt and spouse in room for AVS instructions, both state understanding and able to teach back.

## 2024-05-17 NOTE — Clinical Note
Atherectomy performed in the middle left anterior descending. Rotational atherectomy was performed. Pass rate = 157 RPM. Pass duration = 11 seconds.

## 2024-05-17 NOTE — Clinical Note
Atherectomy performed in the middle left anterior descending. Rotational atherectomy was performed. Pass rate = 147 RPM. Pass duration = 15 seconds.

## 2024-05-17 NOTE — Clinical Note
Atherectomy performed in the middle left anterior descending. Rotational atherectomy was performed. Pass rate = 158 RPM. Pass duration = 14 seconds.

## 2024-05-17 NOTE — PROGRESS NOTES
Brief CV progress note:    Patient underwent coronary angiogram which demonstrated severe pLAD disease which was successfully treated with complex PCI with rotational atherectomy and PCI x1. Cath findings are detailed below. OK to discharge today. Continue ASA, Plavix and intensive statin therapy. Omeprazole switched to pantoprazole to avoid significant [id number] interaction while on Plavix.           CORONARY ANGIOGRAM 5/17/2024  Conclusion         Mid LAD to Dist LAD lesion is 40% stenosed.    Mid Cx lesion is 40% stenosed.    3rd Mrg lesion is 30% stenosed.    LPAV lesion is 50% stenosed.    Ost LM to Mid LM lesion is 40% stenosed.    Ost LAD to Mid LAD lesion is 95% stenosed.     1.  Diffuse coronary calcification with 30 to 40% its eccentric calcified narrowing mid left main, large caliber vessel with large residual lumen, severe calcification in the proximal third of the LAD and segmental calcified mild to moderate stenoses in the dominant left circumflex system.  Focal 60% stenosis proximal LAD and 95% segment of severe calcific stenosis junction of the proximal and mid LAD (culprit lesion).  2.  Dominant left circumflex with 30 to 40% eccentric calcification proximally.  No severe stenoses.  3.  RCA small nondominant otherwise normal.  4.  Successful PCI proximal LAD with rotational atherectomy, PTCA, shockwave lithotripsy, and Synergy 3.5 x 38 JESSICA implant, postdilated with 3.75 NC balloon to 16 manav.  0% residual, YASIR-3 flow.         Recommendations    Medications:  - Continue dual antiplatelet therapy for `12 month(s).   - Continue high dose statin therapy indefinitely.   - Risk factor management for atherosclerosis.     Coronary Findings    Diagnostic  Dominance: Left  Left Main   Ost LM to Mid LM lesion is 40% stenosed. The lesion is eccentric. The lesion is moderately calcified.      Left Anterior Descending   Ost LAD to Mid LAD lesion is 95% stenosed. The lesion is type B1 - medium risk and concentric.  "The lesion is severely calcified. The lesion was not previously treated. The stenosis was measured by a visual reading.   Mid LAD to Dist LAD lesion is 40% stenosed. The lesion is eccentric. The lesion is moderately calcified.      Left Circumflex   Mid Cx lesion is 40% stenosed. The lesion is concentric. The lesion is moderately calcified.      Third Obtuse Marginal Branch   3rd Mrg lesion is 30% stenosed.      Left Posterior Atrioventricular Artery   LPAV lesion is 50% stenosed. The lesion is concentric. The lesion is moderately calcified.         Intervention     Ost LAD to Mid LAD lesion   Stent   Lesion length: 35 mm. The crossed the lesion. The pre-interventional distal flow is normal (YASIR 3). A STENT CORONARY JESSICA SYNERGY XD MR US 3.61D59GK [id number] drug eluting stent was successfully placed. Pre-stent angioplasty was performed using a ANETA ROTAPRO OS 2.0 1.50MM [id number] supply. An additional CATH BALLOON EMERGE 3.0X12MM [id number] supply was used. Maximum pressure:10 manav. An additional CATH IWRBU4X SHOCKWAVE C2+ 3.5 X12MM CORONARY [id number] supply was used. Maximum pressure:6 manav. . Post-stent angioplasty was performed using a CATH BALLOON NC EMERGE 3.66F67KF [id number] supply. Maximum pressure: 16 manav. . Maximum pressure: 10 manav. The post-interventional distal flow is normal (YASIR 3). The intervention was successful. No complications occurred at this lesion.   Supplies used: WIRE GUIDE HI-TRQ  WHISPER MS JTIP 0.014\"X190CM [id number]; CATH LAUNCHER 6FR EBU 4.0 [id number]; GUIDEWIRE ROTAWIRE DRIVE FLOPPY [id number]; ANETA ROTAPRO OS 2.0 1.50MM [id number]; CATH BALLOON EMERGE 3.0X12MM [id number]; CATH BALLOON NC EMERGE 3.14H43LA [id number]; CATH BVJCO7B SHOCKWAVE C2+ 3.5 X12MM CORONARY [id number]; STENT CORONARY JESSICA SYNERGY XD MR US 3.59P71SJ [id number]   There is a 0% residual stenosis post intervention.           "

## 2024-05-28 ENCOUNTER — LAB REQUISITION (OUTPATIENT)
Dept: LAB | Facility: CLINIC | Age: 74
End: 2024-05-28
Payer: MEDICARE

## 2024-05-28 DIAGNOSIS — D64.9 ANEMIA, UNSPECIFIED: ICD-10-CM

## 2024-05-28 LAB
IRON BINDING CAPACITY (ROCHE): 309 UG/DL (ref 240–430)
IRON SATN MFR SERPL: 40 % (ref 15–46)
IRON SERPL-MCNC: 123 UG/DL (ref 61–157)

## 2024-05-28 PROCEDURE — 83550 IRON BINDING TEST: CPT | Mod: ORL | Performed by: FAMILY MEDICINE

## 2024-05-29 ENCOUNTER — OFFICE VISIT (OUTPATIENT)
Dept: CARDIOLOGY | Facility: CLINIC | Age: 74
End: 2024-05-29
Attending: INTERNAL MEDICINE
Payer: MEDICARE

## 2024-05-29 ENCOUNTER — OFFICE VISIT (OUTPATIENT)
Dept: CARDIOLOGY | Facility: CLINIC | Age: 74
End: 2024-05-29
Payer: MEDICARE

## 2024-05-29 VITALS
BODY MASS INDEX: 31.95 KG/M2 | WEIGHT: 204 LBS | SYSTOLIC BLOOD PRESSURE: 150 MMHG | OXYGEN SATURATION: 96 % | DIASTOLIC BLOOD PRESSURE: 88 MMHG | HEART RATE: 62 BPM

## 2024-05-29 VITALS
BODY MASS INDEX: 31.55 KG/M2 | HEIGHT: 67 IN | WEIGHT: 201 LBS | HEART RATE: 64 BPM | SYSTOLIC BLOOD PRESSURE: 120 MMHG | DIASTOLIC BLOOD PRESSURE: 78 MMHG | RESPIRATION RATE: 16 BRPM

## 2024-05-29 DIAGNOSIS — I25.110 CORONARY ARTERY DISEASE INVOLVING NATIVE CORONARY ARTERY OF NATIVE HEART WITH UNSTABLE ANGINA PECTORIS (H): Primary | ICD-10-CM

## 2024-05-29 DIAGNOSIS — I49.5 SSS (SICK SINUS SYNDROME) (H): ICD-10-CM

## 2024-05-29 DIAGNOSIS — I49.5 SICK SINUS SYNDROME (H): ICD-10-CM

## 2024-05-29 DIAGNOSIS — Z95.0 CARDIAC PACEMAKER IN SITU: ICD-10-CM

## 2024-05-29 DIAGNOSIS — I48.0 PAROXYSMAL ATRIAL FIBRILLATION (H): ICD-10-CM

## 2024-05-29 DIAGNOSIS — R93.1 ABNORMAL NUCLEAR CARDIAC IMAGING TEST: ICD-10-CM

## 2024-05-29 DIAGNOSIS — Z95.818 PRESENCE OF LEFT ATRIAL APPENDAGE CLOSURE DEVICE COMPOSED OF NICKEL-TITANIUM ALLOY WITH POLYETHYLENE TEREPHTHALATE MEMBRANE: ICD-10-CM

## 2024-05-29 DIAGNOSIS — Z95.5 STATUS POST INSERTION OF DRUG ELUTING CORONARY ARTERY STENT: ICD-10-CM

## 2024-05-29 DIAGNOSIS — Z95.0 PACEMAKER: ICD-10-CM

## 2024-05-29 DIAGNOSIS — I48.0 PAROXYSMAL ATRIAL FIBRILLATION (H): Primary | ICD-10-CM

## 2024-05-29 PROBLEM — I25.10 NONOCCLUSIVE CORONARY ATHEROSCLEROSIS OF NATIVE CORONARY ARTERY: Status: RESOLVED | Noted: 2017-10-17 | Resolved: 2024-05-29

## 2024-05-29 LAB
MDC_IDC_LEAD_CONNECTION_STATUS: NORMAL
MDC_IDC_LEAD_CONNECTION_STATUS: NORMAL
MDC_IDC_LEAD_IMPLANT_DT: NORMAL
MDC_IDC_LEAD_IMPLANT_DT: NORMAL
MDC_IDC_LEAD_LOCATION: NORMAL
MDC_IDC_LEAD_LOCATION: NORMAL
MDC_IDC_LEAD_LOCATION_DETAIL_1: NORMAL
MDC_IDC_LEAD_LOCATION_DETAIL_1: NORMAL
MDC_IDC_LEAD_MFG: NORMAL
MDC_IDC_LEAD_MFG: NORMAL
MDC_IDC_LEAD_MODEL: NORMAL
MDC_IDC_LEAD_MODEL: NORMAL
MDC_IDC_LEAD_POLARITY_TYPE: NORMAL
MDC_IDC_LEAD_POLARITY_TYPE: NORMAL
MDC_IDC_LEAD_SERIAL: NORMAL
MDC_IDC_LEAD_SERIAL: NORMAL
MDC_IDC_LEAD_SPECIAL_FUNCTION: NORMAL
MDC_IDC_LEAD_SPECIAL_FUNCTION: NORMAL
MDC_IDC_MSMT_BATTERY_DTM: NORMAL
MDC_IDC_MSMT_BATTERY_REMAINING_LONGEVITY: 116 MO
MDC_IDC_MSMT_BATTERY_RRT_TRIGGER: 2.62
MDC_IDC_MSMT_BATTERY_STATUS: NORMAL
MDC_IDC_MSMT_BATTERY_VOLTAGE: 3 V
MDC_IDC_MSMT_LEADCHNL_RA_IMPEDANCE_VALUE: 342 OHM
MDC_IDC_MSMT_LEADCHNL_RA_IMPEDANCE_VALUE: 380 OHM
MDC_IDC_MSMT_LEADCHNL_RA_PACING_THRESHOLD_AMPLITUDE: 0.75 V
MDC_IDC_MSMT_LEADCHNL_RA_PACING_THRESHOLD_PULSEWIDTH: 0.4 MS
MDC_IDC_MSMT_LEADCHNL_RA_SENSING_INTR_AMPL: 3 MV
MDC_IDC_MSMT_LEADCHNL_RV_IMPEDANCE_VALUE: 361 OHM
MDC_IDC_MSMT_LEADCHNL_RV_IMPEDANCE_VALUE: 418 OHM
MDC_IDC_MSMT_LEADCHNL_RV_PACING_THRESHOLD_AMPLITUDE: 1 V
MDC_IDC_MSMT_LEADCHNL_RV_PACING_THRESHOLD_PULSEWIDTH: 0.4 MS
MDC_IDC_MSMT_LEADCHNL_RV_SENSING_INTR_AMPL: 3.1 MV
MDC_IDC_PG_IMPLANT_DTM: NORMAL
MDC_IDC_PG_MFG: NORMAL
MDC_IDC_PG_MODEL: NORMAL
MDC_IDC_PG_SERIAL: NORMAL
MDC_IDC_PG_TYPE: NORMAL
MDC_IDC_SESS_CLINIC_NAME: NORMAL
MDC_IDC_SESS_DTM: NORMAL
MDC_IDC_SESS_TYPE: NORMAL
MDC_IDC_SET_BRADY_AT_MODE_SWITCH_RATE: 171 {BEATS}/MIN
MDC_IDC_SET_BRADY_HYSTRATE: NORMAL
MDC_IDC_SET_BRADY_LOWRATE: 60 {BEATS}/MIN
MDC_IDC_SET_BRADY_MAX_SENSOR_RATE: 130 {BEATS}/MIN
MDC_IDC_SET_BRADY_MAX_TRACKING_RATE: 130 {BEATS}/MIN
MDC_IDC_SET_BRADY_MODE: NORMAL
MDC_IDC_SET_BRADY_PAV_DELAY_LOW: 180 MS
MDC_IDC_SET_BRADY_SAV_DELAY_LOW: 150 MS
MDC_IDC_SET_LEADCHNL_RA_PACING_AMPLITUDE: NORMAL
MDC_IDC_SET_LEADCHNL_RA_PACING_ANODE_ELECTRODE_1: NORMAL
MDC_IDC_SET_LEADCHNL_RA_PACING_ANODE_LOCATION_1: NORMAL
MDC_IDC_SET_LEADCHNL_RA_PACING_CAPTURE_MODE: NORMAL
MDC_IDC_SET_LEADCHNL_RA_PACING_CATHODE_ELECTRODE_1: NORMAL
MDC_IDC_SET_LEADCHNL_RA_PACING_CATHODE_LOCATION_1: NORMAL
MDC_IDC_SET_LEADCHNL_RA_PACING_POLARITY: NORMAL
MDC_IDC_SET_LEADCHNL_RA_PACING_PULSEWIDTH: 0.4 MS
MDC_IDC_SET_LEADCHNL_RA_SENSING_ANODE_ELECTRODE_1: NORMAL
MDC_IDC_SET_LEADCHNL_RA_SENSING_ANODE_LOCATION_1: NORMAL
MDC_IDC_SET_LEADCHNL_RA_SENSING_CATHODE_ELECTRODE_1: NORMAL
MDC_IDC_SET_LEADCHNL_RA_SENSING_CATHODE_LOCATION_1: NORMAL
MDC_IDC_SET_LEADCHNL_RA_SENSING_POLARITY: NORMAL
MDC_IDC_SET_LEADCHNL_RA_SENSING_SENSITIVITY: 0.3 MV
MDC_IDC_SET_LEADCHNL_RV_PACING_AMPLITUDE: NORMAL
MDC_IDC_SET_LEADCHNL_RV_PACING_ANODE_ELECTRODE_1: NORMAL
MDC_IDC_SET_LEADCHNL_RV_PACING_ANODE_LOCATION_1: NORMAL
MDC_IDC_SET_LEADCHNL_RV_PACING_CAPTURE_MODE: NORMAL
MDC_IDC_SET_LEADCHNL_RV_PACING_CATHODE_ELECTRODE_1: NORMAL
MDC_IDC_SET_LEADCHNL_RV_PACING_CATHODE_LOCATION_1: NORMAL
MDC_IDC_SET_LEADCHNL_RV_PACING_POLARITY: NORMAL
MDC_IDC_SET_LEADCHNL_RV_PACING_PULSEWIDTH: 0.4 MS
MDC_IDC_SET_LEADCHNL_RV_SENSING_ANODE_ELECTRODE_1: NORMAL
MDC_IDC_SET_LEADCHNL_RV_SENSING_ANODE_LOCATION_1: NORMAL
MDC_IDC_SET_LEADCHNL_RV_SENSING_CATHODE_ELECTRODE_1: NORMAL
MDC_IDC_SET_LEADCHNL_RV_SENSING_CATHODE_LOCATION_1: NORMAL
MDC_IDC_SET_LEADCHNL_RV_SENSING_POLARITY: NORMAL
MDC_IDC_SET_LEADCHNL_RV_SENSING_SENSITIVITY: 0.45 MV
MDC_IDC_SET_ZONE_DETECTION_INTERVAL: 350 MS
MDC_IDC_SET_ZONE_DETECTION_INTERVAL: 400 MS
MDC_IDC_SET_ZONE_STATUS: NORMAL
MDC_IDC_SET_ZONE_STATUS: NORMAL
MDC_IDC_SET_ZONE_TYPE: NORMAL
MDC_IDC_SET_ZONE_VENDOR_TYPE: NORMAL
MDC_IDC_STAT_AT_BURDEN_PERCENT: 0 %
MDC_IDC_STAT_AT_DTM_END: NORMAL
MDC_IDC_STAT_AT_DTM_START: NORMAL
MDC_IDC_STAT_AT_MODE_SW_COUNT: 0
MDC_IDC_STAT_BRADY_AP_VP_PERCENT: 0.28 %
MDC_IDC_STAT_BRADY_AP_VS_PERCENT: 10.14 %
MDC_IDC_STAT_BRADY_AS_VP_PERCENT: 0.05 %
MDC_IDC_STAT_BRADY_AS_VS_PERCENT: 89.52 %
MDC_IDC_STAT_BRADY_DTM_END: NORMAL
MDC_IDC_STAT_BRADY_DTM_START: NORMAL
MDC_IDC_STAT_BRADY_RA_PERCENT_PACED: 10.3 %
MDC_IDC_STAT_BRADY_RV_PERCENT_PACED: 0.33 %
MDC_IDC_STAT_EPISODE_RECENT_COUNT: 0
MDC_IDC_STAT_EPISODE_RECENT_COUNT_DTM_END: NORMAL
MDC_IDC_STAT_EPISODE_RECENT_COUNT_DTM_START: NORMAL
MDC_IDC_STAT_EPISODE_TOTAL_COUNT: 0
MDC_IDC_STAT_EPISODE_TOTAL_COUNT: 0
MDC_IDC_STAT_EPISODE_TOTAL_COUNT: 20
MDC_IDC_STAT_EPISODE_TOTAL_COUNT: 215
MDC_IDC_STAT_EPISODE_TOTAL_COUNT: 261
MDC_IDC_STAT_EPISODE_TOTAL_COUNT_DTM_END: NORMAL
MDC_IDC_STAT_EPISODE_TOTAL_COUNT_DTM_START: NORMAL
MDC_IDC_STAT_EPISODE_TYPE: NORMAL
MDC_IDC_STAT_TACHYTHERAPY_RECENT_DTM_END: NORMAL
MDC_IDC_STAT_TACHYTHERAPY_RECENT_DTM_START: NORMAL
MDC_IDC_STAT_TACHYTHERAPY_TOTAL_DTM_END: NORMAL
MDC_IDC_STAT_TACHYTHERAPY_TOTAL_DTM_START: NORMAL

## 2024-05-29 PROCEDURE — 93280 PM DEVICE PROGR EVAL DUAL: CPT | Performed by: INTERNAL MEDICINE

## 2024-05-29 PROCEDURE — 99214 OFFICE O/P EST MOD 30 MIN: CPT | Performed by: INTERNAL MEDICINE

## 2024-05-29 PROCEDURE — G2211 COMPLEX E/M VISIT ADD ON: HCPCS | Performed by: INTERNAL MEDICINE

## 2024-05-29 RX ORDER — NITROGLYCERIN 0.4 MG/1
TABLET SUBLINGUAL
Qty: 10 TABLET | Refills: 1 | Status: SHIPPED | OUTPATIENT
Start: 2024-05-29

## 2024-05-29 NOTE — LETTER
2024    Bj Hendrickson MD  [address] [place] [address]    RE: Ashok Kirklanddner       Dear Colleague,     I had the pleasure of seeing Ashok Del Rosario in the [facility].     [facility]  Cardiac Electrophysiology  [address]  [place] [address]   Office: [phone]  Fax: [phone]     Patient: Ashok Del Rosario   : 1950       CHIEF COMPLAINT/REASON FOR VISIT  Paroxysmal atrial fibrillation  Dual chamber pacemaker in-situ (Medtronic, 2018)      Assessment/Recommendations     Cepio5F/Paroxysmal atrial fibrillation - symptomatic with lightheaded, palpitations, dyspnea  [id number] 5  RF PVI 2019  pLAAO (Watchman FLX, 2020)  Infrequent and brief AF recurrences   Sotalol (2022-present)  We reviewed atrial fibrillation physiology, managing stroke risk, cardioversion, antiarrhythmic drug therapy, and catheter ablation.    - at present, given his stable clinical course, low AF burden, and resolution of exertional dspnea and angina post PCI 2024, we will plan for continued sotalol for now, with further consideration for catheter ablation in case of progressive AF breakthrough  - continue sotalol 80mg twice daily  - continue aspirin 81mg daily  - we discussed the ongoing importance of lifestyle modification (maintaining a healthy weight, aerobic activity, sleep apnea diagnosis and management, alcohol avoidance) as part of a long term strategy for atrial fibrillation management    Dual chamber pacemaker in-situ - Medtronic, 2018, implanted for SND  - continue routine pacemaker follow-up    Follow up: follow-up 1 year, sooner if needed           History of Present Illness   Ashok Del Rosario is a 73 year old male with paroxysmal atrial fibrillation with prior RF PVI 2019, pLAAO (Watchman FLX, 2020), dual chamber pacemaker in-situ (Medtronic, 2018), sinus node dysfunction, CAD with  pLAD PCI/JESSICA 5/17/2024, TIA, carotid stenosis, HTN, emphysema referred by Suzan Waddell CNP for consultation regarding atrial fibrillation.    Mrs. Del Rosario's atrial fibrillation history is as summarized below:  Symptoms: lightheaded, palpitations, dyspnea  Symptom onset date: early 2019, associated with rapid ventricular rates  Diagnosis date: 1/11/2019  Prior medical therapies: sotalol (7/2022-present)  Prior DCCVs: has had prior DCCVs  Prior ablations: RF PVI 8/29/2019 (unknown provider, [facility])  Percutaneous left atrial appendage occlusion: Watchman FLX, 2/4/2020    He feels well.  He denies chest pain, syncope.  He had noted a long history of exertional dyspnea and angina which he had initially attributed to sotalol - underwent pLAD PCI/JESSICA 5/17/2024 with resolution of these symptoms.  He will be starting cardiac rehabilitation on 5/31/2024.       Physical Examination  Review of Systems   VITALS: BP (!) 150/88 (BP Location: Right arm, Patient Position: Sitting, Cuff Size: Adult Regular)   Pulse 62   Wt 92.5 kg (204 lb)   SpO2 96%   BMI 31.95 kg/m    Wt Readings from Last 3 Encounters:   05/17/24 91.6 kg (202 lb)   05/07/24 91.6 kg (202 lb)   01/22/24 95.3 kg (210 lb)     CONSTITUTIONAL: well nourished, comfortable, no distress  EYES:  Conjunctivae pink, sclerae clear.    E/N/T:  Oral mucosa pink  RESPIRATORY:  Respiratory effort is normal  CARDIOVASCULAR:  normal S1 and S2  GASTROINTESTINAL:  Abdomen without masses or tenderness  EXTREMITIES:  No clubbing or cyanosis.    MUSCULOSKELETAL:  Overall grossly normal muscle strength  SKIN:  Overall, skin warm and dry, no lesions.  NEURO/PSYCH:  Oriented x 3 with normal affect.   Constitutional:  No weight loss or loss of appetite    Eyes:  No difficulty with vision, no double vision, no dry eyes  ENT:  No sore throat, difficulty swallowing; changes in hearing or tinnitus  Cardiovascular: As detailed above  Respiratory:  No cough  Musculoskeletal  No joint  pain, muscle aches  Neurologic:  No syncope, lightheadedness, fainting spells   Hematologic: No easy bruising, excessive bleeding tendency   Gastrointestinal:  No jaundice, abdominal pain or abdominal bloating  Genitourinary: No changes in urinary habits, no trouble urinating    Psychiatric: No anxiety or depression      Medical History  Surgical History   Past Medical History:   Diagnosis Date    Anxiety about health     per [facility]    Dyslipidemia     GERD (gastroesophageal reflux disease)     Hypertension     Nonocclusive coronary atherosclerosis of native coronary artery 10/17/2017    Paroxysmal atrial fibrillation (H) 01/14/2019    Dx [id number] score = 5 (age, HTN, CAD, TIA 2) Rx flecainide Rx Xarelto    Pulmonary emphysema (H) 11/21/2019    per [facility]    Right bundle branch block 08/29/2019    Solitary pulmonary nodule 11/21/2019    per [facility]    Syncope 01/18/2019    TIA (transient ischemic attack) 12/30/2018    Past Surgical History:   Procedure Laterality Date    APPENDECTOMY  2000    CARDIAC ELECTROPHYSIOLOGY MAPPING AND ABLATION  08/29/2019    PVI done at [facility]    CV CORONARY ANGIOGRAM N/A 10/17/2017    Procedure: Coronary Angiogram;  Surgeon: Ashok Hyatt MD;  Location: [facility] Cath Lab;  Service:     CV CORONARY ANGIOGRAM N/A 5/17/2024    Procedure: Coronary Angiogram;  Surgeon: Matias Scales MD;  Location: [facility] CATH [facility] CV    CV LEFT HEART CATH N/A 5/17/2024    Procedure: Left Heart Catheterization;  Surgeon: Matias Scales MD;  Location: [facility] CV    CV LEFT HEART CATHETERIZATION WITH LEFT VENTRICULOGRAM N/A 10/17/2017    Procedure: Left Heart Catheterization with Left Ventriculogram;  Surgeon: Ashok Hyatt MD;  Location: [facility] Cath Lab;  Service:     CV PCI N/A 5/17/2024    Procedure: Percutaneous Coronary Intervention;  Surgeon: Matias Scales MD;  Location: [facility] LAB CV    CV PCI ATHERECTOMY ORBITAL N/A 5/17/2024    Procedure: Percutaneous  Coronary Intervention - Atherectomy Rotational;  Surgeon: Matias Scales MD;  Location: [facility] CATH LAB CV    EP PACEMAKER INSERT Left 2018    Procedure: EP Pacemaker Insertion;  Surgeon: Micheal Junior MD;  Location: [facility] Cath Lab;  Service: Cardiology    OTHER SURGICAL HISTORY  2020    [place]         Family History Social History   Family History   Problem Relation Age of Onset    Cancer Mother     Cancer Father     Coronary Artery Disease Brother     Coronary Artery Disease Brother     Valvular heart disease Brother     Rectal Cancer Sister     Colon Cancer Sister         Social History     Tobacco Use    Smoking status: Former     Current packs/day: 0.00     Types: Cigarettes     Quit date: 10/16/1999     Years since quittin.6    Smokeless tobacco: Never   Substance Use Topics    Alcohol use: Yes     Alcohol/week: 1.0 standard drink of alcohol     Comment: Alcoholic Drinks/day: none since december    Drug use: No         Medications  Allergies     Current Outpatient Medications:     acetaminophen (TYLENOL) 500 MG tablet, [ACETAMINOPHEN (TYLENOL) 500 MG TABLET] Take 1-2 tablets (500-1,000 mg total) by mouth every 4 (four) hours as needed., Disp: , Rfl: 0    aspirin (ASPIR-81) 81 MG EC tablet, [ASPIRIN (ASPIR-81) 81 MG EC TABLET] Take 81 mg by mouth daily. , Disp: , Rfl:     atorvastatin (LIPITOR) 80 MG tablet, [ATORVASTATIN (LIPITOR) 80 MG TABLET] TAKE 1 TABLET BY MOUTH EVERYDAY AT BEDTIME, Disp: 90 tablet, Rfl: 1    carboxymethylcellulose (REFRESH PLUS) 0.5 % SOLN ophthalmic solution, Place 1 drop into the right eye At Bedtime, Disp: , Rfl:     clopidogrel (PLAVIX) 75 MG tablet, Take 1 tablet (75 mg) by mouth daily, Disp: 90 tablet, Rfl: 3    ferrous sulfate (FEROSUL) 325 (65 Fe) MG tablet, Take 325 mg by mouth three times a week, Disp: , Rfl:     pantoprazole (PROTONIX) 40 MG EC tablet, Take 1 tablet (40 mg) by mouth daily, Disp: 90  tablet, Rfl: 3    sotalol (BETAPACE) 80 MG tablet, Take 1 tablet (80 mg) by mouth 2 times daily, Disp: 180 tablet, Rfl: 3     Allergies   Allergen Reactions    Cephalexin Rash          Lab Results    Chemistry CBC Cardiac Enzymes/BNP/TSH/INR   Recent Labs   Lab Test 05/17/24  0753      POTASSIUM 4.2   CHLORIDE 108*   CO2 21*   *   BUN 11.1   CR 0.90   GFRESTIMATED 90   ESSENCE 9.5     Recent Labs   Lab Test 05/17/24  0753 11/27/23  1040 06/02/23  0941   CR 0.90 0.92 0.85          Recent Labs   Lab Test 05/17/24  0753   WBC 6.8   HGB 15.1   HCT 44.8   MCV 87        Recent Labs   Lab Test 05/17/24  0753 06/02/23  0941 07/08/20  1620   HGB 15.1 14.7 13.4*    Recent Labs   Lab Test 07/08/20  1620 08/15/19  0138 08/14/19  2230   TROPONINI <0.01 <0.01 <0.01     Recent Labs   Lab Test 01/18/19  1007 01/11/19  1639   BNP <10 11     Recent Labs   Lab Test 06/05/23  1522   TSH 0.69     Recent Labs   Lab Test 08/14/19  2230 04/20/19  1348 01/18/19  1007   INR 2.34* 1.13* 1.24*         Data Review    ECGs (tracings independently reviewed)  5/17/2024 - SR 67bpm, bifascicular block (RBBB and LAFB) QRS 130ms, QT//505ms    5/29/2024 pacemaker check  Normal lead and device function  No arrhythmia episodes  10.3% RA pacing, 0.3% RV pacing  Estimated remaining battery longevity 9.7yrs    1/23/2021 JOVANI  1. The baseline ECG demonstrated sinus rhythm with a rate of 82 bpm.  2. The WATCHMAN device is visualized and is well-expanded within the left atrial appendage.  3. Small residual jet on the postero-inferiorior aspect of the device (jet size 2 mm; JOVANI angle  139 degrees; clip #61). The remainder of the WATCHMAN device margin appears well sealed.  4. No intracardiac mass or thrombus identified.  No thrombus on the atrial face of the WATCHMAN  device.  5. No pericardial effusion.  6. No shunt at atrial level by color flow imaging and agitated saline contrast injection.  7. Normal left ventricular chamber size.   Left ventricular ejection fraction 60%.  8. 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  9. Normal right ventricular chamber size and systolic function.  10. Moderate tricuspid valve regurgitation.        45 minutes was spent reviewing the chart and in direct discussion with the patient.    Cc: Madhavi Bruno CNP, John, MD Amila Dilusha William, MD  5/29/2024  12:10 PM        Thank you for allowing me to participate in the care of your patient.      Sincerely,     Kristy Guzman MD     [facility]  cc:   Jorge Dubose MD  [address]  [place] [address]

## 2024-05-29 NOTE — PATIENT INSTRUCTIONS
[facility]  Cardiac Electrophysiology  [address]  [place] [address]   Office: [phone]  Fax: [phone]     Thank you for seeing us in clinic today - it is a pleasure to be a part of your care team.  Below is a summary of our plan from today's visit.       You have atrial fibrillation, and underwent an ablation 8/29/2019.  We reviewed atrial fibrillation, treatment options (ablation vs antiarrhythmic drug therapy), and long term stroke risk prevention (Watchman device).  You have done well recently, with very little atrial fibrillation over the past many months.    We will plan for the following:  - continue sotalol 80mg twice daily  - continue aspirin 81mg daily  - continue routine pacemaker follow-up  - follow-up in 1 year, sooner if recurrent atrial fibrillation episodes     Please do not hesitate to be in touch with our office at [phone] with any questions that may arise.       Thank you for trusting us with your care,    Kristy Guzman MD  Clinical Cardiac Electrophysiology  [facility]  [address]  [place] [address]   Office: [phone]  Fax: [phone]        ATRIAL FIBRILLATION: Patient Information    What is atrial fibrillation?  Atrial fibrillation (AF, A-fib) is a common heart rhythm problem (arrhythmia) occurring within the upper chambers of the heart (the atria).  In normal rhythm, the upper and lower chambers of the heart are electrically driven to contract in a coordinated sequence.  In atrial fibrillation, the atria lose their ability to contract because of rapid and chaotic electrical activity.  The lower chambers of the heart (the ventricles) continue to pump blood throughout the body, though with irregular and often faster rate due to the chaotic activity within the atria.        How do I know if I have atrial fibrillation?   Some people may feel their heart beating faster, harder, or  irregularly while in atrial fibrillation.  Others may be lightheaded, fatigued, feel weak or tired or become more short of breath especially with activities.  Some patients have no symptoms at all.  Atrial fibrillation may be found due to an irregular pulse or on an electrocardiogram (ECG). Atrial fibrillation can start and stop on its own, and episodes can last from seconds to several months.      How common is atrial fibrillation?   An estimated 3-6 million people in the [place] have atrial fibrillation.  Atrial fibrillation is a common heart rhythm problem for older persons, affecting as estimated 12-15% of people over the age of 65 years of age.    What causes atrial fibrillation?   Age is the most important risk factor for atrial fibrillation.  Atrial fibrillation is more common in people with other heart disease, high blood pressure, diabetes, obesity, sleep apnea and in people who regularly consume alcohol.  Surgery, lung disease, or thyroid problems can lead to atrial fibrillation.  Atrial fibrillation has multiple possible causes, and in most cases a single cause cannot be found.  Atrial fibrillation is a progressive condition, usually starting with at an early stage with short and infrequent episodes.  In later stages of disease, more frequent and longer lasting episodes of atrial fibrillation occur, ultimately culminating in episodes which do not spontaneously terminate.  Generally, more enlargement and scarring within the upper chambers of the heart is observed as atrial fibrillation progresses from early to late-stage disease.    How is atrial fibrillation diagnosed and evaluated?    Because of its start-stop nature, atrial fibrillation can be challenging to diagnose.  Atrial fibrillation is most commonly diagnosed via cardiac rhythm recordings - either an ECG or wearable cardiac rhythm monitor.  For patients with pacemakers, defibrillators or implantable loop recorders, atrial fibrillation may be  recorded via these devices.  Recently, commercially available devices (eg. Apple Watch, Theralogix device, certain FitBit devices, others) can allow patients to take 30 second cardiac rhythm recordings which may document atrial fibrillation.  Once atrial fibrillation is diagnosed, additional tests include blood tests and an echocardiogram.  The echocardiogram uses ultrasound to look at your heart to assess your cardiac function and evaluate for other heart disease.  Additional evaluation may include CT or MRI studies.    Is atrial fibrillation dangerous?   Atrial fibrillation is not usually a life-threatening arrhythmia.  The most serious consequences of atrial fibrillation including stroke and worsening of overall cardiac function.  While in atrial fibrillation, the upper cardiac chambers do not contract normally, resulting in slower blood flow and increased risk of clot formation.  If this blood clot becomes detached from the heart a stroke can occur.  Unfortunately, stroke can be the first sign of atrial fibrillation for some people.  With a stroke, you may notice abnormal sensation, weakness on one side of the body or face, changes in your vision or speech.  If you have any of these signs, you should contact EMS and be evaluated in an emergency room as soon as possible.      How is atrial fibrillation treated?     Several treatment options exist for suppressing atrial fibrillation - however, it is not an easily curable arrhythmia.  The first goal in managing atrial fibrillation is to minimize stroke risk.  The second goal is to improve symptoms associated with atrial fibrillation.  Finally, in patients with reduced cardiac function, maintaining normal rhythm can help improve cardiac function.      Blood thinners are used to reduce the risk of stroke in patients with high estimated stroke risk related to atrial fibrillation.  For patients at higher risk of bleeding related to blood thinner use, implantable devices  may be an option to reduce stroke risk without the need for long term blood thinner use.      Atrial fibrillation can be managed via two strategies: rate control and rhythm control.  In a rate control strategy, continued atrial fibrillation is accepted and medications (eg. beta-blockers or calcium channel blockers) are used to control the lower chamber rate.  In a rhythm control strategy, anti-arrhythmic medications or catheter ablation are used to maintain normal cardiac rhythm and slow disease progression by suppressing atrial fibrillation.  A procedure called a cardioversion, in which an electric shock is delivered through patches placed on the chest wall while under deep sedation, can be performed to temporarily restore normal cardiac rhythm, though does not address the chance of atrial fibrillation recurrence.  Treatments are more effective for earlier rather than later stage atrial fibrillation.  Lifestyle modifications (maintaining a healthy weight, aerobic exercise, diagnosing and treating sleep apnea, and minimizing alcohol intake) are important elements of atrial fibrillation rhythm control.     What is catheter ablation for atrial fibrillation?  Cardiac catheter ablation is a commonly performed, minimally invasive procedure performed by a cardiac electrophysiologist to treat many different cardiac rhythm abnormalities.  During catheter ablation, long, thin, flexible tubes are advanced into the heart via small sheaths inserted into the femoral veins and thermal energy (either heating or cooling) is applied within the heart to disrupt abnormal electrical activity.  Atrial fibrillation ablation is performed under general anesthesia, with procedures generally taking approximately 2-3 hours.  Patients are typically observed for 3-5 hours after the ablation, and in most cases can be discharged home the same day.  Atrial fibrillation ablation is associated with better outcomes (mortality, cardiovascular  hospitalizations, atrial arrhythmia recurrences) compared to antiarrhythmic drug therapy.  However, atrial fibrillation recurrences are not uncommon, and repeat catheter ablation may be offered.  Your electrophysiology team can review atrial fibrillation ablation, anticipated success rates, risks, and recovery expectations with you.    What are anti-arrhythmic medications?  Anti-arrhythmic medications are specialized drugs which alter cardiac electrical functioning to suppress arrhythmias.  There are several anti-arrhythmic medications available, each with its own success rate and side effects.  Some anti-arrhythmic medications are less effective though safer to use, others are more effective though have serious potential toxicities.  Atrial fibrillation recurrences are common and may require dose adjustment or change in antiarrhythmic therapy.  Your electrophysiology team will carefully consider which medication would be the best and safest for your particular case.      Can I live a normal life?    The goal of atrial fibrillation management is for patients to live normal lives without being limited by symptoms related to atrial fibrillation.    Are any additional educational resources available?  There are a number of excellent atrial fibrillation education resources available to you online.  A few options you may wish to review include:  hrsonline.org/guide-atrial-fibrillation  afibmatters.org  getsmartaboutafib.com  stopaf.com    What comes next?    Consider your management options and let us know how we can help in your decision process.  Please take medications as they have been prescribed.  You should also get any tests that may have been ordered for you.      When to Call Your Doctor or seek emergency care:  Call your doctor or seek emergency care if you have any significant changes with the following:  Weakness  Dizziness  Fainting  Fatigue  Shortness of breath  Chest pain with increased activity  If you  are concerned that your heart rate is too fast or too slow  Bleeding that does not stop in 10 minutes  Coughing or throwing up blood  Bloody diarrhea or bleeding hemorrhoids  Dark-colored urine or black stool  Allergic reactions:  Rash  Itching  Swelling  Trouble breathing or swallowing      Please call the [facility] at [phone] if you have concerns about your symptoms, your medicines, or your follow-up appointments.

## 2024-05-29 NOTE — PATIENT INSTRUCTIONS
Ashok Del Rosario,    It was a pleasure to see you today in the clinic regarding your recent stent.     My recommendations after this visit include:     - continue aspirin and Plavix for at least 12 months  - if you have recurrent chest pain, call us, use the nitroglycerin or go to the ED if nitroglycerin doesn't work after 3    My treatment team includes: myself, Nalini Gagnon PA-C, Dr. Quintana and Dr. Bautista    You should followup with Dr. Scales or Dr. Vargas in 2-3 months    If you have questions or concerns, please call using the numbers below:    After Hours/Scheduling  [phone]    Otherwise you can dial the nurse directly at:                Jennifer Landon RN  [phone]

## 2024-05-29 NOTE — LETTER
5/29/2024    Bj Hendrickson MD  [address] [place] [address]    RE: Ashok Baumanngardner       Dear Colleague,     I had the pleasure of seeing Ashok Del Rosario in the [facility].      Assessment/Recommendations   ASSESSMENT:  1.  Coronary artery disease - with recent abnormal stress test.  Coronary angiogram on May 17 showed focal 60% lesion in the proximal LAD and a 95% severe stenosis at the junction of the proximal/mid LAD.  Now s/p PCI with rotational atherectomy, PTCA, shockwave and JESSICA to proximal LAD.                - patient is chest pain free and SOB is gone              - currently taking ASA/Plavix    2.  Dyslipidemia - Ashok Del Rosario is on high intensity statin therapy with atorvastatin 80 mg daily.   Last LDL was 80    3.  Hypertension - His blood pressure is well controlled today.    4.  Paroxysmal atrial fibrillation -currently in NSR on sotalol 80 mg twice daily.  He had Watchman implant in 2020 at [facility]    5.  Sick sinus syndrome -permanent pacemaker in situ.  Device check today at [facility]     PLAN:  - Ashok Del Rosario will continue on dual antiplatelet therapy with aspirin 81 mg indefinitely and clopidogrel 75mg for 1 year.  We discussed the importance of antiplatelet therapy and talking with his cardiologist prior to stopping these medications for any reason.      - We discussed about utilization of as needed nitroglycerin.  He was encouraged to seek medical attention if recurrent chest pain or shortness of breath.   New Rx for SL nitroglycerin sent to pharmacy     - Risk factor modification and lifestyle management topics were discussed including managing comorbidities, weight loss, heart healthy diet, exercise, smoking cessation, and stress reduction.      - Cardiac rehab starting May 31 at .      - We discussed a diet low in saturated fat, weight loss, and exercise along with medication for better control of cholesterol.  Highly  encouraged to participate in nutrition class in cardiac rehab.        He will see Dr. Guzman today.   He should see Dr. Vargas or Dr. Scales in 2-3 months for general cardiology follow up .  Repeat lipid profile at this visit (order is already placed)       History of Present Illness/Subjective    Ashok Del Rosario is seen at the [facility] for post coronary intervention follow up.  His wife accompanies him to the visit today.     Ashok Del Rosario is a 73 year old male with past medical history significant for paroxysmal atrial fibrillation, sick sinus syndrome with pacemaker implant in 2018, TIA, carotid artery stenosis, hypertension, hyperlipidemia, history of tobacco abuse and mild obstructive sleep apnea.      In early May, patient saw the EP nurse practitioner for A-fib follow-up and reported significant and progressive shortness of breath and burning sensation across his chest with exertion.  He tells me that it had been going on for 6 months or more by the time he saw closely.  A stress test was ordered and showed severe ischemia involving the mid to distal anteroseptal, anterior, distal anterolateral, apical and distal inferior wall.     He went on to have coronary angiogram on May 17 and that showed a focal 60% lesion in the proximal LAD as well as a 95% severe stenosis at the junction of the proximal/mid LAD.  He underwent PCI with rotational atherectomy, PTCA, shockwave and drug-eluting stent.    Since procedure, he says the burning in his chest has gone away.  He no longer gets short of breath with exercise.  He is very happy with results.  He has been walking some at home with his wife but has not done any major exercise.      He denies fatigue, lightheadedness, shortness of breath, dyspnea on exertion, orthopnea, PND, palpitations, chest pain, abdominal fullness/bloating, and lower extremity edema.      Review of Systems:   12 point review of systems was reviewed and was  negative except for those noted in the HPI      ECG: Personally reviewed. normal sinus rhythm, RBBB.      CORONARY ANGIOGRAM May 17:    Mid LAD to Dist LAD lesion is 40% stenosed.    Mid Cx lesion is 40% stenosed.    3rd Mrg lesion is 30% stenosed.    LPAV lesion is 50% stenosed.    Ost LM to Mid LM lesion is 40% stenosed.    Ost LAD to Mid LAD lesion is 95% stenosed.     1.  Diffuse coronary calcification with 30 to 40% its eccentric calcified narrowing mid left main, large caliber vessel with large residual lumen, severe calcification in the proximal third of the LAD and segmental calcified mild to moderate stenoses in the dominant left circumflex system.  Focal 60% stenosis proximal LAD and 95% segment of severe calcific stenosis junction of the proximal and mid LAD (culprit lesion).  2.  Dominant left circumflex with 30 to 40% eccentric calcification proximally.  No severe stenoses.  3.  RCA small nondominant otherwise normal.  4.  Successful PCI proximal LAD with rotational atherectomy, PTCA, shockwave lithotripsy, and Synergy 3.5 x 38 JESSICA implant, postdilated with 3.75 NC balloon to 16 manav.  0% residual, YASIR-3 flow.      NM MPI with Lexiscan from 5/13/24:    Lexiscan stress ECG negative for ischemia.    The nuclear stress test is abnormal.  There is a large area of severe ischemia involving the mid to distal anteroseptal, anterior, distal anterolateral, apical and distal inferior wall.    Stress to rest cavity ratio is 1.44.  TID is present visually and quantitatively.  This may be a marker of multivessel disease.    The left ventricular ejection fraction at stress is 61% with anteroseptal and anteroapical hypokinesis.    The patient is at a high risk of future cardiac ischemic events.    A prior study was conducted on 6/7/2021.  The area of ischemia is new.    The findings of this examination were communicated to Suzan Waddell CNP.     Physical Examination Review of Systems   /78 (BP Location: Right  "arm, Patient Position: Sitting, Cuff Size: Adult Regular)   Pulse 64   Resp 16   Ht 1.702 m (5' 7\")   Wt 91.2 kg (201 lb)   BMI 31.48 kg/m    Body mass index is 31.48 kg/m .  Wt Readings from Last 3 Encounters:   05/29/24 91.2 kg (201 lb)   05/17/24 91.6 kg (202 lb)   05/07/24 91.6 kg (202 lb)       General Appearance:   no distress, normal body habitus   ENT/Mouth: membranes moist, no oral lesions or bleeding gums.      EYES:  no scleral icterus, normal conjunctivae   Neck: no carotid bruits or thyromegaly   Chest/Lungs:   lungs are clear to auscultation, no rales or wheezing, equal chest wall expansion    Cardiovascular:   Regular. Normal first and second heart sounds with no murmurs, rubs, or gallops; the carotid, radial and posterior tibial pulses are intact, no edema bilaterally    Abdomen:  no organomegaly, masses, bruits, or tenderness; bowel sounds are present   Extremities: no cyanosis or clubbing   Skin: no xanthelasma, warm.    Neurologic: normal  bilateral, no tremors     Psychiatric: alert and oriented x3, calm                                               Medical History  Surgical History Family History Social History   Past Medical History:   Diagnosis Date    Anxiety about health     per [facility]    Dyslipidemia     GERD (gastroesophageal reflux disease)     Hypertension     Nonocclusive coronary atherosclerosis of native coronary artery 10/17/2017    Paroxysmal atrial fibrillation (H) 01/14/2019    Dx [id number] score = 5 (age, HTN, CAD, TIA 2) Rx flecainide Rx Xarelto    Pulmonary emphysema (H) 11/21/2019    per [place]    Right bundle branch block 08/29/2019    Solitary pulmonary nodule 11/21/2019    per [place]    Syncope 01/18/2019    TIA (transient ischemic attack) 12/30/2018    Past Surgical History:   Procedure Laterality Date    APPENDECTOMY  2000    CARDIAC ELECTROPHYSIOLOGY MAPPING AND ABLATION  08/29/2019    PVI done at [place]    CV CORONARY ANGIOGRAM N/A 10/17/2017    Procedure: " Coronary Angiogram;  Surgeon: Ashok Hyatt MD;  Location: [facility] Lab;  Service:     CV CORONARY ANGIOGRAM N/A 2024    Procedure: Coronary Angiogram;  Surgeon: Matias Scales MD;  Location: [facility] LAB CV    CV LEFT HEART CATH N/A 2024    Procedure: Left Heart Catheterization;  Surgeon: Matias Scales MD;  Location: [facility] CV    CV LEFT HEART CATHETERIZATION WITH LEFT VENTRICULOGRAM N/A 10/17/2017    Procedure: Left Heart Catheterization with Left Ventriculogram;  Surgeon: Ashok Hyatt MD;  Location: [facility] Lab;  Service:     CV PCI N/A 2024    Procedure: Percutaneous Coronary Intervention;  Surgeon: Matias Scales MD;  Location: [facility] CV    CV PCI ATHERECTOMY ORBITAL N/A 2024    Procedure: Percutaneous Coronary Intervention - Atherectomy Rotational;  Surgeon: Matias Scales MD;  Location: [facility] CV    EP PACEMAKER INSERT Left 2018    Procedure: EP Pacemaker Insertion;  Surgeon: Micheal Junior MD;  Location: [facility] Cath Lab;  Service: Cardiology    OTHER SURGICAL HISTORY  2020    [place] atrial append[place]X [place]    Family History   Problem Relation Age of Onset    Cancer Mother     Cancer Father     Coronary Artery Disease Brother     Coronary Artery Disease Brother     Valvular heart disease Brother     Rectal Cancer Sister     Colon Cancer Sister     Social History     Socioeconomic History    Marital status:      Spouse name: Not on file    Number of children: Not on file    Years of education: Not on file    Highest education level: Not on file   Occupational History    Not on file   Tobacco Use    Smoking status: Former     Current packs/day: 0.00     Types: Cigarettes     Quit date: 10/16/1999     Years since quittin.6    Smokeless tobacco: Never   Substance and Sexual Activity    Alcohol use: Yes     Alcohol/week: 1.0 standard drink of alcohol     Comment:  Alcoholic Drinks/day: none since december    Drug use: No    Sexual activity: Yes     Partners: Female   Other Topics Concern    Not on file   Social History Narrative    Not on file     Social Determinants of Health     Financial Resource Strain: Medium Risk (5/23/2020)    Received from [facility]    Overall Financial Resource Strain (CARDIA)     Difficulty of Paying Living Expenses: Somewhat hard   Food Insecurity: No Food Insecurity (5/23/2020)    Received from [facility]    Hunger Vital Sign     Worried About Running Out of Food in the Last Year: Never true     Ran Out of Food in the Last Year: Never true   Transportation Needs: No Transportation Needs (5/23/2020)    Received from [facility]    PRAPARE - Transportation     Lack of Transportation (Medical): No     Lack of Transportation (Non-Medical): No   Physical Activity: Sufficiently Active (5/23/2020)    Received from [facility]    Exercise Vital Sign     Days of Exercise per Week: 5 days     Minutes of Exercise per Session: 40 min   Stress: No Stress Concern Present (5/23/2020)    Received from [facility]    [employer] - Occupational Stress Questionnaire     Feeling of Stress : Not at all   Social Connections: Unknown (5/23/2020)    Received from [facility]    Social Connection and Isolation Panel [NHANES]     Frequency of Communication with Friends and Family: More than three times a week     Frequency of Social Gatherings with Friends and Family: Once a week     Attends Restoration Services: 1 to 4 times per year     Active Member of Clubs or Organizations: Yes     Attends Club or Organization Meetings: 1 to 4 times per year     Marital Status: Not on file   Interpersonal Safety: Not on file   Housing Stability: Not on file          Medications  Allergies   Current Outpatient Medications   Medication Sig Dispense Refill    acetaminophen (TYLENOL) 500 MG tablet [ACETAMINOPHEN (TYLENOL) 500 MG TABLET] Take 1-2 tablets  (500-1,000 mg total) by mouth every 4 (four) hours as needed.  0    aspirin (ASPIR-81) 81 MG EC tablet [ASPIRIN (ASPIR-81) 81 MG EC TABLET] Take 81 mg by mouth daily.       atorvastatin (LIPITOR) 80 MG tablet [ATORVASTATIN (LIPITOR) 80 MG TABLET] TAKE 1 TABLET BY MOUTH EVERYDAY AT BEDTIME 90 tablet 1    carboxymethylcellulose (REFRESH PLUS) 0.5 % SOLN ophthalmic solution Place 1 drop into the right eye At Bedtime      clopidogrel (PLAVIX) 75 MG tablet Take 1 tablet (75 mg) by mouth daily 90 tablet 3    pantoprazole (PROTONIX) 40 MG EC tablet Take 1 tablet (40 mg) by mouth daily 90 tablet 3    sotalol (BETAPACE) 80 MG tablet Take 1 tablet (80 mg) by mouth 2 times daily 180 tablet 3    ferrous sulfate (FEROSUL) 325 (65 Fe) MG tablet Take 325 mg by mouth three times a week (Patient not taking: Reported on 5/29/2024)      Allergies   Allergen Reactions    Cephalexin Rash         Lab Results    Chemistry/lipid CBC Cardiac Enzymes/BNP/TSH/INR   Lab Results   Component Value Date    CHOL 135 05/17/2024    HDL 27 (L) 05/17/2024    TRIG 138 05/17/2024    BUN 11.1 05/17/2024     05/17/2024    CO2 21 (L) 05/17/2024    Lab Results   Component Value Date    WBC 6.8 05/17/2024    HGB 15.1 05/17/2024    HCT 44.8 05/17/2024    MCV 87 05/17/2024     05/17/2024    Lab Results   Component Value Date    TROPONINI <0.01 07/08/2020    BNP <10 01/18/2019    TSH 0.69 06/05/2023    INR 2.34 (H) 08/14/2019                                 Thank you for allowing me to participate in the care of your patient.      Sincerely,     JR GALLOWAY PA-C     [facility]  cc:   Bj Hendrickson MD  [address] [place] [address]

## 2024-05-29 NOTE — PROGRESS NOTES
Assessment/Recommendations   ASSESSMENT:  1.  Coronary artery disease - with recent abnormal stress test.  Coronary angiogram on May 17 showed focal 60% lesion in the proximal LAD and a 95% severe stenosis at the junction of the proximal/mid LAD.  Now s/p PCI with rotational atherectomy, PTCA, shockwave and JESSICA to proximal LAD.                - patient is chest pain free and SOB is gone              - currently taking ASA/Plavix    2.  Dyslipidemia - Ashok Del Rosario is on high intensity statin therapy with atorvastatin 80 mg daily.   Last LDL was 80    3.  Hypertension - His blood pressure is well controlled today.    4.  Paroxysmal atrial fibrillation -currently in NSR on sotalol 80 mg twice daily.  He had Watchman implant in 2020 at [facility]    5.  Sick sinus syndrome -permanent pacemaker in situ.  Device check today at [facility]     PLAN:  - Ashok Del Rosario will continue on dual antiplatelet therapy with aspirin 81 mg indefinitely and clopidogrel 75mg for 1 year.  We discussed the importance of antiplatelet therapy and talking with his cardiologist prior to stopping these medications for any reason.      - We discussed about utilization of as needed nitroglycerin.  He was encouraged to seek medical attention if recurrent chest pain or shortness of breath.   New Rx for SL nitroglycerin sent to pharmacy     - Risk factor modification and lifestyle management topics were discussed including managing comorbidities, weight loss, heart healthy diet, exercise, smoking cessation, and stress reduction.      - Cardiac rehab starting May 31 at .      - We discussed a diet low in saturated fat, weight loss, and exercise along with medication for better control of cholesterol.  Highly encouraged to participate in nutrition class in cardiac rehab.        He will see Dr. Guzman today.   He should see Dr. Vargas or Dr. Scales in 2-3 months for general cardiology follow up .  Repeat lipid profile at this  visit (order is already placed)       History of Present Illness/Subjective    Ashok Del Rosario is seen at the [facility] for post coronary intervention follow up.  His wife accompanies him to the visit today.     Ashok Del Rosario is a 73 year old male with past medical history significant for paroxysmal atrial fibrillation, sick sinus syndrome with pacemaker implant in 2018, TIA, carotid artery stenosis, hypertension, hyperlipidemia, history of tobacco abuse and mild obstructive sleep apnea.      In early May, patient saw the EP nurse practitioner for A-fib follow-up and reported significant and progressive shortness of breath and burning sensation across his chest with exertion.  He tells me that it had been going on for 6 months or more by the time he saw closely.  A stress test was ordered and showed severe ischemia involving the mid to distal anteroseptal, anterior, distal anterolateral, apical and distal inferior wall.     He went on to have coronary angiogram on May 17 and that showed a focal 60% lesion in the proximal LAD as well as a 95% severe stenosis at the junction of the proximal/mid LAD.  He underwent PCI with rotational atherectomy, PTCA, shockwave and drug-eluting stent.    Since procedure, he says the burning in his chest has gone away.  He no longer gets short of breath with exercise.  He is very happy with results.  He has been walking some at home with his wife but has not done any major exercise.      He denies fatigue, lightheadedness, shortness of breath, dyspnea on exertion, orthopnea, PND, palpitations, chest pain, abdominal fullness/bloating, and lower extremity edema.      Review of Systems:   12 point review of systems was reviewed and was negative except for those noted in the HPI      ECG: Personally reviewed. normal sinus rhythm, RBBB.      CORONARY ANGIOGRAM May 17:    Mid LAD to Dist LAD lesion is 40% stenosed.    Mid Cx lesion is 40% stenosed.    3rd Mrg  "lesion is 30% stenosed.    LPAV lesion is 50% stenosed.    Ost LM to Mid LM lesion is 40% stenosed.    Ost LAD to Mid LAD lesion is 95% stenosed.     1.  Diffuse coronary calcification with 30 to 40% its eccentric calcified narrowing mid left main, large caliber vessel with large residual lumen, severe calcification in the proximal third of the LAD and segmental calcified mild to moderate stenoses in the dominant left circumflex system.  Focal 60% stenosis proximal LAD and 95% segment of severe calcific stenosis junction of the proximal and mid LAD (culprit lesion).  2.  Dominant left circumflex with 30 to 40% eccentric calcification proximally.  No severe stenoses.  3.  RCA small nondominant otherwise normal.  4.  Successful PCI proximal LAD with rotational atherectomy, PTCA, shockwave lithotripsy, and Synergy 3.5 x 38 JESSICA implant, postdilated with 3.75 NC balloon to 16 manav.  0% residual, YASIR-3 flow.      NM MPI with Lexiscan from 5/13/24:    Lexiscan stress ECG negative for ischemia.    The nuclear stress test is abnormal.  There is a large area of severe ischemia involving the mid to distal anteroseptal, anterior, distal anterolateral, apical and distal inferior wall.    Stress to rest cavity ratio is 1.44.  TID is present visually and quantitatively.  This may be a marker of multivessel disease.    The left ventricular ejection fraction at stress is 61% with anteroseptal and anteroapical hypokinesis.    The patient is at a high risk of future cardiac ischemic events.    A prior study was conducted on 6/7/2021.  The area of ischemia is new.    The findings of this examination were communicated to Suzan Waddell CNP.     Physical Examination Review of Systems   /78 (BP Location: Right arm, Patient Position: Sitting, Cuff Size: Adult Regular)   Pulse 64   Resp 16   Ht 1.702 m (5' 7\")   Wt 91.2 kg (201 lb)   BMI 31.48 kg/m    Body mass index is 31.48 kg/m .  Wt Readings from Last 3 Encounters:   05/29/24 " 91.2 kg (201 lb)   05/17/24 91.6 kg (202 lb)   05/07/24 91.6 kg (202 lb)       General Appearance:   no distress, normal body habitus   ENT/Mouth: membranes moist, no oral lesions or bleeding gums.      EYES:  no scleral icterus, normal conjunctivae   Neck: no carotid bruits or thyromegaly   Chest/Lungs:   lungs are clear to auscultation, no rales or wheezing, equal chest wall expansion    Cardiovascular:   Regular. Normal first and second heart sounds with no murmurs, rubs, or gallops; the carotid, radial and posterior tibial pulses are intact, no edema bilaterally    Abdomen:  no organomegaly, masses, bruits, or tenderness; bowel sounds are present   Extremities: no cyanosis or clubbing   Skin: no xanthelasma, warm.    Neurologic: normal  bilateral, no tremors     Psychiatric: alert and oriented x3, calm                                               Medical History  Surgical History Family History Social History   Past Medical History:   Diagnosis Date    Anxiety about health     per [facility]    Dyslipidemia     GERD (gastroesophageal reflux disease)     Hypertension     Nonocclusive coronary atherosclerosis of native coronary artery 10/17/2017    Paroxysmal atrial fibrillation (H) 01/14/2019    Dx [id number] score = 5 (age, HTN, CAD, TIA 2) Rx flecainide Rx Xarelto    Pulmonary emphysema (H) 11/21/2019    per [place]    Right bundle branch block 08/29/2019    Solitary pulmonary nodule 11/21/2019    per [place]    Syncope 01/18/2019    TIA (transient ischemic attack) 12/30/2018    Past Surgical History:   Procedure Laterality Date    APPENDECTOMY  2000    CARDIAC ELECTROPHYSIOLOGY MAPPING AND ABLATION  08/29/2019    PVI done at [place]    CV CORONARY ANGIOGRAM N/A 10/17/2017    Procedure: Coronary Angiogram;  Surgeon: Ashok Hyatt MD;  Location: [facility] Cath Lab;  Service:     CV CORONARY ANGIOGRAM N/A 5/17/2024    Procedure: Coronary Angiogram;  Surgeon: Matias Scales MD;  Location: [facility]  CV    CV LEFT HEART CATH N/A 2024    Procedure: Left Heart Catheterization;  Surgeon: Matias Scales MD;  Location: [facility] CV    CV LEFT HEART CATHETERIZATION WITH LEFT VENTRICULOGRAM N/A 10/17/2017    Procedure: Left Heart Catheterization with Left Ventriculogram;  Surgeon: Ashok Hyatt MD;  Location: [facility] Cath Lab;  Service:     CV PCI N/A 2024    Procedure: Percutaneous Coronary Intervention;  Surgeon: Matias Scales MD;  Location: [facility] CV    CV PCI ATHERECTOMY ORBITAL N/A 2024    Procedure: Percutaneous Coronary Intervention - Atherectomy Rotational;  Surgeon: Matias Scales MD;  Location: [facility] CV    EP PACEMAKER INSERT Left 2018    Procedure: EP Pacemaker Insertion;  Surgeon: Micheal Junior MD;  Location: [facility] Cath Lab;  Service: Cardiology    OTHER SURGICAL HISTORY  2020    [place]    Family History   Problem Relation Age of Onset    Cancer Mother     Cancer Father     Coronary Artery Disease Brother     Coronary Artery Disease Brother     Valvular heart disease Brother     Rectal Cancer Sister     Colon Cancer Sister     Social History     Socioeconomic History    Marital status:      Spouse name: Not on file    Number of children: Not on file    Years of education: Not on file    Highest education level: Not on file   Occupational History    Not on file   Tobacco Use    Smoking status: Former     Current packs/day: 0.00     Types: Cigarettes     Quit date: 10/16/1999     Years since quittin.6    Smokeless tobacco: Never   Substance and Sexual Activity    Alcohol use: Yes     Alcohol/week: 1.0 standard drink of alcohol     Comment: Alcoholic Drinks/day: none since december    Drug use: No    Sexual activity: Yes     Partners: Female   Other Topics Concern    Not on file   Social History Narrative    Not on file     Social Determinants of Health     Financial  Resource Strain: Medium Risk (5/23/2020)    Received from [facility]    Overall Financial Resource Strain (CARDIA)     Difficulty of Paying Living Expenses: Somewhat hard   Food Insecurity: No Food Insecurity (5/23/2020)    Received from [facility]    Hunger Vital Sign     Worried About Running Out of Food in the Last Year: Never true     Ran Out of Food in the Last Year: Never true   Transportation Needs: No Transportation Needs (5/23/2020)    Received from [facility]    PRAPARE - Transportation     Lack of Transportation (Medical): No     Lack of Transportation (Non-Medical): No   Physical Activity: Sufficiently Active (5/23/2020)    Received from [facility]    Exercise Vital Sign     Days of Exercise per Week: 5 days     Minutes of Exercise per Session: 40 min   Stress: No Stress Concern Present (5/23/2020)    Received from [facility]    [employer] [place] [employer] - Occupational Stress Questionnaire     Feeling of Stress : Not at all   Social Connections: Unknown (5/23/2020)    Received from [facility]    Social Connection and Isolation Panel [NHANES]     Frequency of Communication with Friends and Family: More than three times a week     Frequency of Social Gatherings with Friends and Family: Once a week     Attends Spiritism Services: 1 to 4 times per year     Active Member of Clubs or Organizations: Yes     Attends Club or Organization Meetings: 1 to 4 times per year     Marital Status: Not on file   Interpersonal Safety: Not on file   Housing Stability: Not on file          Medications  Allergies   Current Outpatient Medications   Medication Sig Dispense Refill    acetaminophen (TYLENOL) 500 MG tablet [ACETAMINOPHEN (TYLENOL) 500 MG TABLET] Take 1-2 tablets (500-1,000 mg total) by mouth every 4 (four) hours as needed.  0    aspirin (ASPIR-81) 81 MG EC tablet [ASPIRIN (ASPIR-81) 81 MG EC TABLET] Take 81 mg by mouth daily.       atorvastatin (LIPITOR) 80 MG tablet [ATORVASTATIN (LIPITOR) 80  MG TABLET] TAKE 1 TABLET BY MOUTH EVERYDAY AT BEDTIME 90 tablet 1    carboxymethylcellulose (REFRESH PLUS) 0.5 % SOLN ophthalmic solution Place 1 drop into the right eye At Bedtime      clopidogrel (PLAVIX) 75 MG tablet Take 1 tablet (75 mg) by mouth daily 90 tablet 3    pantoprazole (PROTONIX) 40 MG EC tablet Take 1 tablet (40 mg) by mouth daily 90 tablet 3    sotalol (BETAPACE) 80 MG tablet Take 1 tablet (80 mg) by mouth 2 times daily 180 tablet 3    ferrous sulfate (FEROSUL) 325 (65 Fe) MG tablet Take 325 mg by mouth three times a week (Patient not taking: Reported on 5/29/2024)      Allergies   Allergen Reactions    Cephalexin Rash         Lab Results    Chemistry/lipid CBC Cardiac Enzymes/BNP/TSH/INR   Lab Results   Component Value Date    CHOL 135 05/17/2024    HDL 27 (L) 05/17/2024    TRIG 138 05/17/2024    BUN 11.1 05/17/2024     05/17/2024    CO2 21 (L) 05/17/2024    Lab Results   Component Value Date    WBC 6.8 05/17/2024    HGB 15.1 05/17/2024    HCT 44.8 05/17/2024    MCV 87 05/17/2024     05/17/2024    Lab Results   Component Value Date    TROPONINI <0.01 07/08/2020    BNP <10 01/18/2019    TSH 0.69 06/05/2023    INR 2.34 (H) 08/14/2019

## 2024-05-29 NOTE — PROGRESS NOTES
[facility]  Cardiac Electrophysiology  [address]  [place] [address]   Office: [phone]  Fax: [phone]     Patient: Ashok Del Rosario   : 1950       CHIEF COMPLAINT/REASON FOR VISIT  Paroxysmal atrial fibrillation  Dual chamber pacemaker in-situ (Medtronic, 2018)      Assessment/Recommendations     [id number]/Paroxysmal atrial fibrillation - symptomatic with lightheaded, palpitations, dyspnea  [id number] 5  RF PVI 2019  pLAAO (Watchman FLX, 2020)  Infrequent and brief AF recurrences   Sotalol (2022-present)  We reviewed atrial fibrillation physiology, managing stroke risk, cardioversion, antiarrhythmic drug therapy, and catheter ablation.    - at present, given his stable clinical course, low AF burden, and resolution of exertional dspnea and angina post PCI 2024, we will plan for continued sotalol for now, with further consideration for catheter ablation in case of progressive AF breakthrough  - continue sotalol 80mg twice daily  - continue aspirin 81mg daily  - we discussed the ongoing importance of lifestyle modification (maintaining a healthy weight, aerobic activity, sleep apnea diagnosis and management, alcohol avoidance) as part of a long term strategy for atrial fibrillation management    Dual chamber pacemaker in-situ - Medtronic, 2018, implanted for SND  - continue routine pacemaker follow-up    Follow up: follow-up 1 year, sooner if needed           History of Present Illness   Ashok Del Rosario is a 73 year old male with paroxysmal atrial fibrillation with prior RF PVI 2019, pLAAO (Watchman FLX, 2020), dual chamber pacemaker in-situ (Medtronic, 2018), sinus node dysfunction, CAD with pLAD PCI/JESSICA 2024, TIA, carotid stenosis, HTN, emphysema referred by Suzan Waddell CNP for consultation regarding atrial fibrillation.    Mrs. Del Rosario's atrial fibrillation history is as summarized  below:  Symptoms: lightheaded, palpitations, dyspnea  Symptom onset date: early 2019, associated with rapid ventricular rates  Diagnosis date: 1/11/2019  Prior medical therapies: sotalol (7/2022-present)  Prior DCCVs: has had prior DCCVs  Prior ablations: RF PVI 8/29/2019 (unknown provider, [facility])  Percutaneous left atrial appendage occlusion: Watchman FLX, 2/4/2020    He feels well.  He denies chest pain, syncope.  He had noted a long history of exertional dyspnea and angina which he had initially attributed to sotalol - underwent pLAD PCI/JESSICA 5/17/2024 with resolution of these symptoms.  He will be starting cardiac rehabilitation on 5/31/2024.       Physical Examination  Review of Systems   VITALS: BP (!) 150/88 (BP Location: Right arm, Patient Position: Sitting, Cuff Size: Adult Regular)   Pulse 62   Wt 92.5 kg (204 lb)   SpO2 96%   BMI 31.95 kg/m    Wt Readings from Last 3 Encounters:   05/17/24 91.6 kg (202 lb)   05/07/24 91.6 kg (202 lb)   01/22/24 95.3 kg (210 lb)     CONSTITUTIONAL: well nourished, comfortable, no distress  EYES:  Conjunctivae pink, sclerae clear.    E/N/T:  Oral mucosa pink  RESPIRATORY:  Respiratory effort is normal  CARDIOVASCULAR:  normal S1 and S2  GASTROINTESTINAL:  Abdomen without masses or tenderness  EXTREMITIES:  No clubbing or cyanosis.    MUSCULOSKELETAL:  Overall grossly normal muscle strength  SKIN:  Overall, skin warm and dry, no lesions.  NEURO/PSYCH:  Oriented x 3 with normal affect.   Constitutional:  No weight loss or loss of appetite    Eyes:  No difficulty with vision, no double vision, no dry eyes  ENT:  No sore throat, difficulty swallowing; changes in hearing or tinnitus  Cardiovascular: As detailed above  Respiratory:  No cough  Musculoskeletal  No joint pain, muscle aches  Neurologic:  No syncope, lightheadedness, fainting spells   Hematologic: No easy bruising, excessive bleeding tendency   Gastrointestinal:  No jaundice, abdominal pain or abdominal  bloating  Genitourinary: No changes in urinary habits, no trouble urinating    Psychiatric: No anxiety or depression      Medical History  Surgical History   Past Medical History:   Diagnosis Date    Anxiety about health     per [facility]    Dyslipidemia     GERD (gastroesophageal reflux disease)     Hypertension     Nonocclusive coronary atherosclerosis of native coronary artery 10/17/2017    Paroxysmal atrial fibrillation (H) 01/14/2019    Dx [id number] score = 5 (age, HTN, CAD, TIA 2) Rx flecainide Rx Xarelto    Pulmonary emphysema (H) 11/21/2019    per [place]    Right bundle branch block 08/29/2019    Solitary pulmonary nodule 11/21/2019    per [place]    Syncope 01/18/2019    TIA (transient ischemic attack) 12/30/2018    Past Surgical History:   Procedure Laterality Date    APPENDECTOMY  2000    CARDIAC ELECTROPHYSIOLOGY MAPPING AND ABLATION  08/29/2019    PVI done at [place]    CV CORONARY ANGIOGRAM N/A 10/17/2017    Procedure: Coronary Angiogram;  Surgeon: Ashok Hyatt MD;  Location: [facility] Cath Lab;  Service:     CV CORONARY ANGIOGRAM N/A 5/17/2024    Procedure: Coronary Angiogram;  Surgeon: Matias Scales MD;  Location: [facility] CV    CV LEFT HEART CATH N/A 5/17/2024    Procedure: Left Heart Catheterization;  Surgeon: Matias Scales MD;  Location: [facility] CV    CV LEFT HEART CATHETERIZATION WITH LEFT VENTRICULOGRAM N/A 10/17/2017    Procedure: Left Heart Catheterization with Left Ventriculogram;  Surgeon: Ashok Hyatt MD;  Location: [facility] Cath Lab;  Service:     CV PCI N/A 5/17/2024    Procedure: Percutaneous Coronary Intervention;  Surgeon: Matias Scales MD;  Location: [facility] CV    CV PCI ATHERECTOMY ORBITAL N/A 5/17/2024    Procedure: Percutaneous Coronary Intervention - Atherectomy Rotational;  Surgeon: Matias Scales MD;  Location: [facility] CV    EP PACEMAKER INSERT Left 12/31/2018    Procedure: EP Pacemaker Insertion;  Surgeon:  Micheal Junior MD;  Location: [facility] Cath Lab;  Service: Cardiology    OTHER SURGICAL HISTORY  2020    Left atrial appendage [place]man FLX at [place]         Family History Social History   Family History   Problem Relation Age of Onset    Cancer Mother     Cancer Father     Coronary Artery Disease Brother     Coronary Artery Disease Brother     Valvular heart disease Brother     Rectal Cancer Sister     Colon Cancer Sister         Social History     Tobacco Use    Smoking status: Former     Current packs/day: 0.00     Types: Cigarettes     Quit date: 10/16/1999     Years since quittin.6    Smokeless tobacco: Never   Substance Use Topics    Alcohol use: Yes     Alcohol/week: 1.0 standard drink of alcohol     Comment: Alcoholic Drinks/day: none since december    Drug use: No         Medications  Allergies     Current Outpatient Medications:     acetaminophen (TYLENOL) 500 MG tablet, [ACETAMINOPHEN (TYLENOL) 500 MG TABLET] Take 1-2 tablets (500-1,000 mg total) by mouth every 4 (four) hours as needed., Disp: , Rfl: 0    aspirin (ASPIR-81) 81 MG EC tablet, [ASPIRIN (ASPIR-81) 81 MG EC TABLET] Take 81 mg by mouth daily. , Disp: , Rfl:     atorvastatin (LIPITOR) 80 MG tablet, [ATORVASTATIN (LIPITOR) 80 MG TABLET] TAKE 1 TABLET BY MOUTH EVERYDAY AT BEDTIME, Disp: 90 tablet, Rfl: 1    carboxymethylcellulose (REFRESH PLUS) 0.5 % SOLN ophthalmic solution, Place 1 drop into the right eye At Bedtime, Disp: , Rfl:     clopidogrel (PLAVIX) 75 MG tablet, Take 1 tablet (75 mg) by mouth daily, Disp: 90 tablet, Rfl: 3    ferrous sulfate (FEROSUL) 325 (65 Fe) MG tablet, Take 325 mg by mouth three times a week, Disp: , Rfl:     pantoprazole (PROTONIX) 40 MG EC tablet, Take 1 tablet (40 mg) by mouth daily, Disp: 90 tablet, Rfl: 3    sotalol (BETAPACE) 80 MG tablet, Take 1 tablet (80 mg) by mouth 2 times daily, Disp: 180 tablet, Rfl: 3     Allergies   Allergen Reactions    Cephalexin Rash          Lab Results     Chemistry CBC Cardiac Enzymes/BNP/TSH/INR   Recent Labs   Lab Test 05/17/24  0753      POTASSIUM 4.2   CHLORIDE 108*   CO2 21*   *   BUN 11.1   CR 0.90   GFRESTIMATED 90   ESSENCE 9.5     Recent Labs   Lab Test 05/17/24  0753 11/27/23  1040 06/02/23  0941   CR 0.90 0.92 0.85          Recent Labs   Lab Test 05/17/24  0753   WBC 6.8   HGB 15.1   HCT 44.8   MCV 87        Recent Labs   Lab Test 05/17/24  0753 06/02/23  0941 07/08/20  1620   HGB 15.1 14.7 13.4*    Recent Labs   Lab Test 07/08/20  1620 08/15/19  0138 08/14/19  2230   TROPONINI <0.01 <0.01 <0.01     Recent Labs   Lab Test 01/18/19  1007 01/11/19  1639   BNP <10 11     Recent Labs   Lab Test 06/05/23  1522   TSH 0.69     Recent Labs   Lab Test 08/14/19  2230 04/20/19  1348 01/18/19  1007   INR 2.34* 1.13* 1.24*         Data Review    ECGs (tracings independently reviewed)  5/17/2024 - SR 67bpm, bifascicular block (RBBB and LAFB) QRS 130ms, QT//505ms    5/29/2024 pacemaker check  Normal lead and device function  No arrhythmia episodes  10.3% RA pacing, 0.3% RV pacing  Estimated remaining battery longevity 9.7yrs    1/23/2021 JOVANI  1. The baseline ECG demonstrated sinus rhythm with a rate of 82 bpm.  2. The WATCHMAN device is visualized and is well-expanded within the left atrial appendage.  3. Small residual jet on the postero-inferiorior aspect of the device (jet size 2 mm; JOVANI angle  139 degrees; clip #61). The remainder of the WATCHMAN device margin appears well sealed.  4. No intracardiac mass or thrombus identified.  No thrombus on the atrial face of the WATCHMAN  device.  5. No pericardial effusion.  6. No shunt at atrial level by color flow imaging and agitated saline contrast injection.  7. Normal left ventricular chamber size.  Left ventricular ejection fraction 60%.  8. 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  9. Normal right ventricular chamber size and systolic function.  10. Moderate  tricuspid valve regurgitation.        45 minutes was spent reviewing the chart and in direct discussion with the patient.    Cc: Suzan Waddell CNP, Bj Hendrickson MD Amila Dilusha William, MD  5/29/2024  12:10 PM

## 2024-05-31 ENCOUNTER — HOSPITAL ENCOUNTER (OUTPATIENT)
Dept: CARDIAC REHAB | Facility: CLINIC | Age: 74
Discharge: HOME | End: 2024-05-31
Attending: INTERNAL MEDICINE
Payer: MEDICARE

## 2024-05-31 DIAGNOSIS — Z95.5 STATUS POST INSERTION OF DRUG ELUTING CORONARY ARTERY STENT: ICD-10-CM

## 2024-05-31 PROCEDURE — 93798 PHYS/QHP OP CAR RHAB W/ECG: CPT

## 2024-05-31 PROCEDURE — 93797 PHYS/QHP OP CAR RHAB WO ECG: CPT | Mod: 59

## 2024-06-04 ENCOUNTER — HOSPITAL ENCOUNTER (OUTPATIENT)
Dept: CARDIAC REHAB | Facility: CLINIC | Age: 74
Discharge: HOME | End: 2024-06-04
Attending: INTERNAL MEDICINE
Payer: MEDICARE

## 2024-06-04 PROCEDURE — 93798 PHYS/QHP OP CAR RHAB W/ECG: CPT

## 2024-06-06 ENCOUNTER — HOSPITAL ENCOUNTER (OUTPATIENT)
Dept: CARDIAC REHAB | Facility: CLINIC | Age: 74
Discharge: HOME | End: 2024-06-06
Attending: INTERNAL MEDICINE
Payer: MEDICARE

## 2024-06-06 PROCEDURE — 93798 PHYS/QHP OP CAR RHAB W/ECG: CPT

## 2024-06-07 ENCOUNTER — TRANSFERRED RECORDS (OUTPATIENT)
Dept: HEALTH INFORMATION MANAGEMENT | Facility: CLINIC | Age: 74
End: 2024-06-07

## 2024-06-13 ENCOUNTER — HOSPITAL ENCOUNTER (OUTPATIENT)
Dept: CARDIAC REHAB | Facility: CLINIC | Age: 74
Discharge: HOME | End: 2024-06-13
Attending: INTERNAL MEDICINE
Payer: MEDICARE

## 2024-06-13 PROCEDURE — 93798 PHYS/QHP OP CAR RHAB W/ECG: CPT

## 2024-06-18 ENCOUNTER — HOSPITAL ENCOUNTER (OUTPATIENT)
Dept: CARDIAC REHAB | Facility: CLINIC | Age: 74
Discharge: HOME | End: 2024-06-18
Attending: INTERNAL MEDICINE
Payer: MEDICARE

## 2024-06-18 PROCEDURE — 93798 PHYS/QHP OP CAR RHAB W/ECG: CPT

## 2024-06-20 ENCOUNTER — HOSPITAL ENCOUNTER (OUTPATIENT)
Dept: CARDIAC REHAB | Facility: CLINIC | Age: 74
Discharge: HOME | End: 2024-06-20
Attending: INTERNAL MEDICINE
Payer: MEDICARE

## 2024-06-20 PROCEDURE — 93798 PHYS/QHP OP CAR RHAB W/ECG: CPT

## 2024-06-25 ENCOUNTER — HOSPITAL ENCOUNTER (OUTPATIENT)
Dept: CARDIAC REHAB | Facility: CLINIC | Age: 74
Discharge: HOME | End: 2024-06-25
Attending: INTERNAL MEDICINE
Payer: MEDICARE

## 2024-06-25 PROCEDURE — 93798 PHYS/QHP OP CAR RHAB W/ECG: CPT

## 2024-06-27 ENCOUNTER — HOSPITAL ENCOUNTER (OUTPATIENT)
Dept: CARDIAC REHAB | Facility: CLINIC | Age: 74
Discharge: HOME | End: 2024-06-27
Attending: INTERNAL MEDICINE
Payer: MEDICARE

## 2024-06-27 PROCEDURE — 93798 PHYS/QHP OP CAR RHAB W/ECG: CPT

## 2024-07-02 ENCOUNTER — HOSPITAL ENCOUNTER (OUTPATIENT)
Dept: CARDIAC REHAB | Facility: CLINIC | Age: 74
Discharge: HOME | End: 2024-07-02
Attending: INTERNAL MEDICINE
Payer: MEDICARE

## 2024-07-02 PROCEDURE — 93798 PHYS/QHP OP CAR RHAB W/ECG: CPT

## 2024-07-09 ENCOUNTER — HOSPITAL ENCOUNTER (OUTPATIENT)
Dept: CARDIAC REHAB | Facility: CLINIC | Age: 74
Discharge: HOME | End: 2024-07-09
Attending: INTERNAL MEDICINE
Payer: MEDICARE

## 2024-07-09 PROCEDURE — 93798 PHYS/QHP OP CAR RHAB W/ECG: CPT

## 2024-07-12 ENCOUNTER — OFFICE VISIT (OUTPATIENT)
Dept: CARDIOLOGY | Facility: CLINIC | Age: 74
End: 2024-07-12
Attending: INTERNAL MEDICINE
Payer: MEDICARE

## 2024-07-12 VITALS
DIASTOLIC BLOOD PRESSURE: 90 MMHG | HEART RATE: 60 BPM | WEIGHT: 201 LBS | BODY MASS INDEX: 31.55 KG/M2 | RESPIRATION RATE: 16 BRPM | SYSTOLIC BLOOD PRESSURE: 130 MMHG | HEIGHT: 67 IN

## 2024-07-12 DIAGNOSIS — Z86.79 STATUS POST ABLATION OF ATRIAL FIBRILLATION: ICD-10-CM

## 2024-07-12 DIAGNOSIS — I48.0 PAROXYSMAL ATRIAL FIBRILLATION (H): ICD-10-CM

## 2024-07-12 DIAGNOSIS — Z98.890 STATUS POST ABLATION OF ATRIAL FIBRILLATION: ICD-10-CM

## 2024-07-12 DIAGNOSIS — I10 ESSENTIAL HYPERTENSION: ICD-10-CM

## 2024-07-12 DIAGNOSIS — I49.5 SSS (SICK SINUS SYNDROME) (H): Primary | ICD-10-CM

## 2024-07-12 DIAGNOSIS — R93.1 ABNORMAL NUCLEAR CARDIAC IMAGING TEST: ICD-10-CM

## 2024-07-12 DIAGNOSIS — I25.110 CORONARY ARTERY DISEASE INVOLVING NATIVE CORONARY ARTERY OF NATIVE HEART WITH UNSTABLE ANGINA PECTORIS (H): ICD-10-CM

## 2024-07-12 DIAGNOSIS — Z95.5 STATUS POST INSERTION OF DRUG ELUTING CORONARY ARTERY STENT: ICD-10-CM

## 2024-07-12 PROCEDURE — 99214 OFFICE O/P EST MOD 30 MIN: CPT | Performed by: INTERNAL MEDICINE

## 2024-07-12 RX ORDER — LOSARTAN POTASSIUM 25 MG/1
25 TABLET ORAL DAILY
Qty: 90 TABLET | Refills: 3 | Status: SHIPPED | OUTPATIENT
Start: 2024-07-12

## 2024-07-12 NOTE — PROGRESS NOTES
"  Cardiology Clinic Office Note    Assessment / Plan:    1.  Coronary artery disease status post LAD stent placement with significant improvement in activity tolerance.  Needs 1 year of dual antiplatelet therapy.  2.  History of paroxysmal atrial fibrillation with no recent recurrences on sotalol.  Status post Watchman device placement.  With episode of lightheadedness have patient monitor her heart rate and blood pressure with activities, which he should increase to target 150 minutes of moderate aerobic activities each week.  Discussed that dose of sotalol may need to be decreased if episodes are recurrent  3.  Hypertension trending upward.  Will start losartan 25 mg daily in addition to his current medication regimen.    Follow-up 1 year    ______________________________________________________________________    Subjective:    I had the opportunity to see Ashok RODRIGUEZ Carin at the [facility]. Ashok RODRIGUEZ Carin is a 73 year old male with a history of paroxysmal atrial fibrillation status post ablation at [facility] in 2020 and left atrial appendage occlusion device placement at [facility] with \"Watchman\" left atrial occlusion device- FL X device.  He is also status post dual-chamber pacemaker placement in 2018.  He had a history of mild to moderate coronary disease by angiography in 2017, and developed worsening exertional dyspnea in May leading to stress testing and subsequent LAD stent placement.  He returns for follow-up accompanied by his wife..     He notes marked improvement in his activity tolerance since the LAD stent was placed.  His dyspnea on exertion has largely resolved.  He has had no chest pain or tightness.  On 1 occasion while mowing the lawn he felt some dizziness and lightheadedness, he feels he overdid it that day and has not experienced that since.  He does have a smart watch and monitors his heart rate, it seldom goes above 90 though he does " get 7-8000 steps in a day.  Blood pressures at rehab have been running higher but are variable.  Blood pressures more often elevated at home.  Has been on treatment in the past though not currently.  Tolerating current medication regimen well.    Recent device check shows no atrial fibrillation.  Renal function normal.    ______________________________________________________________________    Problem List:  Patient Active Problem List   Diagnosis    Mixed hyperlipidemia    Gastroesophageal reflux disease, esophagitis presence not specified    SSS (sick sinus syndrome) (H)    Cardiac pacemaker in situ, dual chamber    Paroxysmal atrial fibrillation (H)    MARCELLA (obstructive sleep apnea)    Status post ablation of atrial fibrillation    Left Atrial Appendage Closure (Watchman FLX)    Coronary artery disease involving native coronary artery of native heart with unstable angina pectoris (H)    Status post insertion of drug eluting coronary artery stent    Dyspnea on exertion    Status post coronary angiogram    Pre-procedure lab exam    Abnormal nuclear cardiac imaging test     Medical History:  Past Medical History:   Diagnosis Date    Anxiety about health     per [facility]    Dyslipidemia     GERD (gastroesophageal reflux disease)     Hypertension     Nonocclusive coronary atherosclerosis of native coronary artery 10/17/2017    Paroxysmal atrial fibrillation (H) 01/14/2019    Dx [id number] score = 5 (age, HTN, CAD, TIA 2) Rx flecainide Rx Xarelto    Pulmonary emphysema (H) 11/21/2019    per [facility]    Right bundle branch block 08/29/2019    Solitary pulmonary nodule 11/21/2019    per [facility]    Syncope 01/18/2019    TIA (transient ischemic attack) 12/30/2018     Surgical History:  Past Surgical History:   Procedure Laterality Date    APPENDECTOMY  2000    CARDIAC ELECTROPHYSIOLOGY MAPPING AND ABLATION  08/29/2019    PVI done at [facility]    CV CORONARY ANGIOGRAM N/A 10/17/2017    Procedure: Coronary Angiogram;  Surgeon:  Ashok Hyatt MD;  Location: [facility] Cath [facility];  Service:     CV CORONARY ANGIOGRAM N/A 2024    Procedure: Coronary Angiogram;  Surgeon: Matias Scales MD;  Location: [facility] CV    CV LEFT HEART CATH N/A 2024    Procedure: Left Heart Catheterization;  Surgeon: Matias Scales MD;  Location: [facility] CV    CV LEFT HEART CATHETERIZATION WITH LEFT VENTRICULOGRAM N/A 10/17/2017    Procedure: Left Heart Catheterization with Left Ventriculogram;  Surgeon: Ashok Hyatt MD;  Location: [facility] Cath Lab;  Service:     CV PCI N/A 2024    Procedure: Percutaneous Coronary Intervention;  Surgeon: Matias Scales MD;  Location: [facility] CV    CV PCI ATHERECTOMY ORBITAL N/A 2024    Procedure: Percutaneous Coronary Intervention - Atherectomy Rotational;  Surgeon: Matias Scales MD;  Location: [facility] CV    EP PACEMAKER INSERT Left 2018    Procedure: EP Pacemaker Insertion;  Surgeon: Micheal Junior MD;  Location: [facility] Cath Lab;  Service: Cardiology    OTHER SURGICAL HISTORY  [facility]     Social History:  Social History     Socioeconomic History    Marital status:      Spouse name: Not on file    Number of children: Not on file    Years of education: Not on file    Highest education level: Not on file   Occupational History    Not on file   Tobacco Use    Smoking status: Former     Current packs/day: 0.00     Types: Cigarettes     Quit date: 10/16/1999     Years since quittin.7    Smokeless tobacco: Never   Substance and Sexual Activity    Alcohol use: Yes     Alcohol/week: 1.0 standard drink of alcohol     Comment: Alcoholic Drinks/day: none since december    Drug use: No    Sexual activity: Yes     Partners: Female   Other Topics Concern    Not on file   Social History Narrative    Not on file     Social Determinants of Health     Financial Resource Strain: Medium  Risk (5/23/2020)    Received from [facility]    Overall Financial Resource Strain (CARDIA)     Difficulty of Paying Living Expenses: Somewhat hard   Food Insecurity: No Food Insecurity (5/23/2020)    Received from [facility]    Hunger Vital Sign     Worried About Running Out of Food in the Last Year: Never true     Ran Out of Food in the Last Year: Never true   Transportation Needs: No Transportation Needs (5/23/2020)    Received from [facility]    PRAPARE - Transportation     Lack of Transportation (Medical): No     Lack of Transportation (Non-Medical): No   Physical Activity: Sufficiently Active (5/23/2020)    Received from [facility]    Exercise Vital Sign     Days of Exercise per Week: 5 days     Minutes of Exercise per Session: 40 min   Stress: No Stress Concern Present (5/23/2020)    Received from [facility]    [employer] - Occupational Stress Questionnaire     Feeling of Stress : Not at all   Social Connections: Unknown (5/23/2020)    Received from [facility]    Social Connection and Isolation Panel [NHANES]     Frequency of Communication with Friends and Family: More than three times a week     Frequency of Social Gatherings with Friends and Family: Once a week     Attends Yazidi Services: 1 to 4 times per year     Active Member of Clubs or Organizations: Yes     Attends Club or Organization Meetings: 1 to 4 times per year     Marital Status: Not on file   Interpersonal Safety: Not on file   Housing Stability: Not on file     Sleep History:  Restorative though he does nap daily.  No snoring or apnea noted by wife  Exercise History:  Walks some, does cardiac rehab, yard work.  Outside of rehab no regular aerobic activities.    Review of Systems:   Positive for visual disturbance, hearing loss, shortness of breath with activity, dizziness.  12 point review of systems otherwise negative       Please refer above for cardiac ROS details.         Family History:  Family  "History   Problem Relation Age of Onset    Cancer Mother     Cancer Father     Coronary Artery Disease Brother     Coronary Artery Disease Brother     Valvular heart disease Brother     Rectal Cancer Sister     Colon Cancer Sister          Allergies:  Allergies   Allergen Reactions    Cephalexin Rash     Medications:  Current Outpatient Medications   Medication Sig Dispense Refill    acetaminophen (TYLENOL) 500 MG tablet [ACETAMINOPHEN (TYLENOL) 500 MG TABLET] Take 1-2 tablets (500-1,000 mg total) by mouth every 4 (four) hours as needed.  0    aspirin (ASPIR-81) 81 MG EC tablet [ASPIRIN (ASPIR-81) 81 MG EC TABLET] Take 81 mg by mouth daily.       atorvastatin (LIPITOR) 80 MG tablet [ATORVASTATIN (LIPITOR) 80 MG TABLET] TAKE 1 TABLET BY MOUTH EVERYDAY AT BEDTIME 90 tablet 1    carboxymethylcellulose (REFRESH PLUS) 0.5 % SOLN ophthalmic solution Place 1 drop into the right eye At Bedtime      clopidogrel (PLAVIX) 75 MG tablet Take 1 tablet (75 mg) by mouth daily 90 tablet 3    pantoprazole (PROTONIX) 40 MG EC tablet Take 1 tablet (40 mg) by mouth daily 90 tablet 3    sotalol (BETAPACE) 80 MG tablet Take 1 tablet (80 mg) by mouth 2 times daily 180 tablet 3    nitroGLYcerin (NITROSTAT) 0.4 MG sublingual tablet For chest pain place 1 tablet under the tongue every 5 minutes for 3 doses. If symptoms persist 5 minutes after 1st dose call 911. (Patient not taking: Reported on 7/12/2024) 10 tablet 1       Objective:   Wt Readings from Last 3 Encounters:   07/12/24 91.2 kg (201 lb)   05/29/24 92.5 kg (204 lb)   05/29/24 91.2 kg (201 lb)     Vital signs:  BP (!) 130/90 (BP Location: Left arm, Patient Position: Sitting, Cuff Size: Adult Large)   Pulse 60   Resp 16   Ht 1.702 m (5' 7\")   Wt 91.2 kg (201 lb)   BMI 31.48 kg/m        Physical Exam:    General Appearance : Awake, Alert, No acute distress  HEENT: No Scleral icterus; the mucous membranes were pink and moist.  Conjunctivae not injected  Neck:  No cervical bruits, " "jugular venous distention, or thyromegaly   Chest: The spine was straight. Chest wall pacemaker site without erythema warmth or tenderness  Lungs: Respirations unlabored; the lungs are clear to auscultation.  No wheezing   Cardiovascular:   Normal point of maximal impulse.  Auscultation reveals normal first and second heart sounds with no murmurs, rubs, or gallops.  Carotid, radial, and dorsalis pedal pulses are intact and symmetric.    Abdomen: No organomegaly, masses, bruits, or tenderness. Bowels sounds are present  Extremities:  No clubbing, cyanosis.  No edema  Skin: No rash, bruising  Musculoskeletal: No tenderness.  Neurologic: Alert and oriented ×3. Speech is fluent.     Lab Results    Chemistry/lipid CBC Cardiac Enzymes/BNP/TSH/INR   Recent Labs   Lab Test 05/17/24  0753   CHOL 135   HDL 27*   LDL 80   TRIG 138     Recent Labs   Lab Test 05/17/24  0753 11/27/23  1040 11/22/22  0838   LDL 80 59 53     Recent Labs   Lab Test 05/17/24  0753      POTASSIUM 4.2   CHLORIDE 108*   CO2 21*   *   BUN 11.1   CR 0.90   GFRESTIMATED 90   ESSENCE 9.5     Recent Labs   Lab Test 05/17/24  0753 11/27/23  1040 06/02/23  0941   CR 0.90 0.92 0.85     No results for input(s): \"A1C\" in the last 08829 hours.       Recent Labs   Lab Test 05/17/24  0753   WBC 6.8   HGB 15.1   HCT 44.8   MCV 87        Recent Labs   Lab Test 05/17/24  0753 06/02/23  0941 07/08/20  1620   HGB 15.1 14.7 13.4*    Recent Labs   Lab Test 07/08/20  1620 08/15/19  0138 08/14/19  2230   TROPONINI <0.01 <0.01 <0.01     Recent Labs   Lab Test 01/18/19  1007 01/11/19  1639   BNP <10 11     Recent Labs   Lab Test 06/05/23  1522   TSH 0.69     Recent Labs   Lab Test 08/14/19  2230 04/20/19  1348 01/18/19  1007   INR 2.34* 1.13* 1.24*          Coronary angiography with intervention 5/2024:    Mid LAD to Dist LAD lesion is 40% stenosed.    Mid Cx lesion is 40% stenosed.    3rd Mrg lesion is 30% stenosed.    LPAV lesion is 50% stenosed.    Ost LM to " Mid LM lesion is 40% stenosed.    Ost LAD to Mid LAD lesion is 95% stenosed.  1.  Diffuse coronary calcification with 30 to 40% its eccentric calcified narrowing mid left main, large caliber vessel with large residual lumen, severe calcification in the proximal third of the LAD and segmental calcified mild to moderate stenoses in the dominant left circumflex system.  Focal 60% stenosis proximal LAD and 95% segment of severe calcific stenosis junction of the proximal and mid LAD (culprit lesion).  2.  Dominant left circumflex with 30 to 40% eccentric calcification proximally.  No severe stenoses.  3.  RCA small nondominant otherwise normal.  4.  Successful PCI proximal LAD with rotational atherectomy, PTCA, shockwave lithotripsy, and Synergy 3.5 x 38 JESSICA implant, postdilated with 3.75 NC balloon to 16 manav.  0% residual, YASIR-3 flow.    Pharmacologic nuclear stress test 5/2024:    Lexiscan stress ECG negative for ischemia.    The nuclear stress test is abnormal.  There is a large area of severe ischemia involving the mid to distal anteroseptal, anterior, distal anterolateral, apical and distal inferior wall.    Stress to rest cavity ratio is 1.44.  TID is present visually and quantitatively.  This may be a marker of multivessel disease.    The left ventricular ejection fraction at stress is 61% with anteroseptal and anteroapical hypokinesis.    The patient is at a high risk of future cardiac ischemic events.    A prior study was conducted on 6/7/2021.  The area of ischemia is new.        NOHEMY JONES MD [facility]  [phone]    This note created using Dragon voice recognition software.  Sound alike errors may have escaped editing.

## 2024-07-12 NOTE — PATIENT INSTRUCTIONS
Continue to monitor your blood pressure at home, along with your heart rate.  Particularly if you have any more episodes of lightheadedness.  Start losartan 25 mg daily in addition to your current medications to help with blood pressure control.  Work on getting 150 minutes a week of moderate aerobic exercise.  Make sure you are keeping yourself well-hydrated when you are doing your exercise.

## 2024-07-12 NOTE — LETTER
"7/12/2024    Bj Hendrickson MD  [address] [place] [address]    RE: Ashok RODRIGUEZ Carin       Dear Colleague,     I had the pleasure of seeing Ashok Del Rosario in the [facility].    Cardiology Clinic Office Note    Assessment / Plan:    1.  Coronary artery disease status post LAD stent placement with significant improvement in activity tolerance.  Needs 1 year of dual antiplatelet therapy.  2.  History of paroxysmal atrial fibrillation with no recent recurrences on sotalol.  Status post Watchman device placement.  With episode of lightheadedness have patient monitor her heart rate and blood pressure with activities, which he should increase to target 150 minutes of moderate aerobic activities each week.  Discussed that dose of sotalol may need to be decreased if episodes are recurrent  3.  Hypertension trending upward.  Will start losartan 25 mg daily in addition to his current medication regimen.    Follow-up 1 year    ______________________________________________________________________    Subjective:    I had the opportunity to see Ashok Del Rosario at the [facility]. Ashok Del Rosario is a 73 year old male with a history of paroxysmal atrial fibrillation status post ablation at [facility] in 2020 and left atrial appendage occlusion device placement at [facility] with \"Watchman\" left atrial occlusion device- FL X device.  He is also status post dual-chamber pacemaker placement in 2018.  He had a history of mild to moderate coronary disease by angiography in 2017, and developed worsening exertional dyspnea in May leading to stress testing and subsequent LAD stent placement.  He returns for follow-up accompanied by his wife..     He notes marked improvement in his activity tolerance since the LAD stent was placed.  His dyspnea on exertion has largely resolved.  He has had no chest pain or tightness.  On 1 occasion while mowing the " lawn he felt some dizziness and lightheadedness, he feels he overdid it that day and has not experienced that since.  He does have a smart watch and monitors his heart rate, it seldom goes above 90 though he does get 7-8000 steps in a day.  Blood pressures at rehab have been running higher but are variable.  Blood pressures more often elevated at home.  Has been on treatment in the past though not currently.  Tolerating current medication regimen well.    Recent device check shows no atrial fibrillation.  Renal function normal.    ______________________________________________________________________    Problem List:  Patient Active Problem List   Diagnosis    Mixed hyperlipidemia    Gastroesophageal reflux disease, esophagitis presence not specified    SSS (sick sinus syndrome) (H)    Cardiac pacemaker in situ, dual chamber    Paroxysmal atrial fibrillation (H)    MARCELLA (obstructive sleep apnea)    Status post ablation of atrial fibrillation    Left Atrial Appendage Closure (Watchman FLX)    Coronary artery disease involving native coronary artery of native heart with unstable angina pectoris (H)    Status post insertion of drug eluting coronary artery stent    Dyspnea on exertion    Status post coronary angiogram    Pre-procedure lab exam    Abnormal nuclear cardiac imaging test     Medical History:  Past Medical History:   Diagnosis Date    Anxiety about health     per [facility]    Dyslipidemia     GERD (gastroesophageal reflux disease)     Hypertension     Nonocclusive coronary atherosclerosis of native coronary artery 10/17/2017    Paroxysmal atrial fibrillation (H) 01/14/2019    Dx [id number] score = 5 (age, HTN, CAD, TIA 2) Rx flecainide Rx Xarelto    Pulmonary emphysema (H) 11/21/2019    per [place]    Right bundle branch block 08/29/2019    Solitary pulmonary nodule 11/21/2019    per [place]    Syncope 01/18/2019    TIA (transient ischemic attack) 12/30/2018     Surgical History:  Past Surgical History:    Procedure Laterality Date    APPENDECTOMY  2000    CARDIAC ELECTROPHYSIOLOGY MAPPING AND ABLATION  2019    PVI done at [place]    CV CORONARY ANGIOGRAM N/A 10/17/2017    Procedure: Coronary Angiogram;  Surgeon: Ashok Hyatt MD;  Location: [facility] Cath Lab;  Service:     CV CORONARY ANGIOGRAM N/A 2024    Procedure: Coronary Angiogram;  Surgeon: Matias Scales MD;  Location: [facility] CV    CV LEFT HEART CATH N/A 2024    Procedure: Left Heart Catheterization;  Surgeon: Matias Scales MD;  Location: [facility] LAB CV    CV LEFT HEART CATHETERIZATION WITH LEFT VENTRICULOGRAM N/A 10/17/2017    Procedure: Left Heart Catheterization with Left Ventriculogram;  Surgeon: Ashok Hyatt MD;  Location: [facility] Cath Lab;  Service:     CV PCI N/A 2024    Procedure: Percutaneous Coronary Intervention;  Surgeon: Matias Scales MD;  Location: [facility] CV    CV PCI ATHERECTOMY ORBITAL N/A 2024    Procedure: Percutaneous Coronary Intervention - Atherectomy Rotational;  Surgeon: Matias Scales MD;  Location: [facility] CV    EP PACEMAKER INSERT Left 2018    Procedure: EP Pacemaker Insertion;  Surgeon: Micheal Junior MD;  Location: [facility] Cath Lab;  Service: Cardiology    OTHER SURGICAL HISTORY  [facility] [place]     Social History:  Social History     Socioeconomic History    Marital status:      Spouse name: Not on file    Number of children: Not on file    Years of education: Not on file    Highest education level: Not on file   Occupational History    Not on file   Tobacco Use    Smoking status: Former     Current packs/day: 0.00     Types: Cigarettes     Quit date: 10/16/1999     Years since quittin.7    Smokeless tobacco: Never   Substance and Sexual Activity    Alcohol use: Yes     Alcohol/week: 1.0 standard drink of alcohol     Comment: Alcoholic Drinks/day: none since december     Drug use: No    Sexual activity: Yes     Partners: Female   Other Topics Concern    Not on file   Social History Narrative    Not on file     Social Determinants of Health     Financial Resource Strain: Medium Risk (5/23/2020)    Received from [facility]    Overall Financial Resource Strain (CARDIA)     Difficulty of Paying Living Expenses: Somewhat hard   Food Insecurity: No Food Insecurity (5/23/2020)    Received from [facility]    Hunger Vital Sign     Worried About Running Out of Food in the Last Year: Never true     Ran Out of Food in the Last Year: Never true   Transportation Needs: No Transportation Needs (5/23/2020)    Received from [facility]    PRAPARE - Transportation     Lack of Transportation (Medical): No     Lack of Transportation (Non-Medical): No   Physical Activity: Sufficiently Active (5/23/2020)    Received from [facility]    Exercise Vital Sign     Days of Exercise per Week: 5 days     Minutes of Exercise per Session: 40 min   Stress: No Stress Concern Present (5/23/2020)    Received from [facility]    [employer] - Occupational Stress Questionnaire     Feeling of Stress : Not at all   Social Connections: Unknown (5/23/2020)    Received from [facility]    Social Connection and Isolation Panel [NHANES]     Frequency of Communication with Friends and Family: More than three times a week     Frequency of Social Gatherings with Friends and Family: Once a week     Attends Sabianism Services: 1 to 4 times per year     Active Member of Clubs or Organizations: Yes     Attends Club or Organization Meetings: 1 to 4 times per year     Marital Status: Not on file   Interpersonal Safety: Not on file   Housing Stability: Not on file     Sleep History:  Restorative though he does nap daily.  No snoring or apnea noted by wife  Exercise History:  Walks some, does cardiac rehab, yard work.  Outside of rehab no regular aerobic activities.    Review of Systems:   Positive for  "visual disturbance, hearing loss, shortness of breath with activity, dizziness.  12 point review of systems otherwise negative       Please refer above for cardiac ROS details.         Family History:  Family History   Problem Relation Age of Onset    Cancer Mother     Cancer Father     Coronary Artery Disease Brother     Coronary Artery Disease Brother     Valvular heart disease Brother     Rectal Cancer Sister     Colon Cancer Sister          Allergies:  Allergies   Allergen Reactions    Cephalexin Rash     Medications:  Current Outpatient Medications   Medication Sig Dispense Refill    acetaminophen (TYLENOL) 500 MG tablet [ACETAMINOPHEN (TYLENOL) 500 MG TABLET] Take 1-2 tablets (500-1,000 mg total) by mouth every 4 (four) hours as needed.  0    aspirin (ASPIR-81) 81 MG EC tablet [ASPIRIN (ASPIR-81) 81 MG EC TABLET] Take 81 mg by mouth daily.       atorvastatin (LIPITOR) 80 MG tablet [ATORVASTATIN (LIPITOR) 80 MG TABLET] TAKE 1 TABLET BY MOUTH EVERYDAY AT BEDTIME 90 tablet 1    carboxymethylcellulose (REFRESH PLUS) 0.5 % SOLN ophthalmic solution Place 1 drop into the right eye At Bedtime      clopidogrel (PLAVIX) 75 MG tablet Take 1 tablet (75 mg) by mouth daily 90 tablet 3    pantoprazole (PROTONIX) 40 MG EC tablet Take 1 tablet (40 mg) by mouth daily 90 tablet 3    sotalol (BETAPACE) 80 MG tablet Take 1 tablet (80 mg) by mouth 2 times daily 180 tablet 3    nitroGLYcerin (NITROSTAT) 0.4 MG sublingual tablet For chest pain place 1 tablet under the tongue every 5 minutes for 3 doses. If symptoms persist 5 minutes after 1st dose call 911. (Patient not taking: Reported on 7/12/2024) 10 tablet 1       Objective:   Wt Readings from Last 3 Encounters:   07/12/24 91.2 kg (201 lb)   05/29/24 92.5 kg (204 lb)   05/29/24 91.2 kg (201 lb)     Vital signs:  BP (!) 130/90 (BP Location: Left arm, Patient Position: Sitting, Cuff Size: Adult Large)   Pulse 60   Resp 16   Ht 1.702 m (5' 7\")   Wt 91.2 kg (201 lb)   BMI 31.48 " "kg/m        Physical Exam:    General Appearance : Awake, Alert, No acute distress  HEENT: No Scleral icterus; the mucous membranes were pink and moist.  Conjunctivae not injected  Neck:  No cervical bruits, jugular venous distention, or thyromegaly   Chest: The spine was straight. Chest wall pacemaker site without erythema warmth or tenderness  Lungs: Respirations unlabored; the lungs are clear to auscultation.  No wheezing   Cardiovascular:   Normal point of maximal impulse.  Auscultation reveals normal first and second heart sounds with no murmurs, rubs, or gallops.  Carotid, radial, and dorsalis pedal pulses are intact and symmetric.    Abdomen: No organomegaly, masses, bruits, or tenderness. Bowels sounds are present  Extremities:  No clubbing, cyanosis.  No edema  Skin: No rash, bruising  Musculoskeletal: No tenderness.  Neurologic: Alert and oriented ×3. Speech is fluent.     Lab Results    Chemistry/lipid CBC Cardiac Enzymes/BNP/TSH/INR   Recent Labs   Lab Test 05/17/24  0753   CHOL 135   HDL 27*   LDL 80   TRIG 138     Recent Labs   Lab Test 05/17/24  0753 11/27/23  1040 11/22/22  0838   LDL 80 59 53     Recent Labs   Lab Test 05/17/24  0753      POTASSIUM 4.2   CHLORIDE 108*   CO2 21*   *   BUN 11.1   CR 0.90   GFRESTIMATED 90   ESSENCE 9.5     Recent Labs   Lab Test 05/17/24  0753 11/27/23  1040 06/02/23  0941   CR 0.90 0.92 0.85     No results for input(s): \"A1C\" in the last 21175 hours.       Recent Labs   Lab Test 05/17/24  0753   WBC 6.8   HGB 15.1   HCT 44.8   MCV 87        Recent Labs   Lab Test 05/17/24  0753 06/02/23  0941 07/08/20  1620   HGB 15.1 14.7 13.4*    Recent Labs   Lab Test 07/08/20  1620 08/15/19  0138 08/14/19  2230   TROPONINI <0.01 <0.01 <0.01     Recent Labs   Lab Test 01/18/19  1007 01/11/19  1639   BNP <10 11     Recent Labs   Lab Test 06/05/23  1522   TSH 0.69     Recent Labs   Lab Test 08/14/19  2230 04/20/19  1348 01/18/19  1007   INR 2.34* 1.13* 1.24*    "       Coronary angiography with intervention 5/2024:    Mid LAD to Dist LAD lesion is 40% stenosed.    Mid Cx lesion is 40% stenosed.    3rd Mrg lesion is 30% stenosed.    LPAV lesion is 50% stenosed.    Ost LM to Mid LM lesion is 40% stenosed.    Ost LAD to Mid LAD lesion is 95% stenosed.  1.  Diffuse coronary calcification with 30 to 40% its eccentric calcified narrowing mid left main, large caliber vessel with large residual lumen, severe calcification in the proximal third of the LAD and segmental calcified mild to moderate stenoses in the dominant left circumflex system.  Focal 60% stenosis proximal LAD and 95% segment of severe calcific stenosis junction of the proximal and mid LAD (culprit lesion).  2.  Dominant left circumflex with 30 to 40% eccentric calcification proximally.  No severe stenoses.  3.  RCA small nondominant otherwise normal.  4.  Successful PCI proximal LAD with rotational atherectomy, PTCA, shockwave lithotripsy, and Synergy 3.5 x 38 JESSICA implant, postdilated with 3.75 NC balloon to 16 manav.  0% residual, YASIR-3 flow.    Pharmacologic nuclear stress test 5/2024:    Lexiscan stress ECG negative for ischemia.    The nuclear stress test is abnormal.  There is a large area of severe ischemia involving the mid to distal anteroseptal, anterior, distal anterolateral, apical and distal inferior wall.    Stress to rest cavity ratio is 1.44.  TID is present visually and quantitatively.  This may be a marker of multivessel disease.    The left ventricular ejection fraction at stress is 61% with anteroseptal and anteroapical hypokinesis.    The patient is at a high risk of future cardiac ischemic events.    A prior study was conducted on 6/7/2021.  The area of ischemia is new.        NOHEMY JONES MD [facility]  [phone]    This note created using Dragon voice recognition software.  Sound alike errors may have escaped editing.      Thank you for allowing me to participate in the care of your  patient.      Sincerely,     Norman Vargas MD     [facility]  cc:   Aliya Alvaardo PA-C  [address] [facility] [place] [facility] [address]  [place] [address]

## 2024-07-16 ENCOUNTER — TELEPHONE (OUTPATIENT)
Dept: VASCULAR SURGERY | Facility: CLINIC | Age: 74
End: 2024-07-16
Payer: MEDICARE

## 2024-07-16 ENCOUNTER — HOSPITAL ENCOUNTER (OUTPATIENT)
Dept: CARDIAC REHAB | Facility: CLINIC | Age: 74
Discharge: HOME | End: 2024-07-16
Attending: INTERNAL MEDICINE
Payer: MEDICARE

## 2024-07-16 PROCEDURE — 93798 PHYS/QHP OP CAR RHAB W/ECG: CPT

## 2024-07-16 NOTE — PROGRESS NOTES
This pt needs a 1 year follow up and is new to [facility], can I schedule him in 30 min spot or should it be 60? Thanks Christi

## 2024-07-16 NOTE — TELEPHONE ENCOUNTER
LMTCB schedule follow up with AA + study    Marie Swartz RN Stejskal, Jennifer K  30 is fine for this patient.    Ree Jolly RN  [facility]  Please call to follow up in 9-12 months for repeat carotid and follow up with VASCULAR MEDICINE. May want yearly follow up in September or October instead of exactly 1 year which is okay with RW.

## 2024-07-18 ENCOUNTER — HOSPITAL ENCOUNTER (OUTPATIENT)
Dept: CARDIAC REHAB | Facility: CLINIC | Age: 74
Discharge: HOME | End: 2024-07-18
Attending: INTERNAL MEDICINE
Payer: MEDICARE

## 2024-07-18 PROCEDURE — 93798 PHYS/QHP OP CAR RHAB W/ECG: CPT

## 2024-08-06 ENCOUNTER — HOSPITAL ENCOUNTER (OUTPATIENT)
Dept: CARDIAC REHAB | Facility: CLINIC | Age: 74
Discharge: HOME | End: 2024-08-06
Attending: INTERNAL MEDICINE
Payer: MEDICARE

## 2024-08-06 PROCEDURE — 93798 PHYS/QHP OP CAR RHAB W/ECG: CPT

## 2024-08-08 ENCOUNTER — HOSPITAL ENCOUNTER (OUTPATIENT)
Dept: CARDIAC REHAB | Facility: CLINIC | Age: 74
Discharge: HOME | End: 2024-08-08
Attending: INTERNAL MEDICINE
Payer: MEDICARE

## 2024-08-08 PROCEDURE — 93798 PHYS/QHP OP CAR RHAB W/ECG: CPT

## 2024-08-10 NOTE — LETTER
Letter by Any Sherman at      Author: Any Sherman Service: -- Author Type: --    Filed:  Encounter Date: 2/5/2019 Status: (Other)       Ashok Del Rosario  [address] [place] [address]      February 5, 2019      Dear Mr. Del Rosario,    RE: Remote Results    We are writing to you regarding your recent Remote Pacemaker check from home. Your transmission was received successfully. Battery status is satisfactory at this time.     Your results are within normal limits.    Your next device appointment will be a clinic visit on February 21, 2019 at 2:50pm at our  [place] location, [address] [facility].    To schedule or reschedule, please call [phone] and press 1.    NOTE: If you would like to do an extra transmission, please call [phone] and press 3 to speak to a nurse BEFORE transmitting. This ensures that the Device Clinic staff is aware of the reason you are sending a transmission, and can follow-up with you after it has been reviewed.    We will be checking your implanted device from home (remotely) every three months unless otherwise instructed. We will need to see you in the clinic at least once a year. You may need to be seen in the clinic sooner depending on the results of your check.    Please be aware:    The follow-up schedule is like a Physician prescription.    Your remote monitor is paired to your specific implanted device.      Sincerely,    [facility]        WAS OBSERVATION 8/9/24 @ 0348 CONVERTED TO INPATIENT ADMISSION 8/10/24 @ 1257 DUE TO CONTINUED STAY REQUIRED TO CARE FOR PATIENT WITH Pancreatitis requiring aggressive IV fluids, IV Pain control, Leukocytosis and Hyponatremia.     Initial Clinical Review    Admission: Date/Time/Statement:   Admission Orders (From admission, onward)       Ordered        08/10/24 1257  INPATIENT ADMISSION  Once            08/09/24 0348  Place in Observation  Once                          Orders Placed This Encounter   Procedures    INPATIENT ADMISSION     Standing Status:   Standing     Number of Occurrences:   1     Order Specific Question:   Level of Care     Answer:   Med Surg [16]     Order Specific Question:   Estimated length of stay     Answer:   More than 2 Midnights     Order Specific Question:   Certification     Answer:   I certify that inpatient services are medically necessary for this patient for a duration of greater than two midnights. See H&P and MD Progress Notes for additional information about the patient's course of treatment.     ED Arrival Information       Expected   -    Arrival   8/9/2024 01:29    Acuity   Emergent              Means of arrival   Walk-In    Escorted by   Self    Service   Hospitalist    Admission type   Emergency              Arrival complaint   abdominal pain             Chief Complaint   Patient presents with    Abdominal Pain     Pt having upper gi pain he think its his pancreas hx of pancreatitis. Pain has been on going x3 days, last bm 4 days ago. No meds PTA.        Initial Presentation: 61 y.o. male who presented self from home to [facility] ED. Admitted as Observation for evaluation and treatment of Acute Pancreatitis.     PMHx:  has a past medical history of Alcohol abuse, Back pain, Chronic low back pain, Depression, Diabetes mellitus (HCC), DM2 (diabetes mellitus, type 2) (HCC), Erectile dysfunction, GERD (gastroesophageal reflux disease), Hepatic steatosis, Hyperlipidemia,  "Hypertension, Neuropathy, Pancreatitis, Psychiatric disorder, and Tobacco abuse.      Presented w/ worsening upper mid gastric abd pain since Tuesday. Reports pain radiates to back and it's a 10/10 pain scale. Pt denies recent drinking. On exam, abd tenderness in the R upper quad, R lower quad and L upper quad and lower quad with guarding . Labs Lipase 112. Na 132. Imaging CT a/p w/ contrast: \"Trace stranding about the pancreas may be due to pancreatitis .     Plan: NPO, IV fluids, Pain control, Obtain CRP, Triglycerides and lipase levels. Continue to trend CMP and Lipase. GI  consulted.    GI consult: Pancreatitis. Pt has had prior episodes of EtOH-related pancreatitis (x6) with last episode 1 year ago. It was recommended that he pursue an outpatient MRI/MRCP to exclude an underlying lesion but he has not completed this. His IgG4 levels were normal in 2022. Plan:  Advance diet as tolerated. Continue IV fluids. Pain control. Continue PPI daily. Start Miralax daily for constipation.      8/10/24 - patient bed status changed to Inpatient.     Date: 8/10   Day 2: Pt reports no significant improvement/changes in abd pain at this time. Current Pain scale 10/10. Had one time low grade fever of 100 yesterday afternoon. WBC elevated today 15.96 with elevated procal of 4.11, Creat elevated at 1.84 from 0.86 yesterday. Na 131.  Plan: Repeat CT without contrast due to GUSTAVO, Continue aggressive IV fluid hydration in the setting of pancreatitis - consider ATN in the setting of intermittent hypotension, Received doses of NSAID (IV Toradol) yesterday, now discontinued - will also discontinue ACEI - holding Metformin, Monitor renal functions and urine output, start IV abx after blood cultures drawn, Pain control, trend CMP. Nephrology consult.   Nephrology consult: GUSTAVO - baseline creat 0.7 to 0.9. Creatinine rising to 1.8 on 8/10 prompting nephrology consultation. Patient likely has acute kidney injury in the setting of relative " hypotension/sepsis/ACE inhibitor/autoregulatory failure/Toradol/hypovolemia/contrast all leading to ATN. Plan: Hold fenofibrate. Avoid all NSAIDs and all nephrotoxic agents. Continue IV fluids. Hold Amlodipine, Lisinopril and Toradol. Restart lisinopril only if SBP above 140. Avoid IV contrast. Repeat BMP in am.     Date: 8/11  Day 3: Has surpassed a 2nd midnight with active treatments and services.  Pt reports mild improvement in pain today and requesting diet to be advanced. Plan: Trial full liquids w/ toast/crackers today. CT imaging yesterday still reveals unchanged/mild peripancreatic fat stranding but not worsening. LFTs today shows elevated AST and Bilirubin from a few days prior. U/S RUQ notable for steatosis. Procal improved to 1.95. Blood cultures so far negative.  Plan: Continue IV fluids, Pain control. IV abx. F/U with final cultures results.          ED Triage Vitals   Temperature Pulse Respirations Blood Pressure SpO2 Pain Score   08/09/24 0141 08/09/24 0138 08/09/24 0138 08/09/24 0138 08/09/24 0138 08/09/24 0145   98 °F (36.7 °C) (!) 108 19 (!) 208/98 97 % 10 - Worst Possible Pain     Weight (last 2 days)       Date/Time Weight    08/09/24 0432 101 (223.13)            Vital Signs (last 3 days)       Date/Time Temp Pulse Resp BP MAP (mmHg) SpO2 O2 Device Patient Position - Orthostatic VS Pain    08/10/24 0900 -- -- -- -- -- -- -- -- 10 - Worst Possible Pain    08/10/24 0749 98.4 °F (36.9 °C) 82 20 112/58 -- 92 % None (Room air) Sitting --    08/10/24 0341 -- -- -- -- -- -- -- -- 10 - Worst Possible Pain    08/10/24 0340 98.3 °F (36.8 °C) 97 20 124/63 80 -- -- Sitting --    08/09/24 2345 -- -- -- 101/60 -- -- -- Lying 8    08/09/24 2332 98.1 °F (36.7 °C) 79 18 95/57 71 91 % None (Room air) Lying --    08/09/24 1945 -- -- -- -- -- -- -- -- 8    08/09/24 1836 -- -- -- -- -- -- -- -- 10 - Worst Possible Pain    08/09/24 1520 100 °F (37.8 °C) 84 18 100/57 -- 90 % -- Lying --    08/09/24 1343 -- -- -- -- --  -- -- -- 10 - Worst Possible Pain    08/09/24 0906 -- -- -- -- -- -- -- -- 10 - Worst Possible Pain    08/09/24 0903 -- -- -- -- -- -- -- -- 10 - Worst Possible Pain    08/09/24 0855 -- -- -- 122/77 -- -- -- -- --    08/09/24 0601 -- -- -- -- -- -- -- -- 10 - Worst Possible Pain    08/09/24 0556 -- -- -- -- -- -- -- -- 10 - Worst Possible Pain    08/09/24 0445 -- -- -- -- -- -- -- -- 10 - Worst Possible Pain    08/09/24 0440 -- -- -- -- -- -- -- -- 10 - Worst Possible Pain    08/09/24 0432 97.4 °F (36.3 °C) 87 18 143/89 110 96 % None (Room air) Lying --    08/09/24 0353 -- -- -- -- -- -- -- -- Med Not Given for Pain - for MAR use only    08/09/24 0350 -- 79 18 150/73 -- 93 % -- -- --    08/09/24 0243 -- -- -- -- -- -- -- -- 10 - Worst Possible Pain    08/09/24 0241 -- -- -- -- -- -- -- -- 10 - Worst Possible Pain    08/09/24 0215 -- 92 20 149/76 104 94 % None (Room air) Lying --    08/09/24 0149 -- -- -- -- -- -- None (Room air) -- --    08/09/24 0145 -- -- -- -- -- -- -- -- 10 - Worst Possible Pain    08/09/24 0141 98 °F (36.7 °C) -- -- -- -- -- -- -- --    08/09/24 0138 -- 108 19 208/98 -- 97 % None (Room air) Lying --              Pertinent Labs/Diagnostic Test Results:   Radiology:  US right upper quadrant   Final Interpretation by Chu Da Silva MD (08/09 1950)      Mild hepatomegaly and hepatic steatosis.      Workstation performed: [id number]         CT abdomen pelvis with contrast   Final Interpretation by Piter Baker MD (08/09 0328)      Trace stranding about the pancreas may be due to pancreatitis, correlate with lipase. No discrete peripancreatic collection.         Workstation performed: [id number]         MRI abdomen w wo contrast and mrcp    (Results Pending)   CT abdomen pelvis wo contrast    (Results Pending)     Cardiology:  ECG 12 lead   Final Result by Cheyenne Zuluaga MD (08/09 1153)   Sinus tachycardia   Septal infarct (cited on or before 01-DEC-2023)   Abnormal ECG   When compared with ECG  of 01-DEC-2023 08:15,   No significant change was found   Confirmed by Cheyenne Zuluaga ([id number]) on 8/9/2024 11:53:17 AM        Results from last 7 days   Lab Units 08/10/24  0415 08/09/24  0144   WBC Thousand/uL 15.96* 7.53   HEMOGLOBIN g/dL 13.9 16.4   HEMATOCRIT % 41.2 46.7   PLATELETS Thousands/uL 157 193   TOTAL NEUT ABS Thousands/µL 14.46* 4.56         Results from last 7 days   Lab Units 08/10/24  0415 08/09/24  0523 08/09/24  0144   SODIUM mmol/L 131* 134* 132*   POTASSIUM mmol/L 4.7 4.0 3.9   CHLORIDE mmol/L 102 103 99   CO2 mmol/L 21 22 23   ANION GAP mmol/L 8 9 10   BUN mg/dL 19 10 8   CREATININE mg/dL 1.84* 0.86 0.96   EGFR ml/min/1.73sq m 38 93 84   CALCIUM mg/dL 8.6 9.3 10.0     Results from last 7 days   Lab Units 08/09/24  0523 08/09/24  0144   AST U/L 23 25   ALT U/L 26 28   ALK PHOS U/L 95 98   TOTAL PROTEIN g/dL 7.1 7.9   ALBUMIN g/dL 3.8 4.2   TOTAL BILIRUBIN mg/dL 0.64 0.69     Results from last 7 days   Lab Units 08/10/24  1218 08/10/24  0604 08/10/24  0026 08/09/24  2123 08/09/24  1704 08/09/24  1113 08/09/24  0712 08/09/24  0601   POC GLUCOSE mg/dl 160* 184* 147* 145* 175* 150* 168* 160*     Results from last 7 days   Lab Units 08/10/24  0415 08/09/24  0523 08/09/24  0144   GLUCOSE RANDOM mg/dL 191* 150* 271*             BETA-HYDROXYBUTYRATE   Date Value Ref Range Status   09/20/2021 0.5 <0.6 mmol/L Final      Results from last 7 days   Lab Units 08/09/24  0523 08/09/24  0353 08/09/24  0144   HS TNI 0HR ng/L  --   --  6   HS TNI 2HR ng/L 7  --   --    HSTNI D2 ng/L 1  --   --    HS TNI 4HR ng/L  --  7  --    HSTNI D4 ng/L  --  1  --      Results from last 7 days   Lab Units 08/10/24  0415   LACTIC ACID mmol/L 1.9         Results from last 7 days   Lab Units 08/10/24  0415 08/09/24  0523 08/09/24  0144   LIPASE u/L 19  --  112*   AMYLASE IU/L  --  25*  --      Results from last 7 days   Lab Units 08/09/24 0523   CRP mg/L 8.8*             Results from last 7 days   Lab Units 08/10/24  0415    CLARITY UA  Clear   COLOR UA  Radha*   SPEC GRAV UA  1.025   PH UA  5.5   GLUCOSE UA mg/dl Negative   KETONES UA mg/dl Trace*   BLOOD UA  Negative   PROTEIN UA mg/dl 2+*   NITRITE UA  Positive*   BILIRUBIN UA  2+*   UROBILINOGEN UA E.U./dl 1.0   LEUKOCYTES UA  Negative   WBC UA /hpf 0-5   RBC UA /hpf None Seen   BACTERIA UA /hpf Occasional   EPITHELIAL CELLS WET PREP /hpf None Seen   MUCUS THREADS  Moderate*       ED Treatment-Medication Administration from 08/09/2024 0129 to 08/09/2024 0426         Date/Time Order Dose Route Action     08/09/2024 0147 sodium chloride 0.9 % bolus 1,000 mL 1,000 mL Intravenous New Bag     08/09/2024 0145 ketorolac (TORADOL) injection 15 mg 15 mg Intravenous Given     08/09/2024 0243 morphine injection 4 mg 4 mg Intravenous Given     08/09/2024 0306 iohexol (OMNIPAQUE) 350 MG/ML injection (SINGLE-DOSE) 100 mL 100 mL Intravenous Given     08/09/2024 0353 morphine injection 4 mg 4 mg Intravenous Given            Past Medical History:   Diagnosis Date    Alcohol abuse     Back pain     Chronic low back pain     Depression     Diabetes mellitus (HCC)     DM2 (diabetes mellitus, type 2) (HCC)     Erectile dysfunction     GERD (gastroesophageal reflux disease)     Hepatic steatosis     Hyperlipidemia     Hypertension     Neuropathy     Pancreatitis     Psychiatric disorder     Tobacco abuse      Present on Admission:   Acute pancreatitis   GERD (gastroesophageal reflux disease)   Type 2 diabetes mellitus (HCC)   Essential hypertension   Dyslipidemia   GUSTAVO (acute kidney injury) (HCC)   Hyponatremia      Admitting Diagnosis: Abdominal pain [R10.9]  Acute pancreatitis without infection or necrosis, unspecified pancreatitis type [K85.90]  Age/Sex: 61 y.o. male  Admission Orders:  Scheduled Medications:  amLODIPine, 5 mg, Oral, Daily  docusate sodium, 100 mg, Oral, BID  enoxaparin, 40 mg, Subcutaneous, Daily  fenofibrate, 145 mg, Oral, Daily  gabapentin, 300 mg, Oral, TID  insulin lispro, 1-6  Units, Subcutaneous, Q6H ROSALEE  lisinopril, 40 mg, Oral, Daily  magnesium Oxide, 400 mg, Oral, Daily  nicotine, 1 patch, Transdermal, Daily  pantoprazole, 40 mg, Intravenous, Q24H ROSALEE  polyethylene glycol, 17 g, Oral, Daily  pravastatin, 40 mg, Oral, Daily With Dinner  sertraline, 150 mg, Oral, Daily      Continuous IV Infusions:  sodium chloride, 200 mL/hr, Intravenous, Continuous      PRN Meds:  acetaminophen, 650 mg, Oral, Q6H PRN - given x1 8/9   aluminum-magnesium hydroxide-simethicone, 30 mL, Oral, Q4H PRN - given x1 8/9 and x1 8/10.  bisacodyl, 10 mg, Rectal, Daily PRN  HYDROmorphone, 0.3 mg, Intravenous, Q3H PRN - given x1 8/9  HYDROmorphone, 0.5 mg, Intravenous, Q6H PRN - given x1 8/9 and x1 8/10  HYDROmorphone, 1 mg, Intravenous, Q6H PRN - given x2 8/9 and x2 8/10  naloxone, 0.04 mg, Intravenous, Q1MIN PRN  ondansetron, 4 mg, Intravenous, Q6H PRN        IP CONSULT TO GASTROENTEROLOGY  IP CONSULT TO NEPHROLOGY    Network Utilization Review Department  ATTENTION: Please call with any questions or concerns to [phone] and carefully listen to the prompts so that you are directed to the right person. All voicemails are confidential.   For Discharge needs, contact Care Management DC Support Team at [phone] opt. [phone]  Send all requests for admission clinical reviews, approved or denied determinations and any other requests to dedicated fax number below belonging to the campus where the patient is receiving treatment. List of dedicated fax numbers for the Facilities:  FACILITY NAME UR FAX NUMBER   ADMISSION DENIALS (Administrative/Medical Necessity) [phone]   DISCHARGE SUPPORT TEAM (NETWORK) [phone]   PARENT CHILD HEALTH (Maternity/NICU/Pediatrics) [phone]   [facility] [phone]   [facility] [phone]   [facility] [phone]   [facility] [phone]   [facility]  [address] [phone]   [facility] [phone]   [facility] [phone]   [facility] [phone]   [facility] [phone]   [facility] [phone]   [facility] [phone]   [facility] [phone]

## 2024-08-13 ENCOUNTER — HOSPITAL ENCOUNTER (OUTPATIENT)
Dept: CARDIAC REHAB | Facility: CLINIC | Age: 74
Discharge: HOME | End: 2024-08-13
Attending: INTERNAL MEDICINE
Payer: MEDICARE

## 2024-08-13 PROCEDURE — 93798 PHYS/QHP OP CAR RHAB W/ECG: CPT

## 2024-08-15 ENCOUNTER — HOSPITAL ENCOUNTER (OUTPATIENT)
Dept: CARDIAC REHAB | Facility: CLINIC | Age: 74
Discharge: HOME | End: 2024-08-15
Attending: INTERNAL MEDICINE
Payer: MEDICARE

## 2024-08-15 PROCEDURE — 93798 PHYS/QHP OP CAR RHAB W/ECG: CPT

## 2024-08-27 ENCOUNTER — HOSPITAL ENCOUNTER (OUTPATIENT)
Dept: CARDIAC REHAB | Facility: CLINIC | Age: 74
Discharge: HOME | End: 2024-08-27
Attending: INTERNAL MEDICINE
Payer: MEDICARE

## 2024-09-03 ENCOUNTER — HOSPITAL ENCOUNTER (OUTPATIENT)
Dept: CARDIAC REHAB | Facility: CLINIC | Age: 74
Discharge: HOME | End: 2024-09-03
Attending: INTERNAL MEDICINE
Payer: MEDICARE

## 2024-09-03 PROCEDURE — 93798 PHYS/QHP OP CAR RHAB W/ECG: CPT

## 2024-09-05 ENCOUNTER — HOSPITAL ENCOUNTER (OUTPATIENT)
Dept: CARDIAC REHAB | Facility: CLINIC | Age: 74
Discharge: HOME | End: 2024-09-05
Attending: INTERNAL MEDICINE
Payer: MEDICARE

## 2024-09-05 PROCEDURE — 93798 PHYS/QHP OP CAR RHAB W/ECG: CPT

## 2024-09-10 ENCOUNTER — ANCILLARY PROCEDURE (OUTPATIENT)
Dept: CARDIOLOGY | Facility: CLINIC | Age: 74
End: 2024-09-10
Attending: INTERNAL MEDICINE
Payer: MEDICARE

## 2024-09-10 ENCOUNTER — HOSPITAL ENCOUNTER (OUTPATIENT)
Dept: CARDIAC REHAB | Facility: CLINIC | Age: 74
Discharge: HOME | End: 2024-09-10
Attending: INTERNAL MEDICINE
Payer: MEDICARE

## 2024-09-10 DIAGNOSIS — I49.5 SICK SINUS SYNDROME (H): ICD-10-CM

## 2024-09-10 DIAGNOSIS — Z95.0 PACEMAKER: ICD-10-CM

## 2024-09-10 PROCEDURE — 93798 PHYS/QHP OP CAR RHAB W/ECG: CPT

## 2024-09-17 ENCOUNTER — HOSPITAL ENCOUNTER (OUTPATIENT)
Dept: CARDIAC REHAB | Facility: CLINIC | Age: 74
Discharge: HOME | End: 2024-09-17
Attending: INTERNAL MEDICINE
Payer: MEDICARE

## 2024-09-17 PROCEDURE — 93798 PHYS/QHP OP CAR RHAB W/ECG: CPT

## 2024-09-18 ENCOUNTER — ANCILLARY PROCEDURE (OUTPATIENT)
Dept: VASCULAR ULTRASOUND | Facility: CLINIC | Age: 74
End: 2024-09-18
Attending: PHYSICIAN ASSISTANT
Payer: MEDICARE

## 2024-09-18 ENCOUNTER — VIRTUAL VISIT (OUTPATIENT)
Dept: VASCULAR SURGERY | Facility: CLINIC | Age: 74
End: 2024-09-18
Attending: HOSPITALIST
Payer: MEDICARE

## 2024-09-18 VITALS
OXYGEN SATURATION: 97 % | SYSTOLIC BLOOD PRESSURE: 144 MMHG | TEMPERATURE: 98 F | HEART RATE: 59 BPM | DIASTOLIC BLOOD PRESSURE: 90 MMHG

## 2024-09-18 DIAGNOSIS — E78.5 DYSLIPIDEMIA, GOAL LDL BELOW 70: ICD-10-CM

## 2024-09-18 DIAGNOSIS — I65.21 CAROTID STENOSIS, RIGHT: ICD-10-CM

## 2024-09-18 DIAGNOSIS — I65.21 ASYMPTOMATIC STENOSIS OF RIGHT CAROTID ARTERY: Primary | ICD-10-CM

## 2024-09-18 PROCEDURE — 93880 EXTRACRANIAL BILAT STUDY: CPT

## 2024-09-18 PROCEDURE — 93880 EXTRACRANIAL BILAT STUDY: CPT | Mod: 26 | Performed by: SURGERY

## 2024-09-18 PROCEDURE — 99214 OFFICE O/P EST MOD 30 MIN: CPT | Mod: 95 | Performed by: HOSPITALIST

## 2024-09-18 ASSESSMENT — PAIN SCALES - GENERAL: PAINLEVEL: NO PAIN (0)

## 2024-09-18 NOTE — PROGRESS NOTES
VASCULAR MEDICINE VIRTUAL CONSULT NOTE            Date of Service: 9/18/2024      Primary Care Provider: Bj Hendrickson  Referring provider;  Lupe Fagan      Reason for the visit/chief complaint:   Carotid artery disease surveillance       Virtual Visit Details    Type of service:  Video Visit   Video Start Time:  09:00 AM  Video End Time: 09:30 AM    Originating Location (pt. Location):  [facility]ASCULAR [facility]       Distant Location (provider location):  Off-site  Platform used for Video Visit: Howie[employer]      HPI:  Ashok Del Rosario is a pleasant 73 year old male who presents to our Vascular Medicine clinic for the above mentioned reason. He is accompanied by his spouse and I am joining them virtually.     Has past medical history significant for tobacco smoking quit 25 years ago, hypertension, dyslipidemia, CAD s/p PCI with rotational atherectomy, PTCA, shockwave and JESSICA to proximal LAD 5/17/2024, paroxysmal atrial fibrillation on sotalol status post SHERRILL closure device/watchman at [facility] February 2020, sick sinus syndrome status post PPM December 2018 and asymptomatic moderate right ICA stenosis.    He was previously seen by our colleagues in vascular surgery.  Saw Dr. Cervantes June of last year 2023.  At that time, he had had few vision changes such as red spots in his right eye.  This was not felt to be consistent with amaurosis fugax.  His ultrasound carotid did show evidence of 50 to 69% stenosis on the right ICA with less than 50% stenosis on the left.  He then had a repeat after 6 months in December 2023 with follow-up with vascular surgery ASHLEY Fagan.  Based on the ultrasound, he was noted to have less than 50% stenosis bilaterally.    He now comes in for 9 months ultrasound carotid surveillance.  Completed this this morning in our clinic.  He now denies any visual changes.  No amaurosis fugax, TIA or stroke.      Cardiovascular risk  factors:  Hypertension: On losartan 25 mg.  Reports controlled blood pressure.  Smoking: Smoked since age 18 and quit 25 years ago.  Dyslipidemia: On atorvastatin 80 mg.  Reports has been on the same dose for years.  Previously has had LDL in the 50s but the most recent check from May 2024 with LDL 80.  HDL has been on the low side historically with the most recent 27, total cholesterol 135 and .  No history of diabetes.  History of CAD as above.        REVIEW OF SYSTEMS:    A 12 point ROS was reviewed and is negative except what is mentioned in HPI.       Past medical history, surgical history, medications, family history, social history and allergies were reviewed. Pertinent points mentioned under HPI.      OBJECTIVE:    Vital signs:  BP (!) 153/96 (BP Location: Left arm)   Pulse 59   Temp 98  F (36.7  C)   SpO2 97%   Wt Readings from Last 1 Encounters:   07/12/24 201 lb (91.2 kg)           DIAGNOSTIC STUDIES:   Labs and diagnostics reviewed including outside records. Pertinent points are mentioned under HPI and assessment and plan sections.        ASSESSMENT AND PLAN:    Asymptomatic moderate right ICA stenosis  CAD status post PCI with rotational atherectomy, PTCA, shockwave and JESSICA to proximal LAD 5/17/2024  History of tobacco smoking quit 25 years ago  Hypertension  Dyslipidemia  Paroxysmal atrial fibrillation on sotalol status post SHERRILL closure device/watchman at [facility] February 2020  Sick sinus syndrome status post PPM December 2018       Mr. Del Rosario remains asymptomatic from carotid artery disease perspective with no stroke, TIA or amaurosis fugax.  His ultrasound carotid from today was reviewed with him consistent with his ultrasound last year showing 50 to 69% stenosis of the right ICA with less than 50% stenosis on the left ICA.  Although his PSV is less than 180 at 156 cm/s, his EDV is more than 40 at 53 cm/s and ICA/CCA ratio is more than 2 at 2.64.  Given stability now over the past  year on multiple imaging, would recommend annual follow-up from now on.    We will continue with medical therapy and active surveillance.  He is currently on DAPT given the recent cardiac stenting in May plan for 1 year.      Recommendations:  Ultrasound carotid in 1 year with follow-up.  Continue current DAPT per cardiology.  Continue high intensity atorvastatin 80 mg.  As discussed with the patient today, his LDL has historically been less than 70 apart from the most recent check.  We will get new lipid panel checked expected tomorrow.  If LDL continues to be above 70, would recommend adding ezetimibe 10 mg.  If less than 70, will continue the same.  Continue excellent blood pressure control.  Again counseled about stroke, TIA and amaurosis fugax symptoms.  Should he develop any, needs to call 911 and present emergently to the ED.      It was a pleasure meeting Mr. Del Rosario and his spouse through our video visit today.      Dhara Roth MD, [facility]  Vascular Medicine  September 18, 2024

## 2024-09-18 NOTE — PATIENT INSTRUCTIONS
Natacha Pulido,    Thank you for entrusting your care with us today. After your visit today with MD Dhara Roth this is the plan that was discussed at your appointment.    Get your cholesterol levels checked at lab tomorrow.  You will need to be fasting for this.  You should call [phone] to schedule this.     Dr. Roth will reach out to you if any medication changes are needed (possibly start ezetimibe).     Follow up in 1 year with Dr. Roth and a carotid ultrasound prior.  A  will reach out to you closer to that time to schedule.     I am including additional information on these things and our contact information if you have any questions or concerns.   Please do not hesitate to reach out to us if you felt we did not answer your questions or you are unsure of the treatment plan after your visit today. Our number is [phone].Thank you for trusting us with your care.         Again thank you for your time.       Carotid Artery Disease      What is carotid artery disease?  A carotid artery on each side of the neck supplies blood to the brain. Carotid artery disease occurs when a substance called plaque builds up in either or both arteries. The buildup may narrow the artery and limit blood flow to the brain. If this plaque breaks open, it may form a blood clot. Or pieces of the plaque may break off. A piece of plaque or a blood clot could move to the brain and cause a stroke or transient ischemic attack (TIA).    The narrowing in an artery is called stenosis. The more narrow an artery becomes, the greater the risk of stroke or TIA.        What causes it?  Carotid artery disease is caused by a process called hardening of the arteries, or atherosclerosis. Plaque builds up inside the carotid arteries. Things that can lead to this buildup include:    Smoking.  High blood pressure.  High cholesterol.  Diabetes.  A family history of hardening of the arteries.    What are the symptoms?  Many people have no  "symptoms. In some people, a doctor can hear a sound in their neck called a bruit (pronounced \"broo-EE\"). This is a whooshing sound that happens when a carotid artery is narrowed.    For some people, a transient ischemic attack (TIA) or stroke is the first sign of the disease.    Know the warning signs and symptoms of stroke: BE FAST     B = Balance loss   E = Eyesight changes   F = Facial droop or numbness   A = Arm or leg weakness   S = Speech difficulty, slurred speech   T = Time to call 911 for help    How is carotid artery disease treated?  The goal of treatment is to lower your risk of a stroke. Treatment depends on whether you have symptoms and how narrow your arteries are. You probably will take medicine. You also will be encouraged to have a heart-healthy lifestyle. Some people also have a procedure to lower their risk.    Medicines  You may take aspirin or another medicine to prevent blood clots. You will likely also take medicine to lower cholesterol. You may also take medicine to help manage blood pressure.    Heart-healthy lifestyle  A heart-healthy lifestyle can help lower your risk of stroke.    Don't smoke. Avoid secondhand smoke too.  Eat heart-healthy foods.  Be active. Ask your doctor what type of exercise is safe for you.  Stay at a healthy weight. Lose weight if you need to.  Manage other health problems, such as diabetes. If you think you may have a problem with alcohol or drug use, talk to your doctor.  Get vaccinated against COVID-19, the flu, and pneumonia.    Surgery or stenting  Surgery in the arteries is called carotid endarterectomy. The doctor makes a cut in the neck and takes the plaque out of the artery.    Some people have a stent placed inside a carotid artery. A stent may be inserted during a catheter procedure. In this procedure, a doctor puts a thin tube, called a catheter, into a blood vessel in your groin or arm. The doctor threads the tube up to the carotid artery in the neck. " "The catheter is used to place the stent. In another type of procedure, a stent is placed in the artery through a cut in the neck.    Surgery and stenting may help lower your risk of a stroke. But they also have a risk of serious problems. You and your doctor can decide together if you want to have surgery or stenting.    Current as of: 2023  Author: [employer] StaffYou are leaving this website for information purposes only  Clinical Review BoardYou are leaving this website for information purposes only  All [employer] education is reviewed by a team that includes physicians, nurses, advanced practitioners, registered dieticians, and other healthcare professionals.    Your risk factors for stroke or TIA (transient ischemic attack):     Your Risk Factors Your Results Goals Additional education   Please scan QR code   [] High blood pressure BP (!) 153/96 (BP Location: Left arm)   Pulse 59   Temp 98  F (36.7  C)   SpO2 97%    Less than 120/80    [] Cholesterol          Total Cholesterol   Date Value Ref Range Status   2024 135 <200 mg/dL Final    Less than 150     Triglycerides   Triglycerides   Date Value Ref Range Status   2024 138 <150 mg/dL Final    Less than 150     LDL LDL Cholesterol Calculated   Date Value Ref Range Status   2024 80 <=100 mg/dL Final      Less than 70     HDL Direct Measure HDL   Date Value Ref Range Status   2024 27 (L) >=40 mg/dL Final    Greater than 40 (men)  Greater than 50 (women)    [] Diabetes                A1C No results found for: \"A1C\" Less than 5.7    [] Smoking/tobacco use   Tobacco Use      Smoking status: Former        Packs/day: 0.00        Types: Cigarettes        Quit date: 10/16/1999        Years since quittin.9      Smokeless tobacco: Never   Quit smoking and tobacco    [] Overweight Estimated body mass index is 31.48 kg/m  as calculated from the following:    Height as of 24: 5' 7\" (1.702 m).    Weight as of 24: 201 lb " (91.2 kg).  Less than 25

## 2024-09-18 NOTE — PROGRESS NOTES
[facility] Rooming Questions        Patient is here for a 9 month  follow up  to discuss Carotid Artery Stenosis. Pt has no symptoms.    BP (!) 153/96 (BP Location: Left arm)   Pulse 59   Temp 98  F (36.7  C)   SpO2 97%     The provider has been notified that the patient has concerns of wondering if another stint is needed?     Questions patient would like addressed today are: result of ultrasound on carotid.    Refills are needed: No    Has homecare services and agency name:  No

## 2024-09-19 ENCOUNTER — HOSPITAL ENCOUNTER (OUTPATIENT)
Dept: CARDIAC REHAB | Facility: CLINIC | Age: 74
Discharge: HOME | End: 2024-09-19
Attending: INTERNAL MEDICINE
Payer: MEDICARE

## 2024-09-19 ENCOUNTER — LAB (OUTPATIENT)
Dept: LAB | Facility: CLINIC | Age: 74
End: 2024-09-19
Payer: MEDICARE

## 2024-09-19 DIAGNOSIS — E78.5 DYSLIPIDEMIA, GOAL LDL BELOW 70: ICD-10-CM

## 2024-09-19 LAB
CHOLEST SERPL-MCNC: 121 MG/DL
FASTING STATUS PATIENT QL REPORTED: YES
HDLC SERPL-MCNC: 27 MG/DL
LDLC SERPL CALC-MCNC: 69 MG/DL
NONHDLC SERPL-MCNC: 94 MG/DL
TRIGL SERPL-MCNC: 127 MG/DL

## 2024-09-19 PROCEDURE — 80061 LIPID PANEL: CPT

## 2024-09-19 PROCEDURE — 36415 COLL VENOUS BLD VENIPUNCTURE: CPT

## 2024-09-19 PROCEDURE — 93798 PHYS/QHP OP CAR RHAB W/ECG: CPT

## 2024-09-30 LAB
MDC_IDC_EPISODE_DTM: NORMAL
MDC_IDC_EPISODE_DURATION: 1 S
MDC_IDC_EPISODE_DURATION: 1 S
MDC_IDC_EPISODE_DURATION: 2 S
MDC_IDC_EPISODE_ID: 628
MDC_IDC_EPISODE_ID: 629
MDC_IDC_EPISODE_ID: 630
MDC_IDC_EPISODE_TYPE: NORMAL
MDC_IDC_LEAD_CONNECTION_STATUS: NORMAL
MDC_IDC_LEAD_CONNECTION_STATUS: NORMAL
MDC_IDC_LEAD_IMPLANT_DT: NORMAL
MDC_IDC_LEAD_IMPLANT_DT: NORMAL
MDC_IDC_LEAD_LOCATION: NORMAL
MDC_IDC_LEAD_LOCATION: NORMAL
MDC_IDC_LEAD_LOCATION_DETAIL_1: NORMAL
MDC_IDC_LEAD_LOCATION_DETAIL_1: NORMAL
MDC_IDC_LEAD_MFG: NORMAL
MDC_IDC_LEAD_MFG: NORMAL
MDC_IDC_LEAD_MODEL: NORMAL
MDC_IDC_LEAD_MODEL: NORMAL
MDC_IDC_LEAD_POLARITY_TYPE: NORMAL
MDC_IDC_LEAD_POLARITY_TYPE: NORMAL
MDC_IDC_LEAD_SERIAL: NORMAL
MDC_IDC_LEAD_SERIAL: NORMAL
MDC_IDC_LEAD_SPECIAL_FUNCTION: NORMAL
MDC_IDC_LEAD_SPECIAL_FUNCTION: NORMAL
MDC_IDC_MSMT_BATTERY_DTM: NORMAL
MDC_IDC_MSMT_BATTERY_REMAINING_LONGEVITY: 113 MO
MDC_IDC_MSMT_BATTERY_RRT_TRIGGER: 2.62
MDC_IDC_MSMT_BATTERY_STATUS: NORMAL
MDC_IDC_MSMT_BATTERY_VOLTAGE: 3 V
MDC_IDC_MSMT_LEADCHNL_RA_IMPEDANCE_VALUE: 323 OHM
MDC_IDC_MSMT_LEADCHNL_RA_IMPEDANCE_VALUE: 361 OHM
MDC_IDC_MSMT_LEADCHNL_RA_PACING_THRESHOLD_AMPLITUDE: 0.62 V
MDC_IDC_MSMT_LEADCHNL_RA_PACING_THRESHOLD_PULSEWIDTH: 0.4 MS
MDC_IDC_MSMT_LEADCHNL_RA_SENSING_INTR_AMPL: 2.5 MV
MDC_IDC_MSMT_LEADCHNL_RA_SENSING_INTR_AMPL: 2.5 MV
MDC_IDC_MSMT_LEADCHNL_RV_IMPEDANCE_VALUE: 304 OHM
MDC_IDC_MSMT_LEADCHNL_RV_IMPEDANCE_VALUE: 380 OHM
MDC_IDC_MSMT_LEADCHNL_RV_PACING_THRESHOLD_AMPLITUDE: 1 V
MDC_IDC_MSMT_LEADCHNL_RV_PACING_THRESHOLD_PULSEWIDTH: 0.4 MS
MDC_IDC_MSMT_LEADCHNL_RV_SENSING_INTR_AMPL: 1.5 MV
MDC_IDC_MSMT_LEADCHNL_RV_SENSING_INTR_AMPL: 1.5 MV
MDC_IDC_PG_IMPLANT_DTM: NORMAL
MDC_IDC_PG_MFG: NORMAL
MDC_IDC_PG_MODEL: NORMAL
MDC_IDC_PG_SERIAL: NORMAL
MDC_IDC_PG_TYPE: NORMAL
MDC_IDC_SESS_CLINIC_NAME: NORMAL
MDC_IDC_SESS_DTM: NORMAL
MDC_IDC_SESS_TYPE: NORMAL
MDC_IDC_SET_BRADY_AT_MODE_SWITCH_RATE: 171 {BEATS}/MIN
MDC_IDC_SET_BRADY_HYSTRATE: NORMAL
MDC_IDC_SET_BRADY_LOWRATE: 60 {BEATS}/MIN
MDC_IDC_SET_BRADY_MAX_SENSOR_RATE: 130 {BEATS}/MIN
MDC_IDC_SET_BRADY_MAX_TRACKING_RATE: 130 {BEATS}/MIN
MDC_IDC_SET_BRADY_MODE: NORMAL
MDC_IDC_SET_BRADY_PAV_DELAY_LOW: 180 MS
MDC_IDC_SET_BRADY_SAV_DELAY_LOW: 150 MS
MDC_IDC_SET_LEADCHNL_RA_PACING_AMPLITUDE: 1.5 V
MDC_IDC_SET_LEADCHNL_RA_PACING_ANODE_ELECTRODE_1: NORMAL
MDC_IDC_SET_LEADCHNL_RA_PACING_ANODE_LOCATION_1: NORMAL
MDC_IDC_SET_LEADCHNL_RA_PACING_CAPTURE_MODE: NORMAL
MDC_IDC_SET_LEADCHNL_RA_PACING_CATHODE_ELECTRODE_1: NORMAL
MDC_IDC_SET_LEADCHNL_RA_PACING_CATHODE_LOCATION_1: NORMAL
MDC_IDC_SET_LEADCHNL_RA_PACING_POLARITY: NORMAL
MDC_IDC_SET_LEADCHNL_RA_PACING_PULSEWIDTH: 0.4 MS
MDC_IDC_SET_LEADCHNL_RA_SENSING_ANODE_ELECTRODE_1: NORMAL
MDC_IDC_SET_LEADCHNL_RA_SENSING_ANODE_LOCATION_1: NORMAL
MDC_IDC_SET_LEADCHNL_RA_SENSING_CATHODE_ELECTRODE_1: NORMAL
MDC_IDC_SET_LEADCHNL_RA_SENSING_CATHODE_LOCATION_1: NORMAL
MDC_IDC_SET_LEADCHNL_RA_SENSING_POLARITY: NORMAL
MDC_IDC_SET_LEADCHNL_RA_SENSING_SENSITIVITY: 0.3 MV
MDC_IDC_SET_LEADCHNL_RV_PACING_AMPLITUDE: 1.5 V
MDC_IDC_SET_LEADCHNL_RV_PACING_ANODE_ELECTRODE_1: NORMAL
MDC_IDC_SET_LEADCHNL_RV_PACING_ANODE_LOCATION_1: NORMAL
MDC_IDC_SET_LEADCHNL_RV_PACING_CAPTURE_MODE: NORMAL
MDC_IDC_SET_LEADCHNL_RV_PACING_CATHODE_ELECTRODE_1: NORMAL
MDC_IDC_SET_LEADCHNL_RV_PACING_CATHODE_LOCATION_1: NORMAL
MDC_IDC_SET_LEADCHNL_RV_PACING_POLARITY: NORMAL
MDC_IDC_SET_LEADCHNL_RV_PACING_PULSEWIDTH: 0.4 MS
MDC_IDC_SET_LEADCHNL_RV_SENSING_ANODE_ELECTRODE_1: NORMAL
MDC_IDC_SET_LEADCHNL_RV_SENSING_ANODE_LOCATION_1: NORMAL
MDC_IDC_SET_LEADCHNL_RV_SENSING_CATHODE_ELECTRODE_1: NORMAL
MDC_IDC_SET_LEADCHNL_RV_SENSING_CATHODE_LOCATION_1: NORMAL
MDC_IDC_SET_LEADCHNL_RV_SENSING_POLARITY: NORMAL
MDC_IDC_SET_LEADCHNL_RV_SENSING_SENSITIVITY: 0.45 MV
MDC_IDC_SET_ZONE_DETECTION_INTERVAL: 350 MS
MDC_IDC_SET_ZONE_DETECTION_INTERVAL: 400 MS
MDC_IDC_SET_ZONE_STATUS: NORMAL
MDC_IDC_SET_ZONE_STATUS: NORMAL
MDC_IDC_SET_ZONE_TYPE: NORMAL
MDC_IDC_SET_ZONE_VENDOR_TYPE: NORMAL
MDC_IDC_STAT_AT_BURDEN_PERCENT: 0 %
MDC_IDC_STAT_AT_DTM_END: NORMAL
MDC_IDC_STAT_AT_DTM_START: NORMAL
MDC_IDC_STAT_BRADY_AP_VP_PERCENT: 0.19 %
MDC_IDC_STAT_BRADY_AP_VS_PERCENT: 12.87 %
MDC_IDC_STAT_BRADY_AS_VP_PERCENT: 0.19 %
MDC_IDC_STAT_BRADY_AS_VS_PERCENT: 86.74 %
MDC_IDC_STAT_BRADY_DTM_END: NORMAL
MDC_IDC_STAT_BRADY_DTM_START: NORMAL
MDC_IDC_STAT_BRADY_RA_PERCENT_PACED: 12.9 %
MDC_IDC_STAT_BRADY_RV_PERCENT_PACED: 0.38 %
MDC_IDC_STAT_EPISODE_RECENT_COUNT: 0
MDC_IDC_STAT_EPISODE_RECENT_COUNT: 3
MDC_IDC_STAT_EPISODE_RECENT_COUNT_DTM_END: NORMAL
MDC_IDC_STAT_EPISODE_RECENT_COUNT_DTM_START: NORMAL
MDC_IDC_STAT_EPISODE_TOTAL_COUNT: 0
MDC_IDC_STAT_EPISODE_TOTAL_COUNT: 0
MDC_IDC_STAT_EPISODE_TOTAL_COUNT: 20
MDC_IDC_STAT_EPISODE_TOTAL_COUNT: 218
MDC_IDC_STAT_EPISODE_TOTAL_COUNT: 261
MDC_IDC_STAT_EPISODE_TOTAL_COUNT_DTM_END: NORMAL
MDC_IDC_STAT_EPISODE_TOTAL_COUNT_DTM_START: NORMAL
MDC_IDC_STAT_EPISODE_TYPE: NORMAL
MDC_IDC_STAT_TACHYTHERAPY_RECENT_DTM_END: NORMAL
MDC_IDC_STAT_TACHYTHERAPY_RECENT_DTM_START: NORMAL
MDC_IDC_STAT_TACHYTHERAPY_TOTAL_DTM_END: NORMAL
MDC_IDC_STAT_TACHYTHERAPY_TOTAL_DTM_START: NORMAL

## 2024-09-30 PROCEDURE — 93296 REM INTERROG EVL PM/IDS: CPT | Performed by: INTERNAL MEDICINE

## 2024-09-30 PROCEDURE — 93294 REM INTERROG EVL PM/LDLS PM: CPT | Performed by: INTERNAL MEDICINE

## 2024-10-01 ENCOUNTER — TELEPHONE (OUTPATIENT)
Dept: VASCULAR SURGERY | Facility: CLINIC | Age: 74
End: 2024-10-01
Payer: MEDICARE

## 2024-10-01 ENCOUNTER — HOSPITAL ENCOUNTER (OUTPATIENT)
Dept: CARDIAC REHAB | Facility: CLINIC | Age: 74
Discharge: HOME | End: 2024-10-01
Attending: INTERNAL MEDICINE
Payer: MEDICARE

## 2024-10-01 PROCEDURE — 93798 PHYS/QHP OP CAR RHAB W/ECG: CPT

## 2024-10-01 NOTE — TELEPHONE ENCOUNTER
Caller: Ashok    Provider: MD Dhara Roth    Detailed reason for call: Patient stopped into the [facility] office to ask about his lab results, particularly LDL, and any medication changes.    Best phone number to contact: [phone]    Best time to contact: any    Ok to leave a detailed message: Yes

## 2024-10-01 NOTE — TELEPHONE ENCOUNTER
Spoke with patient and went over mychart message from provider below.  Patient states understanding.      Mr. Del Rosario,        I reviewed your cholesterol lab result. No need to add more cholesterol medication. Your bad cholesterol LDL looks good. Your good cholesterol HDL continues to be low. Could work on that with adopting healthy diet and exercise.        Dhara Gallardo MD, [facility]  Vascular Medicine

## 2024-10-02 DIAGNOSIS — I48.0 PAROXYSMAL ATRIAL FIBRILLATION (H): ICD-10-CM

## 2024-10-03 RX ORDER — SOTALOL HYDROCHLORIDE 80 MG/1
80 TABLET ORAL 2 TIMES DAILY
Qty: 180 TABLET | Refills: 3 | Status: SHIPPED | OUTPATIENT
Start: 2024-10-03

## 2024-12-03 ENCOUNTER — LAB REQUISITION (OUTPATIENT)
Dept: LAB | Facility: CLINIC | Age: 74
End: 2024-12-03
Payer: MEDICARE

## 2024-12-03 ENCOUNTER — TRANSFERRED RECORDS (OUTPATIENT)
Dept: HEALTH INFORMATION MANAGEMENT | Facility: CLINIC | Age: 74
End: 2024-12-03

## 2024-12-03 DIAGNOSIS — I10 ESSENTIAL (PRIMARY) HYPERTENSION: ICD-10-CM

## 2024-12-04 ENCOUNTER — LAB REQUISITION (OUTPATIENT)
Dept: LAB | Facility: CLINIC | Age: 74
End: 2024-12-04
Payer: MEDICARE

## 2024-12-04 DIAGNOSIS — R41.3 OTHER AMNESIA: ICD-10-CM

## 2024-12-04 LAB
ALBUMIN SERPL BCG-MCNC: 4.3 G/DL (ref 3.5–5.2)
ALP SERPL-CCNC: 60 U/L (ref 40–150)
ALT SERPL W P-5'-P-CCNC: 30 U/L (ref 0–70)
ANION GAP SERPL CALCULATED.3IONS-SCNC: 11 MMOL/L (ref 7–15)
AST SERPL W P-5'-P-CCNC: 28 U/L (ref 0–45)
BILIRUB SERPL-MCNC: 0.5 MG/DL
BUN SERPL-MCNC: 7 MG/DL (ref 8–23)
CALCIUM SERPL-MCNC: 9.6 MG/DL (ref 8.8–10.4)
CHLORIDE SERPL-SCNC: 106 MMOL/L (ref 98–107)
CHOLEST SERPL-MCNC: 129 MG/DL
CREAT SERPL-MCNC: 1.02 MG/DL (ref 0.67–1.17)
EGFRCR SERPLBLD CKD-EPI 2021: 78 ML/MIN/1.73M2
FASTING STATUS PATIENT QL REPORTED: NO
FASTING STATUS PATIENT QL REPORTED: NO
GLUCOSE SERPL-MCNC: 112 MG/DL (ref 70–99)
HCO3 SERPL-SCNC: 23 MMOL/L (ref 22–29)
HDLC SERPL-MCNC: 30 MG/DL
LDLC SERPL CALC-MCNC: 76 MG/DL
NONHDLC SERPL-MCNC: 99 MG/DL
POTASSIUM SERPL-SCNC: 4.9 MMOL/L (ref 3.4–5.3)
PROT SERPL-MCNC: 7 G/DL (ref 6.4–8.3)
SODIUM SERPL-SCNC: 140 MMOL/L (ref 135–145)
TRIGL SERPL-MCNC: 117 MG/DL

## 2024-12-05 LAB
TSH SERPL DL<=0.005 MIU/L-ACNC: 1.44 UIU/ML (ref 0.3–4.2)
VIT B12 SERPL-MCNC: 409 PG/ML (ref 232–1245)

## 2025-01-18 ENCOUNTER — HEALTH MAINTENANCE LETTER (OUTPATIENT)
Age: 75
End: 2025-01-18

## 2025-03-13 ENCOUNTER — APPOINTMENT (OUTPATIENT)
Dept: RADIOLOGY | Facility: HOSPITAL | Age: 75
End: 2025-03-13
Attending: EMERGENCY MEDICINE
Payer: MEDICARE

## 2025-03-13 ENCOUNTER — HOSPITAL ENCOUNTER (OUTPATIENT)
Facility: HOSPITAL | Age: 75
Setting detail: OBSERVATION
Discharge: LEFT BEFORE DISCHARGE | End: 2025-03-13
Attending: EMERGENCY MEDICINE
Payer: MEDICARE

## 2025-03-13 ENCOUNTER — APPOINTMENT (OUTPATIENT)
Dept: CT IMAGING | Facility: HOSPITAL | Age: 75
End: 2025-03-13
Attending: EMERGENCY MEDICINE
Payer: MEDICARE

## 2025-03-13 VITALS
DIASTOLIC BLOOD PRESSURE: 105 MMHG | OXYGEN SATURATION: 95 % | SYSTOLIC BLOOD PRESSURE: 196 MMHG | RESPIRATION RATE: 20 BRPM | HEART RATE: 78 BPM | BODY MASS INDEX: 32.89 KG/M2 | TEMPERATURE: 98 F | WEIGHT: 210 LBS

## 2025-03-13 DIAGNOSIS — G45.9 TIA (TRANSIENT ISCHEMIC ATTACK): ICD-10-CM

## 2025-03-13 DIAGNOSIS — R47.9 SPEECH DISTURBANCE, UNSPECIFIED TYPE: ICD-10-CM

## 2025-03-13 LAB
ANION GAP SERPL CALCULATED.3IONS-SCNC: 10 MMOL/L (ref 7–15)
APTT PPP: 25 SECONDS (ref 22–38)
BASOPHILS # BLD AUTO: 0.1 10E3/UL (ref 0–0.2)
BASOPHILS NFR BLD AUTO: 1 %
BUN SERPL-MCNC: 6.8 MG/DL (ref 8–23)
CALCIUM SERPL-MCNC: 10.1 MG/DL (ref 8.8–10.4)
CHLORIDE SERPL-SCNC: 108 MMOL/L (ref 98–107)
CHOLEST SERPL-MCNC: 139 MG/DL
CREAT SERPL-MCNC: 0.83 MG/DL (ref 0.67–1.17)
EGFRCR SERPLBLD CKD-EPI 2021: >90 ML/MIN/1.73M2
EOSINOPHIL # BLD AUTO: 0.1 10E3/UL (ref 0–0.7)
EOSINOPHIL NFR BLD AUTO: 1 %
ERYTHROCYTE [DISTWIDTH] IN BLOOD BY AUTOMATED COUNT: 13.2 % (ref 10–15)
EST. AVERAGE GLUCOSE BLD GHB EST-MCNC: 134 MG/DL
FLUAV RNA SPEC QL NAA+PROBE: NEGATIVE
FLUBV RNA RESP QL NAA+PROBE: NEGATIVE
GLUCOSE SERPL-MCNC: 154 MG/DL (ref 70–99)
HBA1C MFR BLD: 6.3 %
HCO3 SERPL-SCNC: 24 MMOL/L (ref 22–29)
HCT VFR BLD AUTO: 36.1 % (ref 40–53)
HDLC SERPL-MCNC: 30 MG/DL
HGB BLD-MCNC: 11.7 G/DL (ref 13.3–17.7)
HOLD SPECIMEN: NORMAL
IMM GRANULOCYTES # BLD: 0 10E3/UL
IMM GRANULOCYTES NFR BLD: 0 %
INR PPP: 1.04 (ref 0.85–1.15)
IRON BINDING CAPACITY (ROCHE): 399 UG/DL (ref 240–430)
IRON SATN MFR SERPL: 8 % (ref 15–46)
IRON SERPL-MCNC: 30 UG/DL (ref 61–157)
LDLC SERPL CALC-MCNC: 77 MG/DL
LYMPHOCYTES # BLD AUTO: 2.4 10E3/UL (ref 0.8–5.3)
LYMPHOCYTES NFR BLD AUTO: 24 %
MAGNESIUM SERPL-MCNC: 2 MG/DL (ref 1.7–2.3)
MCH RBC QN AUTO: 25.6 PG (ref 26.5–33)
MCHC RBC AUTO-ENTMCNC: 32.4 G/DL (ref 31.5–36.5)
MCV RBC AUTO: 79 FL (ref 78–100)
MONOCYTES # BLD AUTO: 0.7 10E3/UL (ref 0–1.3)
MONOCYTES NFR BLD AUTO: 7 %
NEUTROPHILS # BLD AUTO: 7 10E3/UL (ref 1.6–8.3)
NEUTROPHILS NFR BLD AUTO: 68 %
NONHDLC SERPL-MCNC: 109 MG/DL
NRBC # BLD AUTO: 0 10E3/UL
NRBC BLD AUTO-RTO: 0 /100
PLATELET # BLD AUTO: 350 10E3/UL (ref 150–450)
POTASSIUM SERPL-SCNC: 3.8 MMOL/L (ref 3.4–5.3)
PROCALCITONIN SERPL IA-MCNC: 0.05 NG/ML
RBC # BLD AUTO: 4.57 10E6/UL (ref 4.4–5.9)
RSV RNA SPEC NAA+PROBE: NEGATIVE
SARS-COV-2 RNA RESP QL NAA+PROBE: NEGATIVE
SODIUM SERPL-SCNC: 142 MMOL/L (ref 135–145)
TRIGL SERPL-MCNC: 158 MG/DL
TROPONIN T SERPL HS-MCNC: <6 NG/L
TSH SERPL DL<=0.005 MIU/L-ACNC: 0.69 UIU/ML (ref 0.3–4.2)
WBC # BLD AUTO: 10.3 10E3/UL (ref 4–11)

## 2025-03-13 PROCEDURE — 85610 PROTHROMBIN TIME: CPT | Performed by: EMERGENCY MEDICINE

## 2025-03-13 PROCEDURE — 250N000011 HC RX IP 250 OP 636: Performed by: EMERGENCY MEDICINE

## 2025-03-13 PROCEDURE — 83036 HEMOGLOBIN GLYCOSYLATED A1C: CPT | Performed by: INTERNAL MEDICINE

## 2025-03-13 PROCEDURE — 83550 IRON BINDING TEST: CPT | Performed by: INTERNAL MEDICINE

## 2025-03-13 PROCEDURE — 99205 OFFICE O/P NEW HI 60 MIN: CPT | Performed by: INTERNAL MEDICINE

## 2025-03-13 PROCEDURE — 93005 ELECTROCARDIOGRAM TRACING: CPT

## 2025-03-13 PROCEDURE — 82565 ASSAY OF CREATININE: CPT | Performed by: EMERGENCY MEDICINE

## 2025-03-13 PROCEDURE — 80048 BASIC METABOLIC PNL TOTAL CA: CPT | Performed by: EMERGENCY MEDICINE

## 2025-03-13 PROCEDURE — 85730 THROMBOPLASTIN TIME PARTIAL: CPT | Performed by: EMERGENCY MEDICINE

## 2025-03-13 PROCEDURE — 83540 ASSAY OF IRON: CPT | Performed by: INTERNAL MEDICINE

## 2025-03-13 PROCEDURE — 85014 HEMATOCRIT: CPT | Performed by: EMERGENCY MEDICINE

## 2025-03-13 PROCEDURE — 36415 COLL VENOUS BLD VENIPUNCTURE: CPT | Performed by: EMERGENCY MEDICINE

## 2025-03-13 PROCEDURE — 80061 LIPID PANEL: CPT | Performed by: INTERNAL MEDICINE

## 2025-03-13 PROCEDURE — 70496 CT ANGIOGRAPHY HEAD: CPT

## 2025-03-13 PROCEDURE — 84145 PROCALCITONIN (PCT): CPT | Performed by: INTERNAL MEDICINE

## 2025-03-13 PROCEDURE — 84484 ASSAY OF TROPONIN QUANT: CPT | Performed by: EMERGENCY MEDICINE

## 2025-03-13 PROCEDURE — G0378 HOSPITAL OBSERVATION PER HR: HCPCS

## 2025-03-13 PROCEDURE — 80051 ELECTROLYTE PANEL: CPT | Performed by: EMERGENCY MEDICINE

## 2025-03-13 PROCEDURE — 83735 ASSAY OF MAGNESIUM: CPT | Performed by: EMERGENCY MEDICINE

## 2025-03-13 PROCEDURE — 85004 AUTOMATED DIFF WBC COUNT: CPT | Performed by: EMERGENCY MEDICINE

## 2025-03-13 PROCEDURE — 99285 EMERGENCY DEPT VISIT HI MDM: CPT | Mod: 25

## 2025-03-13 PROCEDURE — G0508 CRIT CARE TELEHEA CONSULT 60: HCPCS | Mod: G0 | Performed by: NURSE PRACTITIONER

## 2025-03-13 PROCEDURE — 87637 SARSCOV2&INF A&B&RSV AMP PRB: CPT | Performed by: INTERNAL MEDICINE

## 2025-03-13 PROCEDURE — 93005 ELECTROCARDIOGRAM TRACING: CPT | Performed by: EMERGENCY MEDICINE

## 2025-03-13 PROCEDURE — 71046 X-RAY EXAM CHEST 2 VIEWS: CPT

## 2025-03-13 PROCEDURE — 84443 ASSAY THYROID STIM HORMONE: CPT | Performed by: EMERGENCY MEDICINE

## 2025-03-13 PROCEDURE — 82728 ASSAY OF FERRITIN: CPT | Performed by: INTERNAL MEDICINE

## 2025-03-13 RX ORDER — NEOMYCIN SULFATE, POLYMYXIN B SULFATE, AND DEXAMETHASONE 3.5; 10000; 1 MG/G; [USP'U]/G; MG/G
0.5 OINTMENT OPHTHALMIC 2 TIMES DAILY PRN
COMMUNITY
Start: 2024-12-04

## 2025-03-13 RX ORDER — NITROGLYCERIN 0.4 MG/1
0.4 TABLET SUBLINGUAL EVERY 5 MIN PRN
COMMUNITY

## 2025-03-13 RX ORDER — ATORVASTATIN CALCIUM 80 MG/1
80 TABLET, FILM COATED ORAL AT BEDTIME
COMMUNITY

## 2025-03-13 RX ORDER — ACETAMINOPHEN 500 MG
500-1000 TABLET ORAL EVERY 6 HOURS PRN
COMMUNITY

## 2025-03-13 RX ORDER — IOPAMIDOL 755 MG/ML
67 INJECTION, SOLUTION INTRAVASCULAR ONCE
Status: COMPLETED | OUTPATIENT
Start: 2025-03-13 | End: 2025-03-13

## 2025-03-13 RX ORDER — LIDOCAINE 40 MG/G
CREAM TOPICAL
Status: DISCONTINUED | OUTPATIENT
Start: 2025-03-13 | End: 2025-03-14 | Stop reason: HOSPADM

## 2025-03-13 RX ADMIN — IOPAMIDOL 67 ML: 755 INJECTION, SOLUTION INTRAVENOUS at 18:45

## 2025-03-13 ASSESSMENT — ACTIVITIES OF DAILY LIVING (ADL)
ADLS_ACUITY_SCORE: 41

## 2025-03-13 NOTE — ED PROVIDER NOTES
EMERGENCY DEPARTMENT ENCOUNTER      NAME: Ashok Del Rosario  AGE: 74 year old male  YOB: 1950  MRN: [id number]  EVALUATION DATE & TIME: No admission date for patient encounter.    PCP: Bj Hendrickson    ED PROVIDER: Berna Riley M.D.      Chief Complaint   Patient presents with    Dysphagia       FINAL IMPRESSION:  No diagnosis found.    ED COURSE & MEDICAL DECISION MAKING:    ***  6:25 PM I met with the patient to gather history and to perform my initial exam. I discussed the plan for care while in the Emergency Department.  ED Course as of 03/13/25 2131   Thu Mar 13, 2025   1827 I spoke to stroke Neurology regarding patient. They will see him on camera and then speak to me again.   1904 I spoke to Radiology, no LVO.    1907 I spoke to Neurology. Recc'd admission for TIA workup, routine EEG.     Pertinent Labs & Imaging studies reviewed. (See chart for details).    Medical Decision Making  Obtained supplemental history:Supplemental history obtained?: Documented in chart and Family Member/Significant Other  Reviewed external records: External records reviewed?: Documented in chart  Care impacted by chronic illness:Documented in Chart  Did you consider but not order tests?: Work up considered but not performed and documented in chart, if applicable  Did you interpret images independently?: Independent interpretation of ECG and images noted in documentation, when applicable.  Consultation discussion with other provider:Did you involve another provider (consultant, , pharmacy, etc.)?: I discussed the care with another health care provider, see documentation for details.  Admit.    MIPS (CTPE, Dental pain, [place], Sinusitis, Asthma/COPD, Head Trauma): Not Applicable    SEPSIS: The patient has stroke symptoms:         ED Stroke specific documentation           NIHSS PDF     Patient last known well time: 4pm  ED Provider first to bedside at: 6:25p  CT Results received at: 1904    Thrombolytics:   Not  "given due to:   - ***    If treating with thrombolytics: Ensure SBP<180 and DBP<105 prior to treatment with thrombolytics.  Administering thrombolytics after treatment with IV labetalol, hydralazine, or nicardipine is reasonable once BP control is established.    Endovascular Retrieval:  Not initiated due to absence of proximal vessel occlusion    National Institutes of Health Stroke Scale (Baseline)  Time Performed: ***     Score    Level of consciousness: { :7962988}    LOC questions: { :8798369}    LOC commands: { :6100339}    Best gaze: { :3537853}    Visual: { :5108898}    Facial palsy: { :0657505}    Motor arm (left): { :6541179}    Motor arm (right): { :5546778}    Motor leg (left): { :0295202}    Motor leg (right): { :0072836}    Limb ataxia: { :9842856}    Sensory: { :7011957}    Best language: { :4725704}    Dysarthria: { :7340899}    Extinction and inattention: { :7327014}        Total Score:  ***        Stroke Mimics were considered (including migraine headache, seizure disorder, hypoglycemia (or hyperglycemia), head or spinal trauma, CNS infection, Toxin ingestion and shock state (e.g. sepsis) .    {Delays:831734::\"Evaluation/Treatement was delayed due to: ***\",\" \"}     {NIH Stroke Scale Post-CT and subsequent :378720}          At the conclusion of the encounter I discussed the results of all of the tests and the disposition. The questions were answered. The patient or family acknowledged understanding and was agreeable with the care plan.      CRITICAL CARE:  ***N/A    HPI    Patient information was obtained from: patient.    Use of : N/A.       Ashok Del Rosario is a 74 year old male who presents for difficulty speaking starting around 4 PM, noticed by wife when they got home from shopping, seem normal throughout the day.  She asked him \"what is wrong\"? because he looked dazed standing outside the car.  He was not able to answer her.  She made him sit down, she did not ask him any more " questions.  She states that in triage his speech is not normal like he is having difficulty getting the words out still.  He did not use any incorrect words in his speech to her knowledge.  He is on Plavix.  Wife thinks he has had a TIA diagnosed at [facility] prior to diagnosis of a heart issue.    Per Chart Review: ***      REVIEW OF SYSTEMS  All other systems negative unless noted in HPI.    PAST MEDICAL HISTORY:  Past Medical History:   Diagnosis Date    Anxiety about health     per [facility]    Dyslipidemia     GERD (gastroesophageal reflux disease)     Hypertension     Nonocclusive coronary atherosclerosis of native coronary artery 10/17/2017    Paroxysmal atrial fibrillation (H) 01/14/2019    Dx [id number] score = 5 (age, HTN, CAD, TIA 2) Rx flecainide Rx Xarelto    Pulmonary emphysema (H) 11/21/2019    per [place]    Right bundle branch block 08/29/2019    Solitary pulmonary nodule 11/21/2019    per [place]    Syncope 01/18/2019    TIA (transient ischemic attack) 12/30/2018       PAST SURGICAL HISTORY:  Past Surgical History:   Procedure Laterality Date    APPENDECTOMY  2000    CARDIAC ELECTROPHYSIOLOGY MAPPING AND ABLATION  08/29/2019    PVI done at [place]    CV CORONARY ANGIOGRAM N/A 10/17/2017    Procedure: Coronary Angiogram;  Surgeon: Ashok Hyatt MD;  Location: [facility] Cath Lab;  Service:     CV CORONARY ANGIOGRAM N/A 5/17/2024    Procedure: Coronary Angiogram;  Surgeon: Matias Scales MD;  Location: [facility] CV    CV LEFT HEART CATH N/A 5/17/2024    Procedure: Left Heart Catheterization;  Surgeon: Matias Scales MD;  Location: [facility] CV    CV LEFT HEART CATHETERIZATION WITH LEFT VENTRICULOGRAM N/A 10/17/2017    Procedure: Left Heart Catheterization with Left Ventriculogram;  Surgeon: Ashok Hyatt MD;  Location: [facility] Cath Lab;  Service:     CV PCI N/A 5/17/2024    Procedure: Percutaneous Coronary Intervention;  Surgeon: Matias Scales MD;  Location:   [facility] CATH LAB CV    CV PCI ATHERECTOMY ORBITAL N/A 5/17/2024    Procedure: Percutaneous Coronary Intervention - Atherectomy Rotational;  Surgeon: Matias Scales MD;  Location: [facility] CV    EP PACEMAKER INSERT Left 12/31/2018    Procedure: EP Pacemaker Insertion;  Surgeon: Micheal Junior MD;  Location: [facility] Cath Lab;  Service: Cardiology    OTHER SURGICAL HISTORY  02/04/2020    Left atrial append[facility] [place]         CURRENT MEDICATIONS:    Current Facility-Administered Medications   Medication Dose Route Frequency Provider Last Rate Last Admin    iopamidol (ISOVUE-370) solution 67 mL  67 mL Intravenous Once Berna Riley MD        And    sodium chloride 0.9 % bag for CT scan flush use  100 mL As instructed Once Berna Riley MD         Current Outpatient Medications   Medication Sig Dispense Refill    acetaminophen (TYLENOL) 500 MG tablet [ACETAMINOPHEN (TYLENOL) 500 MG TABLET] Take 1-2 tablets (500-1,000 mg total) by mouth every 4 (four) hours as needed.  0    aspirin (ASPIR-81) 81 MG EC tablet [ASPIRIN (ASPIR-81) 81 MG EC TABLET] Take 81 mg by mouth daily.       atorvastatin (LIPITOR) 80 MG tablet [ATORVASTATIN (LIPITOR) 80 MG TABLET] TAKE 1 TABLET BY MOUTH EVERYDAY AT BEDTIME 90 tablet 1    carboxymethylcellulose (REFRESH PLUS) 0.5 % SOLN ophthalmic solution Place 1 drop into the right eye At Bedtime      clopidogrel (PLAVIX) 75 MG tablet Take 1 tablet (75 mg) by mouth daily 90 tablet 3    losartan (COZAAR) 25 MG tablet Take 1 tablet (25 mg) by mouth daily 90 tablet 3    nitroGLYcerin (NITROSTAT) 0.4 MG sublingual tablet For chest pain place 1 tablet under the tongue every 5 minutes for 3 doses. If symptoms persist 5 minutes after 1st dose call 911. 10 tablet 1    pantoprazole (PROTONIX) 40 MG EC tablet Take 1 tablet (40 mg) by mouth daily 90 tablet 3    sotalol (BETAPACE) 80 MG tablet TAKE 1 TABLET (80 MG) BY MOUTH 2 TIMES DAILY 180 tablet 3          ALLERGIES:  Allergies   Allergen Reactions    Cephalexin Rash       FAMILY HISTORY:  Family History   Problem Relation Age of Onset    Cancer Mother     Cancer Father     Coronary Artery Disease Brother     Coronary Artery Disease Brother     Valvular heart disease Brother     Rectal Cancer Sister     Colon Cancer Sister        SOCIAL HISTORY:  Social History     Socioeconomic History    Marital status:    Tobacco Use    Smoking status: Former     Current packs/day: 0.00     Types: Cigarettes     Quit date: 10/16/1999     Years since quittin.4    Smokeless tobacco: Never   Substance and Sexual Activity    Alcohol use: Yes     Alcohol/week: 1.0 standard drink of alcohol     Comment: Alcoholic Drinks/day: none since december    Drug use: No    Sexual activity: Yes     Partners: Female     Social Drivers of Health     Financial Resource Strain: Medium Risk (2020)    Received from [facility]    Overall Financial Resource Strain (CARDIA)     Difficulty of Paying Living Expenses: Somewhat hard   Food Insecurity: No Food Insecurity (2020)    Received from [facility]    Hunger Vital Sign     Worried About Running Out of Food in the Last Year: Never true     Ran Out of Food in the Last Year: Never true   Transportation Needs: No Transportation Needs (2020)    Received from [facility]    PRAPARE - Transportation     Lack of Transportation (Medical): No     Lack of Transportation (Non-Medical): No   Physical Activity: Sufficiently Active (2020)    Received from [facility]    Exercise Vital Sign     Days of Exercise per Week: 5 days     Minutes of Exercise per Session: 40 min   Stress: No Stress Concern Present (2020)    Received from [facility]    [employer] - Occupational Stress Questionnaire     Feeling of Stress : Not at all   Social Connections: Unknown (2020)    Received from [facility]    Social Connection and Isolation Panel [NHANES]      Frequency of Communication with Friends and Family: More than three times a week     Frequency of Social Gatherings with Friends and Family: Once a week     Attends Yarsani Services: 1 to 4 times per year     Active Member of Clubs or Organizations: Yes     Attends Club or Organization Meetings: 1 to 4 times per year       VITALS:  Patient Vitals for the past 24 hrs:   BP Temp Temp src Pulse Resp SpO2 Weight   03/13/25 1817 (!) 179/105 -- -- -- -- -- --   03/13/25 1815 (!) 179/105 -- -- -- -- -- --   03/13/25 1814 -- 97.9  F (36.6  C) Oral 97 16 95 % 95.3 kg (210 lb)       PHYSICAL EXAM    VITAL SIGNS: BP (!) 179/105   Pulse 97   Temp 97.9  F (36.6  C) (Oral)   Resp 16   Wt 95.3 kg (210 lb)   SpO2 95%   BMI 32.89 kg/m    Physical Exam  Vitals and nursing note reviewed.   Constitutional:       General: He is not in acute distress.     Appearance: He is not toxic-appearing.   Eyes:      General: No scleral icterus.        Right eye: No discharge.         Left eye: No discharge.      Extraocular Movements: Extraocular movements intact.      Pupils: Pupils are equal, round, and reactive to light.      Comments: No visual field deficit upon confrontation, no gaze preference   Cardiovascular:      Rate and Rhythm: Normal rate and regular rhythm.   Pulmonary:      Effort: Pulmonary effort is normal. No respiratory distress.      Breath sounds: Normal breath sounds.   Musculoskeletal:         General: No swelling or deformity.      Cervical back: Neck supple. No rigidity.   Skin:     General: Skin is warm and dry.      Capillary Refill: Capillary refill takes less than 2 seconds.      Findings: No bruising or erythema.   Neurological:      Mental Status: He is alert.      Comments: Alert and oriented x3, finger-to-nose intact bilaterally, clear speech without dysarthria or aphasia, no facial droop, 5 out of 5 strength to upper and lower extremities bilaterally.  Sensation grossly intact to upper and lower  "extremities bilaterally.    Psychiatric:         Mood and Affect: Mood normal.         Behavior: Behavior normal.         LABS  Labs Ordered and Resulted from Time of ED Arrival to Time of ED Departure - No data to display      RADIOLOGY  CT Head w/o Contrast    (Results Pending)   CTA Head Neck with Contrast    (Results Pending)      {swfradiology:450161}      EKG:    {swfekg2:433080::\"NA\"}       PROCEDURES:  N/A      MEDICATIONS GIVEN IN THE EMERGENCY:  Medications   iopamidol (ISOVUE-370) solution 67 mL (has no administration in time range)     And   sodium chloride 0.9 % bag for CT scan flush use (has no administration in time range)       NEW PRESCRIPTIONS STARTED AT TODAY'S ER VISIT  New Prescriptions    No medications on file        I, ***, am serving as a scribe to document services personally performed by Berna Riley MD, based on my observations and the provider's statements to me.  I, Berna Riley MD, attest that *** is acting in a scribe capacity, has observed my performance of the services and has documented them in accordance with my direction.     Berna Riley MD  Emergency Medicine  [facility] EMERGENCY DEPARTMENT  [address] [place] [id number] [phone]  Dept: [phone]          "   Neurological:      Mental Status: He is alert.      Comments: Alert and oriented x3, finger-to-nose intact bilaterally, clear speech without dysarthria or aphasia, no facial droop, 5 out of 5 strength to upper and lower extremities bilaterally.  Sensation grossly intact to upper and lower extremities bilaterally.    Psychiatric:         Mood and Affect: Mood normal.         Behavior: Behavior normal.         LABS  Labs Ordered and Resulted from Time of ED Arrival to Time of ED Departure   BASIC METABOLIC PANEL - Abnormal       Result Value    Sodium 142      Potassium 3.8      Chloride 108 (*)     Carbon Dioxide (CO2) 24      Anion Gap 10      Urea Nitrogen 6.8 (*)     Creatinine 0.83      GFR Estimate >90      Calcium 10.1      Glucose 154 (*)    CBC WITH PLATELETS AND DIFFERENTIAL - Abnormal    WBC Count 10.3      RBC Count 4.57      Hemoglobin 11.7 (*)     Hematocrit 36.1 (*)     MCV 79      MCH 25.6 (*)     MCHC 32.4      RDW 13.2      Platelet Count 350      % Neutrophils 68      % Lymphocytes 24      % Monocytes 7      % Eosinophils 1      % Basophils 1      % Immature Granulocytes 0      NRBCs per 100 WBC 0      Absolute Neutrophils 7.0      Absolute Lymphocytes 2.4      Absolute Monocytes 0.7      Absolute Eosinophils 0.1      Absolute Basophils 0.1      Absolute Immature Granulocytes 0.0      Absolute NRBCs 0.0     HEMOGLOBIN A1C - Abnormal    Estimated Average Glucose 134 (*)     Hemoglobin A1C 6.3 (*)    LIPID PROFILE - Abnormal    Cholesterol 139      Triglycerides 158 (*)     Direct Measure HDL 30 (*)     LDL Cholesterol Calculated 77      Non HDL Cholesterol 109     IRON AND IRON BINDING CAPACITY - Abnormal    Iron 30 (*)     Iron Binding Capacity 399      Iron Sat Index 8 (*)    INR - Normal    INR 1.04     PARTIAL THROMBOPLASTIN TIME - Normal    aPTT 25     TROPONIN T, HIGH SENSITIVITY - Normal    Troponin T, High Sensitivity <6     MAGNESIUM - Normal    Magnesium 2.0     TSH WITH FREE T4 REFLEX -  Normal    TSH 0.69     PROCALCITONIN - Normal    Procalcitonin 0.05           RADIOLOGY  Chest XR,  PA & LAT   Final Result   IMPRESSION: Mild patchy opacities have developed in the right mid lung, suspicious for infectious/inflammatory process. Lungs otherwise clear. No pleural effusion or pneumothorax. Left chest cardiac pacemaker. Left atrial appendage occlusion device.    Normal heart size and pulmonary vascularity.      CTA Head Neck with Contrast   Final Result   IMPRESSION:    HEAD CT:   1.  No CT evidence for acute intracranial process.   2.  Brain atrophy and presumed chronic microvascular ischemic changes as above.      HEAD CTA:   1.  No significant stenosis, large vessel occlusion, aneurysm or high flow vascular malformation.   2.  Mild to moderate narrowing of the right mid P2 posterior cerebral artery.      NECK CTA:   1.  Approximately 60% stenosis involving the right carotid bifurcation.   2.  Approximately 50-55% stenosis involving the right proximal ICA.   3.  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   4.  Mild beaded irregularity of the left proximal to mid ICA, possibly reflecting fibromuscular dysplasia.   5.  Mild-to-moderate narrowing involving the origin of the left vertebral artery.   6.  Mild narrowing involving the origin of the right vertebral artery.   7.  Additional findings above.      Dr. Dominick Palmer discussed results with Dr. Riley at 6:36 PM CDT and 7:03 PM CDT on 3/13/2025.         I have independently reviewed the above image. See radiology report for detail.      EKG:    EKG obtained at 1856 and shows sinus rhythm rate 87, Qtc 495, compared to previous EKG on 5/17/24, bifascicular block noted today and on previous EKG, PVC seen today, otherwise similar to previous EKG. I have independently reviewed and interpreted today's EKG, pending Cardiologist read.       PROCEDURES:  N/A      MEDICATIONS GIVEN IN THE  EMERGENCY:  Medications   iopamidol (ISOVUE-370) solution 67 mL (67 mLs Intravenous $Given 3/13/25 1843)       NEW PRESCRIPTIONS STARTED AT TODAY'S ER VISIT  Discharge Medication List as of 3/13/2025 11:22 PM           Berna Riley MD  Emergency Medicine  [facility] EMERGENCY DEPARTMENT  [address] [place] [id number]  [phone]  Dept: [phone]             Berna Riley MD  03/14/25 2405

## 2025-03-13 NOTE — ED TRIAGE NOTES
The patient started having difficulty speaking at 1600. His speaking is better now but his spouse states that he is not back to normal. He is A&Ox4. He is on thinners.

## 2025-03-13 NOTE — CONSULTS
"[facility]     Stroke Code Note          History of Present Illness     Chief Complaint: Difficulty speaking    Ashok Del Roasrio is a 74 year old male with history of pAfib s/p watchman, CAD s/p LAD stent placement, pacemaker, TIA?, HTN, and HLD. Wife reports that symptoms onset around 4pm after shopping, she reports patient unable to speak, dazed standing outside of car. Patient reports shortness of breath and dizziness. Pt is currently on ASA and Plavix. Initial BP was 179/105. During telestroke evaluation, wife and daughter reports that Ashok has previous episodes of feeling dazed before, but inability to speak was new. Family reports that Ahsok's speech is back to baseline.    Ashok is not a candidate for TNK or emergent thrombectomy due to resolution of symptoms and no LVO.          Past Medical History     Stroke risk factors: pAfib, CAD, TIA, HTN, HLD    Preadmission antithrombotic regimen: ASA and Plavix    Modified [place] Score (Pre-morbid)    -  0                   Assessment and Plan       1.  Transient \"dazed\" feeling and inability to speak. Family reports recurrent episodes of \"being dazed\" but has never had associated language impairment. Differential includes seizure vs TIA/stroke vs cardiac episode given shortness of breath.     Intravenous Thrombolysis  Not given due to:   - minor/isolated/quickly resolving symptoms     Endovascular Treatment  Not initiated due to absence of proximal vessel occlusion     Plan:  - Use orderset: \"Ischemic Stroke Routine Admission\" or \"Ischemic Stroke No Thrombolytics/No Thrombectomy ICU Admission\"  - Place Neurology IP Stroke Consult order   - Neurochecks and Vital Signs every 4 hours  - Permissive HTN; goal SBP < 220 mmHg  - Continue PTA DAPT   - Statin: lipid panel pending  - MRI Brain with and without contrast, if cannot get MRI due to pacemaker, recommend repeat CTH tomorrow AM  - Routine EEG  - Telemetry, EKG  - Bedside " "Glucose Monitoring  - A1c, Lipid Panel, Troponin x 3  - PT/OT/SLP  - Stroke Education  - Euthermia, Euglycemia    Case discussed with vascular neurology attending Dr. Fair     ___________________________________________________________________    FARAZ Sharma CNP  Vascular Neurology    To page me or covering stroke neurology team member, click here: AMCOM  Choose \"On Call\" tab at top, then select \"NEUROLOGY/ALL SITES\" from middle drop-down box, press Enter, then look for \"stroke\" or \"telestroke\" for your site.  ___________________________________________________________________    Neena DUMONT CNP, was present with the medical/MICHAEL student who participated in the service and in the documentation of the note.  I have verified the history and personally performed the physical exam and medical decision making.  I agree with the assessment and plan of care as documented in the note.       Neena Villalobos CNP        Imaging/Labs   (personally reviewed all)    CT head: No acute intracranial pathology.   CTA head/neck: No LVO or critical stenosis.         Physical Examination     BP: (!) 179/105   Pulse: 97   Resp: 16   Temp: 97.9  F (36.6  C)   Temp src: Oral   SpO2: 95 %   O2 Device: None (Room air)   Weight: 95.3 kg (210 lb)    Wt Readings from Last 2 Encounters:   03/13/25 95.3 kg (210 lb)   07/12/24 91.2 kg (201 lb)       Neuro Exam  Mental Status:  alert, oriented x 3, follows commands, speech clear and fluent, naming and repetition normal  Cranial Nerves:  visual fields intact (tested by nurse), EOMI with normal smooth pursuit, facial sensation intact and symmetric (tested by nurse), facial movements symmetric, hearing not formally tested but intact to conversation, no dysarthria, tongue protrusion midline  Motor:  no abnormal movements, able to move all limbs antigravity spontaneously with no signs of hemiparesis observed, no pronator drift  Reflexes:  unable to test (telestroke)  Sensory:  light touch " sensation intact and symmetric throughout upper and lower extremities (assessed by nurse), no extinction on double simultaneous stimulation (assessed by nurse)  Coordination:  normal finger-to-nose   Station/Gait:  unable to test due to telestroke        Stroke Scales       NIHSS  1a. Level of Consciousness 0-->Alert, keenly responsive   1b. LOC Questions 0-->Answers both questions correctly   1c. LOC Commands 0-->Performs both tasks correctly   2.   Best Gaze 0-->Normal   3.   Visual 0-->No visual loss   4.   Facial Palsy 0-->Normal symmetrical movements   5a. Motor Arm, Left 0-->No drift, limb holds 90 (or 45) degrees for full 10 secs   5b. Motor Arm, Right 0-->No drift, limb holds 90 (or 45) degrees for full 10 secs   6a. Motor Leg, Left 0-->No drift, leg holds 30 degree position for full 5 secs   6b. Motor Leg, right 0-->No drift, leg holds 30 degree position for full 5 secs   7.   Limb Ataxia 0-->Absent   8.   Sensory 0-->Normal, no sensory loss   9.   Best Language 0-->No aphasia, normal   10. Dysarthria 0-->Normal   11. Extinction and Inattention  0-->No abnormality   Total 0 (03/13/25 1846)            Labs     CBC  Lab Results   Component Value Date    HGB 11.7 (L) 03/13/2025    HCT 36.1 (L) 03/13/2025    WBC 10.3 03/13/2025     03/13/2025       BMP  Lab Results   Component Value Date     12/03/2024    POTASSIUM 4.9 12/03/2024    CHLORIDE 106 12/03/2024    CO2 23 12/03/2024    BUN 7.0 (L) 12/03/2024    CR 1.02 12/03/2024     (H) 12/03/2024    ESSENCE 9.6 12/03/2024       INR  INR   Date Value Ref Range Status   08/14/2019 2.34 (H) 0.90 - 1.10 Final   04/20/2019 1.13 (H) 0.90 - 1.10 Final   01/18/2019 1.24 (H) 0.90 - 1.10 Final       Data   Stroke Code Data  (for stroke code with tele)  Stroke code activated 03/13/25  1820   First stroke provider response 03/13/25  1822   Video start time 03/13/25  1846   Video end time 03/13/25  1901   Last known normal 03/13/25  1600   Time of discovery (or  onset of symptoms)  03/13/25  1600   Head CT read by Stroke Neuro Provider 03/13/25  1833   Was stroke code de-escalated?    03/13/25  1902     Telestroke Service Details  Type of service telemedicine diagnostic assessment of acute neurological changes   Reason telemedicine is appropriate patient requires assessment with a specialist for diagnosis and treatment of neurological symptoms   Mode of transmission secure interactive audio and video communication per Delvin   Originating site (patient location) [facility]    Distant site (provider location) [facility]        Clinically Significant Risk Factors Present on Admission                 # Drug Induced Platelet Defect: home medication list includes an antiplatelet medication   # Hypertension: Noted on problem list                # Pacemaker present      Time Spent on this Encounter   Billing: I personally examined and evaluated the patient today. At the time of my evaluation and management the patient was critical condition today due to neurologic symptoms. I personally managed chart review, image review, and exam. Key decisions made today included admission for further workup. I spent a total of 40 minutes providing critical care services, evaluating the patient, directing care and reviewing laboratory values and radiologic reports.

## 2025-03-14 ENCOUNTER — MEDICAL CORRESPONDENCE (OUTPATIENT)
Dept: HEALTH INFORMATION MANAGEMENT | Facility: CLINIC | Age: 75
End: 2025-03-14
Payer: MEDICARE

## 2025-03-14 LAB — FERRITIN SERPL-MCNC: 13 NG/ML (ref 31–409)

## 2025-03-14 NOTE — H&P
"[facility]    History and Physical - Hospitalist Service       Date of Admission:  3/13/2025    Assessment & Plan      Ashok Del Rosario is a 74 year old male admitted on 3/13/2025. He has PMH most notable for CAD s/p PCI LAD 05/2024, proximal atrial fibrillation s/p Watchman 2020, SSS s/p PPM placement, HTN, HLD, GERD that presents with transient aphasia concerning for TIA.    Have not ordered the patient's PTA medications.  The patient and his wife request that he be given his own medications due to the fact he is admitted to obs.  The patient's med rec was done in the ED and the patient's wife gave him his evening medications after the med rec was completed.    #Suspected TIA  #Transient aphasia  The patient had brief transient expressive aphasia and a feeling of being \"dazed\" starting at 1600 today.  No other focal deficits.  His symptoms resolved within 10 minutes.  He denies dysphagia, focal weakness or numbness.  CVA code called in the ED but neurology de-escalated due to no large vessel occlusion and resolution of symptoms.  No tenecteplase was given.  CTA head and neck with no significant stenosis, large vessel occlusion or aneurysm.  There was mild to moderate narrowing of the right mid P2 posterior cerebral artery and nonobstructive atherosclerosis in the right internal carotid artery.  Differential includes TIA less likely seizure.  -Ischemic Stroke Routine Admission order set utilized  -Neurology IP Stroke Consult  -Neurochecks and Vital Signs every 4 hours  -Permissive HTN; goal SBP < 220 mmHg  -Continue PTA DAPT   -Continue PTA atorvastatin 80 mg nightly  -MRI Brain with and without contrast (ordered but patient does have pacemaker and this was noted in the order)  -If cannot get MRI due to pacemaker, neurology recommend repeat CTH tomorrow AM  -Routine EEG (have not ordered yet, hoping to get MRI first)  -Telemetry, EKG  -Bedside Glucose Monitoring  -A1c, Lipid Panel, " "Troponin x 3  -PT/OT/SLP  -Stroke Education  -Euthermia, Euglycemia  -The patient's pacemaker has been interrogated and read is pending    #Nonproductive cough  The patient states he has had a nonproductive cough for the last several days associated with congestion.  No dyspnea.  On exam he has normal work of breathing and no rales in his lungs.  WBC normal.  CXR with patchy opacities in the right lung.  He denies dysphagia to solids/liquids.  The patient's daughter states she recently had \"pneumonia.\"  -Will add on procalcitonin, consider starting antimicrobials and CT chest if procalcitonin is significantly elevated  -COVID/RSV/flu swab    #Microcytic anemia  -This is a new finding, the patient denies hematochezia/melena  -Will start with iron panel  -Monitor for signs of bleeding    #HTN  #HLD  #CAD s/p PCI to LAD 05/17/2024  -Continue DAPT with ASA 81 mg and Plavix 75 mg  -Continue PTA sotalol and losartan    #Sick sinus syndrome s/p PPM placement  #Proximal atrial fibrillation s/p Watchman in 2020    #GERD  -Continue PTA PPI        Diet: NPO for Medical/Clinical Reasons Except for: No Exceptions    DVT Prophylaxis: Pneumatic Compression Devices  Cosby Catheter: Not present  Lines: None     Cardiac Monitoring: None  Code Status:  Full code, discussed with the patient, his wife and daughter on admission    Clinically Significant Risk Factors Present on Admission          # Hyperchloremia: Highest Cl = 108 mmol/L in last 2 days, will monitor as appropriate            # Drug Induced Platelet Defect: home medication list includes an antiplatelet medication   # Hypertension: Noted on problem list                # Pacemaker present       Disposition Plan     Medically Ready for Discharge: Anticipated Tomorrow           Pollo Chacon MD  Hospitalist Service  [facility]  Securely message with Tripda (more info)  Text page via [employer] Paging/Directory "     ______________________________________________________________________    Chief Complaint   Difficulty speaking    History is obtained from the patient and his wife    History of Present Illness   Ashok Del Rosario is a 74 year old male who has PMH most notable for CAD s/p PCI LAD 05/2024, proximal atrial fibrillation s/p Watchman 2020, SSS s/p PPM placement, HTN, HLD, GERD that presents with difficulty speaking.    The patient had abrupt onset of difficulty speaking that started at 1600 today.  His wife reports that the patient would not respond to questions however his lips were moving as if he were trying to get words out.  The patient himself states that he tried to speak but could not.  He denies that he had any focal weakness/numbness.  This episode was short-lived and resolved within 10 minutes.  The patient and his wife then had dinner and later presented to the emergency department.  He reports that he has not had any dysphagia to solids/liquids.  He does endorse a productive cough and congestion over the last several days.  His daughter reports she had a pneumonia a few weeks ago.  He denies any chest pain or dyspnea at rest but does endorse exertional dyspnea.  No vomiting, abdominal pain, diarrhea, urinary complaints.  No hematochezia/melena.    ER course:  -Stroke code activated  -AF, HR 70s-80s, RR 20, BP 140s-150s/80s-100s and on RA  -No focal deficits noted on exam  -CR normal, WBC 10.3, MCV 79, Hgb 11.7  -TSH normal, troponin negative  -EKG with right bundle branch block and left anterior fascicular block  -CXR patchy opacities in the right lung  -CTA head and neck with no significant stenosis, large vessel occlusion or aneurysm.  There was mild to moderate narrowing of the right mid P2 posterior cerebral artery and nonobstructive atherosclerosis in the right internal carotid artery.  -Stroke code was de-escalated and no tenecteplase was given  -The patient's wife did give the patient his  evening medications in the ED after the med rec was done    Past Medical History    Past Medical History:   Diagnosis Date    Anxiety about health     per [facility]    Dyslipidemia     GERD (gastroesophageal reflux disease)     Hypertension     Nonocclusive coronary atherosclerosis of native coronary artery 10/17/2017    Paroxysmal atrial fibrillation (H) 01/14/2019    Dx [id number] score = 5 (age, HTN, CAD, TIA 2) Rx flecainide Rx Xarelto    Pulmonary emphysema (H) 11/21/2019    per [place]    Right bundle branch block 08/29/2019    Solitary pulmonary nodule 11/21/2019    per [place]    Syncope 01/18/2019    TIA (transient ischemic attack) 12/30/2018       Past Surgical History   Past Surgical History:   Procedure Laterality Date    APPENDECTOMY  2000    CARDIAC ELECTROPHYSIOLOGY MAPPING AND ABLATION  08/29/2019    PVI done at [place]    CV CORONARY ANGIOGRAM N/A 10/17/2017    Procedure: Coronary Angiogram;  Surgeon: Ashok Hyatt MD;  Location: [facility];  Service:     CV CORONARY ANGIOGRAM N/A 5/17/2024    Procedure: Coronary Angiogram;  Surgeon: Matias Scales MD;  Location: [facility] CV    CV LEFT HEART CATH N/A 5/17/2024    Procedure: Left Heart Catheterization;  Surgeon: Matias Scales MD;  Location: [facility] CV    CV LEFT HEART CATHETERIZATION WITH LEFT VENTRICULOGRAM N/A 10/17/2017    Procedure: Left Heart Catheterization with Left Ventriculogram;  Surgeon: Ashok Hyatt MD;  Location: [facility];  Service:     CV PCI N/A 5/17/2024    Procedure: Percutaneous Coronary Intervention;  Surgeon: Matias Scales MD;  Location: [facility] CV    CV PCI ATHERECTOMY ORBITAL N/A 5/17/2024    Procedure: Percutaneous Coronary Intervention - Atherectomy Rotational;  Surgeon: Matias Scales MD;  Location: [facility] CV    EP PACEMAKER INSERT Left 12/31/2018    Procedure: EP Pacemaker Insertion;  Surgeon: Micheal Junior MD;  Location: [facility]  Lab;  Service: Cardiology    OTHER SURGICAL HISTORY  2020    Left atrial appendage closureAnne FLX at [place]       Prior to Admission Medications   Prior to Admission Medications   Prescriptions Last Dose Informant Patient Reported? Taking?   acetaminophen (TYLENOL) 500 MG tablet   No No   Sig: [ACETAMINOPHEN (TYLENOL) 500 MG TABLET] Take 1-2 tablets (500-1,000 mg total) by mouth every 4 (four) hours as needed.   aspirin (ASPIR-81) 81 MG EC tablet   Yes No   Sig: [ASPIRIN (ASPIR-81) 81 MG EC TABLET] Take 81 mg by mouth daily.    atorvastatin (LIPITOR) 80 MG tablet   No No   Sig: [ATORVASTATIN (LIPITOR) 80 MG TABLET] TAKE 1 TABLET BY MOUTH EVERYDAY AT BEDTIME   carboxymethylcellulose (REFRESH PLUS) 0.5 % SOLN ophthalmic solution   Yes No   Sig: Place 1 drop into the right eye At Bedtime   clopidogrel (PLAVIX) 75 MG tablet   No No   Sig: Take 1 tablet (75 mg) by mouth daily   losartan (COZAAR) 25 MG tablet   No No   Sig: Take 1 tablet (25 mg) by mouth daily   nitroGLYcerin (NITROSTAT) 0.4 MG sublingual tablet   No No   Sig: For chest pain place 1 tablet under the tongue every 5 minutes for 3 doses. If symptoms persist 5 minutes after 1st dose call 911.   pantoprazole (PROTONIX) 40 MG EC tablet   No No   Sig: Take 1 tablet (40 mg) by mouth daily   sotalol (BETAPACE) 80 MG tablet   No No   Sig: TAKE 1 TABLET (80 MG) BY MOUTH 2 TIMES DAILY      Facility-Administered Medications: None        Review of Systems    The 10 point Review of Systems is negative other than noted in the HPI or here.     Social History   I have reviewed this patient's social history and updated it with pertinent information if needed.  Social History     Tobacco Use    Smoking status: Former     Current packs/day: 0.00     Types: Cigarettes     Quit date: 10/16/1999     Years since quittin.4    Smokeless tobacco: Never   Substance Use Topics    Alcohol use: Yes     Alcohol/week: 1.0 standard drink of alcohol     Comment: Alcoholic  Drinks/day: none since december    Drug use: No         Family History   I have reviewed this patient's family history and updated it with pertinent information if needed.  Family History   Problem Relation Age of Onset    Cancer Mother     Cancer Father     Coronary Artery Disease Brother     Coronary Artery Disease Brother     Valvular heart disease Brother     Rectal Cancer Sister     Colon Cancer Sister          Allergies   Allergies   Allergen Reactions    Cephalexin Rash        Physical Exam   Vital Signs: Temp: 97.9  F (36.6  C) Temp src: Oral BP: (!) 147/82 Pulse: 78   Resp: 17 SpO2: 95 % O2 Device: None (Room air)    Weight: 210 lbs 0 oz    GENERAL: Lying in bed, no distress, appears stated age   HEENT: NC/AT, sclera anicteric   CV: RRR, no M/R/G, CR < 2 s   PULM: CTAB, no wheezes, rales, rhonchi   GI: Abd soft, NT, ND  MSK: WWP, no LE edema   SKIN: no rash     NEURO:   -Awake, alert, oriented to 3/13/2025  -CN II through XII intact  -5/5 strength in the proximal and distal upper and lower extremities bilaterally  -Normal sensation to light touch in all extremities  -Able to easily pull himself up by the bed rails    Medical Decision Making       65 MINUTES SPENT BY ME on the date of service doing chart review, history, exam, documentation & further activities per the note.      Data     I have personally reviewed the following data over the past 24 hrs:    10.3  \   11.7 (L)   / 350     142 108 (H) 6.8 (L) /  154 (H)   3.8 24 0.83 \     Trop: <6 BNP: N/A     TSH: 0.69 T4: N/A A1C: N/A     INR:  1.04 PTT:  25   D-dimer:  N/A Fibrinogen:  N/A       Imaging results reviewed over the past 24 hrs:   Recent Results (from the past 24 hours)   CTA Head Neck with Contrast    Narrative    EXAM: CTA HEAD NECK W CONTRAST  LOCATION: [facility]  DATE: 3/13/2025    INDICATION: Code Stroke, evaluate for LVO. PLEASE READ IMMEDIATELY.  COMPARISON: None.  CONTRAST: 67 mL Isovue-[id number].  TECHNIQUE: Head  and neck CT angiogram with IV contrast. Noncontrast head CT followed by axial helical CT images of the head and neck vessels obtained during the arterial phase of intravenous contrast administration. Axial 2D reconstructed images and   multiplanar 3D MIP reconstructed images of the head and neck vessels were performed by the technologist. Dose reduction techniques were used. All stenosis measurements made according to NASCET criteria unless otherwise specified.    FINDINGS:   NONCONTRAST HEAD CT:   INTRACRANIAL CONTENTS: No intracranial hemorrhage, extraaxial collection, or mass effect.  No CT evidence of acute infarct. Moderate presumed chronic small vessel ischemic changes. Mild generalized volume loss. No hydrocephalus. Cavum septum pellucidum   et vergae.    VISUALIZED ORBITS/SINUSES/MASTOIDS: Prior bilateral cataract surgery. Visualized portions of the orbits are otherwise unremarkable. No significant paranasal sinus mucosal disease. No middle ear or mastoid effusion.    BONES/SOFT TISSUES: No acute abnormality.    HEAD CTA:  ANTERIOR CIRCULATION: No significant stenosis, large vessel occlusion, aneurysm or high-flow vascular malformation. Standard [place] of [place] anatomy.    POSTERIOR CIRCULATION: Calcific plaque involving the right intradural vertebral artery proximally causes less than 50%/mild narrowing. No proximal large vessel occlusion. No aneurysm. No high-flow vascular malformation. There is mild-to-moderate   narrowing of the right mid P2 posterior cerebral artery. Balanced vertebral arteries supply a normal basilar artery.     DURAL VENOUS SINUSES: Expected enhancement of the major dural venous sinuses.    NECK CTA:  RIGHT CAROTID: Scattered plaque within the right carotid system. There is dense calcific plaque involving the right carotid bifurcation causing approximately 60% stenosis. Additionally, there is calcific plaque involving the right proximal ICA causing   approximately 50-55% stenosis. No  dissection identified.    LEFT CAROTID: Scattered plaque within the left carotid system. Calcific plaque noted involving the left carotid bifurcation and left proximal ICA causes less than 50% narrowing. There is a short segment pseudoaneurysm identified involving the left   proximal to mid ICA measuring 4.8 mm in craniocaudal dimensions, 4.4 mm in AP dimensions and 5.3 mm in transverse dimensions (images 267 series 15). There is mild associated beaded irregularity in the left proximal to mid ICA.    VERTEBRAL ARTERIES: Mild narrowing involving the origin of the right vertebral artery due to calcific plaque. Otherwise, no significant stenosis or dissection involving the right vertebral artery. There is mild-to-moderate narrowing involving the origin   of the left vertebral artery. Otherwise no significant stenosis or dissection. Dominant left and smaller right vertebral arteries.    AORTIC ARCH: Classic aortic arch anatomy. Calcific plaque noted along the aortic arch and origins of the great vessels. There is mild narrowing involving the origins of the left subclavian artery and brachiocephalic artery.    NONVASCULAR STRUCTURES: Pulmonary emphysema. Incompletely imaged left-sided pacing device.      Impression    IMPRESSION:   HEAD CT:  1.  No CT evidence for acute intracranial process.  2.  Brain atrophy and presumed chronic microvascular ischemic changes as above.    HEAD CTA:  1.  No significant stenosis, large vessel occlusion, aneurysm or high flow vascular malformation.  2.  Mild to moderate narrowing of the right mid P2 posterior cerebral artery.    NECK CTA:  1.  Approximately 60% stenosis involving the right carotid bifurcation.  2.  Approximately 50-55% stenosis involving the right proximal ICA.  3.  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  4.  Mild beaded irregularity of the left proximal to mid ICA, possibly reflecting fibromuscular dysplasia.  5.   Mild-to-moderate narrowing involving the origin of the left vertebral artery.  6.  Mild narrowing involving the origin of the right vertebral artery.  7.  Additional findings above.    Dr. Dominick Palmer discussed results with Dr. Riley at 6:36 PM CDT and 7:03 PM CDT on 3/13/2025.   Chest XR,  PA & LAT    Narrative    EXAM: XR CHEST 2 VIEWS  LOCATION: [facility]  DATE: 3/13/2025    INDICATION: Intermittent shortness of breath.  COMPARISON: 6/2/2023      Impression    IMPRESSION: Mild patchy opacities have developed in the right mid lung, suspicious for infectious/inflammatory process. Lungs otherwise clear. No pleural effusion or pneumothorax. Left chest cardiac pacemaker. Left atrial appendage occlusion device.   Normal heart size and pulmonary vascularity.

## 2025-03-14 NOTE — SIGNIFICANT EVENT
"Significant Event Note    Informed by Dr. Riley that patient wished to leave AMA.    I went and spoke to the patient and his wife.  Again the patient was oriented to name, date, location.  They are worried about being admitted to observation status and the cost of admission.      I explained to the patient that he is at high risk of stroke which could be debilitating and/or fatal.  The patient stated \"I will take my chances  I cant afford to stay  I know I could get worse.\"    The patient is oriented and aware of the risks of leaving AGAINST MEDICAL ADVICE.  As such we cannot violate his autonomy and compel him to stay against his will.  "

## 2025-03-14 NOTE — ED NOTES
"[facility] ED Handoff Report    ED Chief Complaint: Dysphagia    ED Diagnosis:  (R47.9) Speech disturbance, unspecified type    (G45.9) TIA (transient ischemic attack)       PMH:    Past Medical History:   Diagnosis Date    Anxiety about health     per [facility]    Dyslipidemia     GERD (gastroesophageal reflux disease)     Hypertension     Nonocclusive coronary atherosclerosis of native coronary artery 10/17/2017    Paroxysmal atrial fibrillation (H) 01/14/2019    Dx [id number] score = 5 (age, HTN, CAD, TIA 2) Rx flecainide Rx Xarelto    Pulmonary emphysema (H) 11/21/2019    per [place]    Right bundle branch block 08/29/2019    Solitary pulmonary nodule 11/21/2019    per [place]    Syncope 01/18/2019    TIA (transient ischemic attack) 12/30/2018        Code Status:  No Order     Falls Risk: No Band: Not applicable. Pt denies dizziness, lightheadedness, and vision changes. Neuro assessment is back to baseline, NIHSS score 0.    Current Living Situation/Residence: lives with a significant other     Elimination Status: Continent: Yes     Activity Level: Independent    Patients Preferred Language:  English     Needed: No    Vital Signs:  BP (!) 147/82   Pulse 78   Temp 97.9  F (36.6  C) (Oral)   Resp 17   Wt 95.3 kg (210 lb)   SpO2 95%   BMI 32.89 kg/m       Cardiac Rhythm: NSR with PVC. Paced ([employer])    Pain Score: 0/10    Is the Patient Confused:  No    Last Food or Drink: 03/13/25    Focused Assessment:  ***    Tests Performed: Done: Labs and Imaging    Family Dynamics/Concerns: No    Family Updated On Visitor Policy: No    Plan of Care Communicated to Family: Yes    Who Was Updated about Plan of Care: Patient and family    Belongings Checklist Done and Signed by Patient: Yes    Belongings Sent with Patient: ***    Medications sent with patient: ***    {Covid:[id number]:::1}, {POSITIVE/NEGATIVE:915644}    Additional Information: ***    {RN:[id number]::\"***\":1} 3/13/2025 10:00 PM       "

## 2025-03-14 NOTE — ED NOTES
Pt expressing wishes to leave the hospital due to lack of insurance coverage with observation stay. Omar RESENDEZ in the room with the pt.

## 2025-03-14 NOTE — MEDICATION SCRIBE - ADMISSION MEDICATION HISTORY
Medication Scribe Admission Medication History    Admission medication history is complete. The information provided in this note is only as accurate as the sources available at the time of the update.    Information Source(s): Patient via in-person    Pertinent Information: Patient states that he have his home medication with him and is planning to use own meds.    Changes made to PTA medication list:  Added: Neomycin-polymyxin-Dexamethasone (per patient and fill history)  Deleted: None  Changed: None    Allergies reviewed with patient and updates made in EHR: yes    Medication History Completed By: Danae Mahajan 3/13/2025 10:07 PM    PTA Med List   Medication Sig Last Dose/Taking    acetaminophen (TYLENOL) 500 MG tablet Take 500-1,000 mg by mouth every 6 hours as needed for mild pain. Taking As Needed    aspirin (ASPIR-81) 81 MG EC tablet [ASPIRIN (ASPIR-81) 81 MG EC TABLET] Take 81 mg by mouth daily.  3/12/2025 Bedtime    atorvastatin (LIPITOR) 80 MG tablet Take 80 mg by mouth at bedtime. 3/12/2025 Bedtime    carboxymethylcellulose (REFRESH PLUS) 0.5 % SOLN ophthalmic solution Place 1 drop into the right eye At Bedtime 3/12/2025 Bedtime    clopidogrel (PLAVIX) 75 MG tablet Take 1 tablet (75 mg) by mouth daily 3/12/2025 Bedtime    losartan (COZAAR) 25 MG tablet Take 1 tablet (25 mg) by mouth daily 3/12/2025 Bedtime    neomycin-polymyxin-dexAMETHasone (MAXITROL) 3.5-43844-6.1 ophthalmic ointment Place 0.5 inches into both eyes 2 times daily as needed (.). 1 APPLICATION INTO THE LOWER EYELID OF AFFECTED EYE OPHTHALMIC 2 TIMES DAILY AS NEEDED Taking As Needed    nitroGLYcerin (NITROSTAT) 0.4 MG sublingual tablet Place 0.4 mg under the tongue every 5 minutes as needed for chest pain. For chest pain place 1 tablet under the tongue every 5 minutes for 3 doses. If symptoms persist 5 minutes after 1st dose call 911. Taking As Needed    pantoprazole (PROTONIX) 40 MG EC tablet Take 1 tablet (40 mg) by mouth daily  3/12/2025 Bedtime    sotalol (BETAPACE) 80 MG tablet TAKE 1 TABLET (80 MG) BY MOUTH 2 TIMES DAILY 3/13/2025 Morning

## 2025-03-14 NOTE — DISCHARGE INSTRUCTIONS
You are leaving AGAINST MEDICAL ADVICE.  I referred you to neurology.  Please make an appointment as soon as possible with neurology.  Return to the ER if you change your mind or if you have additional symptoms/return of symptoms.

## 2025-03-19 LAB
ATRIAL RATE - MUSE: 87 BPM
DIASTOLIC BLOOD PRESSURE - MUSE: 86 MMHG
INTERPRETATION ECG - MUSE: NORMAL
P AXIS - MUSE: 58 DEGREES
PR INTERVAL - MUSE: 172 MS
QRS DURATION - MUSE: 142 MS
QT - MUSE: 412 MS
QTC - MUSE: 495 MS
R AXIS - MUSE: -47 DEGREES
SYSTOLIC BLOOD PRESSURE - MUSE: 156 MMHG
T AXIS - MUSE: 22 DEGREES
VENTRICULAR RATE- MUSE: 87 BPM

## 2025-03-24 ENCOUNTER — TRANSFERRED RECORDS (OUTPATIENT)
Dept: HEALTH INFORMATION MANAGEMENT | Facility: CLINIC | Age: 75
End: 2025-03-24

## 2025-04-14 ENCOUNTER — TRANSFERRED RECORDS (OUTPATIENT)
Dept: HEALTH INFORMATION MANAGEMENT | Facility: CLINIC | Age: 75
End: 2025-04-14

## 2025-05-01 DIAGNOSIS — Z95.0 CARDIAC PACEMAKER IN SITU: Primary | ICD-10-CM

## 2025-05-01 DIAGNOSIS — I49.5 SICK SINUS SYNDROME (H): ICD-10-CM

## 2025-05-07 NOTE — PROGRESS NOTES
HEART CARE ELECTROPHYSIOLOGY NOTE      [facility]  [phone]      Assessment/Recommendations   Assessment/Plan:  1.  Paroxysmal atrial fibrillation: 1 episode of AF-RVR in 1/2025 lasting 4.5 hours.  We discussed the natural progression of atrial fibrillation and treatment options of antiarrhythmic medications versus repeat ablation.  He has had at least mild side effects on sotalol.  Discussed avoidance of possible side effects  -- Continue on sotalol 80 mg twice daily  -- Consideration for repeat ablation     He was reassured that atrial fibrillation is not life-threatening, but carries an increased risk for stroke.  He has a [id number]-LTEq score of 5 for age 65-74, hypertension, possible TIA, and vascular disease.  He underwent left atrial appendage closure with the Watchman FLX device as part of the Option study done at [place] on 2/4/2020.  Possible TIA 3/13/2025--no A-fib around that time per pacemaker interrogation.      --Currently on DAPT due to PCI, then aspirin 81 mg daily indefinitely.      2.  Sick sinus syndrome status post dual-chamber pacemaker implant:  The pacemaker was interrogated and is functioning appropriately.  The battery showed estimated longevity of 9.1 years.  Atrial and ventricular lead sensing, impedance, and pacing thresholds were stable and within normal limits.  He has had no further symptoms of syncope since his pacemaker implant.      -- Routine pacemaker follow-up     3.  Hypertension: Controlled.     4.  Coronary artery disease: PCI to LAD 5/17/2024.  Significant symptomatic improvement after PCI.  Denies anginal symptoms.  DAPT for 12 months postprocedure.  High intensity statin.    Follow up with me 6 months  Follow up with Dr. Houser 6/2/2025       History of Present Illness/Subjective    HPI: Ashok Del Rosario is a 74 year old male who comes in today companied by his wife for EP device and arrhythmia follow-up.  He has a history of sinus bradycardia  status post dual-chamber pacemaker implant on 12/31/2018, TIA, atrial fibrillation, carotid artery stenosis, hypertension, hyperlipidemia, anxiety, mild obstructive sleep apnea, and emphysema.  Seen in the ED 3/13/2025 for possible TIA (10-15 minute episode of absence, though followed directions).    Arrhythmia history  Dx/date: diagnosed with atrial fibrillation with rapid ventricular response 1/11/2019.  Prolonged episode of AF with very rapid ventricular response 2/2022-started on sotalol.  Sx: achypalpitations, lightheadedness, and shortness of breath  [id number] score: 5 for age 65-74, hypertension, possible TIA, and vascular disease  Oral anticoagulation: s/p left atrial appendage closure with the Watchman FLX (Option study) on 2/4/2020 at [place].  Aspirin 81 mg daily  Antiarrhythmic medications, AV bro blocking agents: Briefly on flecainide prior to ablation.  Sotalol 80 mg twice daily (7/25/2022 to present)  Procedures  DCCV: 1/11/2019 (ED)  Ablation: 8/29/2019 at [place] (PVI)     Pat states that he is feeling very well.  He denies palpitations, abdominal fullness/bloating or peripheral edema, shortness of breath at rest, paroxysmal nocturnal dyspnea, orthopnea, headedness, dizziness, pre-syncope, or syncope.        Cardiographics (EKGs and device interrogation personally reviewed):  EKG done 3/13/2025 shows sinus rhythm at 87 bpm, PVCs, RBBB,  ms, QT/QTc 412/495 ms  EKG done 5/7/2024 shows sinus rhythm at 68 bpm, RBBB,  ms, QT/QTc 484/514 ms  EKG done 6/2/2023 shows sinus rhythm at 66 bpm, right bundle branch block,  ms, QT/QTc 470/492 ms, though shorter on manual measurement  EKG done 7/29/2022 shows atrial pacing with intrinsic ventricular conduction at 60 bpm, right bundle branch block,  ms, QT/QTc is 474/474 ms  EKG done 5/25/2019 shows atrial fibrillation with rapid ventricular response at 139 bpm   EKG, Done 1/18/2019 starts in A. fib with transition to sinus rhythm  EKG  done 1/14/2019 shows sinus rhythm at 89 bpm, incomplete right bundle branch block, QT/QTc interval measures 378/459 ms  EKG done 1/11/2019 shows atrial fibrillation with rapid ventricular response at 141 bpm.  Subsequent EKG shows sinus tachycardia at 105 bpm with an incomplete right bundle branch block     Pacemaker Interrogation, in clinic (MDT implant 12/31/2018):  Pacing mode: MVP   Presenting rhythm: AS-VS 63 bpm.   Underlying rhythm: SR 60 bpm  A-paced: 9.2%.  V-paced 0.4%.  Battery: estimated longevity 9.1 years.  Atrial and Ventricular leads: Stable with good sensing, impedance, and pacing thresholds  Arrhythmias: AF-RVR x 4.5 hours on 1/3/2025. Since 5/29/2024, 4 VHRs logged; 6 beats @ 176 bpm, 3 EGMs suggest 12 seconds or less of SVT @ 150's bpm.   [place]: <0.1%   Histograms: Good rate distribution     JOVANI done 1/23/2021 at [facility]:  1. The baseline ECG demonstrated sinus rhythm with a rate of 82 bpm.  2. The WATCHMAN device is visualized and is well-expanded within the left atrial appendage.  3. Small residual jet on the postero-inferiorior aspect of the device (jet size 2 mm; JOVANI angle  139 degrees; clip #61). The remainder of the WATCHMAN device margin appears well sealed.  4. No intracardiac mass or thrombus identified.  No thrombus on the atrial face of the WATCHMAN  device.  5. No pericardial effusion.  6. No shunt at atrial level by color flow imaging and agitated saline contrast injection.  7. Normal left ventricular chamber size.  Left ventricular ejection fraction 60%.  8. 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  9. Normal right ventricular chamber size and systolic function.  10. Moderate tricuspid valve regurgitation.     CTA pulmonary veings done 11/20/2019 at [facility]:   PULMONARY VEINS:  Two right and two left pulmonary veins enter the left atrium unobstructed. No  evidence of pulmonary vein stenosis.     CARDIOVASCULAR FINDINGS:     Presumed focal  dissection with nearby ulcerated plaque in the left subclavian  artery, likely unchanged. Scattered calcified and noncalcified plaque in the  remainder of the thoracic aorta and visualized arch vessels. Severe coronary  calcification. No intracardiac mass or thrombus. Left atrial enlargement.    ADDITIONAL FINDINGS:      Focal peripheral hyperenhancement in hepatic segment 4A, indeterminate but  likely benign. Left anterior chest wall dual chamber pacemaker. Redemonstrated  centrilobular and paraseptal emphysema with decreased fluid and debris within  the previously noted left lower lobe bulla/cavity; this lesion remains somewhat  thick-walled.     Coronary angiogram done 5/17/2024:    Mid LAD to Dist LAD lesion is 40% stenosed.    Mid Cx lesion is 40% stenosed.    3rd Mrg lesion is 30% stenosed.    LPAV lesion is 50% stenosed.    Ost LM to Mid LM lesion is 40% stenosed.    Ost LAD to Mid LAD lesion is 95% stenosed.     1.  Diffuse coronary calcification with 30 to 40% its eccentric calcified narrowing mid left main, large caliber vessel with large residual lumen, severe calcification in the proximal third of the LAD and segmental calcified mild to moderate stenoses in the dominant left circumflex system.  Focal 60% stenosis proximal LAD and 95% segment of severe calcific stenosis junction of the proximal and mid LAD (culprit lesion).  2.  Dominant left circumflex with 30 to 40% eccentric calcification proximally.  No severe stenoses.  3.  RCA small nondominant otherwise normal.  4.  Successful PCI proximal LAD with rotational atherectomy, PTCA, shockwave lithotripsy, and Synergy 3.5 x 38 JESSICA implant, postdilated with 3.75 NC balloon to 16 manav.  0% residual, YASIR-3 flow.    I have reviewed and updated the patient's Past Medical History, Social History, Family History and Medication List.  Outside records personally reviewed.     Physical Examination  Review of Systems   Vitals: BP (!) 154/82 (BP Location: Right arm,  Patient Position: Sitting, Cuff Size: Adult Large)   Pulse 60   Resp 16   Wt 96.2 kg (212 lb)   SpO2 97%   BMI 33.20 kg/m    BMI= Body mass index is 33.2 kg/m .  Wt Readings from Last 3 Encounters:   05/08/25 96.2 kg (212 lb)   03/13/25 95.3 kg (210 lb)   07/12/24 91.2 kg (201 lb)       General Appearance:   Alert, well-appearing and in no acute distress.   HEENT: Atraumatic, normocephalic.  No scleral icterus, normal conjunctivae, EOMs intact, PERRL.  Mucous membranes pink and moist.   Chest/Lungs:   Chest symmetric, spine straight.  Respirations unlabored.  Lungs are clear to auscultation.  Left subclavian pacemaker site with no sign of infection or tissue thinning.   Cardiovascular:   Regular rate and rhythm.  Normal first and second heart sounds with no murmurs, rubs, or gallops; radial pulses are intact, No edema.   Abdomen:  Soft, nondistended.   Extremities: No cyanosis or clubbing.   Musculoskeletal: Moves all extremities.     Skin: Warm, dry, intact.    Neurologic: Mood and affect are appropriate.  Alert and oriented to person, place, time, and situation.        ROS: 10 point ROS neg other than the symptoms noted above in the HPI.         Medical History  Surgical History Family History Social History   Past Medical History:   Diagnosis Date    Anxiety about health     per [facility]    Dyslipidemia     GERD (gastroesophageal reflux disease)     Hypertension     Nonocclusive coronary atherosclerosis of native coronary artery 10/17/2017    Paroxysmal atrial fibrillation (H) 01/14/2019    Dx [id number] score = 5 (age, HTN, CAD, TIA 2) Rx flecainide Rx Xarelto    Pulmonary emphysema (H) 11/21/2019    per [place]    Right bundle branch block 08/29/2019    Solitary pulmonary nodule 11/21/2019    per [place]    Syncope 01/18/2019    TIA (transient ischemic attack) 12/30/2018     Past Surgical History:   Procedure Laterality Date    APPENDECTOMY  2000    CARDIAC ELECTROPHYSIOLOGY MAPPING AND ABLATION  08/29/2019     PVI done at [facility]    CV CORONARY ANGIOGRAM N/A 10/17/2017    Procedure: Coronary Angiogram;  Surgeon: Ashok Hyatt MD;  Location: [facility] Cath Lab;  Service:     CV CORONARY ANGIOGRAM N/A 2024    Procedure: Coronary Angiogram;  Surgeon: Matias Scales MD;  Location: [facility] CV    CV LEFT HEART CATH N/A 2024    Procedure: Left Heart Catheterization;  Surgeon: Matias Scales MD;  Location: [facility] CV    CV LEFT HEART CATHETERIZATION WITH LEFT VENTRICULOGRAM N/A 10/17/2017    Procedure: Left Heart Catheterization with Left Ventriculogram;  Surgeon: Ashok Hyatt MD;  Location: [facility] Cath Lab;  Service:     CV PCI N/A 2024    Procedure: Percutaneous Coronary Intervention;  Surgeon: Matias Scales MD;  Location: [facility] CV    CV PCI ATHERECTOMY ORBITAL N/A 2024    Procedure: Percutaneous Coronary Intervention - Atherectomy Rotational;  Surgeon: Matias Scales MD;  Location: [facility] CV    EP PACEMAKER INSERT Left 2018    Procedure: EP Pacemaker Insertion;  Surgeon: Micheal Junior MD;  Location: [facility] Cath Lab;  Service: Cardiology    OTHER SURGICAL HISTORY  [facility] [place]     Family History   Problem Relation Age of Onset    Cancer Mother     Cancer Father     Coronary Artery Disease Brother     Coronary Artery Disease Brother     Valvular heart disease Brother     Rectal Cancer Sister     Colon Cancer Sister         Social History     Socioeconomic History    Marital status:      Spouse name: Not on file    Number of children: Not on file    Years of education: Not on file    Highest education level: Not on file   Occupational History    Not on file   Tobacco Use    Smoking status: Former     Current packs/day: 0.00     Types: Cigarettes     Quit date: 10/16/1999     Years since quittin.5    Smokeless tobacco: Never   Substance and Sexual Activity     Alcohol use: Yes     Alcohol/week: 1.0 standard drink of alcohol     Comment: Alcoholic Drinks/day: none since december    Drug use: No    Sexual activity: Yes     Partners: Female   Other Topics Concern    Not on file   Social History Narrative    Not on file     Social Drivers of Health     Financial Resource Strain: Medium Risk (5/23/2020)    Received from [facility]    Overall Financial Resource Strain (CARDIA)     Difficulty of Paying Living Expenses: Somewhat hard   Food Insecurity: No Food Insecurity (5/23/2020)    Received from [facility]    Hunger Vital Sign     Worried About Running Out of Food in the Last Year: Never true     Ran Out of Food in the Last Year: Never true   Transportation Needs: No Transportation Needs (5/23/2020)    Received from [facility]    PRAPARE - Transportation     Lack of Transportation (Medical): No     Lack of Transportation (Non-Medical): No   Physical Activity: Sufficiently Active (5/23/2020)    Received from [facility]    Exercise Vital Sign     Days of Exercise per Week: 5 days     Minutes of Exercise per Session: 40 min   Stress: No Stress Concern Present (5/23/2020)    Received from [facility]    [place] [employer] - Occupational Stress Questionnaire     Feeling of Stress : Not at all   Social Connections: Unknown (5/23/2020)    Received from [facility]    Social Connection and Isolation Panel [NHANES]     Frequency of Communication with Friends and Family: More than three times a week     Frequency of Social Gatherings with Friends and Family: Once a week     Attends Druze Services: 1 to 4 times per year     Active Member of Clubs or Organizations: Yes     Attends Club or Organization Meetings: 1 to 4 times per year     Marital Status: Not on file   Interpersonal Safety: Not on file   Housing Stability: Not on file           Medications  Allergies   Current Outpatient Medications   Medication Sig Dispense Refill    acetaminophen (TYLENOL)  500 MG tablet Take 500-1,000 mg by mouth every 6 hours as needed for mild pain.      aspirin (ASPIR-81) 81 MG EC tablet [ASPIRIN (ASPIR-81) 81 MG EC TABLET] Take 81 mg by mouth daily.       atorvastatin (LIPITOR) 80 MG tablet Take 80 mg by mouth at bedtime.      carboxymethylcellulose (REFRESH PLUS) 0.5 % SOLN ophthalmic solution Place 1 drop into the right eye At Bedtime      clopidogrel (PLAVIX) 75 MG tablet Take 1 tablet (75 mg) by mouth daily. 90 tablet 0    ferrous sulfate (FE TABS) 325 (65 Fe) MG EC tablet Take 325 mg by mouth every other day.      losartan (COZAAR) 25 MG tablet Take 1 tablet (25 mg) by mouth daily 90 tablet 3    neomycin-polymyxin-dexAMETHasone (MAXITROL) 3.5-90997-1.1 ophthalmic ointment Place 0.5 inches into both eyes 2 times daily as needed (.). 1 APPLICATION INTO THE LOWER EYELID OF AFFECTED EYE OPHTHALMIC 2 TIMES DAILY AS NEEDED      nitroGLYcerin (NITROSTAT) 0.4 MG sublingual tablet Place 0.4 mg under the tongue every 5 minutes as needed for chest pain. For chest pain place 1 tablet under the tongue every 5 minutes for 3 doses. If symptoms persist 5 minutes after 1st dose call 911.      pantoprazole (PROTONIX) 40 MG EC tablet Take 1 tablet (40 mg) by mouth daily 90 tablet 3    sotalol (BETAPACE) 80 MG tablet Take 1 tablet (80 mg) by mouth 2 times daily. 180 tablet 3       Allergies   Allergen Reactions    Cephalexin Rash          Lab Results    Chemistry/lipid CBC Cardiac Enzymes/BNP/TSH/INR   Recent Labs   Lab Test 03/13/25 1849 12/03/24  0941   CHOL 139 129   HDL 30* 30*   LDL 77 76   TRIG 158* 117     Recent Labs   Lab Test 03/13/25  1849 12/03/24  0941    140   POTASSIUM 3.8 4.9   CHLORIDE 108* 106   CO2 24 23   ANIONGAP 10 11   * 112*   BUN 6.8* 7.0*   CR 0.83 1.02   ESSENCE 10.1 9.6      CBC RESULTS:   Recent Labs   Lab Test 03/13/25 1849   WBC 10.3   RBC 4.57   HGB 11.7*   HCT 36.1*   MCV 79   MCH 25.6*   MCHC 32.4   RDW 13.2                 TSH   Date Value Ref  Range Status   03/13/2025 0.69 0.30 - 4.20 uIU/mL Final   07/08/2020 0.53 0.30 - 5.00 uIU/mL Final          The longitudinal plan of care for the diagnosis(es)/condition(s) as documented were addressed during this visit. Due to the added complexity in care, I will continue to support Ashok in the subsequent management and with ongoing continuity of care.

## 2025-05-08 ENCOUNTER — OFFICE VISIT (OUTPATIENT)
Dept: CARDIOLOGY | Facility: CLINIC | Age: 75
End: 2025-05-08
Attending: INTERNAL MEDICINE
Payer: MEDICARE

## 2025-05-08 VITALS
HEART RATE: 60 BPM | BODY MASS INDEX: 33.2 KG/M2 | WEIGHT: 212 LBS | SYSTOLIC BLOOD PRESSURE: 154 MMHG | RESPIRATION RATE: 16 BRPM | DIASTOLIC BLOOD PRESSURE: 82 MMHG | OXYGEN SATURATION: 97 %

## 2025-05-08 DIAGNOSIS — I48.0 PAROXYSMAL ATRIAL FIBRILLATION (H): Primary | ICD-10-CM

## 2025-05-08 DIAGNOSIS — I25.110 CORONARY ARTERY DISEASE INVOLVING NATIVE CORONARY ARTERY OF NATIVE HEART WITH UNSTABLE ANGINA PECTORIS (H): ICD-10-CM

## 2025-05-08 DIAGNOSIS — Z95.0 PACEMAKER: ICD-10-CM

## 2025-05-08 DIAGNOSIS — Z95.818 PRESENCE OF LEFT ATRIAL APPENDAGE CLOSURE DEVICE COMPOSED OF NICKEL-TITANIUM ALLOY WITH POLYETHYLENE TEREPHTHALATE MEMBRANE: ICD-10-CM

## 2025-05-08 DIAGNOSIS — I10 ESSENTIAL HYPERTENSION: ICD-10-CM

## 2025-05-08 DIAGNOSIS — I49.5 SICK SINUS SYNDROME (H): ICD-10-CM

## 2025-05-08 DIAGNOSIS — Z95.0 CARDIAC PACEMAKER IN SITU: ICD-10-CM

## 2025-05-08 LAB
MDC_IDC_EPISODE_DTM: NORMAL
MDC_IDC_EPISODE_DURATION: 0 S
MDC_IDC_EPISODE_DURATION: 0 S
MDC_IDC_EPISODE_DURATION: 12 S
MDC_IDC_EPISODE_DURATION: 63 S
MDC_IDC_EPISODE_ID: 664
MDC_IDC_EPISODE_ID: 665
MDC_IDC_EPISODE_ID: 666
MDC_IDC_EPISODE_ID: 667
MDC_IDC_EPISODE_TYPE: NORMAL
MDC_IDC_EPISODE_TYPE_INDUCED: NO
MDC_IDC_LEAD_CONNECTION_STATUS: NORMAL
MDC_IDC_LEAD_CONNECTION_STATUS: NORMAL
MDC_IDC_LEAD_IMPLANT_DT: NORMAL
MDC_IDC_LEAD_IMPLANT_DT: NORMAL
MDC_IDC_LEAD_LOCATION: NORMAL
MDC_IDC_LEAD_LOCATION: NORMAL
MDC_IDC_LEAD_LOCATION_DETAIL_1: NORMAL
MDC_IDC_LEAD_LOCATION_DETAIL_1: NORMAL
MDC_IDC_LEAD_MFG: NORMAL
MDC_IDC_LEAD_MFG: NORMAL
MDC_IDC_LEAD_MODEL: NORMAL
MDC_IDC_LEAD_MODEL: NORMAL
MDC_IDC_LEAD_POLARITY_TYPE: NORMAL
MDC_IDC_LEAD_POLARITY_TYPE: NORMAL
MDC_IDC_LEAD_SERIAL: NORMAL
MDC_IDC_LEAD_SERIAL: NORMAL
MDC_IDC_LEAD_SPECIAL_FUNCTION: NORMAL
MDC_IDC_LEAD_SPECIAL_FUNCTION: NORMAL
MDC_IDC_MSMT_BATTERY_DTM: NORMAL
MDC_IDC_MSMT_BATTERY_REMAINING_LONGEVITY: 110 MO
MDC_IDC_MSMT_BATTERY_RRT_TRIGGER: 2.62
MDC_IDC_MSMT_BATTERY_STATUS: NORMAL
MDC_IDC_MSMT_BATTERY_VOLTAGE: 2.99 V
MDC_IDC_MSMT_LEADCHNL_RA_IMPEDANCE_VALUE: 304 OHM
MDC_IDC_MSMT_LEADCHNL_RA_IMPEDANCE_VALUE: 361 OHM
MDC_IDC_MSMT_LEADCHNL_RA_IMPEDANCE_VALUE: 361 OHM
MDC_IDC_MSMT_LEADCHNL_RA_PACING_THRESHOLD_AMPLITUDE: 0.88 V
MDC_IDC_MSMT_LEADCHNL_RA_PACING_THRESHOLD_AMPLITUDE: 1 V
MDC_IDC_MSMT_LEADCHNL_RA_PACING_THRESHOLD_PULSEWIDTH: 0.4 MS
MDC_IDC_MSMT_LEADCHNL_RA_PACING_THRESHOLD_PULSEWIDTH: 0.4 MS
MDC_IDC_MSMT_LEADCHNL_RA_SENSING_INTR_AMPL: 1.25 MV
MDC_IDC_MSMT_LEADCHNL_RA_SENSING_INTR_AMPL: 1.25 MV
MDC_IDC_MSMT_LEADCHNL_RA_SENSING_INTR_AMPL: 1.4 MV
MDC_IDC_MSMT_LEADCHNL_RV_IMPEDANCE_VALUE: 342 OHM
MDC_IDC_MSMT_LEADCHNL_RV_IMPEDANCE_VALUE: 418 OHM
MDC_IDC_MSMT_LEADCHNL_RV_IMPEDANCE_VALUE: 418 OHM
MDC_IDC_MSMT_LEADCHNL_RV_PACING_THRESHOLD_AMPLITUDE: 1.12 V
MDC_IDC_MSMT_LEADCHNL_RV_PACING_THRESHOLD_AMPLITUDE: 1.25 V
MDC_IDC_MSMT_LEADCHNL_RV_PACING_THRESHOLD_PULSEWIDTH: 0.4 MS
MDC_IDC_MSMT_LEADCHNL_RV_PACING_THRESHOLD_PULSEWIDTH: 0.4 MS
MDC_IDC_MSMT_LEADCHNL_RV_SENSING_INTR_AMPL: 1 MV
MDC_IDC_MSMT_LEADCHNL_RV_SENSING_INTR_AMPL: 2.38 MV
MDC_IDC_MSMT_LEADCHNL_RV_SENSING_INTR_AMPL: 2.5 MV
MDC_IDC_PG_IMPLANT_DTM: NORMAL
MDC_IDC_PG_MFG: NORMAL
MDC_IDC_PG_MODEL: NORMAL
MDC_IDC_PG_SERIAL: NORMAL
MDC_IDC_PG_TYPE: NORMAL
MDC_IDC_SESS_CLINIC_NAME: NORMAL
MDC_IDC_SESS_DTM: NORMAL
MDC_IDC_SESS_TYPE: NORMAL
MDC_IDC_SET_BRADY_AT_MODE_SWITCH_RATE: 171 {BEATS}/MIN
MDC_IDC_SET_BRADY_HYSTRATE: NORMAL
MDC_IDC_SET_BRADY_LOWRATE: 60 {BEATS}/MIN
MDC_IDC_SET_BRADY_MAX_SENSOR_RATE: 130 {BEATS}/MIN
MDC_IDC_SET_BRADY_MAX_TRACKING_RATE: 130 {BEATS}/MIN
MDC_IDC_SET_BRADY_MODE: NORMAL
MDC_IDC_SET_BRADY_PAV_DELAY_LOW: 180 MS
MDC_IDC_SET_BRADY_SAV_DELAY_LOW: 150 MS
MDC_IDC_SET_LEADCHNL_RA_PACING_AMPLITUDE: 1.5 V
MDC_IDC_SET_LEADCHNL_RA_PACING_ANODE_ELECTRODE_1: NORMAL
MDC_IDC_SET_LEADCHNL_RA_PACING_ANODE_LOCATION_1: NORMAL
MDC_IDC_SET_LEADCHNL_RA_PACING_CAPTURE_MODE: NORMAL
MDC_IDC_SET_LEADCHNL_RA_PACING_CATHODE_ELECTRODE_1: NORMAL
MDC_IDC_SET_LEADCHNL_RA_PACING_CATHODE_LOCATION_1: NORMAL
MDC_IDC_SET_LEADCHNL_RA_PACING_POLARITY: NORMAL
MDC_IDC_SET_LEADCHNL_RA_PACING_PULSEWIDTH: 0.4 MS
MDC_IDC_SET_LEADCHNL_RA_SENSING_ANODE_ELECTRODE_1: NORMAL
MDC_IDC_SET_LEADCHNL_RA_SENSING_ANODE_LOCATION_1: NORMAL
MDC_IDC_SET_LEADCHNL_RA_SENSING_CATHODE_ELECTRODE_1: NORMAL
MDC_IDC_SET_LEADCHNL_RA_SENSING_CATHODE_LOCATION_1: NORMAL
MDC_IDC_SET_LEADCHNL_RA_SENSING_POLARITY: NORMAL
MDC_IDC_SET_LEADCHNL_RA_SENSING_SENSITIVITY: 0.3 MV
MDC_IDC_SET_LEADCHNL_RV_PACING_AMPLITUDE: 1.75 V
MDC_IDC_SET_LEADCHNL_RV_PACING_ANODE_ELECTRODE_1: NORMAL
MDC_IDC_SET_LEADCHNL_RV_PACING_ANODE_LOCATION_1: NORMAL
MDC_IDC_SET_LEADCHNL_RV_PACING_CAPTURE_MODE: NORMAL
MDC_IDC_SET_LEADCHNL_RV_PACING_CATHODE_ELECTRODE_1: NORMAL
MDC_IDC_SET_LEADCHNL_RV_PACING_CATHODE_LOCATION_1: NORMAL
MDC_IDC_SET_LEADCHNL_RV_PACING_POLARITY: NORMAL
MDC_IDC_SET_LEADCHNL_RV_PACING_PULSEWIDTH: 0.4 MS
MDC_IDC_SET_LEADCHNL_RV_SENSING_ANODE_ELECTRODE_1: NORMAL
MDC_IDC_SET_LEADCHNL_RV_SENSING_ANODE_LOCATION_1: NORMAL
MDC_IDC_SET_LEADCHNL_RV_SENSING_CATHODE_ELECTRODE_1: NORMAL
MDC_IDC_SET_LEADCHNL_RV_SENSING_CATHODE_LOCATION_1: NORMAL
MDC_IDC_SET_LEADCHNL_RV_SENSING_POLARITY: NORMAL
MDC_IDC_SET_LEADCHNL_RV_SENSING_SENSITIVITY: 0.45 MV
MDC_IDC_SET_ZONE_DETECTION_INTERVAL: 350 MS
MDC_IDC_SET_ZONE_DETECTION_INTERVAL: 400 MS
MDC_IDC_SET_ZONE_STATUS: NORMAL
MDC_IDC_SET_ZONE_STATUS: NORMAL
MDC_IDC_SET_ZONE_TYPE: NORMAL
MDC_IDC_SET_ZONE_VENDOR_TYPE: NORMAL
MDC_IDC_STAT_AT_BURDEN_PERCENT: 0 %
MDC_IDC_STAT_AT_DTM_END: NORMAL
MDC_IDC_STAT_AT_DTM_START: NORMAL
MDC_IDC_STAT_BRADY_AP_VP_PERCENT: 0.18 %
MDC_IDC_STAT_BRADY_AP_VS_PERCENT: 9.15 %
MDC_IDC_STAT_BRADY_AS_VP_PERCENT: 0.19 %
MDC_IDC_STAT_BRADY_AS_VS_PERCENT: 90.48 %
MDC_IDC_STAT_BRADY_DTM_END: NORMAL
MDC_IDC_STAT_BRADY_DTM_START: NORMAL
MDC_IDC_STAT_BRADY_RA_PERCENT_PACED: 9.16 %
MDC_IDC_STAT_BRADY_RV_PERCENT_PACED: 0.37 %
MDC_IDC_STAT_EPISODE_RECENT_COUNT: 0
MDC_IDC_STAT_EPISODE_RECENT_COUNT: 3
MDC_IDC_STAT_EPISODE_RECENT_COUNT_DTM_END: NORMAL
MDC_IDC_STAT_EPISODE_RECENT_COUNT_DTM_START: NORMAL
MDC_IDC_STAT_EPISODE_TOTAL_COUNT: 0
MDC_IDC_STAT_EPISODE_TOTAL_COUNT: 0
MDC_IDC_STAT_EPISODE_TOTAL_COUNT: 22
MDC_IDC_STAT_EPISODE_TOTAL_COUNT: 249
MDC_IDC_STAT_EPISODE_TOTAL_COUNT: 263
MDC_IDC_STAT_EPISODE_TOTAL_COUNT_DTM_END: NORMAL
MDC_IDC_STAT_EPISODE_TOTAL_COUNT_DTM_START: NORMAL
MDC_IDC_STAT_EPISODE_TYPE: NORMAL
MDC_IDC_STAT_TACHYTHERAPY_RECENT_DTM_END: NORMAL
MDC_IDC_STAT_TACHYTHERAPY_RECENT_DTM_START: NORMAL
MDC_IDC_STAT_TACHYTHERAPY_TOTAL_DTM_END: NORMAL
MDC_IDC_STAT_TACHYTHERAPY_TOTAL_DTM_START: NORMAL

## 2025-05-08 RX ORDER — SOTALOL HYDROCHLORIDE 80 MG/1
80 TABLET ORAL 2 TIMES DAILY
Qty: 180 TABLET | Refills: 3 | Status: SHIPPED | OUTPATIENT
Start: 2025-05-08

## 2025-05-08 RX ORDER — FERROUS SULFATE 325(65) MG
325 TABLET, DELAYED RELEASE (ENTERIC COATED) ORAL EVERY OTHER DAY
COMMUNITY

## 2025-05-08 NOTE — PATIENT INSTRUCTIONS
Ashok Del Rosario,    It was a pleasure to see you today at the [facility].     My recommendations after this visit include:    Continue sotalol  Consider repeat ablation    Follow up with me in 6 months, or sooner if needed    Suzan Waddell, CNP  [facility], Electrophysiology  [phone]  EP nurses [phone]

## 2025-05-08 NOTE — LETTER
5/8/2025    Bj Hendrickson MD  [address] [place] [address]    RE: Ashok Del Rosario       Dear Colleague,     I had the pleasure of seeing Ashok Del Rosario in the [facility].    HEART CARE ELECTROPHYSIOLOGY NOTE      [facility]  [phone]      Assessment/Recommendations   Assessment/Plan:  1.  Paroxysmal atrial fibrillation: 1 episode of AF-RVR in 1/2025 lasting 4.5 hours.  We discussed the natural progression of atrial fibrillation and treatment options of antiarrhythmic medications versus repeat ablation.  He has had at least mild side effects on sotalol.  Discussed avoidance of possible side effects  -- Continue on sotalol 80 mg twice daily  -- Consideration for repeat ablation     He was reassured that atrial fibrillation is not life-threatening, but carries an increased risk for stroke.  He has a [id number] score of 5 for age 65-74, hypertension, possible TIA, and vascular disease.  He underwent left atrial appendage closure with the Watchman FLX device as part of the Option study done at [place] on 2/4/2020.  Possible TIA 3/13/2025--no A-fib around that time per pacemaker interrogation.      --Currently on DAPT due to PCI, then aspirin 81 mg daily indefinitely.      2.  Sick sinus syndrome status post dual-chamber pacemaker implant:  The pacemaker was interrogated and is functioning appropriately.  The battery showed estimated longevity of 9.1 years.  Atrial and ventricular lead sensing, impedance, and pacing thresholds were stable and within normal limits.  He has had no further symptoms of syncope since his pacemaker implant.      -- Routine pacemaker follow-up     3.  Hypertension: Controlled.     4.  Coronary artery disease: PCI to LAD 5/17/2024.  Significant symptomatic improvement after PCI.  Denies anginal symptoms.  DAPT for 12 months postprocedure.  High intensity statin.    Follow up with me 6 months  Follow up with Dr. Houser 6/2/2025        History of Present Illness/Subjective    HPI: Ashok Del Rosario is a 74 year old male who comes in today companied by his wife for EP device and arrhythmia follow-up.  He has a history of sinus bradycardia status post dual-chamber pacemaker implant on 12/31/2018, TIA, atrial fibrillation, carotid artery stenosis, hypertension, hyperlipidemia, anxiety, mild obstructive sleep apnea, and emphysema.  Seen in the ED 3/13/2025 for possible TIA (10-15 minute episode of absence, though followed directions).    Arrhythmia history  Dx/date: diagnosed with atrial fibrillation with rapid ventricular response 1/11/2019.  Prolonged episode of AF with very rapid ventricular response 2/2022-started on sotalol.  Sx: achypalpitations, lightheadedness, and shortness of breath  [id number] score: 5 for age 65-74, hypertension, possible TIA, and vascular disease  Oral anticoagulation: s/p left atrial appendage closure with the Watchman FLX (Option study) on 2/4/2020 at [place].  Aspirin 81 mg daily  Antiarrhythmic medications, AV bro blocking agents: Briefly on flecainide prior to ablation.  Sotalol 80 mg twice daily (7/25/2022 to present)  Procedures  DCCV: 1/11/2019 (ED)  Ablation: 8/29/2019 at [place] (PVI)     Annika states that he is feeling very well.  He denies palpitations, abdominal fullness/bloating or peripheral edema, shortness of breath at rest, paroxysmal nocturnal dyspnea, orthopnea, headedness, dizziness, pre-syncope, or syncope.        Cardiographics (EKGs and device interrogation personally reviewed):  EKG done 3/13/2025 shows sinus rhythm at 87 bpm, PVCs, RBBB,  ms, QT/QTc 412/495 ms  EKG done 5/7/2024 shows sinus rhythm at 68 bpm, RBBB,  ms, QT/QTc 484/514 ms  EKG done 6/2/2023 shows sinus rhythm at 66 bpm, right bundle branch block,  ms, QT/QTc 470/492 ms, though shorter on manual measurement  EKG done 7/29/2022 shows atrial pacing with intrinsic ventricular conduction at 60 bpm, right bundle  branch block,  ms, QT/QTc is 474/474 ms  EKG done 5/25/2019 shows atrial fibrillation with rapid ventricular response at 139 bpm   EKG, Done 1/18/2019 starts in A. fib with transition to sinus rhythm  EKG done 1/14/2019 shows sinus rhythm at 89 bpm, incomplete right bundle branch block, QT/QTc interval measures 378/459 ms  EKG done 1/11/2019 shows atrial fibrillation with rapid ventricular response at 141 bpm.  Subsequent EKG shows sinus tachycardia at 105 bpm with an incomplete right bundle branch block     Pacemaker Interrogation, in clinic (MDT implant 12/31/2018):  Pacing mode: MVP   Presenting rhythm: AS-VS 63 bpm.   Underlying rhythm: SR 60 bpm  A-paced: 9.2%.  V-paced 0.4%.  Battery: estimated longevity 9.1 years.  Atrial and Ventricular leads: Stable with good sensing, impedance, and pacing thresholds  Arrhythmias: AF-RVR x 4.5 hours on 1/3/2025. Since 5/29/2024, 4 VHRs logged; 6 beats @ 176 bpm, 3 EGMs suggest 12 seconds or less of SVT @ 150's bpm.   Sophia: <0.1%   Histograms: Good rate distribution     JOVANI done 1/23/2021 at [place]:  1. The baseline ECG demonstrated sinus rhythm with a rate of 82 bpm.  2. The WATCHMAN device is visualized and is well-expanded within the left atrial appendage.  3. Small residual jet on the postero-inferiorior aspect of the device (jet size 2 mm; JOVANI angle  139 degrees; clip #61). The remainder of the WATCHMAN device margin appears well sealed.  4. No intracardiac mass or thrombus identified.  No thrombus on the atrial face of the WATCHMAN  device.  5. No pericardial effusion.  6. No shunt at atrial level by color flow imaging and agitated saline contrast injection.  7. Normal left ventricular chamber size.  Left ventricular ejection fraction 60%.  8. 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  9. Normal right ventricular chamber size and systolic function.  10. Moderate tricuspid valve regurgitation.     CTA pulmonary veings done  11/20/2019 at [place]:   PULMONARY VEINS:  Two right and two left pulmonary veins enter the left atrium unobstructed. No  evidence of pulmonary vein stenosis.     CARDIOVASCULAR FINDINGS:     Presumed focal dissection with nearby ulcerated plaque in the left subclavian  artery, likely unchanged. Scattered calcified and noncalcified plaque in the  remainder of the thoracic aorta and visualized arch vessels. Severe coronary  calcification. No intracardiac mass or thrombus. Left atrial enlargement.    ADDITIONAL FINDINGS:      Focal peripheral hyperenhancement in hepatic segment 4A, indeterminate but  likely benign. Left anterior chest wall dual chamber pacemaker. Redemonstrated  centrilobular and paraseptal emphysema with decreased fluid and debris within  the previously noted left lower lobe bulla/cavity; this lesion remains somewhat  thick-walled.     Coronary angiogram done 5/17/2024:     Mid LAD to Dist LAD lesion is 40% stenosed.     Mid Cx lesion is 40% stenosed.     3rd Mrg lesion is 30% stenosed.     LPAV lesion is 50% stenosed.     Ost LM to Mid LM lesion is 40% stenosed.     Ost LAD to Mid LAD lesion is 95% stenosed.     1.  Diffuse coronary calcification with 30 to 40% its eccentric calcified narrowing mid left main, large caliber vessel with large residual lumen, severe calcification in the proximal third of the LAD and segmental calcified mild to moderate stenoses in the dominant left circumflex system.  Focal 60% stenosis proximal LAD and 95% segment of severe calcific stenosis junction of the proximal and mid LAD (culprit lesion).  2.  Dominant left circumflex with 30 to 40% eccentric calcification proximally.  No severe stenoses.  3.  RCA small nondominant otherwise normal.  4.  Successful PCI proximal LAD with rotational atherectomy, PTCA, shockwave lithotripsy, and Synergy 3.5 x 38 JESSICA implant, postdilated with 3.75 NC balloon to 16 manav.  0% residual, YASIR-3 flow.    I have reviewed and updated the  patient's Past Medical History, Social History, Family History and Medication List.  Outside records personally reviewed.     Physical Examination  Review of Systems   Vitals: BP (!) 154/82 (BP Location: Right arm, Patient Position: Sitting, Cuff Size: Adult Large)   Pulse 60   Resp 16   Wt 96.2 kg (212 lb)   SpO2 97%   BMI 33.20 kg/m    BMI= Body mass index is 33.2 kg/m .  Wt Readings from Last 3 Encounters:   05/08/25 96.2 kg (212 lb)   03/13/25 95.3 kg (210 lb)   07/12/24 91.2 kg (201 lb)       General Appearance:   Alert, well-appearing and in no acute distress.   HEENT: Atraumatic, normocephalic.  No scleral icterus, normal conjunctivae, EOMs intact, PERRL.  Mucous membranes pink and moist.   Chest/Lungs:   Chest symmetric, spine straight.  Respirations unlabored.  Lungs are clear to auscultation.  Left subclavian pacemaker site with no sign of infection or tissue thinning.   Cardiovascular:   Regular rate and rhythm.  Normal first and second heart sounds with no murmurs, rubs, or gallops; radial pulses are intact, No edema.   Abdomen:  Soft, nondistended.   Extremities: No cyanosis or clubbing.   Musculoskeletal: Moves all extremities.     Skin: Warm, dry, intact.    Neurologic: Mood and affect are appropriate.  Alert and oriented to person, place, time, and situation.        ROS: 10 point ROS neg other than the symptoms noted above in the HPI.         Medical History  Surgical History Family History Social History   Past Medical History:   Diagnosis Date     Anxiety about health     per [facility]     Dyslipidemia      GERD (gastroesophageal reflux disease)      Hypertension      Nonocclusive coronary atherosclerosis of native coronary artery 10/17/2017     Paroxysmal atrial fibrillation (H) 01/14/2019    Dx [id number] score = 5 (age, HTN, CAD, TIA 2) Rx flecainide Rx Xarelto     Pulmonary emphysema (H) 11/21/2019    per [facility]     Right bundle branch block 08/29/2019     Solitary pulmonary nodule  11/21/2019    per [facility]     Syncope 01/18/2019     TIA (transient ischemic attack) 12/30/2018     Past Surgical History:   Procedure Laterality Date     APPENDECTOMY  2000     CARDIAC ELECTROPHYSIOLOGY MAPPING AND ABLATION  08/29/2019    PVI done at [place]     CV CORONARY ANGIOGRAM N/A 10/17/2017    Procedure: Coronary Angiogram;  Surgeon: Ashok Hyatt MD;  Location: [facility];  Service:      CV CORONARY ANGIOGRAM N/A 5/17/2024    Procedure: Coronary Angiogram;  Surgeon: Matias Scales MD;  Location: [facility] CV     CV LEFT HEART CATH N/A 5/17/2024    Procedure: Left Heart Catheterization;  Surgeon: Matias Scales MD;  Location: [facility] CV     CV LEFT HEART CATHETERIZATION WITH LEFT VENTRICULOGRAM N/A 10/17/2017    Procedure: Left Heart Catheterization with Left Ventriculogram;  Surgeon: Ashok Hyatt MD;  Location: [facility] Lab;  Service:      CV PCI N/A 5/17/2024    Procedure: Percutaneous Coronary Intervention;  Surgeon: Matias Scales MD;  Location: [facility] CV     CV PCI ATHERECTOMY ORBITAL N/A 5/17/2024    Procedure: Percutaneous Coronary Intervention - Atherectomy Rotational;  Surgeon: Matias Scales MD;  Location: [facility] CV     EP PACEMAKER INSERT Left 12/31/2018    Procedure: EP Pacemaker Insertion;  Surgeon: Micheal Junior MD;  Location: [facility] Lab;  Service: Cardiology     OTHER SURGICAL HISTORY  02/04/2020    Left atrial appendage closureWatchman FLX at [place]     Family History   Problem Relation Age of Onset     Cancer Mother      Cancer Father      Coronary Artery Disease Brother      Coronary Artery Disease Brother      Valvular heart disease Brother      Rectal Cancer Sister      Colon Cancer Sister         Social History     Socioeconomic History     Marital status:      Spouse name: Not on file     Number of children: Not on file     Years of education: Not on file     Highest education level: Not on  file   Occupational History     Not on file   Tobacco Use     Smoking status: Former     Current packs/day: 0.00     Types: Cigarettes     Quit date: 10/16/1999     Years since quittin.5     Smokeless tobacco: Never   Substance and Sexual Activity     Alcohol use: Yes     Alcohol/week: 1.0 standard drink of alcohol     Comment: Alcoholic Drinks/day: none since december     Drug use: No     Sexual activity: Yes     Partners: Female   Other Topics Concern     Not on file   Social History Narrative     Not on file     Social Drivers of Health     Financial Resource Strain: Medium Risk (2020)    Received from [facility]    Overall Financial Resource Strain (CARDIA)      Difficulty of Paying Living Expenses: Somewhat hard   Food Insecurity: No Food Insecurity (2020)    Received from [facility]    Hunger Vital Sign      Worried About Running Out of Food in the Last Year: Never true      Ran Out of Food in the Last Year: Never true   Transportation Needs: No Transportation Needs (2020)    Received from [facility]    PRAPARE - Transportation      Lack of Transportation (Medical): No      Lack of Transportation (Non-Medical): No   Physical Activity: Sufficiently Active (2020)    Received from [facility]    Exercise Vital Sign      Days of Exercise per Week: 5 days      Minutes of Exercise per Session: 40 min   Stress: No Stress Concern Present (2020)    Received from [facility]    [employer] [place] [employer] - Occupational Stress Questionnaire      Feeling of Stress : Not at all   Social Connections: Unknown (2020)    Received from [facility]    Social Connection and Isolation Panel [NHANES]      Frequency of Communication with Friends and Family: More than three times a week      Frequency of Social Gatherings with Friends and Family: Once a week      Attends Protestant Services: 1 to 4 times per year      Active Member of Clubs or Organizations: Yes      Attends Club or  Organization Meetings: 1 to 4 times per year      Marital Status: Not on file   Interpersonal Safety: Not on file   Housing Stability: Not on file           Medications  Allergies   Current Outpatient Medications   Medication Sig Dispense Refill     acetaminophen (TYLENOL) 500 MG tablet Take 500-1,000 mg by mouth every 6 hours as needed for mild pain.       aspirin (ASPIR-81) 81 MG EC tablet [ASPIRIN (ASPIR-81) 81 MG EC TABLET] Take 81 mg by mouth daily.        atorvastatin (LIPITOR) 80 MG tablet Take 80 mg by mouth at bedtime.       carboxymethylcellulose (REFRESH PLUS) 0.5 % SOLN ophthalmic solution Place 1 drop into the right eye At Bedtime       clopidogrel (PLAVIX) 75 MG tablet Take 1 tablet (75 mg) by mouth daily. 90 tablet 0     ferrous sulfate (FE TABS) 325 (65 Fe) MG EC tablet Take 325 mg by mouth every other day.       losartan (COZAAR) 25 MG tablet Take 1 tablet (25 mg) by mouth daily 90 tablet 3     neomycin-polymyxin-dexAMETHasone (MAXITROL) 3.5-40119-2.1 ophthalmic ointment Place 0.5 inches into both eyes 2 times daily as needed (.). 1 APPLICATION INTO THE LOWER EYELID OF AFFECTED EYE OPHTHALMIC 2 TIMES DAILY AS NEEDED       nitroGLYcerin (NITROSTAT) 0.4 MG sublingual tablet Place 0.4 mg under the tongue every 5 minutes as needed for chest pain. For chest pain place 1 tablet under the tongue every 5 minutes for 3 doses. If symptoms persist 5 minutes after 1st dose call 911.       pantoprazole (PROTONIX) 40 MG EC tablet Take 1 tablet (40 mg) by mouth daily 90 tablet 3     sotalol (BETAPACE) 80 MG tablet Take 1 tablet (80 mg) by mouth 2 times daily. 180 tablet 3       Allergies   Allergen Reactions     Cephalexin Rash          Lab Results    Chemistry/lipid CBC Cardiac Enzymes/BNP/TSH/INR   Recent Labs   Lab Test 03/13/25  1849 12/03/24  0941   CHOL 139 129   HDL 30* 30*   LDL 77 76   TRIG 158* 117     Recent Labs   Lab Test 03/13/25  1849 12/03/24  0941    140   POTASSIUM 3.8 4.9   CHLORIDE 108*  106   CO2 24 23   ANIONGAP 10 11   * 112*   BUN 6.8* 7.0*   CR 0.83 1.02   ESSENCE 10.1 9.6      CBC RESULTS:   Recent Labs   Lab Test 03/13/25  1849   WBC 10.3   RBC 4.57   HGB 11.7*   HCT 36.1*   MCV 79   MCH 25.6*   MCHC 32.4   RDW 13.2                 TSH   Date Value Ref Range Status   03/13/2025 0.69 0.30 - 4.20 uIU/mL Final   07/08/2020 0.53 0.30 - 5.00 uIU/mL Final          The longitudinal plan of care for the diagnosis(es)/condition(s) as documented were addressed during this visit. Due to the added complexity in care, I will continue to support Ashok in the subsequent management and with ongoing continuity of care.                                        Thank you for allowing me to participate in the care of your patient.      Sincerely,     [facility]  cc:   Kristy Guzman MD  [address]  [place] [address]

## 2025-06-02 ENCOUNTER — TELEPHONE (OUTPATIENT)
Dept: VASCULAR SURGERY | Facility: CLINIC | Age: 75
End: 2025-06-02

## 2025-06-02 ENCOUNTER — OFFICE VISIT (OUTPATIENT)
Dept: CARDIOLOGY | Facility: CLINIC | Age: 75
End: 2025-06-02
Attending: INTERNAL MEDICINE
Payer: MEDICARE

## 2025-06-02 ENCOUNTER — DOCUMENTATION ONLY (OUTPATIENT)
Dept: CARDIOLOGY | Facility: CLINIC | Age: 75
End: 2025-06-02

## 2025-06-02 VITALS
SYSTOLIC BLOOD PRESSURE: 130 MMHG | BODY MASS INDEX: 33.12 KG/M2 | DIASTOLIC BLOOD PRESSURE: 82 MMHG | HEART RATE: 60 BPM | RESPIRATION RATE: 16 BRPM | WEIGHT: 211 LBS | HEIGHT: 67 IN

## 2025-06-02 DIAGNOSIS — I49.5 SSS (SICK SINUS SYNDROME) (H): ICD-10-CM

## 2025-06-02 DIAGNOSIS — Z98.890 STATUS POST ABLATION OF ATRIAL FIBRILLATION: ICD-10-CM

## 2025-06-02 DIAGNOSIS — I10 ESSENTIAL HYPERTENSION: ICD-10-CM

## 2025-06-02 DIAGNOSIS — R93.1 ABNORMAL NUCLEAR CARDIAC IMAGING TEST: ICD-10-CM

## 2025-06-02 DIAGNOSIS — I48.0 PAROXYSMAL ATRIAL FIBRILLATION (H): ICD-10-CM

## 2025-06-02 DIAGNOSIS — Z86.79 STATUS POST ABLATION OF ATRIAL FIBRILLATION: ICD-10-CM

## 2025-06-02 DIAGNOSIS — I65.23 BILATERAL CAROTID ARTERY STENOSIS: Primary | ICD-10-CM

## 2025-06-02 DIAGNOSIS — I48.0 PAROXYSMAL ATRIAL FIBRILLATION (H): Primary | ICD-10-CM

## 2025-06-02 DIAGNOSIS — Z95.5 STATUS POST INSERTION OF DRUG ELUTING CORONARY ARTERY STENT: ICD-10-CM

## 2025-06-02 DIAGNOSIS — I25.110 CORONARY ARTERY DISEASE INVOLVING NATIVE CORONARY ARTERY OF NATIVE HEART WITH UNSTABLE ANGINA PECTORIS (H): ICD-10-CM

## 2025-06-02 PROCEDURE — 99214 OFFICE O/P EST MOD 30 MIN: CPT | Performed by: INTERNAL MEDICINE

## 2025-06-02 PROCEDURE — 3079F DIAST BP 80-89 MM HG: CPT | Performed by: INTERNAL MEDICINE

## 2025-06-02 PROCEDURE — 3075F SYST BP GE 130 - 139MM HG: CPT | Performed by: INTERNAL MEDICINE

## 2025-06-02 PROCEDURE — G2211 COMPLEX E/M VISIT ADD ON: HCPCS | Performed by: INTERNAL MEDICINE

## 2025-06-02 RX ORDER — FENTANYL CITRATE 50 UG/ML
25 INJECTION, SOLUTION INTRAMUSCULAR; INTRAVENOUS
Refills: 0 | OUTPATIENT
Start: 2025-06-02

## 2025-06-02 RX ORDER — SODIUM CHLORIDE 9 MG/ML
100 INJECTION, SOLUTION INTRAVENOUS CONTINUOUS
OUTPATIENT
Start: 2025-06-02

## 2025-06-02 RX ORDER — LIDOCAINE 40 MG/G
CREAM TOPICAL
OUTPATIENT
Start: 2025-06-02

## 2025-06-02 RX ORDER — ROSUVASTATIN CALCIUM 40 MG/1
40 TABLET, COATED ORAL DAILY
Qty: 90 TABLET | Refills: 3 | Status: SHIPPED | OUTPATIENT
Start: 2025-06-02

## 2025-06-02 NOTE — LETTER
6/2/2025    Bj Hendrickson MD  [address] [place] [address]    RE: Ashok RODRIGUEZ Carin       Dear Colleague,     I had the pleasure of seeing Ashok Del Rosario in the [facility].    HEART CARE ENCOUNTER CONSULTATON NOTE      [facility]  [phone]      Assessment/Recommendations   Assessment:  Paroxysmal atrial fibrillation on sotalol for rhythm control: Seen in A-fib clinic and scheduled for ablation  Anticoagulation: CHADS2 vasc score of 2 status post Watchman device.  Will be on Eliquis pre and post ablation.    Sinus node dysfunction status post pacemaker placement: Functioning well on device check  Hypertension: Well-controlled  Coronary disease status post PCI to LAD 5/17/2024: No anginal complaints currently on aspirin  Dyslipidemia: LDL not optimally controlled.  Goal LDL of less than 70    Plan:  1.  Stop atorvastatin and start on Crestor 40 mg daily with repeat fasting lipids in a few months.  May need to consider adding Zetia 10 mg daily  2.  Continue on sotalol at this time.  Plan for eventual ablation  3.  Continue losartan 25 mg daily  4. Follow up in one year       History of Present Illness/Subjective    HPI: Ashok Del Rosario is a 74 year old male with history of paroxysmal atrial fibrillation on sotalol for rhythm control, status post Watchman device, sinus node dysfunction status post pacemaker placement, hypertension, coronary disease status post PCI to LAD 5/17/2024 who I am seeing today to establish care.  His previous cardiologist has retired.  He is being scheduled for an ablation shortly.  He has shortness of breath with exertion that has remained unchanged.  He does not exercise much.  Denies chest pain.  No significant edema.    The longitudinal plan of care for the diagnosis(es)/condition(s) as documented were addressed during this visit. Due to the added complexity in care, I will continue to support Ashok in the subsequent  "management and with ongoing continuity of care.        Physical Examination  Review of Systems   Vitals: /82 (BP Location: Left arm, Patient Position: Sitting, Cuff Size: Adult Large)   Pulse 60   Resp 16   Ht 1.702 m (5' 7\")   Wt 95.7 kg (211 lb)   BMI 33.05 kg/m    BMI= Body mass index is 33.05 kg/m .  Wt Readings from Last 3 Encounters:   06/02/25 95.7 kg (211 lb)   05/08/25 96.2 kg (212 lb)   03/13/25 95.3 kg (210 lb)       General Appearance:   no distress, normal body habitus   ENT/Mouth: membranes moist, no oral lesions or bleeding gums.      EYES:  no scleral icterus, normal conjunctivae   Neck: no carotid bruits or thyromegaly   Chest/Lungs:   lungs are clear to auscultation   Cardiovascular:   Regular. Normal first and second heart sounds with no murmur no edema bilaterally        Extremities: no cyanosis or clubbing   Skin: no xanthelasma, warm.    Neurologic: normal  bilateral, no tremors     Psychiatric: alert and oriented x3, calm        Please refer above for cardiac ROS details.        Medical History  Surgical History Family History Social History   Past Medical History:   Diagnosis Date     Anxiety about health     per [facility]     Dyslipidemia      GERD (gastroesophageal reflux disease)      Hypertension      Nonocclusive coronary atherosclerosis of native coronary artery 10/17/2017     Paroxysmal atrial fibrillation (H) 01/14/2019    Dx [id number] score = 5 (age, HTN, CAD, TIA 2) Rx flecainide Rx Xarelto     Pulmonary emphysema (H) 11/21/2019    per [place]     Right bundle branch block 08/29/2019     Solitary pulmonary nodule 11/21/2019    per [place]     Syncope 01/18/2019     TIA (transient ischemic attack) 12/30/2018     Past Surgical History:   Procedure Laterality Date     APPENDECTOMY  2000     CARDIAC ELECTROPHYSIOLOGY MAPPING AND ABLATION  08/29/2019    PVI done at [place]     CV CORONARY ANGIOGRAM N/A 10/17/2017    Procedure: Coronary Angiogram;  Surgeon: Ashok Hyatt MD; "  Location: [facility] Cath Lab;  Service:      CV CORONARY ANGIOGRAM N/A 2024    Procedure: Coronary Angiogram;  Surgeon: Matias Scales MD;  Location: [facility] LAB CV     CV LEFT HEART CATH N/A 2024    Procedure: Left Heart Catheterization;  Surgeon: Matias Scales MD;  Location: [facility] CV     CV LEFT HEART CATHETERIZATION WITH LEFT VENTRICULOGRAM N/A 10/17/2017    Procedure: Left Heart Catheterization with Left Ventriculogram;  Surgeon: Ashok Hyatt MD;  Location: [facility] Cath Lab;  Service:      CV PCI N/A 2024    Procedure: Percutaneous Coronary Intervention;  Surgeon: Matias Scales MD;  Location: [facility] CV     CV PCI ATHERECTOMY ORBITAL N/A 2024    Procedure: Percutaneous Coronary Intervention - Atherectomy Rotational;  Surgeon: Matias Scales MD;  Location: [facility] CV     EP PACEMAKER INSERT Left 2018    Procedure: EP Pacemaker Insertion;  Surgeon: Micheal Junior MD;  Location: [facility] Cath Lab;  Service: Cardiology     OTHER SURGICAL HISTORY  2020    [place] atrial [place] FLX at [place]     Family History   Problem Relation Age of Onset     Cancer Mother      Cancer Father      Coronary Artery Disease Brother      Coronary Artery Disease Brother      Valvular heart disease Brother      Rectal Cancer Sister      Colon Cancer Sister         Social History     Socioeconomic History     Marital status:      Spouse name: Not on file     Number of children: Not on file     Years of education: Not on file     Highest education level: Not on file   Occupational History     Not on file   Tobacco Use     Smoking status: Former     Current packs/day: 0.00     Types: Cigarettes     Quit date: 10/16/1999     Years since quittin.6     Smokeless tobacco: Never   Substance and Sexual Activity     Alcohol use: Yes     Alcohol/week: 1.0 standard drink of alcohol     Comment: Alcoholic Drinks/day: none  since december     Drug use: No     Sexual activity: Yes     Partners: Female   Other Topics Concern     Not on file   Social History Narrative     Not on file     Social Drivers of Health     Financial Resource Strain: Medium Risk (5/23/2020)    Received from [facility]    Overall Financial Resource Strain (CARDIA)      Difficulty of Paying Living Expenses: Somewhat hard   Food Insecurity: No Food Insecurity (5/23/2020)    Received from [facility]    Hunger Vital Sign      Worried About Running Out of Food in the Last Year: Never true      Ran Out of Food in the Last Year: Never true   Transportation Needs: No Transportation Needs (5/23/2020)    Received from [facility]    PRAPARE - Transportation      Lack of Transportation (Medical): No      Lack of Transportation (Non-Medical): No   Physical Activity: Sufficiently Active (5/23/2020)    Received from [facility]    Exercise Vital Sign      Days of Exercise per Week: 5 days      Minutes of Exercise per Session: 40 min   Stress: No Stress Concern Present (5/23/2020)    Received from [facility]    [employer] - Occupational Stress Questionnaire      Feeling of Stress : Not at all   Social Connections: Unknown (5/23/2020)    Received from [facility]    Social Connection and Isolation Panel [NHANES]      Frequency of Communication with Friends and Family: More than three times a week      Frequency of Social Gatherings with Friends and Family: Once a week      Attends Scientologist Services: 1 to 4 times per year      Active Member of Clubs or Organizations: Yes      Attends Club or Organization Meetings: 1 to 4 times per year      Marital Status: Not on file   Interpersonal Safety: Not on file   Housing Stability: Not on file           Medications  Allergies   Current Outpatient Medications   Medication Sig Dispense Refill     acetaminophen (TYLENOL) 500 MG tablet Take 500-1,000 mg by mouth every 6 hours as needed for mild pain.        aspirin (ASPIR-81) 81 MG EC tablet [ASPIRIN (ASPIR-81) 81 MG EC TABLET] Take 81 mg by mouth daily.        carboxymethylcellulose (REFRESH PLUS) 0.5 % SOLN ophthalmic solution Place 1 drop into the right eye At Bedtime       ferrous sulfate (FE TABS) 325 (65 Fe) MG EC tablet Take 325 mg by mouth every other day.       losartan (COZAAR) 25 MG tablet Take 1 tablet (25 mg) by mouth daily 90 tablet 3     neomycin-polymyxin-dexAMETHasone (MAXITROL) 3.5-15029-6.1 ophthalmic ointment Place 0.5 inches into both eyes 2 times daily as needed (.). 1 APPLICATION INTO THE LOWER EYELID OF AFFECTED EYE OPHTHALMIC 2 TIMES DAILY AS NEEDED       nitroGLYcerin (NITROSTAT) 0.4 MG sublingual tablet Place 0.4 mg under the tongue every 5 minutes as needed for chest pain. For chest pain place 1 tablet under the tongue every 5 minutes for 3 doses. If symptoms persist 5 minutes after 1st dose call 911.       pantoprazole (PROTONIX) 40 MG EC tablet Take 1 tablet (40 mg) by mouth daily 90 tablet 3     rosuvastatin (CRESTOR) 40 MG tablet Take 1 tablet (40 mg) by mouth daily. 90 tablet 3     sotalol (BETAPACE) 80 MG tablet Take 1 tablet (80 mg) by mouth 2 times daily. 180 tablet 3     apixaban ANTICOAGULANT (ELIQUIS ANTICOAGULANT) 5 MG tablet Take 1 tablet (5 mg) by mouth 2 times daily. 60 tablet 3       Allergies   Allergen Reactions     Cephalexin Rash          Lab Results    Chemistry/lipid CBC Cardiac Enzymes/BNP/TSH/INR   Recent Labs   Lab Test 03/13/25 1849   CHOL 139   HDL 30*   LDL 77   TRIG 158*     Recent Labs   Lab Test 03/13/25 1849 12/03/24  0941 09/19/24  0733   LDL 77 76 69     Recent Labs   Lab Test 03/13/25 1849      POTASSIUM 3.8   CHLORIDE 108*   CO2 24   *   BUN 6.8*   CR 0.83   GFRESTIMATED >90   ESSENCE 10.1     Recent Labs   Lab Test 03/13/25 1849 12/03/24  0941 05/17/24  0753   CR 0.83 1.02 0.90     Recent Labs   Lab Test 03/13/25 1849   A1C 6.3*          Recent Labs   Lab Test 03/13/25 1849   WBC 10.3   HGB  11.7*   HCT 36.1*   MCV 79        Recent Labs   Lab Test 03/13/25  1849 05/17/24  0753 06/02/23  0941   HGB 11.7* 15.1 14.7    Recent Labs   Lab Test 07/08/20  1620 08/15/19  0138 08/14/19  2230   TROPONINI <0.01 <0.01 <0.01     Recent Labs   Lab Test 01/18/19  1007 01/11/19  1639   BNP <10 11     Recent Labs   Lab Test 03/13/25  1849   TSH 0.69     Recent Labs   Lab Test 03/13/25  2038 08/14/19  2230 04/20/19  1348   INR 1.04 2.34* 1.13*        Taniya Houser MD                                      Thank you for allowing me to participate in the care of your patient.      Sincerely,     Taniya Houser MD     [facility]  cc:   Norman Vargas MD  [address]  [place] [address]

## 2025-06-02 NOTE — PATIENT INSTRUCTIONS
Ashok RODRIGUEZ Carin,     It was a pleasure to see you in the office today. My recommendations for you include:   1. Stop atorvastatin and start crestor 40 mg daily  2. Repeat cholesterol labs in a few months     Please do not hesitate to call the Heart Care clinic with any questions or concerns at [phone].    Sincerely,     Taniya Houser MD

## 2025-06-02 NOTE — PROGRESS NOTES
HEART CARE ENCOUNTER CONSULTATON NOTE      [facility]  [phone]      Assessment/Recommendations   Assessment:  Paroxysmal atrial fibrillation on sotalol for rhythm control: Seen in A-fib clinic and scheduled for ablation  Anticoagulation: CHADS2 vasc score of 2 status post Watchman device.  Will be on Eliquis pre and post ablation.    Sinus node dysfunction status post pacemaker placement: Functioning well on device check  Hypertension: Well-controlled  Coronary disease status post PCI to LAD 5/17/2024: No anginal complaints currently on aspirin  Dyslipidemia: LDL not optimally controlled.  Goal LDL of less than 70    Plan:  1.  Stop atorvastatin and start on Crestor 40 mg daily with repeat fasting lipids in a few months.  May need to consider adding Zetia 10 mg daily  2.  Continue on sotalol at this time.  Plan for eventual ablation  3.  Continue losartan 25 mg daily  4. Follow up in one year       History of Present Illness/Subjective    HPI: Ashok Workmanumgardner is a 74 year old male with history of paroxysmal atrial fibrillation on sotalol for rhythm control, status post Watchman device, sinus node dysfunction status post pacemaker placement, hypertension, coronary disease status post PCI to LAD 5/17/2024 who I am seeing today to establish care.  His previous cardiologist has retired.  He is being scheduled for an ablation shortly.  He has shortness of breath with exertion that has remained unchanged.  He does not exercise much.  Denies chest pain.  No significant edema.    The longitudinal plan of care for the diagnosis(es)/condition(s) as documented were addressed during this visit. Due to the added complexity in care, I will continue to support Ashok in the subsequent management and with ongoing continuity of care.        Physical Examination  Review of Systems   Vitals: /82 (BP Location: Left arm, Patient Position: Sitting, Cuff Size: Adult Large)   Pulse 60   Resp 16    "Ht 1.702 m (5' 7\")   Wt 95.7 kg (211 lb)   BMI 33.05 kg/m    BMI= Body mass index is 33.05 kg/m .  Wt Readings from Last 3 Encounters:   06/02/25 95.7 kg (211 lb)   05/08/25 96.2 kg (212 lb)   03/13/25 95.3 kg (210 lb)       General Appearance:   no distress, normal body habitus   ENT/Mouth: membranes moist, no oral lesions or bleeding gums.      EYES:  no scleral icterus, normal conjunctivae   Neck: no carotid bruits or thyromegaly   Chest/Lungs:   lungs are clear to auscultation   Cardiovascular:   Regular. Normal first and second heart sounds with no murmur no edema bilaterally        Extremities: no cyanosis or clubbing   Skin: no xanthelasma, warm.    Neurologic: normal  bilateral, no tremors     Psychiatric: alert and oriented x3, calm        Please refer above for cardiac ROS details.        Medical History  Surgical History Family History Social History   Past Medical History:   Diagnosis Date    Anxiety about health     per [facility]    Dyslipidemia     GERD (gastroesophageal reflux disease)     Hypertension     Nonocclusive coronary atherosclerosis of native coronary artery 10/17/2017    Paroxysmal atrial fibrillation (H) 01/14/2019    Dx [id number] score = 5 (age, HTN, CAD, TIA 2) Rx flecainide Rx Xarelto    Pulmonary emphysema (H) 11/21/2019    per [place]    Right bundle branch block 08/29/2019    Solitary pulmonary nodule 11/21/2019    per [place]    Syncope 01/18/2019    TIA (transient ischemic attack) 12/30/2018     Past Surgical History:   Procedure Laterality Date    APPENDECTOMY  2000    CARDIAC ELECTROPHYSIOLOGY MAPPING AND ABLATION  08/29/2019    PVI done at [place]    CV CORONARY ANGIOGRAM N/A 10/17/2017    Procedure: Coronary Angiogram;  Surgeon: Ashok Hyatt MD;  Location: [facility] Cath Lab;  Service:     CV CORONARY ANGIOGRAM N/A 5/17/2024    Procedure: Coronary Angiogram;  Surgeon: Matias Scales MD;  Location: [facility] CATH LAB CV    CV LEFT HEART CATH N/A 5/17/2024    " Procedure: Left Heart Catheterization;  Surgeon: Matias Scales MD;  Location: [facility] CV    CV LEFT HEART CATHETERIZATION WITH LEFT VENTRICULOGRAM N/A 10/17/2017    Procedure: Left Heart Catheterization with Left Ventriculogram;  Surgeon: Ashok Hyatt MD;  Location: [facility] Cath Lab;  Service:     CV PCI N/A 2024    Procedure: Percutaneous Coronary Intervention;  Surgeon: Matias Scales MD;  Location: [facility] CV    CV PCI ATHERECTOMY ORBITAL N/A 2024    Procedure: Percutaneous Coronary Intervention - Atherectomy Rotational;  Surgeon: Matias Scales MD;  Location: [facility] LAB CV    EP PACEMAKER INSERT Left 2018    Procedure: EP Pacemaker Insertion;  Surgeon: Micheal Junior MD;  Location: [facility] Cath Lab;  Service: Cardiology    OTHER SURGICAL HISTORY  2020    [place]     Family History   Problem Relation Age of Onset    Cancer Mother     Cancer Father     Coronary Artery Disease Brother     Coronary Artery Disease Brother     Valvular heart disease Brother     Rectal Cancer Sister     Colon Cancer Sister         Social History     Socioeconomic History    Marital status:      Spouse name: Not on file    Number of children: Not on file    Years of education: Not on file    Highest education level: Not on file   Occupational History    Not on file   Tobacco Use    Smoking status: Former     Current packs/day: 0.00     Types: Cigarettes     Quit date: 10/16/1999     Years since quittin.6    Smokeless tobacco: Never   Substance and Sexual Activity    Alcohol use: Yes     Alcohol/week: 1.0 standard drink of alcohol     Comment: Alcoholic Drinks/day: none since december    Drug use: No    Sexual activity: Yes     Partners: Female   Other Topics Concern    Not on file   Social History Narrative    Not on file     Social Drivers of Health     Financial Resource Strain: Medium Risk (2020)     Received from [facility]    Overall Financial Resource Strain (CARDIA)     Difficulty of Paying Living Expenses: Somewhat hard   Food Insecurity: No Food Insecurity (5/23/2020)    Received from [facility]    Hunger Vital Sign     Worried About Running Out of Food in the Last Year: Never true     Ran Out of Food in the Last Year: Never true   Transportation Needs: No Transportation Needs (5/23/2020)    Received from [facility]    PRAPARE - Transportation     Lack of Transportation (Medical): No     Lack of Transportation (Non-Medical): No   Physical Activity: Sufficiently Active (5/23/2020)    Received from [facility]    Exercise Vital Sign     Days of Exercise per Week: 5 days     Minutes of Exercise per Session: 40 min   Stress: No Stress Concern Present (5/23/2020)    Received from [facility]    [employer] - Occupational Stress Questionnaire     Feeling of Stress : Not at all   Social Connections: Unknown (5/23/2020)    Received from [facility]    Social Connection and Isolation Panel [NHANES]     Frequency of Communication with Friends and Family: More than three times a week     Frequency of Social Gatherings with Friends and Family: Once a week     Attends [employer]ist Services: 1 to 4 times per year     Active Member of Clubs or Organizations: Yes     Attends Club or Organization Meetings: 1 to 4 times per year     Marital Status: Not on file   Interpersonal Safety: Not on file   Housing Stability: Not on file           Medications  Allergies   Current Outpatient Medications   Medication Sig Dispense Refill    acetaminophen (TYLENOL) 500 MG tablet Take 500-1,000 mg by mouth every 6 hours as needed for mild pain.      aspirin (ASPIR-81) 81 MG EC tablet [ASPIRIN (ASPIR-81) 81 MG EC TABLET] Take 81 mg by mouth daily.       carboxymethylcellulose (REFRESH PLUS) 0.5 % SOLN ophthalmic solution Place 1 drop into the right eye At Bedtime      ferrous sulfate (FE TABS) 325 (65 Fe) MG EC  tablet Take 325 mg by mouth every other day.      losartan (COZAAR) 25 MG tablet Take 1 tablet (25 mg) by mouth daily 90 tablet 3    neomycin-polymyxin-dexAMETHasone (MAXITROL) 3.5-95862-2.1 ophthalmic ointment Place 0.5 inches into both eyes 2 times daily as needed (.). 1 APPLICATION INTO THE LOWER EYELID OF AFFECTED EYE OPHTHALMIC 2 TIMES DAILY AS NEEDED      nitroGLYcerin (NITROSTAT) 0.4 MG sublingual tablet Place 0.4 mg under the tongue every 5 minutes as needed for chest pain. For chest pain place 1 tablet under the tongue every 5 minutes for 3 doses. If symptoms persist 5 minutes after 1st dose call 911.      pantoprazole (PROTONIX) 40 MG EC tablet Take 1 tablet (40 mg) by mouth daily 90 tablet 3    rosuvastatin (CRESTOR) 40 MG tablet Take 1 tablet (40 mg) by mouth daily. 90 tablet 3    sotalol (BETAPACE) 80 MG tablet Take 1 tablet (80 mg) by mouth 2 times daily. 180 tablet 3    apixaban ANTICOAGULANT (ELIQUIS ANTICOAGULANT) 5 MG tablet Take 1 tablet (5 mg) by mouth 2 times daily. 60 tablet 3       Allergies   Allergen Reactions    Cephalexin Rash          Lab Results    Chemistry/lipid CBC Cardiac Enzymes/BNP/TSH/INR   Recent Labs   Lab Test 03/13/25 1849   CHOL 139   HDL 30*   LDL 77   TRIG 158*     Recent Labs   Lab Test 03/13/25 1849 12/03/24  0941 09/19/24  0733   LDL 77 76 69     Recent Labs   Lab Test 03/13/25 1849      POTASSIUM 3.8   CHLORIDE 108*   CO2 24   *   BUN 6.8*   CR 0.83   GFRESTIMATED >90   ESSENCE 10.1     Recent Labs   Lab Test 03/13/25 1849 12/03/24  0941 05/17/24  0753   CR 0.83 1.02 0.90     Recent Labs   Lab Test 03/13/25 1849   A1C 6.3*          Recent Labs   Lab Test 03/13/25 1849   WBC 10.3   HGB 11.7*   HCT 36.1*   MCV 79        Recent Labs   Lab Test 03/13/25 1849 05/17/24  0753 06/02/23  0941   HGB 11.7* 15.1 14.7    Recent Labs   Lab Test 07/08/20  1620 08/15/19  0138 08/14/19  2230   TROPONINI <0.01 <0.01 <0.01     Recent Labs   Lab Test 01/18/19  1007  01/11/19  1639   BNP <10 11     Recent Labs   Lab Test 03/13/25  1849   TSH 0.69     Recent Labs   Lab Test 03/13/25  2038 08/14/19  2230 04/20/19  1348   INR 1.04 2.34* 1.13*        Taniya Houser MD                                       walked

## 2025-06-02 NOTE — PROGRESS NOTES
PVI  Order Case Req Y  Order Set Y Imaging Order None     AC Eliquis-start apixaban 5mg twice daily in addition to aspirin, with plan to continue apixaban for 6 weeks post ablation. Thereafter, okay to continue on aspirin 81mg daily alone    AAD Sotalol-Hold 3 days prior   PPI/H2 Blocker PPI not needed PFA   Diuretics None   DM/GLP-1 DM Meds- None  GLP-1- None  SGLT2- None   ED Meds  None     Ashok Kirklanddner, 1950, [id number]  Home:[phone] (home) Cell:[phone] (mobile)  Emergency Contact: Evelyne Del Rosario   PCP: Bj Hendrickson, [phone]    Important patient information for CSC/Cath Lab staff : None    [facility] EP Cath Lab Procedure Order   Ablation Type:  Atrial Fibrillation (Paroxsymal)  Case Request: PFA  Ordering Provider: Dr Guzman  Date Ordered and Prepped: 6/2/2025 Zulay Potts RN    Scheduling Information:  Anticipated Case Duration:  Standard ( Case per day SA 2:1, DW 5:1, KA 3:1)   Scheduling Timeframe:  Next Available  Scheduling Restrictions: None  Scheduling Contact: Pt is aware of scheduling limitations at this time due to schedule, please call pt when schedule is available  EP RN Follow Up Apt: Schedule EP RN PC visit 3-4 days s/p PVI  MD Preference: Scheduling with ordering provider  Current Device/Device Co Needed for Procedure: Dual PacemakerMedtronic  Pre-Procedural Testing needed: None  Mapping System Required:  Carto (Dr Guzman and Dr Vick)  ICE Needed:  Yes  Anesthesia:General Anesthesia    [facility] EP Cath Lab Prep   H&P:  Schedule H&P with EP MICHAEL, RN Teach, and Labs within 30 days of PVI  Pre-op Labs: CBC, BMP, Beta HcG if appropriate, and INR if on Warfarin will be ordered AM of procedure, if not completed at pre-op H&P within 7 days of procedure.  T&S Pre-Procedure Review: Does not need for PVI procedures  Medical Records Pertinent for Procedure:  None  Iodinated Contrast Dye Allergies (Does not include Shellfish, Egg, and/or Iodine Allergy): NA  GLP-1 Protocol:  If on Dulaglutide (Trulicity) (weekly)- Injection hold 7 days prior to procedure  , Exenatide extended release (Bydureon bcise) (weekly)- Injection hold 7 days prior to procedure, Exenatide (Byetta) (twice daily)- Oral Tablet hold day prior and morning of procedure and for Injection hold 7 days prior to procedure, Semaglutide (Ozempic) (weekly)- Injection and Oral hold 7 days prior to procedure, Liraglutide (Victoza, Saxenda) (daily)- Injection hold day prior and morning of procedure  SGLT2 Inhibitors Protocol: ertugliflozin (Steglatro)- Hold 4 days prior to procedure for risk of ketoacidosis. canagliflozin (Invokana), dapagliflozin (Farxiga), and empagliflozin (Jardiance)- Hold 3 days prior to procedure for risk of ketoacidosis. If pt taking for pulmonary HTN, continue.   ED Meds Protocol:  If taking Sildenafil (Viagra), Tadalafill (Cialis, Adcirca), or Vardnafil (Levitra, Staxyn)- Hold 3 days prior to procedure  Follow Up S/P: EP RN 3-4 PC Visit and EP MICHAEL 6wk (To be scheduled at time of case scheduling)    Allergies   Allergen Reactions    Cephalexin Rash       Current Outpatient Medications:     acetaminophen (TYLENOL) 500 MG tablet, Take 500-1,000 mg by mouth every 6 hours as needed for mild pain., Disp: , Rfl:     apixaban ANTICOAGULANT (ELIQUIS ANTICOAGULANT) 5 MG tablet, Take 1 tablet (5 mg) by mouth 2 times daily., Disp: 60 tablet, Rfl: 3    aspirin (ASPIR-81) 81 MG EC tablet, [ASPIRIN (ASPIR-81) 81 MG EC TABLET] Take 81 mg by mouth daily. , Disp: , Rfl:     atorvastatin (LIPITOR) 80 MG tablet, Take 80 mg by mouth at bedtime., Disp: , Rfl:     carboxymethylcellulose (REFRESH PLUS) 0.5 % SOLN ophthalmic solution, Place 1 drop into the right eye At Bedtime, Disp: , Rfl:     ferrous sulfate (FE TABS) 325 (65 Fe) MG EC tablet, Take 325 mg by mouth every other day., Disp: , Rfl:     losartan (COZAAR) 25 MG tablet, Take 1 tablet (25 mg) by mouth daily, Disp: 90 tablet, Rfl: 3    neomycin-polymyxin-dexAMETHasone  (MAXITROL) 3.5-37064-0.1 ophthalmic ointment, Place 0.5 inches into both eyes 2 times daily as needed (.). 1 APPLICATION INTO THE LOWER EYELID OF AFFECTED EYE OPHTHALMIC 2 TIMES DAILY AS NEEDED, Disp: , Rfl:     nitroGLYcerin (NITROSTAT) 0.4 MG sublingual tablet, Place 0.4 mg under the tongue every 5 minutes as needed for chest pain. For chest pain place 1 tablet under the tongue every 5 minutes for 3 doses. If symptoms persist 5 minutes after 1st dose call 911., Disp: , Rfl:     pantoprazole (PROTONIX) 40 MG EC tablet, Take 1 tablet (40 mg) by mouth daily, Disp: 90 tablet, Rfl: 3    sotalol (BETAPACE) 80 MG tablet, Take 1 tablet (80 mg) by mouth 2 times daily., Disp: 180 tablet, Rfl: 3    Documentation Date:6/2/2025 11:16 AM  Zulay Potts RN

## 2025-06-02 NOTE — H&P (VIEW-ONLY)
HEART CARE ENCOUNTER CONSULTATON NOTE      [facility]  [phone]      Assessment/Recommendations   Assessment:  Paroxysmal atrial fibrillation on sotalol for rhythm control: Seen in A-fib clinic and scheduled for ablation  Anticoagulation: CHADS2 vasc score of 2 status post Watchman device.  Will be on Eliquis pre and post ablation.    Sinus node dysfunction status post pacemaker placement: Functioning well on device check  Hypertension: Well-controlled  Coronary disease status post PCI to LAD 5/17/2024: No anginal complaints currently on aspirin  Dyslipidemia: LDL not optimally controlled.  Goal LDL of less than 70    Plan:  1.  Stop atorvastatin and start on Crestor 40 mg daily with repeat fasting lipids in a few months.  May need to consider adding Zetia 10 mg daily  2.  Continue on sotalol at this time.  Plan for eventual ablation  3.  Continue losartan 25 mg daily  4. Follow up in one year       History of Present Illness/Subjective    HPI: Ashok Workmanumgardner is a 74 year old male with history of paroxysmal atrial fibrillation on sotalol for rhythm control, status post Watchman device, sinus node dysfunction status post pacemaker placement, hypertension, coronary disease status post PCI to LAD 5/17/2024 who I am seeing today to establish care.  His previous cardiologist has retired.  He is being scheduled for an ablation shortly.  He has shortness of breath with exertion that has remained unchanged.  He does not exercise much.  Denies chest pain.  No significant edema.    The longitudinal plan of care for the diagnosis(es)/condition(s) as documented were addressed during this visit. Due to the added complexity in care, I will continue to support Ashok in the subsequent management and with ongoing continuity of care.        Physical Examination  Review of Systems   Vitals: /82 (BP Location: Left arm, Patient Position: Sitting, Cuff Size: Adult Large)   Pulse 60   Resp 16    "Ht 1.702 m (5' 7\")   Wt 95.7 kg (211 lb)   BMI 33.05 kg/m    BMI= Body mass index is 33.05 kg/m .  Wt Readings from Last 3 Encounters:   06/02/25 95.7 kg (211 lb)   05/08/25 96.2 kg (212 lb)   03/13/25 95.3 kg (210 lb)       General Appearance:   no distress, normal body habitus   ENT/Mouth: membranes moist, no oral lesions or bleeding gums.      EYES:  no scleral icterus, normal conjunctivae   Neck: no carotid bruits or thyromegaly   Chest/Lungs:   lungs are clear to auscultation   Cardiovascular:   Regular. Normal first and second heart sounds with no murmur no edema bilaterally        Extremities: no cyanosis or clubbing   Skin: no xanthelasma, warm.    Neurologic: normal  bilateral, no tremors     Psychiatric: alert and oriented x3, calm        Please refer above for cardiac ROS details.        Medical History  Surgical History Family History Social History   Past Medical History:   Diagnosis Date    Anxiety about health     per [facility]    Dyslipidemia     GERD (gastroesophageal reflux disease)     Hypertension     Nonocclusive coronary atherosclerosis of native coronary artery 10/17/2017    Paroxysmal atrial fibrillation (H) 01/14/2019    Dx [id number] score = 5 (age, HTN, CAD, TIA 2) Rx flecainide Rx Xarelto    Pulmonary emphysema (H) 11/21/2019    per [facility]    Right bundle branch block 08/29/2019    Solitary pulmonary nodule 11/21/2019    per [facility]    Syncope 01/18/2019    TIA (transient ischemic attack) 12/30/2018     Past Surgical History:   Procedure Laterality Date    APPENDECTOMY  2000    CARDIAC ELECTROPHYSIOLOGY MAPPING AND ABLATION  08/29/2019    PVI done at [facility]    CV CORONARY ANGIOGRAM N/A 10/17/2017    Procedure: Coronary Angiogram;  Surgeon: Ashok Hyatt MD;  Location: [facility] Cath Lab;  Service:     CV CORONARY ANGIOGRAM N/A 5/17/2024    Procedure: Coronary Angiogram;  Surgeon: Matias Scales MD;  Location: [facility] CATH LAB CV    CV LEFT HEART CATH N/A 5/17/2024    " Procedure: Left Heart Catheterization;  Surgeon: Matias Scales MD;  Location: [facility] CV    CV LEFT HEART CATHETERIZATION WITH LEFT VENTRICULOGRAM N/A 10/17/2017    Procedure: Left Heart Catheterization with Left Ventriculogram;  Surgeon: Ashok Hyatt MD;  Location: [facility] Cath Lab;  Service:     CV PCI N/A 2024    Procedure: Percutaneous Coronary Intervention;  Surgeon: Matias Scales MD;  Location: [facility] CV    CV PCI ATHERECTOMY ORBITAL N/A 2024    Procedure: Percutaneous Coronary Intervention - Atherectomy Rotational;  Surgeon: Matias Scales MD;  Location: [facility] LAB CV    EP PACEMAKER INSERT Left 2018    Procedure: EP Pacemaker Insertion;  Surgeon: Micheal Junior MD;  Location: [facility] Cath Lab;  Service: Cardiology    OTHER SURGICAL HISTORY  2020    [place]     Family History   Problem Relation Age of Onset    Cancer Mother     Cancer Father     Coronary Artery Disease Brother     Coronary Artery Disease Brother     Valvular heart disease Brother     Rectal Cancer Sister     Colon Cancer Sister         Social History     Socioeconomic History    Marital status:      Spouse name: Not on file    Number of children: Not on file    Years of education: Not on file    Highest education level: Not on file   Occupational History    Not on file   Tobacco Use    Smoking status: Former     Current packs/day: 0.00     Types: Cigarettes     Quit date: 10/16/1999     Years since quittin.6    Smokeless tobacco: Never   Substance and Sexual Activity    Alcohol use: Yes     Alcohol/week: 1.0 standard drink of alcohol     Comment: Alcoholic Drinks/day: none since december    Drug use: No    Sexual activity: Yes     Partners: Female   Other Topics Concern    Not on file   Social History Narrative    Not on file     Social Drivers of Health     Financial Resource Strain: Medium Risk (2020)     Received from [facility]    Overall Financial Resource Strain (CARDIA)     Difficulty of Paying Living Expenses: Somewhat hard   Food Insecurity: No Food Insecurity (5/23/2020)    Received from [facility]    Hunger Vital Sign     Worried About Running Out of Food in the Last Year: Never true     Ran Out of Food in the Last Year: Never true   Transportation Needs: No Transportation Needs (5/23/2020)    Received from [facility]    PRAPARE - Transportation     Lack of Transportation (Medical): No     Lack of Transportation (Non-Medical): No   Physical Activity: Sufficiently Active (5/23/2020)    Received from [facility]    Exercise Vital Sign     Days of Exercise per Week: 5 days     Minutes of Exercise per Session: 40 min   Stress: No Stress Concern Present (5/23/2020)    Received from [facility]    [employer] - Occupational Stress Questionnaire     Feeling of Stress : Not at all   Social Connections: Unknown (5/23/2020)    Received from [facility]    Social Connection and Isolation Panel [NHANES]     Frequency of Communication with Friends and Family: More than three times a week     Frequency of Social Gatherings with Friends and Family: Once a week     Attends Jewish Services: 1 to 4 times per year     Active Member of Clubs or Organizations: Yes     Attends Club or Organization Meetings: 1 to 4 times per year     Marital Status: Not on file   Interpersonal Safety: Not on file   Housing Stability: Not on file           Medications  Allergies   Current Outpatient Medications   Medication Sig Dispense Refill    acetaminophen (TYLENOL) 500 MG tablet Take 500-1,000 mg by mouth every 6 hours as needed for mild pain.      aspirin (ASPIR-81) 81 MG EC tablet [ASPIRIN (ASPIR-81) 81 MG EC TABLET] Take 81 mg by mouth daily.       carboxymethylcellulose (REFRESH PLUS) 0.5 % SOLN ophthalmic solution Place 1 drop into the right eye At Bedtime      ferrous sulfate (FE TABS) 325 (65 Fe) MG EC  tablet Take 325 mg by mouth every other day.      losartan (COZAAR) 25 MG tablet Take 1 tablet (25 mg) by mouth daily 90 tablet 3    neomycin-polymyxin-dexAMETHasone (MAXITROL) 3.5-63458-8.1 ophthalmic ointment Place 0.5 inches into both eyes 2 times daily as needed (.). 1 APPLICATION INTO THE LOWER EYELID OF AFFECTED EYE OPHTHALMIC 2 TIMES DAILY AS NEEDED      nitroGLYcerin (NITROSTAT) 0.4 MG sublingual tablet Place 0.4 mg under the tongue every 5 minutes as needed for chest pain. For chest pain place 1 tablet under the tongue every 5 minutes for 3 doses. If symptoms persist 5 minutes after 1st dose call 911.      pantoprazole (PROTONIX) 40 MG EC tablet Take 1 tablet (40 mg) by mouth daily 90 tablet 3    rosuvastatin (CRESTOR) 40 MG tablet Take 1 tablet (40 mg) by mouth daily. 90 tablet 3    sotalol (BETAPACE) 80 MG tablet Take 1 tablet (80 mg) by mouth 2 times daily. 180 tablet 3    apixaban ANTICOAGULANT (ELIQUIS ANTICOAGULANT) 5 MG tablet Take 1 tablet (5 mg) by mouth 2 times daily. 60 tablet 3       Allergies   Allergen Reactions    Cephalexin Rash          Lab Results    Chemistry/lipid CBC Cardiac Enzymes/BNP/TSH/INR   Recent Labs   Lab Test 03/13/25 1849   CHOL 139   HDL 30*   LDL 77   TRIG 158*     Recent Labs   Lab Test 03/13/25 1849 12/03/24  0941 09/19/24  0733   LDL 77 76 69     Recent Labs   Lab Test 03/13/25 1849      POTASSIUM 3.8   CHLORIDE 108*   CO2 24   *   BUN 6.8*   CR 0.83   GFRESTIMATED >90   ESSENCE 10.1     Recent Labs   Lab Test 03/13/25 1849 12/03/24  0941 05/17/24  0753   CR 0.83 1.02 0.90     Recent Labs   Lab Test 03/13/25 1849   A1C 6.3*          Recent Labs   Lab Test 03/13/25 1849   WBC 10.3   HGB 11.7*   HCT 36.1*   MCV 79        Recent Labs   Lab Test 03/13/25 1849 05/17/24  0753 06/02/23  0941   HGB 11.7* 15.1 14.7    Recent Labs   Lab Test 07/08/20  1620 08/15/19  0138 08/14/19  2230   TROPONINI <0.01 <0.01 <0.01     Recent Labs   Lab Test 01/18/19  1007  01/11/19  1639   BNP <10 11     Recent Labs   Lab Test 03/13/25  1849   TSH 0.69     Recent Labs   Lab Test 03/13/25  2038 08/14/19  2230 04/20/19  1348   INR 1.04 2.34* 1.13*        Taniya Houser MD

## 2025-06-03 ENCOUNTER — LAB REQUISITION (OUTPATIENT)
Dept: LAB | Facility: CLINIC | Age: 75
End: 2025-06-03
Payer: MEDICARE

## 2025-06-03 DIAGNOSIS — D50.9 IRON DEFICIENCY ANEMIA, UNSPECIFIED: ICD-10-CM

## 2025-06-03 LAB
IRON BINDING CAPACITY (ROCHE): 355 UG/DL (ref 240–430)
IRON SATN MFR SERPL: 47 % (ref 15–46)
IRON SERPL-MCNC: 166 UG/DL (ref 61–157)

## 2025-06-03 NOTE — TELEPHONE ENCOUNTER
Vascular Referral Intake    Appointment note (to be pasted into appt note. Also add where additional info is located ie: outside images pushed to PACS, in Epic, sent to HIM, etc):   Referred by Dr. Houser for ***    Specialty: {Vascular Specialty:129516}    Specific Provider if Necessary:  {[facility] Providers:[id number]:o}    Visit Type: Return    Time Frame: Due to see Dr. Roth     Testing/Imaging Needed Before Consult: ***    Fernando or bed needed: Unknown- please verify     *Schedulers: Please send welcome letter to patient after appointment(s) scheduled*

## 2025-06-04 ENCOUNTER — TELEPHONE (OUTPATIENT)
Dept: VASCULAR SURGERY | Facility: CLINIC | Age: 75
End: 2025-06-04
Payer: MEDICARE

## 2025-06-04 NOTE — TELEPHONE ENCOUNTER
Vascular Referral Intake    Appointment note (to be pasted into appt note. Also add where additional info is located ie: outside images pushed to PACS, in Epic, sent to HIM, etc): Approximately 60% stenosis involving the right carotid bifurcation, approximately 50-55% stenosis involving the right proximal ICA.    Referred by Taniya Houser MD for Bilateral carotid artery stenosis     Specialty: Vascular Medicine    Specific Provider if Necessary:  Dr. Dhara Roth    Visit Type: New    Time Frame: Next Available    Testing/Imaging Needed Before Consult: N/A    Fernando or bed needed: No    *Schedulers: Please send welcome letter to patient after appointment(s) scheduled*

## 2025-06-10 ENCOUNTER — DOCUMENTATION ONLY (OUTPATIENT)
Dept: ANTICOAGULATION | Facility: CLINIC | Age: 75
End: 2025-06-10
Payer: MEDICARE

## 2025-06-10 NOTE — PROGRESS NOTES
Anticoagulant Therapeutic Duplication    Duplicate orders identified: identical order(s)    Duplicate orders appropriate rx for coupon and then ongoing rx    Active anticoagulant: apixaban (Eliquis)    Plan made per [facility] anticoagulation protocol.    Page Haywood RN  6/10/2025

## 2025-06-11 DIAGNOSIS — I48.0 PAROXYSMAL ATRIAL FIBRILLATION (H): Primary | ICD-10-CM

## 2025-06-16 ENCOUNTER — ALLIED HEALTH/NURSE VISIT (OUTPATIENT)
Dept: CARDIOLOGY | Facility: CLINIC | Age: 75
End: 2025-06-16
Payer: MEDICARE

## 2025-06-16 ENCOUNTER — LAB (OUTPATIENT)
Dept: CARDIOLOGY | Facility: CLINIC | Age: 75
End: 2025-06-16
Payer: MEDICARE

## 2025-06-16 VITALS
WEIGHT: 210.2 LBS | HEART RATE: 64 BPM | SYSTOLIC BLOOD PRESSURE: 118 MMHG | DIASTOLIC BLOOD PRESSURE: 68 MMHG | BODY MASS INDEX: 32.99 KG/M2 | RESPIRATION RATE: 16 BRPM | HEIGHT: 67 IN

## 2025-06-16 DIAGNOSIS — I48.0 PAROXYSMAL ATRIAL FIBRILLATION (H): Primary | ICD-10-CM

## 2025-06-16 DIAGNOSIS — I48.0 PAROXYSMAL ATRIAL FIBRILLATION (H): ICD-10-CM

## 2025-06-16 LAB
ANION GAP SERPL CALCULATED.3IONS-SCNC: 9 MMOL/L (ref 7–15)
BUN SERPL-MCNC: 9 MG/DL (ref 8–23)
CALCIUM SERPL-MCNC: 9.4 MG/DL (ref 8.8–10.4)
CHLORIDE SERPL-SCNC: 106 MMOL/L (ref 98–107)
CREAT SERPL-MCNC: 0.93 MG/DL (ref 0.67–1.17)
EGFRCR SERPLBLD CKD-EPI 2021: 86 ML/MIN/1.73M2
ERYTHROCYTE [DISTWIDTH] IN BLOOD BY AUTOMATED COUNT: 16.6 % (ref 10–15)
GLUCOSE SERPL-MCNC: 121 MG/DL (ref 70–99)
HCO3 SERPL-SCNC: 24 MMOL/L (ref 22–29)
HCT VFR BLD AUTO: 42.7 % (ref 40–53)
HGB BLD-MCNC: 13.4 G/DL (ref 13.3–17.7)
MCH RBC QN AUTO: 26 PG (ref 26.5–33)
MCHC RBC AUTO-ENTMCNC: 31.4 G/DL (ref 31.5–36.5)
MCV RBC AUTO: 83 FL (ref 78–100)
PLATELET # BLD AUTO: 277 10E3/UL (ref 150–450)
POTASSIUM SERPL-SCNC: 4.7 MMOL/L (ref 3.4–5.3)
RBC # BLD AUTO: 5.15 10E6/UL (ref 4.4–5.9)
SODIUM SERPL-SCNC: 139 MMOL/L (ref 135–145)
WBC # BLD AUTO: 6.7 10E3/UL (ref 4–11)

## 2025-06-16 PROCEDURE — 3074F SYST BP LT 130 MM HG: CPT

## 2025-06-16 PROCEDURE — 36415 COLL VENOUS BLD VENIPUNCTURE: CPT

## 2025-06-16 PROCEDURE — 3078F DIAST BP <80 MM HG: CPT

## 2025-06-16 PROCEDURE — 93000 ELECTROCARDIOGRAM COMPLETE: CPT | Performed by: INTERNAL MEDICINE

## 2025-06-16 PROCEDURE — 80048 BASIC METABOLIC PNL TOTAL CA: CPT

## 2025-06-16 PROCEDURE — 99207 PR NO CHARGE NURSE ONLY: CPT

## 2025-06-16 PROCEDURE — 85027 COMPLETE CBC AUTOMATED: CPT

## 2025-06-16 RX ORDER — ATORVASTATIN CALCIUM 80 MG/1
80 TABLET, FILM COATED ORAL DAILY
COMMUNITY

## 2025-06-16 RX ORDER — CLOPIDOGREL BISULFATE 75 MG/1
75 TABLET ORAL DAILY
COMMUNITY
Start: 2024-12-06 | End: 2025-06-17

## 2025-06-16 NOTE — PROGRESS NOTES
Pre-Procedure Pulmonary Vein Ablation (AF) Education    Procedure: PVI with Dr Guzman on 6/25/2025 with arrival time 6:30 am    COVID: Pt denies COVID like symptoms, and is aware if he/she develops COVID like symptoms they would need to complete an at home with a rapid antigen COVID test 1-2 days prior to your procedure date. If COVID + pt is aware the procedure will need to be rescheduled, and to contact CV scheduling as soon as possible    Type & Screen: Is not required for PVI Ablation    Pre-Op H&P: Completed on 6/2/2025 in Epic    Education:   Reviewed with pt in Clinic today  Pre-Procedure Instruction: NPO after midnight pre procedure, Defined NPO, Remove all jewelry and leave all valuables at home, Shower prior to arrival, Anesthesia and intubation plan/orders, Intra-procedure PVI process, Post- PVI procedure expectations/recovery, Transportation requirements and arrangements post procedure, Post-procedure follow up process, Letter sent to pt via [employer] and mail with written instructions (Refer to letters tab), Lab results would be called to pt if abnormal  Risks:   Atrial Fibrillation Ablation/Left Atrial Ablation  Cardiac Ablation  <1% Hypotension, Hemorrhage, Thrombophlebitis, Systemic or pulmonic emboli, Cardiac perforation (tamponade), Infection, Pneumothorax, Arrhythmias, Proarrhythmic effects of drugs, Radiation exposure, Catheter entrapment  <1 % Vascular injury including perforation of vein, artery or heart  1-2% Tamponade and Aortic puncture with left sided transeptal approach  1% CVA   <1% MI  <0.1% death  If external defibrillation or CV is needed, 25% risk for superficial burn  Risks associated with general anesthesia will be addressed by the Anesthesiology Department  Radiofrequency Risks:  In addition to standard risks for Radiofrequency Ablation, there is:  <2% Significant pulmonary vein stenosis  <2% Embolic events  <1% Esophageal fistula  <1% Phrenic nerve paralysis   Cryoablation  Risks:  In addition to standard risks for Cryoablation Ablation, there is:  <1% Phrenic nerve paralysis  <1% Pulmonary vein stenosis  <1% Esophageal fistula    Medication:   Instructions regarding anticoagulants: Start Eliquis 7 full days prior to procedure per EP procedural MD, take anticoagulation uninterrupted through procedure, do not miss any doses of AC, importance of taking AC for stroke prevention, taking AC as prescribed, to call prior to PVI if missed a dose of AC, and if upon arrival pt reports missing a dose of AC PVI will potentially be cnx/postponed  Instructions given to pt regarding antiarrhythmic medication: Sotalol- Hold 3 days prior to procedure  Instructions given to pt regarding PPI medication: NA- PFA Ablation  Instructions given to pt regarding diuretics medication: None  Instructions given to pt regarding DM/GLP-1 medication:   DM- None  GLP-1- None  SGLT2- None   Instructions given to pt regarding ED medication: None  Instructions for medication, other than anticoagulants and antiarrhythmics listed above, given to pt: Take all medication AM of procedure with small sips of water     Important patient information for staff: None    6/16/2025 12:07 PM  Jennifer Landon RN

## 2025-06-17 ENCOUNTER — TELEPHONE (OUTPATIENT)
Dept: CARDIOLOGY | Facility: CLINIC | Age: 75
End: 2025-06-17
Payer: MEDICARE

## 2025-06-17 LAB
ATRIAL RATE - MUSE: 66 BPM
DIASTOLIC BLOOD PRESSURE - MUSE: NORMAL MMHG
INTERPRETATION ECG - MUSE: NORMAL
P AXIS - MUSE: 55 DEGREES
PR INTERVAL - MUSE: 154 MS
QRS DURATION - MUSE: 134 MS
QT - MUSE: 440 MS
QTC - MUSE: 461 MS
R AXIS - MUSE: -28 DEGREES
SYSTOLIC BLOOD PRESSURE - MUSE: NORMAL MMHG
T AXIS - MUSE: 6 DEGREES
VENTRICULAR RATE- MUSE: 66 BPM

## 2025-06-25 ENCOUNTER — ANCILLARY PROCEDURE (OUTPATIENT)
Dept: CARDIOLOGY | Facility: CLINIC | Age: 75
End: 2025-06-25
Attending: INTERNAL MEDICINE
Payer: MEDICARE

## 2025-06-25 ENCOUNTER — ANESTHESIA EVENT (OUTPATIENT)
Dept: CARDIOLOGY | Facility: HOSPITAL | Age: 75
End: 2025-06-25
Payer: MEDICARE

## 2025-06-25 ENCOUNTER — HOSPITAL ENCOUNTER (OUTPATIENT)
Facility: HOSPITAL | Age: 75
Discharge: HOME | End: 2025-06-25
Attending: INTERNAL MEDICINE | Admitting: INTERNAL MEDICINE
Payer: MEDICARE

## 2025-06-25 ENCOUNTER — HOSPITAL ENCOUNTER (EMERGENCY)
Facility: HOSPITAL | Age: 75
Discharge: HOME | End: 2025-06-25
Attending: EMERGENCY MEDICINE | Admitting: EMERGENCY MEDICINE
Payer: MEDICARE

## 2025-06-25 ENCOUNTER — ANESTHESIA (OUTPATIENT)
Dept: CARDIOLOGY | Facility: HOSPITAL | Age: 75
End: 2025-06-25
Payer: MEDICARE

## 2025-06-25 VITALS
WEIGHT: 210 LBS | DIASTOLIC BLOOD PRESSURE: 84 MMHG | BODY MASS INDEX: 32.96 KG/M2 | HEART RATE: 103 BPM | TEMPERATURE: 98 F | RESPIRATION RATE: 24 BRPM | HEIGHT: 67 IN | OXYGEN SATURATION: 95 % | SYSTOLIC BLOOD PRESSURE: 149 MMHG

## 2025-06-25 VITALS
BODY MASS INDEX: 31.39 KG/M2 | HEIGHT: 67 IN | RESPIRATION RATE: 20 BRPM | WEIGHT: 200 LBS | SYSTOLIC BLOOD PRESSURE: 131 MMHG | TEMPERATURE: 97.7 F | OXYGEN SATURATION: 94 % | DIASTOLIC BLOOD PRESSURE: 80 MMHG | HEART RATE: 92 BPM

## 2025-06-25 DIAGNOSIS — Z95.0 PACEMAKER: ICD-10-CM

## 2025-06-25 DIAGNOSIS — I49.5 SSS (SICK SINUS SYNDROME) (H): Primary | ICD-10-CM

## 2025-06-25 DIAGNOSIS — I49.5 SSS (SICK SINUS SYNDROME) (H): ICD-10-CM

## 2025-06-25 DIAGNOSIS — Z48.89 ENCOUNTER FOR POSTOPERATIVE WOUND CHECK: ICD-10-CM

## 2025-06-25 DIAGNOSIS — I48.0 PAROXYSMAL ATRIAL FIBRILLATION (H): ICD-10-CM

## 2025-06-25 LAB
ACT BLD: 292 SECONDS (ref 74–150)
ACT BLD: 324 SECONDS (ref 74–150)
ACT BLD: 540 SECONDS (ref 74–150)
ANION GAP SERPL CALCULATED.3IONS-SCNC: 12 MMOL/L (ref 7–15)
ATRIAL RATE - MUSE: 92 BPM
BUN SERPL-MCNC: 9 MG/DL (ref 8–23)
CALCIUM SERPL-MCNC: 9.4 MG/DL (ref 8.8–10.4)
CHLORIDE SERPL-SCNC: 109 MMOL/L (ref 98–107)
CREAT SERPL-MCNC: 0.92 MG/DL (ref 0.67–1.17)
DIASTOLIC BLOOD PRESSURE - MUSE: NORMAL MMHG
EGFRCR SERPLBLD CKD-EPI 2021: 87 ML/MIN/1.73M2
ERYTHROCYTE [DISTWIDTH] IN BLOOD BY AUTOMATED COUNT: 16.5 % (ref 10–15)
GLUCOSE SERPL-MCNC: 122 MG/DL (ref 70–99)
HCO3 SERPL-SCNC: 21 MMOL/L (ref 22–29)
HCT VFR BLD AUTO: 41.9 % (ref 40–53)
HGB BLD-MCNC: 13.5 G/DL (ref 13.3–17.7)
INTERPRETATION ECG - MUSE: NORMAL
MCH RBC QN AUTO: 26.3 PG (ref 26.5–33)
MCHC RBC AUTO-ENTMCNC: 32.2 G/DL (ref 31.5–36.5)
MCV RBC AUTO: 82 FL (ref 78–100)
P AXIS - MUSE: 60 DEGREES
PLATELET # BLD AUTO: 240 10E3/UL (ref 150–450)
POTASSIUM SERPL-SCNC: 4 MMOL/L (ref 3.4–5.3)
PR INTERVAL - MUSE: 182 MS
QRS DURATION - MUSE: 134 MS
QT - MUSE: 424 MS
QTC - MUSE: 524 MS
R AXIS - MUSE: -42 DEGREES
RBC # BLD AUTO: 5.13 10E6/UL (ref 4.4–5.9)
SODIUM SERPL-SCNC: 142 MMOL/L (ref 135–145)
SYSTOLIC BLOOD PRESSURE - MUSE: NORMAL MMHG
T AXIS - MUSE: 14 DEGREES
VENTRICULAR RATE- MUSE: 92 BPM
WBC # BLD AUTO: 5.9 10E3/UL (ref 4–11)

## 2025-06-25 PROCEDURE — 85347 COAGULATION TIME ACTIVATED: CPT

## 2025-06-25 PROCEDURE — 250N000013 HC RX MED GY IP 250 OP 250 PS 637: Performed by: NURSE PRACTITIONER

## 2025-06-25 PROCEDURE — 999N000054 HC STATISTIC EKG NON-CHARGEABLE

## 2025-06-25 PROCEDURE — C1733 CATH, EP, OTHR THAN COOL-TIP: HCPCS | Performed by: INTERNAL MEDICINE

## 2025-06-25 PROCEDURE — 258N000003 HC RX IP 258 OP 636: Performed by: NURSE ANESTHETIST, CERTIFIED REGISTERED

## 2025-06-25 PROCEDURE — 93010 ELECTROCARDIOGRAM REPORT: CPT | Mod: HOP | Performed by: INTERNAL MEDICINE

## 2025-06-25 PROCEDURE — C1769 GUIDE WIRE: HCPCS | Performed by: INTERNAL MEDICINE

## 2025-06-25 PROCEDURE — 93623 PRGRMD STIMJ&PACG IV RX NFS: CPT | Performed by: INTERNAL MEDICINE

## 2025-06-25 PROCEDURE — 93005 ELECTROCARDIOGRAM TRACING: CPT

## 2025-06-25 PROCEDURE — 36415 COLL VENOUS BLD VENIPUNCTURE: CPT | Performed by: INTERNAL MEDICINE

## 2025-06-25 PROCEDURE — 93656 COMPRE EP EVAL ABLTJ ATR FIB: CPT | Performed by: INTERNAL MEDICINE

## 2025-06-25 PROCEDURE — 250N000011 HC RX IP 250 OP 636: Performed by: NURSE ANESTHETIST, CERTIFIED REGISTERED

## 2025-06-25 PROCEDURE — 93657 TX L/R ATRIAL FIB ADDL: CPT | Performed by: INTERNAL MEDICINE

## 2025-06-25 PROCEDURE — 258N000003 HC RX IP 258 OP 636: Performed by: INTERNAL MEDICINE

## 2025-06-25 PROCEDURE — 99282 EMERGENCY DEPT VISIT SF MDM: CPT

## 2025-06-25 PROCEDURE — 272N000001 HC OR GENERAL SUPPLY STERILE: Performed by: INTERNAL MEDICINE

## 2025-06-25 PROCEDURE — 93655 ICAR CATH ABLTJ DSCRT ARRHYT: CPT | Performed by: INTERNAL MEDICINE

## 2025-06-25 PROCEDURE — C1759 CATH, INTRA ECHOCARDIOGRAPHY: HCPCS | Performed by: INTERNAL MEDICINE

## 2025-06-25 PROCEDURE — 85018 HEMOGLOBIN: CPT | Performed by: INTERNAL MEDICINE

## 2025-06-25 PROCEDURE — C1760 CLOSURE DEV, VASC: HCPCS | Performed by: INTERNAL MEDICINE

## 2025-06-25 PROCEDURE — C1894 INTRO/SHEATH, NON-LASER: HCPCS | Performed by: INTERNAL MEDICINE

## 2025-06-25 PROCEDURE — 710N000010 HC RECOVERY PHASE 1, LEVEL 2, PER MIN

## 2025-06-25 PROCEDURE — 250N000011 HC RX IP 250 OP 636: Performed by: INTERNAL MEDICINE

## 2025-06-25 PROCEDURE — 80048 BASIC METABOLIC PNL TOTAL CA: CPT | Performed by: INTERNAL MEDICINE

## 2025-06-25 PROCEDURE — 250N000009 HC RX 250: Performed by: NURSE ANESTHETIST, CERTIFIED REGISTERED

## 2025-06-25 PROCEDURE — C1766 INTRO/SHEATH,STRBLE,NON-PEEL: HCPCS | Performed by: INTERNAL MEDICINE

## 2025-06-25 PROCEDURE — 370N000017 HC ANESTHESIA TECHNICAL FEE, PER MIN: Performed by: INTERNAL MEDICINE

## 2025-06-25 PROCEDURE — C1730 CATH, EP, 19 OR FEW ELECT: HCPCS | Performed by: INTERNAL MEDICINE

## 2025-06-25 PROCEDURE — C1732 CATH, EP, DIAG/ABL, 3D/VECT: HCPCS | Performed by: INTERNAL MEDICINE

## 2025-06-25 PROCEDURE — 93623 PRGRMD STIMJ&PACG IV RX NFS: CPT | Mod: 26 | Performed by: INTERNAL MEDICINE

## 2025-06-25 RX ORDER — PROPOFOL 10 MG/ML
INJECTION, EMULSION INTRAVENOUS PRN
Status: DISCONTINUED | OUTPATIENT
Start: 2025-06-25 | End: 2025-06-25

## 2025-06-25 RX ORDER — ONDANSETRON 2 MG/ML
4 INJECTION INTRAMUSCULAR; INTRAVENOUS EVERY 6 HOURS PRN
Status: DISCONTINUED | OUTPATIENT
Start: 2025-06-25 | End: 2025-06-25 | Stop reason: HOSPADM

## 2025-06-25 RX ORDER — ACETAMINOPHEN 325 MG/1
650 TABLET ORAL EVERY 4 HOURS PRN
Status: DISCONTINUED | OUTPATIENT
Start: 2025-06-25 | End: 2025-06-25 | Stop reason: HOSPADM

## 2025-06-25 RX ORDER — FENTANYL CITRATE 50 UG/ML
INJECTION, SOLUTION INTRAMUSCULAR; INTRAVENOUS PRN
Status: DISCONTINUED | OUTPATIENT
Start: 2025-06-25 | End: 2025-06-25

## 2025-06-25 RX ORDER — LIDOCAINE HYDROCHLORIDE 10 MG/ML
INJECTION, SOLUTION INFILTRATION; PERINEURAL PRN
Status: DISCONTINUED | OUTPATIENT
Start: 2025-06-25 | End: 2025-06-25

## 2025-06-25 RX ORDER — SODIUM CHLORIDE 9 MG/ML
100 INJECTION, SOLUTION INTRAVENOUS CONTINUOUS
Status: DISCONTINUED | OUTPATIENT
Start: 2025-06-25 | End: 2025-06-25 | Stop reason: HOSPADM

## 2025-06-25 RX ORDER — HEPARIN SODIUM 1000 [USP'U]/ML
INJECTION, SOLUTION INTRAVENOUS; SUBCUTANEOUS
Status: DISCONTINUED | OUTPATIENT
Start: 2025-06-25 | End: 2025-06-25 | Stop reason: HOSPADM

## 2025-06-25 RX ORDER — SODIUM CHLORIDE 9 MG/ML
INJECTION, SOLUTION INTRAVENOUS CONTINUOUS PRN
Status: DISCONTINUED | OUTPATIENT
Start: 2025-06-25 | End: 2025-06-25

## 2025-06-25 RX ORDER — IBUPROFEN 600 MG/1
600 TABLET, FILM COATED ORAL EVERY 6 HOURS PRN
Status: DISCONTINUED | OUTPATIENT
Start: 2025-06-25 | End: 2025-06-25 | Stop reason: HOSPADM

## 2025-06-25 RX ORDER — ONDANSETRON 2 MG/ML
INJECTION INTRAMUSCULAR; INTRAVENOUS PRN
Status: DISCONTINUED | OUTPATIENT
Start: 2025-06-25 | End: 2025-06-25

## 2025-06-25 RX ORDER — SOTALOL HYDROCHLORIDE 80 MG/1
80 TABLET ORAL 2 TIMES DAILY
COMMUNITY
Start: 2025-06-25

## 2025-06-25 RX ORDER — LIDOCAINE 40 MG/G
CREAM TOPICAL
Status: DISCONTINUED | OUTPATIENT
Start: 2025-06-25 | End: 2025-06-25 | Stop reason: HOSPADM

## 2025-06-25 RX ORDER — FENTANYL CITRATE 50 UG/ML
25 INJECTION, SOLUTION INTRAMUSCULAR; INTRAVENOUS
Status: DISCONTINUED | OUTPATIENT
Start: 2025-06-25 | End: 2025-06-25 | Stop reason: HOSPADM

## 2025-06-25 RX ORDER — DEXAMETHASONE SODIUM PHOSPHATE 10 MG/ML
INJECTION, SOLUTION INTRAMUSCULAR; INTRAVENOUS PRN
Status: DISCONTINUED | OUTPATIENT
Start: 2025-06-25 | End: 2025-06-25

## 2025-06-25 RX ORDER — HEPARIN SODIUM 10000 [USP'U]/100ML
INJECTION, SOLUTION INTRAVENOUS CONTINUOUS PRN
Status: COMPLETED | OUTPATIENT
Start: 2025-06-25 | End: 2025-06-25

## 2025-06-25 RX ORDER — PROTAMINE SULFATE 10 MG/ML
INJECTION, SOLUTION INTRAVENOUS PRN
Status: DISCONTINUED | OUTPATIENT
Start: 2025-06-25 | End: 2025-06-25

## 2025-06-25 RX ORDER — ONDANSETRON 4 MG/1
4 TABLET, ORALLY DISINTEGRATING ORAL EVERY 6 HOURS PRN
Status: DISCONTINUED | OUTPATIENT
Start: 2025-06-25 | End: 2025-06-25 | Stop reason: HOSPADM

## 2025-06-25 RX ADMIN — ROCURONIUM 50 MG: 50 INJECTION, SOLUTION INTRAVENOUS at 08:55

## 2025-06-25 RX ADMIN — PHENYLEPHRINE HYDROCHLORIDE 0.5 MCG/KG/MIN: 10 INJECTION INTRAVENOUS at 09:03

## 2025-06-25 RX ADMIN — PHENYLEPHRINE HYDROCHLORIDE 100 MCG: 10 INJECTION INTRAVENOUS at 10:07

## 2025-06-25 RX ADMIN — SODIUM CHLORIDE 12 MCG: 9 INJECTION, SOLUTION INTRAVENOUS at 11:18

## 2025-06-25 RX ADMIN — SODIUM CHLORIDE 12 MCG: 9 INJECTION, SOLUTION INTRAVENOUS at 11:20

## 2025-06-25 RX ADMIN — PROTAMINE SULFATE 50 MG: 10 INJECTION, SOLUTION INTRAVENOUS at 11:05

## 2025-06-25 RX ADMIN — LIDOCAINE HYDROCHLORIDE 5 ML: 10 INJECTION, SOLUTION INFILTRATION; PERINEURAL at 08:54

## 2025-06-25 RX ADMIN — PHENYLEPHRINE HYDROCHLORIDE 150 MCG: 10 INJECTION INTRAVENOUS at 09:03

## 2025-06-25 RX ADMIN — PROPOFOL 120 MG: 10 INJECTION, EMULSION INTRAVENOUS at 08:54

## 2025-06-25 RX ADMIN — SUGAMMADEX 200 MG: 100 INJECTION, SOLUTION INTRAVENOUS at 11:05

## 2025-06-25 RX ADMIN — Medication 1 LOZENGE: at 12:21

## 2025-06-25 RX ADMIN — DEXAMETHASONE SODIUM PHOSPHATE 10 MG: 10 INJECTION, SOLUTION INTRAMUSCULAR; INTRAVENOUS at 09:23

## 2025-06-25 RX ADMIN — ROCURONIUM 20 MG: 50 INJECTION, SOLUTION INTRAVENOUS at 10:00

## 2025-06-25 RX ADMIN — PHENYLEPHRINE HYDROCHLORIDE 150 MCG: 10 INJECTION INTRAVENOUS at 09:20

## 2025-06-25 RX ADMIN — ROCURONIUM 20 MG: 50 INJECTION, SOLUTION INTRAVENOUS at 09:46

## 2025-06-25 RX ADMIN — ROCURONIUM 10 MG: 50 INJECTION, SOLUTION INTRAVENOUS at 10:59

## 2025-06-25 RX ADMIN — SODIUM CHLORIDE: 9 INJECTION, SOLUTION INTRAVENOUS at 08:46

## 2025-06-25 RX ADMIN — SODIUM CHLORIDE: 9 INJECTION, SOLUTION INTRAVENOUS at 09:47

## 2025-06-25 RX ADMIN — PHENYLEPHRINE HYDROCHLORIDE 100 MCG: 10 INJECTION INTRAVENOUS at 09:11

## 2025-06-25 RX ADMIN — ONDANSETRON 4 MG: 2 INJECTION INTRAMUSCULAR; INTRAVENOUS at 11:07

## 2025-06-25 RX ADMIN — FENTANYL CITRATE 100 MCG: 50 INJECTION, SOLUTION INTRAMUSCULAR; INTRAVENOUS at 08:54

## 2025-06-25 RX ADMIN — SODIUM CHLORIDE 100 ML/HR: 9 INJECTION, SOLUTION INTRAVENOUS at 08:27

## 2025-06-25 RX ADMIN — PHENYLEPHRINE HYDROCHLORIDE 200 MCG: 10 INJECTION INTRAVENOUS at 09:08

## 2025-06-25 ASSESSMENT — ACTIVITIES OF DAILY LIVING (ADL)
ADLS_ACUITY_SCORE: 41

## 2025-06-25 ASSESSMENT — COPD QUESTIONNAIRES: COPD: 1

## 2025-06-25 ASSESSMENT — COLUMBIA-SUICIDE SEVERITY RATING SCALE - C-SSRS
6. HAVE YOU EVER DONE ANYTHING, STARTED TO DO ANYTHING, OR PREPARED TO DO ANYTHING TO END YOUR LIFE?: NO
1. IN THE PAST MONTH, HAVE YOU WISHED YOU WERE DEAD OR WISHED YOU COULD GO TO SLEEP AND NOT WAKE UP?: NO
2. HAVE YOU ACTUALLY HAD ANY THOUGHTS OF KILLING YOURSELF IN THE PAST MONTH?: NO

## 2025-06-25 ASSESSMENT — LIFESTYLE VARIABLES: TOBACCO_USE: 1

## 2025-06-25 NOTE — Clinical Note
[employer] Med system 12 lead EKG, hemodynamics 5 lead, pulse oximetery, NIBP, Physiocontrol hands off defibrillator/external pacer, with 3 monitoring leads to patient. Baseline assessment done.

## 2025-06-25 NOTE — ANESTHESIA CARE TRANSFER NOTE
Patient: Ashok Del Rosario    Procedure: Procedure(s):  Ablation Atrial Fibrillation       Diagnosis: atrial fibrillation  Diagnosis Additional Information: No value filed.    Anesthesia Type:   General     Note:    Oropharynx: oropharynx clear of all foreign objects and spontaneously breathing  Level of Consciousness: awake  Oxygen Supplementation: room air    Independent Airway: airway patency satisfactory and stable  Dentition: dentition unchanged  Vital Signs Stable: post-procedure vital signs reviewed and stable  Report to RN Given: handoff report given  Patient transferred to: Cardiac Special Care          Vitals:  Vitals Value Taken Time   /73 06/25/25 11:30   Temp 36.5  C (97.7  F) 06/25/25 11:28   Pulse 84 06/25/25 11:32   Resp 20 06/25/25 11:32   SpO2 95 % 06/25/25 11:32   Vitals shown include unfiled device data.    Electronically Signed By: FARAZ Lee CRNA  June 25, 2025  11:33 AM

## 2025-06-25 NOTE — Clinical Note
Arrhythmia Type: atrial flutter.   Method of Cardioversion: synchronous.   The arrhythmia was terminated.   Energy shock delivered: 250 joules.   Time shock delivered: 11:03 CDT.   Post cardioversion rhythm: sinus rhythm.

## 2025-06-25 NOTE — PROGRESS NOTES
Ablation completed and pt discharged to home with post care instructions. Pt and wife both state a good understanding. Groin sites stable with only scant drainage. No hematoma. Pt up and ambulated in robb several times.VSS.

## 2025-06-25 NOTE — ANESTHESIA PREPROCEDURE EVALUATION
Anesthesia Pre-Procedure Evaluation    Patient: Ashok Del Rosario   MRN: [id number] : 1950          Procedure : Procedure(s):  Ablation Atrial Fibrillation       Paroxysmal atrial fibrillation on sotalol for rhythm control: Seen in A-fib clinic and scheduled for ablation  Anticoagulation: CHADS2 vasc score of 2 status post Watchman device.  Will be on Eliquis pre and post ablation.    Sinus node dysfunction status post pacemaker placement: Functioning well on device check  Hypertension: Well-controlled  Coronary disease status post PCI to LAD 2024: No anginal complaints currently on aspirin  Dyslipidemia: LDL not optimally controlled.  Goal LDL of less than 70    Past Medical History:   Diagnosis Date    Anxiety about health     per [facility]    Dyslipidemia     GERD (gastroesophageal reflux disease)     Hypertension     Nonocclusive coronary atherosclerosis of native coronary artery 10/17/2017    Paroxysmal atrial fibrillation (H) 2019    Dx [id number] score = 5 (age, HTN, CAD, TIA 2) Rx flecainide Rx Xarelto    Pulmonary emphysema (H) 2019    per [place]    Right bundle branch block 2019    Solitary pulmonary nodule 2019    per [place]    Syncope 2019    TIA (transient ischemic attack) 2018      Past Surgical History:   Procedure Laterality Date    APPENDECTOMY      CARDIAC ELECTROPHYSIOLOGY MAPPING AND ABLATION  2019    PVI done at [facility] CORONARY ANGIOGRAM N/A 10/17/2017    Procedure: Coronary Angiogram;  Surgeon: Ashok Hyatt MD;  Location: [facility] Cath Lab;  Service:     CV CORONARY ANGIOGRAM N/A 2024    Procedure: Coronary Angiogram;  Surgeon: Matias Scales MD;  Location: [facility]    CV LEFT HEART CATH N/A 2024    Procedure: Left Heart Catheterization;  Surgeon: Matias Scales MD;  Location: [facility] CATH LAB CV    CV LEFT HEART CATHETERIZATION WITH LEFT VENTRICULOGRAM N/A 10/17/2017    Procedure: Left Heart  Catheterization with Left Ventriculogram;  Surgeon: Ashok Hyatt MD;  Location: [facility] Cath Lab;  Service:     CV PCI N/A 2024    Procedure: Percutaneous Coronary Intervention;  Surgeon: Matias Scales MD;  Location: [facility] CV    CV PCI ATHERECTOMY ORBITAL N/A 2024    Procedure: Percutaneous Coronary Intervention - Atherectomy Rotational;  Surgeon: Matias Scales MD;  Location: [facility] CV    EP PACEMAKER INSERT Left 2018    Procedure: EP Pacemaker Insertion;  Surgeon: Micheal Junior MD;  Location: [facility] Cath Lab;  Service: Cardiology    OTHER SURGICAL HISTORY  2020    Left atrial appendage closureWatchman FLX at [place]      Allergies   Allergen Reactions    Cephalexin Rash      Social History     Tobacco Use    Smoking status: Former     Current packs/day: 0.00     Types: Cigarettes     Quit date: 10/16/1999     Years since quittin.7    Smokeless tobacco: Never   Substance Use Topics    Alcohol use: Yes     Alcohol/week: 1.0 standard drink of alcohol     Comment: Alcoholic Drinks/day: none since december      Wt Readings from Last 1 Encounters:   25 95.3 kg (210 lb 3.2 oz)        Anesthesia Evaluation   Pt has had prior anesthetic.         ROS/MED HX  ENT/Pulmonary:     (+) sleep apnea,               tobacco use, Past use,         COPD,              Neurologic: Comment: 1. Less than 50% diameter stenosis of the right ICA relative to the proximal ICA diameter.    2. Less than 50% diameter stenosis of the left ICA relative to the proximal ICA diameter.     (-) no CVA   Cardiovascular: Comment: Encounter type: Pt. seen in clinic for annual device evaluation and iterative programming. Followed by an appointment with ALETHEA Mares, as an annual f/up.  Device: Medtronic, [place] (D) PPM  Pacing %/Programmed: AP- 9.2%, - 0.4%, AAI<=>DDD 60/130  Lead(s): Stable  Battery longevity: Estimating 9 years remaining.  Presenting rhythm: As/VS @ 63  bpm  Underlying rhythm: SR @ 60 bpm  Heart rates: Histograms show primarily 60-80 bpm.  Atrial arrhythmias: Since 5/29/2024- no AT/AF. Per cardiac compass, 1 mode switches lasting 4.5 hours occurring 1/3/2025. See 3/13/2025 remote for EGM.     Anticoagulant: LAAC (plavix)       Antiarythmic: Sotalol  Ventricular arrhythmias: Since 5/29/2024- 4 VHRs logged; 6 beats @ 176 bpm, 3 EGMs suggest 12 seconds or less of SVT @ 150's bpm. Patient cannot correlate symptoms.   Comments: Normal device function. Changed stored EGM 2 to EGM 3 (far field).         (+)  hypertension- -  CAD angina-  - stent-5/2024.   Taking blood thinners                              Previous cardiac testing   Echo: Date: Results:    Stress Test:  Date: Results:    Lexiscan stress ECG negative for ischemia.     The nuclear stress test is abnormal.  There is a large area of severe ischemia involving the mid to distal anteroseptal, anterior, distal anterolateral, apical and distal inferior wall.     Stress to rest cavity ratio is 1.44.  TID is present visually and quantitatively.  This may be a marker of multivessel disease.     The left ventricular ejection fraction at stress is 61% with anteroseptal and anteroapical hypokinesis.     The patient is at a high risk of future cardiac ischemic events.     A prior study was conducted on 6/7/2021.  The area of ischemia is new.     The findings of this examination were communicated to Suzan Waddell CNP.    ECG Reviewed:  Date: Results:    Cath:  Date: Results:      METS/Exercise Tolerance: >4 METS    Hematologic:       Musculoskeletal:       GI/Hepatic:     (+) GERD,                   Renal/Genitourinary:       Endo:    (-) Type II DM   Psychiatric/Substance Use:    (-) alcohol abuse history   Infectious Disease:    (-) Recent Fever   Malignancy:       Other:      (+)  , H/O Chronic Pain,           Physical Exam  Airway  Mallampati: II  TM distance: >3 FB  Neck ROM: full  Mouth opening: >= 4  cm    Cardiovascular   Rhythm: irregular  Rate: normal rate   Comments: Encounter type: Pt. seen in clinic for annual device evaluation and iterative programming. Followed by an appointment with ALETHEA Mares, as an annual f/up.  Device: Medtronic, [place] (D) PPM  Pacing %/Programmed: AP- 9.2%, - 0.4%, AAI<=>DDD 60/130  Lead(s): Stable  Battery longevity: Estimating 9 years remaining.  Presenting rhythm: As/VS @ 63 bpm  Underlying rhythm: SR @ 60 bpm  Heart rates: Histograms show primarily 60-80 bpm.  Atrial arrhythmias: Since 5/29/2024- no AT/AF. Per cardiac compass, 1 mode switches lasting 4.5 hours occurring 1/3/2025. See 3/13/2025 remote for EGM.     Anticoagulant: LAAC (plavix)       Antiarythmic: Sotalol  Ventricular arrhythmias: Since 5/29/2024- 4 VHRs logged; 6 beats @ 176 bpm, 3 EGMs suggest 12 seconds or less of SVT @ 150's bpm. Patient cannot correlate symptoms.   Comments: Normal device function. Changed stored EGM 2 to EGM 3 (far field).       Dental   (+) Edentulous   dental implants, bridges or caps present increased risk of dental damage  Pulmonary Breath sounds clear to auscultation        Neurological   He appears awake, alert and oriented x3.    Other Findings       OUTSIDE LABS:  CBC:   Lab Results   Component Value Date    WBC 6.7 06/16/2025    WBC 10.3 03/13/2025    HGB 13.4 06/16/2025    HGB 11.7 (L) 03/13/2025    HCT 42.7 06/16/2025    HCT 36.1 (L) 03/13/2025     06/16/2025     03/13/2025     BMP:   Lab Results   Component Value Date     06/16/2025     03/13/2025    POTASSIUM 4.7 06/16/2025    POTASSIUM 3.8 03/13/2025    CHLORIDE 106 06/16/2025    CHLORIDE 108 (H) 03/13/2025    CO2 24 06/16/2025    CO2 24 03/13/2025    BUN 9.0 06/16/2025    BUN 6.8 (L) 03/13/2025    CR 0.93 06/16/2025    CR 0.83 03/13/2025     (H) 06/16/2025     (H) 03/13/2025     COAGS:   Lab Results   Component Value Date    PTT 25 03/13/2025    INR 1.04 03/13/2025     POC: No results  "found for: \"BGM\", \"HCG\", \"HCGS\"  HEPATIC:   Lab Results   Component Value Date    ALBUMIN 4.3 12/03/2024    PROTTOTAL 7.0 12/03/2024    ALT 30 12/03/2024    AST 28 12/03/2024    ALKPHOS 60 12/03/2024    BILITOTAL 0.5 12/03/2024     OTHER:   Lab Results   Component Value Date    A1C 6.3 (H) 03/13/2025    ESSENCE 9.4 06/16/2025    MAG 2.0 03/13/2025    LIPASE 52 08/14/2019    TSH 0.69 03/13/2025       Anesthesia Plan    ASA Status:  3      NPO Status: NPO Appropriate   Anesthesia Type: General.  Airway: oral.  Induction: intravenous.  Maintenance: Balanced.   Techniques and Equipment:       - Monitoring Plan: standard ASA monitoring     Consents    Anesthesia Plan(s) and associated risks, benefits, and realistic alternatives discussed. Questions answered and patient/representative(s) expressed understanding.     - Discussed: CRNA     - Discussed with:  Patient        - Pt is DNR/DNI Status: no DNR     Blood Consent:      - Discussed with: patient.     - Consented: consented to blood products     Postoperative Care    Pain management: multimodal analgesia.     Comments:    Other Comments: Risk of MACE, Stroke, sore throat & oral/dental damage, blood tx discussed with patient. Full code.                  Rex Rich MD    I have reviewed the pertinent notes and labs in the chart from the past 30 days and (re)examined the patient.  Any updates or changes from those notes are reflected in this note.    Clinically Significant Risk Factors Present on Admission                # Drug Induced Coagulation Defect: home medication list includes an anticoagulant medication  # Drug Induced Platelet Defect: home medication list includes an antiplatelet medication   # Hypertension: Noted on problem list           # Obesity: Estimated body mass index is 32.92 kg/m  as calculated from the following:    Height as of 6/16/25: 1.702 m (5' 7\").    Weight as of 6/16/25: 95.3 kg (210 lb 3.2 oz).        # Pacemaker present             "

## 2025-06-25 NOTE — ANESTHESIA PROCEDURE NOTES
Airway       Patient location during procedure: OR       Procedure Start/Stop Times: 6/25/2025 9:02 AM  Staff -        CRNA: Rosaura Breaux APRN CRNA       Performed By: CRNA  Consent for Airway        Urgency: elective  Indications and Patient Condition       Indications for airway management: pari-procedural       Induction type:intravenous       Mask difficulty assessment: 3 - difficult mask (inadequate, unstable, or two providers) +/- NMBA (2 person mask)    Final Airway Details       Final airway type: endotracheal airway       Successful airway: ETT - single and Oral  Endotracheal Airway Details        ETT size (mm): 7.5       Cuffed: yes       Cuff volume (mL): 5       Successful intubation technique: video laryngoscopy       VL Blade Size: Glidescope 4       Grade View of Cords: 1       Adjucts: stylet       Position: Right       Measured from: lips       Secured at (cm): 22       Bite block used: Soft    Post intubation assessment        Placement verified by: capnometry, equal breath sounds and chest rise        Number of attempts at approach: 1       Number of other approaches attempted: 0       Secured with: tape       Ease of procedure: easy       Dentition: Intact and Unchanged    Medication(s) Administered   Medication Administration Time: 6/25/2025 9:02 AM

## 2025-06-25 NOTE — DISCHARGE INSTRUCTIONS
[facility]  Cardiac Electrophysiology  [address]  [place] [address]   Office: [phone]  Fax: [phone]       Cardiac Electrophysiology - Post Ablation Discharge Instructions      PROCEDURE   Atrial fibrillation ablation         MEDICATION INSTRUCTIONS   Continue taking your prior to procedure medications, including sotalol for now.  Continue taking your blood thinner apixaban for 6 weeks post ablation. Thereafter, continue on aspirin 81mg daily alone.          DISCHARGE INSTRUCTIONS   Medications   Take your medications as prescribed.   It is important for you to continue your blood thinner without interruption, unless your electrophysiologist instructs you otherwise.     General instructions   Have an adult stay with you until tomorrow.   You may resume your normal diet.     You may shower tomorrow.  Do not take a bath, or use a hot tub or pool for at least 1 week.    Activity recommendations   Do not drive for 3 days.   Avoid stooping or squatting more than 90 degrees at the hips for 7 days.   Avoid repetitive motions such as loading , vacuuming, raking or shoveling for 7 days.   Avoid heavy lifting (greater than 25lbs) for 7 days.       Groin care instructions   For the first 24 hours after your ablation, check the groin access sites every 1-2 hours while awake.   You may keep a bandaid over the puncture sites for 1 or 2 days post-procedure and thereafter may keep these sites uncovered.  Change the bandaid daily.  If there is minor oozing, apply another bandaid and remove it after 12 hours.   For 2 days, when you cough, sneeze, laugh or move your bowels, hold your hand over the puncture sites and press firmly.   Do not scrub the groin access sites.   Do not use lotion or powder near the groin access sites.       Arrhythmias following ablation  Recurrent atrial fibrillation and palpitations are common within the first 3 months post ablation while your  heart recovers from the procedure.  These are usually more frequent in the first few weeks following ablation and should occur less frequently over time.  Please contact us if you are having frequent or long lasting atrial fibrillation episodes following ablation.    Common findings after ablation  Bruising and a dime or pea size lump at the access sites is common.  If you notice increased swelling, external bleeding, or have other concerns regarding your groin access sites please call your electrophysiology team's office, and if after hours consider emergency department evaluation.   Soreness and mild pain at your groin sites is normal, to help relieve this pain you can apply ice/cold packs to the sites for 20min 3-4 times per day.   Pleuritic chest discomfort (chest pain worse with taking deep breaths, worse with laying flat on your back) can occur after ablation, usually coming about within the first 24-72hrs post ablation.  If this occurs and is severe enough to be troublesome to you, please call us and consider starting a course of ibuprofen 400mg three times daily for 5 to 7 days.     Things to watch for   As with any type of procedure, please be more attentive to unusual symptoms post ablation (eg. fever, neurologic changes, pain with swallowing, loss of consciousness, etc) - we recommend ER evaluation for any such symptoms in the first few weeks post procedure.       Contact the EP Nurse line with any of the following.  Contact the cardiology on-call number after business hours.    Consider ER evaluation for severe symptoms.   Groin pain, swelling, or growing hard lump around the puncture sites.   Groin redness, tenderness, swelling, or drainage (blood or pus).   Neurological changes (for example: leg, arm or face weakness or numbness, difficulties with speech or word finding, problems walking or with your balance, vision changes).   Any numbness, coolness or changes in color in your extremities.  Sudden  onset severe abdominal or back pain.  Moderate or severe chest pain not relieved by Tylenol or Ibuprofen, particularly after the first 48 hours.   Shortness of breath.   Chills or fever greater than 100 F.   Difficulty swallowing food or liquids.  Coughing up blood.   Blood in your stool.  Nausea and vomiting.  Difficulty urinating.  Recurrent atrial fibrillation associated with prolonged rapid heart rates (for example, heart rates over 140bpm for greater than 4 hours) or associated with additional concerning symptoms (for example, chest pain, lightheadedness, loss of consciousness, sweating).     If you are being evaluated at an emergency department, please tell your ER doctor that you have recently underwent an atrial fibrillation ablation and ask the ER to contact our office.  Many special considerations apply following ablation - these may be overlooked during an ER evaluation.      Our office will have a follow-up visit scheduled for you in approximately 6 weeks.  Please do not hesitate to call us before that time should issues arise.         [facility]      To reach the  nurses working with Dr. Guzman:   [phone]        To reach the general [facility] line or after hours service:   [phone]   If you are calling after hours, please listen to the entire voicemail,    a live  will answer at the end of the message.

## 2025-06-25 NOTE — ANESTHESIA POSTPROCEDURE EVALUATION
Patient: Ashok Del Rosario    Procedure: Procedure(s):  Ablation Atrial Fibrillation       Anesthesia Type:  General    Note:  Disposition: Outpatient   Postop Pain Control: Uneventful            Sign Out: Well controlled pain   PONV: No   Neuro/Psych: Uneventful            Sign Out: Acceptable/Baseline neuro status   Airway/Respiratory: Uneventful            Sign Out: Acceptable/Baseline resp. status   CV/Hemodynamics: Uneventful            Sign Out: Acceptable CV status; No obvious hypovolemia; No obvious fluid overload   Other NRE: NONE   DID A NON-ROUTINE EVENT OCCUR? No           Last vitals:  Vitals Value Taken Time   /70 06/25/25 12:15   Temp 36.5  C (97.7  F) 06/25/25 11:28   Pulse 92 06/25/25 12:23   Resp 20 06/25/25 12:23   SpO2 92 % 06/25/25 12:23   Vitals shown include unfiled device data.    Electronically Signed By: Rex Rich MD  June 25, 2025  12:25 PM

## 2025-06-25 NOTE — ED TRIAGE NOTES
Patient presents to ER with family.  Had an ablation today and got home at 1530 today and noticed the bleeding from his femoral site on right leg. Family thought the bleeding would stop but continues to bleed.

## 2025-06-25 NOTE — INTERVAL H&P NOTE
"I have reviewed the surgical (or preoperative) H&P that is linked to this encounter, and examined the patient. There are no significant changes    Clinical Conditions Present on Arrival:  Clinically Significant Risk Factors Present on Admission                 # Drug Induced Coagulation Defect: home medication list includes an anticoagulant medication  # Drug Induced Platelet Defect: home medication list includes an antiplatelet medication      # Obesity: Estimated body mass index is 31.32 kg/m  as calculated from the following:    Height as of this encounter: 1.702 m (5' 7\").    Weight as of this encounter: 90.7 kg (200 lb).       "

## 2025-06-26 LAB
MDC_IDC_EPISODE_DTM: NORMAL
MDC_IDC_EPISODE_DURATION: 0 S
MDC_IDC_EPISODE_DURATION: 1 S
MDC_IDC_EPISODE_DURATION: 137 S
MDC_IDC_EPISODE_DURATION: 14 S
MDC_IDC_EPISODE_DURATION: 2 S
MDC_IDC_EPISODE_DURATION: 2 S
MDC_IDC_EPISODE_DURATION: 22 S
MDC_IDC_EPISODE_DURATION: 3 S
MDC_IDC_EPISODE_DURATION: 545 S
MDC_IDC_EPISODE_DURATION: 77 S
MDC_IDC_EPISODE_ID: 668
MDC_IDC_EPISODE_ID: 669
MDC_IDC_EPISODE_ID: 670
MDC_IDC_EPISODE_ID: 671
MDC_IDC_EPISODE_ID: 672
MDC_IDC_EPISODE_ID: 673
MDC_IDC_EPISODE_ID: 674
MDC_IDC_EPISODE_ID: 675
MDC_IDC_EPISODE_ID: 676
MDC_IDC_EPISODE_ID: 677
MDC_IDC_EPISODE_ID: 678
MDC_IDC_EPISODE_ID: 679
MDC_IDC_EPISODE_ID: 680
MDC_IDC_EPISODE_ID: 681
MDC_IDC_EPISODE_ID: 682
MDC_IDC_EPISODE_TYPE: NORMAL
MDC_IDC_EPISODE_TYPE_INDUCED: NO
MDC_IDC_LEAD_CONNECTION_STATUS: NORMAL
MDC_IDC_LEAD_CONNECTION_STATUS: NORMAL
MDC_IDC_LEAD_IMPLANT_DT: NORMAL
MDC_IDC_LEAD_IMPLANT_DT: NORMAL
MDC_IDC_LEAD_LOCATION: NORMAL
MDC_IDC_LEAD_LOCATION: NORMAL
MDC_IDC_LEAD_LOCATION_DETAIL_1: NORMAL
MDC_IDC_LEAD_LOCATION_DETAIL_1: NORMAL
MDC_IDC_LEAD_MFG: NORMAL
MDC_IDC_LEAD_MFG: NORMAL
MDC_IDC_LEAD_MODEL: NORMAL
MDC_IDC_LEAD_MODEL: NORMAL
MDC_IDC_LEAD_POLARITY_TYPE: NORMAL
MDC_IDC_LEAD_POLARITY_TYPE: NORMAL
MDC_IDC_LEAD_SERIAL: NORMAL
MDC_IDC_LEAD_SERIAL: NORMAL
MDC_IDC_LEAD_SPECIAL_FUNCTION: NORMAL
MDC_IDC_LEAD_SPECIAL_FUNCTION: NORMAL
MDC_IDC_MSMT_BATTERY_DTM: NORMAL
MDC_IDC_MSMT_BATTERY_REMAINING_LONGEVITY: 105 MO
MDC_IDC_MSMT_BATTERY_RRT_TRIGGER: 2.62
MDC_IDC_MSMT_BATTERY_STATUS: NORMAL
MDC_IDC_MSMT_BATTERY_VOLTAGE: 2.99 V
MDC_IDC_MSMT_LEADCHNL_RA_IMPEDANCE_VALUE: 304 OHM
MDC_IDC_MSMT_LEADCHNL_RA_IMPEDANCE_VALUE: 342 OHM
MDC_IDC_MSMT_LEADCHNL_RA_PACING_THRESHOLD_AMPLITUDE: 0.75 V
MDC_IDC_MSMT_LEADCHNL_RA_PACING_THRESHOLD_PULSEWIDTH: 0.4 MS
MDC_IDC_MSMT_LEADCHNL_RA_SENSING_INTR_AMPL: 1.25 MV
MDC_IDC_MSMT_LEADCHNL_RA_SENSING_INTR_AMPL: 1.38 MV
MDC_IDC_MSMT_LEADCHNL_RV_IMPEDANCE_VALUE: 323 OHM
MDC_IDC_MSMT_LEADCHNL_RV_IMPEDANCE_VALUE: 380 OHM
MDC_IDC_MSMT_LEADCHNL_RV_PACING_THRESHOLD_AMPLITUDE: 1.12 V
MDC_IDC_MSMT_LEADCHNL_RV_PACING_THRESHOLD_PULSEWIDTH: 0.4 MS
MDC_IDC_MSMT_LEADCHNL_RV_SENSING_INTR_AMPL: 0.88 MV
MDC_IDC_MSMT_LEADCHNL_RV_SENSING_INTR_AMPL: 1 MV
MDC_IDC_PG_IMPLANT_DTM: NORMAL
MDC_IDC_PG_MFG: NORMAL
MDC_IDC_PG_MODEL: NORMAL
MDC_IDC_PG_SERIAL: NORMAL
MDC_IDC_PG_TYPE: NORMAL
MDC_IDC_SESS_CLINIC_NAME: NORMAL
MDC_IDC_SESS_DTM: NORMAL
MDC_IDC_SESS_TYPE: NORMAL
MDC_IDC_SET_BRADY_AT_MODE_SWITCH_RATE: 171 {BEATS}/MIN
MDC_IDC_SET_BRADY_HYSTRATE: NORMAL
MDC_IDC_SET_BRADY_LOWRATE: 60 {BEATS}/MIN
MDC_IDC_SET_BRADY_MAX_SENSOR_RATE: 130 {BEATS}/MIN
MDC_IDC_SET_BRADY_MAX_TRACKING_RATE: 130 {BEATS}/MIN
MDC_IDC_SET_BRADY_MODE: NORMAL
MDC_IDC_SET_BRADY_PAV_DELAY_LOW: 180 MS
MDC_IDC_SET_BRADY_SAV_DELAY_LOW: 150 MS
MDC_IDC_SET_LEADCHNL_RA_PACING_AMPLITUDE: 1.5 V
MDC_IDC_SET_LEADCHNL_RA_PACING_ANODE_ELECTRODE_1: NORMAL
MDC_IDC_SET_LEADCHNL_RA_PACING_ANODE_LOCATION_1: NORMAL
MDC_IDC_SET_LEADCHNL_RA_PACING_CAPTURE_MODE: NORMAL
MDC_IDC_SET_LEADCHNL_RA_PACING_CATHODE_ELECTRODE_1: NORMAL
MDC_IDC_SET_LEADCHNL_RA_PACING_CATHODE_LOCATION_1: NORMAL
MDC_IDC_SET_LEADCHNL_RA_PACING_POLARITY: NORMAL
MDC_IDC_SET_LEADCHNL_RA_PACING_PULSEWIDTH: 0.4 MS
MDC_IDC_SET_LEADCHNL_RA_SENSING_ANODE_ELECTRODE_1: NORMAL
MDC_IDC_SET_LEADCHNL_RA_SENSING_ANODE_LOCATION_1: NORMAL
MDC_IDC_SET_LEADCHNL_RA_SENSING_CATHODE_ELECTRODE_1: NORMAL
MDC_IDC_SET_LEADCHNL_RA_SENSING_CATHODE_LOCATION_1: NORMAL
MDC_IDC_SET_LEADCHNL_RA_SENSING_POLARITY: NORMAL
MDC_IDC_SET_LEADCHNL_RA_SENSING_SENSITIVITY: 0.3 MV
MDC_IDC_SET_LEADCHNL_RV_PACING_AMPLITUDE: 1.75 V
MDC_IDC_SET_LEADCHNL_RV_PACING_ANODE_ELECTRODE_1: NORMAL
MDC_IDC_SET_LEADCHNL_RV_PACING_ANODE_LOCATION_1: NORMAL
MDC_IDC_SET_LEADCHNL_RV_PACING_CAPTURE_MODE: NORMAL
MDC_IDC_SET_LEADCHNL_RV_PACING_CATHODE_ELECTRODE_1: NORMAL
MDC_IDC_SET_LEADCHNL_RV_PACING_CATHODE_LOCATION_1: NORMAL
MDC_IDC_SET_LEADCHNL_RV_PACING_POLARITY: NORMAL
MDC_IDC_SET_LEADCHNL_RV_PACING_PULSEWIDTH: 0.4 MS
MDC_IDC_SET_LEADCHNL_RV_SENSING_ANODE_ELECTRODE_1: NORMAL
MDC_IDC_SET_LEADCHNL_RV_SENSING_ANODE_LOCATION_1: NORMAL
MDC_IDC_SET_LEADCHNL_RV_SENSING_CATHODE_ELECTRODE_1: NORMAL
MDC_IDC_SET_LEADCHNL_RV_SENSING_CATHODE_LOCATION_1: NORMAL
MDC_IDC_SET_LEADCHNL_RV_SENSING_POLARITY: NORMAL
MDC_IDC_SET_LEADCHNL_RV_SENSING_SENSITIVITY: 0.45 MV
MDC_IDC_SET_ZONE_DETECTION_INTERVAL: 350 MS
MDC_IDC_SET_ZONE_DETECTION_INTERVAL: 400 MS
MDC_IDC_SET_ZONE_STATUS: NORMAL
MDC_IDC_SET_ZONE_STATUS: NORMAL
MDC_IDC_SET_ZONE_TYPE: NORMAL
MDC_IDC_SET_ZONE_VENDOR_TYPE: NORMAL
MDC_IDC_STAT_AT_BURDEN_PERCENT: 0 %
MDC_IDC_STAT_AT_DTM_END: NORMAL
MDC_IDC_STAT_AT_DTM_START: NORMAL
MDC_IDC_STAT_BRADY_AP_VP_PERCENT: 0.7 %
MDC_IDC_STAT_BRADY_AP_VS_PERCENT: 12.32 %
MDC_IDC_STAT_BRADY_AS_VP_PERCENT: 1.33 %
MDC_IDC_STAT_BRADY_AS_VS_PERCENT: 85.65 %
MDC_IDC_STAT_BRADY_DTM_END: NORMAL
MDC_IDC_STAT_BRADY_DTM_START: NORMAL
MDC_IDC_STAT_BRADY_RA_PERCENT_PACED: 12.57 %
MDC_IDC_STAT_BRADY_RV_PERCENT_PACED: 2.03 %
MDC_IDC_STAT_EPISODE_RECENT_COUNT: 0
MDC_IDC_STAT_EPISODE_RECENT_COUNT: 0
MDC_IDC_STAT_EPISODE_RECENT_COUNT: 1
MDC_IDC_STAT_EPISODE_RECENT_COUNT: 3
MDC_IDC_STAT_EPISODE_RECENT_COUNT: 9
MDC_IDC_STAT_EPISODE_RECENT_COUNT_DTM_END: NORMAL
MDC_IDC_STAT_EPISODE_RECENT_COUNT_DTM_START: NORMAL
MDC_IDC_STAT_EPISODE_TOTAL_COUNT: 0
MDC_IDC_STAT_EPISODE_TOTAL_COUNT: 0
MDC_IDC_STAT_EPISODE_TOTAL_COUNT: 25
MDC_IDC_STAT_EPISODE_TOTAL_COUNT: 258
MDC_IDC_STAT_EPISODE_TOTAL_COUNT: 264
MDC_IDC_STAT_EPISODE_TOTAL_COUNT_DTM_END: NORMAL
MDC_IDC_STAT_EPISODE_TOTAL_COUNT_DTM_START: NORMAL
MDC_IDC_STAT_EPISODE_TYPE: NORMAL
MDC_IDC_STAT_TACHYTHERAPY_RECENT_DTM_END: NORMAL
MDC_IDC_STAT_TACHYTHERAPY_RECENT_DTM_START: NORMAL
MDC_IDC_STAT_TACHYTHERAPY_TOTAL_DTM_END: NORMAL
MDC_IDC_STAT_TACHYTHERAPY_TOTAL_DTM_START: NORMAL

## 2025-06-26 NOTE — ED PROVIDER NOTES
EMERGENCY DEPARTMENT ENCOUNTER      NAME: Ashok Del Rosario  AGE: 74 year old male  YOB: 1950  MRN: [id number]  EVALUATION DATE & TIME: 6/25/2025  6:59 PM    PCP: Bj Hendrickson    ED PROVIDER: Kelsi Castillo MD    Chief Complaint   Patient presents with    Post-op Problem         FINAL IMPRESSION:  1. Encounter for postoperative wound check          ED COURSE & MEDICAL DECISION MAKING:    Pertinent Labs & Imaging studies reviewed. (See chart for details)  74 year old male with history of paroxysmal A-fib, sick sinus syndrome, HTN, CAD who presents to the Emergency Department for evaluation of status post ablation today with access to the right groin here with bleeding at the groin access site.  On examination there is no active bleeding.  Patient has a 2 x 3 cm Telfa covered with Tegaderm that is not even entirely saturated.  There is no significant swelling, induration, palpable thrill, hematoma to the area.  I do not think patient warrants ultrasound for hematoma, pseudoaneurysm, AV fistula.  Patient was reassured and counseled.  States that he was intubated for the procedure and as a result has some slight cough.  It sounds like 1 of these episodes happened while he was stepping into his vehicle and he was not able to hold some counterpressure on the groin and this may be contributing.  Patient counseled regarding same.  Warning signs return to ED discussed and discharged home.      ED Course as of 06/25/25 2152 Wed Jun 25, 2025 1905 I met with the patient, obtained history, performed an initial exam, and discussed options and plan for diagnostics and treatment here in the ED.         Medical Decision Making  I obtained history from Family Member/Significant Other  I reviewed the EMR: Outpatient Record: Procedure notes today from ablation  Discharge. No recommendations on prescription strength medication(s). See documentation for any additional details.    MIPS (CTPE, Dental pain, [place],  Sinusitis, Asthma/COPD, Head Trauma): Not Applicable    SEPSIS: None          At the conclusion of the encounter I discussed the results of all of the tests and the disposition. The questions were answered. The patient or family acknowledged understanding and was agreeable with the care plan.        MEDICATIONS GIVEN IN THE EMERGENCY:  Medications - No data to display    NEW PRESCRIPTIONS STARTED AT TODAY'S ER VISIT  Discharge Medication List as of 6/25/2025  7:19 PM             =================================================================    HPI    Patient information was obtained from: patient     Use of Intrepreter: N/A        Ashok Del Rosario is a 74 year old male with pertinent medical history of pulmonary embolism, right bundle block, atrial fibrillation, GERD, hypertension, dyslipidemia, prediabetic, bradycardia, cardiac pacemaker, hyperlipidemia who presents with post op problems.     The patient reports that around 9:30 AM (10 hours ago) he had an ablation surgery preformed today and he has an increase in bleeding on his suture site. The patient has not been moving much, and he has been sitting in a chair since his procedure. He states that his throat hurt form being intubated. The patient is anticoagulated on eliquis.        PAST MEDICAL HISTORY:  Past Medical History:   Diagnosis Date    Anxiety about health     per [facility]    Dyslipidemia     GERD (gastroesophageal reflux disease)     Hypertension     Nonocclusive coronary atherosclerosis of native coronary artery 10/17/2017    Paroxysmal atrial fibrillation (H) 01/14/2019    Dx [id number] score = 5 (age, HTN, CAD, TIA 2) Rx flecainide Rx Xarelto    Pulmonary emphysema (H) 11/21/2019    per [place]    Right bundle branch block 08/29/2019    Solitary pulmonary nodule 11/21/2019    per [place]    Syncope 01/18/2019    TIA (transient ischemic attack) 12/30/2018       PAST SURGICAL HISTORY:  Past Surgical History:   Procedure Laterality Date     APPENDECTOMY  2000    CARDIAC ELECTROPHYSIOLOGY MAPPING AND ABLATION  08/29/2019    PVI done at [place]    CV CORONARY ANGIOGRAM N/A 10/17/2017    Procedure: Coronary Angiogram;  Surgeon: Ashok Hyatt MD;  Location: [facility] Cath Lab;  Service:     CV CORONARY ANGIOGRAM N/A 5/17/2024    Procedure: Coronary Angiogram;  Surgeon: Matias Scales MD;  Location: [facility] CV    CV LEFT HEART CATH N/A 5/17/2024    Procedure: Left Heart Catheterization;  Surgeon: Matias Scales MD;  Location: [facility] CV    CV LEFT HEART CATHETERIZATION WITH LEFT VENTRICULOGRAM N/A 10/17/2017    Procedure: Left Heart Catheterization with Left Ventriculogram;  Surgeon: Ashok Hyatt MD;  Location: [facility] Cath Lab;  Service:     CV PCI N/A 5/17/2024    Procedure: Percutaneous Coronary Intervention;  Surgeon: Matias Scales MD;  Location: [facility] CV    CV PCI ATHERECTOMY ORBITAL N/A 5/17/2024    Procedure: Percutaneous Coronary Intervention - Atherectomy Rotational;  Surgeon: Matias Scales MD;  Location: [facility] CV    EP PACEMAKER INSERT Left 12/31/2018    Procedure: EP Pacemaker Insertion;  Surgeon: Micheal Junior MD;  Location: [facility] Cath Lab;  Service: Cardiology    OTHER SURGICAL HISTORY  02/04/2020    Left atrial appendage closure[facility]man FLX at [place]       CURRENT MEDICATIONS:    Prior to Admission Medications   Prescriptions Last Dose Informant Patient Reported? Taking?   acetaminophen (TYLENOL) 500 MG tablet  Self Yes No   Sig: Take 500-1,000 mg by mouth every 6 hours as needed for mild pain.   apixaban ANTICOAGULANT (ELIQUIS) 5 MG tablet   No No   Sig: Take 1 tablet (5 mg) by mouth 2 times daily.   aspirin (ASPIR-81) 81 MG EC tablet  Self Yes No   Sig: [ASPIRIN (ASPIR-81) 81 MG EC TABLET] Take 81 mg by mouth daily.    atorvastatin (LIPITOR) 80 MG tablet   Yes No   Sig: Take 80 mg by mouth daily.   carboxymethylcellulose (REFRESH PLUS) 0.5 % SOLN ophthalmic  solution  Self Yes No   Sig: Place 1 drop into the right eye At Bedtime   ferrous sulfate (FE TABS) 325 (65 Fe) MG EC tablet   Yes No   Sig: Take 325 mg by mouth every other day.   losartan (COZAAR) 25 MG tablet   No No   Sig: TAKE 1 TABLET BY MOUTH EVERY DAY   neomycin-polymyxin-dexAMETHasone (MAXITROL) 3.5-91230-8.1 ophthalmic ointment  Self Yes No   Sig: Place 0.5 inches into both eyes 2 times daily as needed (.). 1 APPLICATION INTO THE LOWER EYELID OF AFFECTED EYE OPHTHALMIC 2 TIMES DAILY AS NEEDED   nitroGLYcerin (NITROSTAT) 0.4 MG sublingual tablet  Self Yes No   Sig: Place 0.4 mg under the tongue every 5 minutes as needed for chest pain. For chest pain place 1 tablet under the tongue every 5 minutes for 3 doses. If symptoms persist 5 minutes after 1st dose call 911.   pantoprazole (PROTONIX) 40 MG EC tablet  Self No No   Sig: Take 1 tablet (40 mg) by mouth daily   rosuvastatin (CRESTOR) 40 MG tablet   No No   Sig: Take 1 tablet (40 mg) by mouth daily.   sotalol (BETAPACE) 80 MG tablet   Yes No   Sig: Take 1 tablet (80 mg) by mouth 2 times daily.      Facility-Administered Medications: None       ALLERGIES:  Allergies   Allergen Reactions    Cephalexin Rash       FAMILY HISTORY:  Family History   Problem Relation Age of Onset    Cancer Mother     Cancer Father     Coronary Artery Disease Brother     Coronary Artery Disease Brother     Valvular heart disease Brother     Rectal Cancer Sister     Colon Cancer Sister        SOCIAL HISTORY:  Social History     Tobacco Use    Smoking status: Former     Current packs/day: 0.00     Types: Cigarettes     Quit date: 10/16/1999     Years since quittin.7    Smokeless tobacco: Never   Substance Use Topics    Alcohol use: Yes     Alcohol/week: 1.0 standard drink of alcohol     Comment: Alcoholic Drinks/day: none since december    Drug use: No        VITALS:  Patient Vitals for the past 24 hrs:   BP Temp Temp src Pulse Resp SpO2 Height Weight   25 1930 (!) 149/84  "-- -- 103 24 95 % -- --   06/25/25 1919 (!) 164/88 -- -- 103 26 96 % -- --   06/25/25 1913 -- -- -- 106 -- -- -- --   06/25/25 1904 (!) 164/96 -- -- 114 28 96 % -- --   06/25/25 1853 (!) 157/94 98  F (36.7  C) Temporal (!) 121 18 93 % 1.702 m (5' 7\") 95.3 kg (210 lb)       PHYSICAL EXAM    General Appearance: Well-appearing, well-nourished, no acute distress  Cardio:  Minimmally tachycardic in the 100's. Regular rhythm, no murmur/gallop/rub  Pulm:  No respiratory distress, clear to auscultation bilaterally  Extremities: Normal gait  Skin:  Skin warm, dry, no rashes. ecchymosis bilaterally in groins. 2 x 3 cm Telfa covered with Tegaderm, Telfa is 2/3 saturated with blood. No active bleeding.   Neuro:  Alert and oriented ×3, moving all extremities, no gross sensory defects     RADIOLOGY/LABS:  Reviewed all pertinent imaging. Please see official radiology report. All pertinent labs reviewed and interpreted.           The creation of this record is based on the scribe s observations of the work being performed by Kelsi Castillo MD and the provider s statements to them. It was created on her behalf by Meg Bach, a trained medical scribe. This document has been checked and approved by the attending provider.    Kelsi Castillo MD  Emergency Medicine  [facility] EMERGENCY DEPARTMENT  [address] [place] [id number] [phone]  Dept: [phone]     Kelsi Castillo MD  06/25/25 2152    "

## 2025-06-30 ENCOUNTER — TELEPHONE (OUTPATIENT)
Dept: CARDIOLOGY | Facility: CLINIC | Age: 75
End: 2025-06-30

## 2025-06-30 ENCOUNTER — APPOINTMENT (OUTPATIENT)
Dept: RADIOLOGY | Facility: HOSPITAL | Age: 75
End: 2025-06-30
Attending: EMERGENCY MEDICINE
Payer: MEDICARE

## 2025-06-30 ENCOUNTER — HOSPITAL ENCOUNTER (EMERGENCY)
Facility: HOSPITAL | Age: 75
Discharge: HOME | End: 2025-06-30
Attending: EMERGENCY MEDICINE | Admitting: EMERGENCY MEDICINE
Payer: MEDICARE

## 2025-06-30 ENCOUNTER — ANCILLARY PROCEDURE (OUTPATIENT)
Dept: CARDIOLOGY | Facility: CLINIC | Age: 75
End: 2025-06-30
Attending: INTERNAL MEDICINE
Payer: MEDICARE

## 2025-06-30 VITALS
RESPIRATION RATE: 22 BRPM | HEIGHT: 67 IN | OXYGEN SATURATION: 97 % | SYSTOLIC BLOOD PRESSURE: 180 MMHG | TEMPERATURE: 97.7 F | DIASTOLIC BLOOD PRESSURE: 100 MMHG | HEART RATE: 83 BPM | BODY MASS INDEX: 32.62 KG/M2 | WEIGHT: 207.8 LBS

## 2025-06-30 DIAGNOSIS — I49.5 SSS (SICK SINUS SYNDROME) (H): ICD-10-CM

## 2025-06-30 DIAGNOSIS — Z95.0 PACEMAKER: ICD-10-CM

## 2025-06-30 DIAGNOSIS — R42 DIZZINESS: ICD-10-CM

## 2025-06-30 DIAGNOSIS — R07.9 CHEST PAIN, UNSPECIFIED TYPE: ICD-10-CM

## 2025-06-30 LAB
ALBUMIN SERPL BCG-MCNC: 4.3 G/DL (ref 3.5–5.2)
ALBUMIN UR-MCNC: 50 MG/DL
ALP SERPL-CCNC: 55 U/L (ref 40–150)
ALT SERPL W P-5'-P-CCNC: 36 U/L (ref 0–70)
ANION GAP SERPL CALCULATED.3IONS-SCNC: 11 MMOL/L (ref 7–15)
APPEARANCE UR: CLEAR
AST SERPL W P-5'-P-CCNC: 23 U/L (ref 0–45)
BASOPHILS # BLD AUTO: 0 10E3/UL (ref 0–0.2)
BASOPHILS NFR BLD AUTO: 0 %
BILIRUB DIRECT SERPL-MCNC: 0.18 MG/DL (ref 0–0.3)
BILIRUB SERPL-MCNC: 0.5 MG/DL
BILIRUB UR QL STRIP: NEGATIVE
BUN SERPL-MCNC: 9.3 MG/DL (ref 8–23)
CALCIUM SERPL-MCNC: 9.2 MG/DL (ref 8.8–10.4)
CHLORIDE SERPL-SCNC: 104 MMOL/L (ref 98–107)
COLOR UR AUTO: YELLOW
CREAT SERPL-MCNC: 0.8 MG/DL (ref 0.67–1.17)
EGFRCR SERPLBLD CKD-EPI 2021: >90 ML/MIN/1.73M2
EOSINOPHIL # BLD AUTO: 0 10E3/UL (ref 0–0.7)
EOSINOPHIL NFR BLD AUTO: 0 %
ERYTHROCYTE [DISTWIDTH] IN BLOOD BY AUTOMATED COUNT: 16.1 % (ref 10–15)
GLUCOSE SERPL-MCNC: 124 MG/DL (ref 70–99)
GLUCOSE UR STRIP-MCNC: NEGATIVE MG/DL
HCO3 SERPL-SCNC: 23 MMOL/L (ref 22–29)
HCT VFR BLD AUTO: 38.7 % (ref 40–53)
HGB BLD-MCNC: 12.8 G/DL (ref 13.3–17.7)
HGB UR QL STRIP: NEGATIVE
HOLD SPECIMEN: NORMAL
IMM GRANULOCYTES # BLD: 0 10E3/UL
IMM GRANULOCYTES NFR BLD: 0 %
KETONES UR STRIP-MCNC: NEGATIVE MG/DL
LEUKOCYTE ESTERASE UR QL STRIP: NEGATIVE
LYMPHOCYTES # BLD AUTO: 2 10E3/UL (ref 0.8–5.3)
LYMPHOCYTES NFR BLD AUTO: 20 %
MCH RBC QN AUTO: 27.1 PG (ref 26.5–33)
MCHC RBC AUTO-ENTMCNC: 33.1 G/DL (ref 31.5–36.5)
MCV RBC AUTO: 82 FL (ref 78–100)
MDC_IDC_EPISODE_DTM: NORMAL
MDC_IDC_EPISODE_DURATION: 1 S
MDC_IDC_EPISODE_ID: 683
MDC_IDC_EPISODE_TYPE: NORMAL
MDC_IDC_LEAD_CONNECTION_STATUS: NORMAL
MDC_IDC_LEAD_CONNECTION_STATUS: NORMAL
MDC_IDC_LEAD_IMPLANT_DT: NORMAL
MDC_IDC_LEAD_IMPLANT_DT: NORMAL
MDC_IDC_LEAD_LOCATION: NORMAL
MDC_IDC_LEAD_LOCATION: NORMAL
MDC_IDC_LEAD_LOCATION_DETAIL_1: NORMAL
MDC_IDC_LEAD_LOCATION_DETAIL_1: NORMAL
MDC_IDC_LEAD_MFG: NORMAL
MDC_IDC_LEAD_MFG: NORMAL
MDC_IDC_LEAD_MODEL: NORMAL
MDC_IDC_LEAD_MODEL: NORMAL
MDC_IDC_LEAD_POLARITY_TYPE: NORMAL
MDC_IDC_LEAD_POLARITY_TYPE: NORMAL
MDC_IDC_LEAD_SERIAL: NORMAL
MDC_IDC_LEAD_SERIAL: NORMAL
MDC_IDC_LEAD_SPECIAL_FUNCTION: NORMAL
MDC_IDC_LEAD_SPECIAL_FUNCTION: NORMAL
MDC_IDC_MSMT_BATTERY_DTM: NORMAL
MDC_IDC_MSMT_BATTERY_REMAINING_LONGEVITY: 104 MO
MDC_IDC_MSMT_BATTERY_RRT_TRIGGER: 2.62
MDC_IDC_MSMT_BATTERY_STATUS: NORMAL
MDC_IDC_MSMT_BATTERY_VOLTAGE: 2.99 V
MDC_IDC_MSMT_LEADCHNL_RA_IMPEDANCE_VALUE: 285 OHM
MDC_IDC_MSMT_LEADCHNL_RA_IMPEDANCE_VALUE: 342 OHM
MDC_IDC_MSMT_LEADCHNL_RA_PACING_THRESHOLD_AMPLITUDE: 0.75 V
MDC_IDC_MSMT_LEADCHNL_RA_PACING_THRESHOLD_PULSEWIDTH: 0.4 MS
MDC_IDC_MSMT_LEADCHNL_RA_SENSING_INTR_AMPL: 1.25 MV
MDC_IDC_MSMT_LEADCHNL_RA_SENSING_INTR_AMPL: 1.25 MV
MDC_IDC_MSMT_LEADCHNL_RV_IMPEDANCE_VALUE: 323 OHM
MDC_IDC_MSMT_LEADCHNL_RV_IMPEDANCE_VALUE: 399 OHM
MDC_IDC_MSMT_LEADCHNL_RV_PACING_THRESHOLD_AMPLITUDE: 1.12 V
MDC_IDC_MSMT_LEADCHNL_RV_PACING_THRESHOLD_PULSEWIDTH: 0.4 MS
MDC_IDC_MSMT_LEADCHNL_RV_SENSING_INTR_AMPL: 0.88 MV
MDC_IDC_MSMT_LEADCHNL_RV_SENSING_INTR_AMPL: 0.88 MV
MDC_IDC_PG_IMPLANT_DTM: NORMAL
MDC_IDC_PG_MFG: NORMAL
MDC_IDC_PG_MODEL: NORMAL
MDC_IDC_PG_SERIAL: NORMAL
MDC_IDC_PG_TYPE: NORMAL
MDC_IDC_SESS_CLINIC_NAME: NORMAL
MDC_IDC_SESS_DTM: NORMAL
MDC_IDC_SESS_TYPE: NORMAL
MDC_IDC_SET_BRADY_AT_MODE_SWITCH_RATE: 171 {BEATS}/MIN
MDC_IDC_SET_BRADY_HYSTRATE: NORMAL
MDC_IDC_SET_BRADY_LOWRATE: 60 {BEATS}/MIN
MDC_IDC_SET_BRADY_MAX_SENSOR_RATE: 130 {BEATS}/MIN
MDC_IDC_SET_BRADY_MAX_TRACKING_RATE: 130 {BEATS}/MIN
MDC_IDC_SET_BRADY_MODE: NORMAL
MDC_IDC_SET_BRADY_PAV_DELAY_LOW: 180 MS
MDC_IDC_SET_BRADY_SAV_DELAY_LOW: 150 MS
MDC_IDC_SET_LEADCHNL_RA_PACING_AMPLITUDE: 1.5 V
MDC_IDC_SET_LEADCHNL_RA_PACING_ANODE_ELECTRODE_1: NORMAL
MDC_IDC_SET_LEADCHNL_RA_PACING_ANODE_LOCATION_1: NORMAL
MDC_IDC_SET_LEADCHNL_RA_PACING_CAPTURE_MODE: NORMAL
MDC_IDC_SET_LEADCHNL_RA_PACING_CATHODE_ELECTRODE_1: NORMAL
MDC_IDC_SET_LEADCHNL_RA_PACING_CATHODE_LOCATION_1: NORMAL
MDC_IDC_SET_LEADCHNL_RA_PACING_POLARITY: NORMAL
MDC_IDC_SET_LEADCHNL_RA_PACING_PULSEWIDTH: 0.4 MS
MDC_IDC_SET_LEADCHNL_RA_SENSING_ANODE_ELECTRODE_1: NORMAL
MDC_IDC_SET_LEADCHNL_RA_SENSING_ANODE_LOCATION_1: NORMAL
MDC_IDC_SET_LEADCHNL_RA_SENSING_CATHODE_ELECTRODE_1: NORMAL
MDC_IDC_SET_LEADCHNL_RA_SENSING_CATHODE_LOCATION_1: NORMAL
MDC_IDC_SET_LEADCHNL_RA_SENSING_POLARITY: NORMAL
MDC_IDC_SET_LEADCHNL_RA_SENSING_SENSITIVITY: 0.3 MV
MDC_IDC_SET_LEADCHNL_RV_PACING_AMPLITUDE: 1.75 V
MDC_IDC_SET_LEADCHNL_RV_PACING_ANODE_ELECTRODE_1: NORMAL
MDC_IDC_SET_LEADCHNL_RV_PACING_ANODE_LOCATION_1: NORMAL
MDC_IDC_SET_LEADCHNL_RV_PACING_CAPTURE_MODE: NORMAL
MDC_IDC_SET_LEADCHNL_RV_PACING_CATHODE_ELECTRODE_1: NORMAL
MDC_IDC_SET_LEADCHNL_RV_PACING_CATHODE_LOCATION_1: NORMAL
MDC_IDC_SET_LEADCHNL_RV_PACING_POLARITY: NORMAL
MDC_IDC_SET_LEADCHNL_RV_PACING_PULSEWIDTH: 0.4 MS
MDC_IDC_SET_LEADCHNL_RV_SENSING_ANODE_ELECTRODE_1: NORMAL
MDC_IDC_SET_LEADCHNL_RV_SENSING_ANODE_LOCATION_1: NORMAL
MDC_IDC_SET_LEADCHNL_RV_SENSING_CATHODE_ELECTRODE_1: NORMAL
MDC_IDC_SET_LEADCHNL_RV_SENSING_CATHODE_LOCATION_1: NORMAL
MDC_IDC_SET_LEADCHNL_RV_SENSING_POLARITY: NORMAL
MDC_IDC_SET_LEADCHNL_RV_SENSING_SENSITIVITY: 0.45 MV
MDC_IDC_SET_ZONE_DETECTION_INTERVAL: 350 MS
MDC_IDC_SET_ZONE_DETECTION_INTERVAL: 400 MS
MDC_IDC_SET_ZONE_STATUS: NORMAL
MDC_IDC_SET_ZONE_STATUS: NORMAL
MDC_IDC_SET_ZONE_TYPE: NORMAL
MDC_IDC_SET_ZONE_VENDOR_TYPE: NORMAL
MDC_IDC_STAT_AT_BURDEN_PERCENT: 0 %
MDC_IDC_STAT_AT_DTM_END: NORMAL
MDC_IDC_STAT_AT_DTM_START: NORMAL
MDC_IDC_STAT_BRADY_AP_VP_PERCENT: 0.8 %
MDC_IDC_STAT_BRADY_AP_VS_PERCENT: 12.84 %
MDC_IDC_STAT_BRADY_AS_VP_PERCENT: 1.01 %
MDC_IDC_STAT_BRADY_AS_VS_PERCENT: 85.35 %
MDC_IDC_STAT_BRADY_DTM_END: NORMAL
MDC_IDC_STAT_BRADY_DTM_START: NORMAL
MDC_IDC_STAT_BRADY_RA_PERCENT_PACED: 12.95 %
MDC_IDC_STAT_BRADY_RV_PERCENT_PACED: 1.81 %
MDC_IDC_STAT_EPISODE_RECENT_COUNT: 0
MDC_IDC_STAT_EPISODE_RECENT_COUNT: 1
MDC_IDC_STAT_EPISODE_RECENT_COUNT_DTM_END: NORMAL
MDC_IDC_STAT_EPISODE_RECENT_COUNT_DTM_START: NORMAL
MDC_IDC_STAT_EPISODE_TOTAL_COUNT: 0
MDC_IDC_STAT_EPISODE_TOTAL_COUNT: 0
MDC_IDC_STAT_EPISODE_TOTAL_COUNT: 25
MDC_IDC_STAT_EPISODE_TOTAL_COUNT: 259
MDC_IDC_STAT_EPISODE_TOTAL_COUNT: 264
MDC_IDC_STAT_EPISODE_TOTAL_COUNT_DTM_END: NORMAL
MDC_IDC_STAT_EPISODE_TOTAL_COUNT_DTM_START: NORMAL
MDC_IDC_STAT_EPISODE_TYPE: NORMAL
MDC_IDC_STAT_TACHYTHERAPY_RECENT_DTM_END: NORMAL
MDC_IDC_STAT_TACHYTHERAPY_RECENT_DTM_START: NORMAL
MDC_IDC_STAT_TACHYTHERAPY_TOTAL_DTM_END: NORMAL
MDC_IDC_STAT_TACHYTHERAPY_TOTAL_DTM_START: NORMAL
MONOCYTES # BLD AUTO: 0.7 10E3/UL (ref 0–1.3)
MONOCYTES NFR BLD AUTO: 7 %
MUCOUS THREADS #/AREA URNS LPF: PRESENT /LPF
NEUTROPHILS # BLD AUTO: 7.4 10E3/UL (ref 1.6–8.3)
NEUTROPHILS NFR BLD AUTO: 73 %
NITRATE UR QL: NEGATIVE
NRBC # BLD AUTO: 0 10E3/UL
NRBC BLD AUTO-RTO: 0 /100
PH UR STRIP: 6.5 [PH] (ref 5–7)
PLATELET # BLD AUTO: 276 10E3/UL (ref 150–450)
POTASSIUM SERPL-SCNC: 4.1 MMOL/L (ref 3.4–5.3)
PROT SERPL-MCNC: 7 G/DL (ref 6.4–8.3)
RBC # BLD AUTO: 4.73 10E6/UL (ref 4.4–5.9)
RBC URINE: 1 /HPF
RENAL TUB EPI: <1 /HPF (ref ?–1)
SODIUM SERPL-SCNC: 138 MMOL/L (ref 135–145)
SP GR UR STRIP: 1.02 (ref 1–1.03)
SPERM #/AREA URNS HPF: PRESENT /HPF
TROPONIN T SERPL HS-MCNC: 68 NG/L
TROPONIN T SERPL HS-MCNC: 71 NG/L
UROBILINOGEN UR STRIP-MCNC: NORMAL MG/DL
WBC # BLD AUTO: 10.2 10E3/UL (ref 4–11)
WBC URINE: 1 /HPF

## 2025-06-30 PROCEDURE — 93005 ELECTROCARDIOGRAM TRACING: CPT | Performed by: STUDENT IN AN ORGANIZED HEALTH CARE EDUCATION/TRAINING PROGRAM

## 2025-06-30 PROCEDURE — 99285 EMERGENCY DEPT VISIT HI MDM: CPT | Mod: 25

## 2025-06-30 PROCEDURE — 36415 COLL VENOUS BLD VENIPUNCTURE: CPT | Performed by: EMERGENCY MEDICINE

## 2025-06-30 PROCEDURE — 80048 BASIC METABOLIC PNL TOTAL CA: CPT | Performed by: EMERGENCY MEDICINE

## 2025-06-30 PROCEDURE — 71045 X-RAY EXAM CHEST 1 VIEW: CPT

## 2025-06-30 PROCEDURE — 84484 ASSAY OF TROPONIN QUANT: CPT | Performed by: EMERGENCY MEDICINE

## 2025-06-30 PROCEDURE — 93005 ELECTROCARDIOGRAM TRACING: CPT | Performed by: EMERGENCY MEDICINE

## 2025-06-30 PROCEDURE — 81001 URINALYSIS AUTO W/SCOPE: CPT | Performed by: EMERGENCY MEDICINE

## 2025-06-30 PROCEDURE — 85004 AUTOMATED DIFF WBC COUNT: CPT | Performed by: EMERGENCY MEDICINE

## 2025-06-30 PROCEDURE — 84155 ASSAY OF PROTEIN SERUM: CPT | Performed by: EMERGENCY MEDICINE

## 2025-06-30 PROCEDURE — 258N000003 HC RX IP 258 OP 636: Performed by: EMERGENCY MEDICINE

## 2025-06-30 PROCEDURE — 96360 HYDRATION IV INFUSION INIT: CPT

## 2025-06-30 RX ADMIN — SODIUM CHLORIDE 500 ML: 0.9 INJECTION, SOLUTION INTRAVENOUS at 19:40

## 2025-06-30 ASSESSMENT — COLUMBIA-SUICIDE SEVERITY RATING SCALE - C-SSRS
1. IN THE PAST MONTH, HAVE YOU WISHED YOU WERE DEAD OR WISHED YOU COULD GO TO SLEEP AND NOT WAKE UP?: NO
6. HAVE YOU EVER DONE ANYTHING, STARTED TO DO ANYTHING, OR PREPARED TO DO ANYTHING TO END YOUR LIFE?: NO

## 2025-06-30 ASSESSMENT — ACTIVITIES OF DAILY LIVING (ADL)
ADLS_ACUITY_SCORE: 41

## 2025-06-30 NOTE — TELEPHONE ENCOUNTER
----- Message from Sadie SUNSHINE sent at 6/30/2025  3:22 PM CDT -----  Pt's daughter reports that Pat is feeling crummy, has groin pain, clammy. BP on the higher side. He went to ED for groin bleeding on 6/25 post PVI.  Remote showed no Afib episodes, 1 short NSVT for 7 beats. She was worried that HR is measuring low on apple watch.    Sadie

## 2025-06-30 NOTE — ED TRIAGE NOTES
"Patient ambulates to triage with spouse.  For the last 1-2 days has been having intermittent dizzy episodes along with left chest discomfort that is also intermittent.  Spouse also states that he's been \"off\" today.  Noting some comments that don't make sense.     Also notes some right groin pain.  Had recent ablation on 6/25/25.          "

## 2025-06-30 NOTE — PROGRESS NOTES
Pc back to daughter.  She reviews sudden confusion in patient.  Reviewed patient should be evaluated in the ER due to changes in LOC.  She is agreeable to plan and will bring him to ER.  OSWALDO

## 2025-06-30 NOTE — TELEPHONE ENCOUNTER
6/30/25: Took call from Annika Montanez's daughter worried about low HR per Apple watch.  Had them send in Remote Transmission form home monitor. Multiple other concerns about groin site hurting, not feeling good. Routed to  Nurse Pool. Sadie Gil, RN on 6/30/2025 at 3:28 PM        Encounter Type: Courtesy remote check for: Patient and daughter worried that HR is going down into the 50's at times.  Device: [employer]onic Fletcher (D) Pacemaker  Presenting: AP/VS to AS/VS, rate 65-68 bpm  Findings: No Afib. 1 NSVT with  for 1 second. 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  See Epic note. Sadie Gil, Device RN

## 2025-06-30 NOTE — ED PROVIDER NOTES
"EMERGENCY DEPARTMENT NOTE     Name: Ashok Del Rosario    Age/Sex: 74 year old male   MRN: [id number]   Evaluation Date & Time:  6/30/2025  4:54 PM    PCP:    Bj Hendrickson   ED Provider: Aurelio Barnes D.O.       CHIEF COMPLAINT    Chest Pain and Dizziness     HISTORY OF PRESENT ILLNESS BRIEF   Ashok Del Rosario is a 74 year old  male with a relevant past history of  paroxysmal atrial fibrillation on sotalol for rhythm control, status post Watchman device, sinus node dysfunction status post pacemaker placement, hypertension, coronary disease status post PCI to LAD 5/17/2024, who presents to the ED  for evaluation of chest pain and dizziness.       DIAGNOSIS & DISPOSITION/MEDICAL DECISION MAKING     1. Dizziness    2. Chest pain, unspecified type        EMERGENCY DEPARTMENT COURSE   5:22 PM I met with the patient to gather history and to perform my initial exam.  We discussed treatment options and the plan for care while in the Emergency Department.  Triage vital signs:BP (!) 180/100   Pulse 84   Temp 97.7  F (36.5  C) (Oral)   Resp 20   Ht 1.702 m (5' 7\")   Wt 94.3 kg (207 lb 12.8 oz)   SpO2 96%   BMI 32.55 kg/m     Differential diagnosis considered included but not limited to: ACS, cardiac arrhythmia, pneumonia, pneumothorax, sepsis or multiple sources including pneumonia, urinary tract infection, symptomatic anemia, electrolyte derangement, CNS process including ischemic or hemorrhagic stroke      MDM: Patient on exam was alert with normal mental status.  Cardiac exam was normal.  Occasional irregular heartbeat which correlates to a PAC on monitor noted on EKG.  Lungs clear to auscultation bilaterally with no overt rales no wheezing.  Patient had reproducible tenderness left lateral chest wall without crepitus or ecchymosis no rash noted.  Abdomen nontender.  Neurologically cranial nerves II through XII intact and 5 out of 5 motor strength of all extremities  Diagnostic studies:  Chest x-ray: " "Probable pulmonary vascular congestion, no infiltrate, no pneumothorax rib fracture or other acute abnormality, cardiomegaly present  Laboratory studies obtained interpreted by myself:  EKG: Sinus rhythm with PACs, initial troponin 70 1 repeat 68  Hemoglobin 12.8 stable from previous values, comprehensive metabolic profile within normal limits, urinalysis without pyuria or bacteriuria  Patient has remained alert hemodynamically stable.  Dizziness nonspecific symptoms occur with positional change and may be mild component of orthostasis but no persistent symptoms or warrant changing medications.  Low suspicion for hemorrhagic or ischemic CVA and further evaluation with CT of the head was not pursued.  Patient reported chest pain but it is described as aching nonexertional reproducible on exam appears to be in the chest wall, EKG nonischemic troponins not elevated and chest x-ray without acute process.  Other labs do not suggest other process including symptomatic anemia no electrolyte derangement.  Patient does not have any signs or symptoms of sepsis and urinalysis negative.  Discussed current findings with the patient and family are comfortable with discharge.  Patient will follow-up with primary care physician this week.  Discussed return criteria and if any recurrent symptoms that are worse or persistent including dizziness, chest pain not improved with Tylenol, progressive shortness of breath, development of any persistent confusion or has new symptoms fever will return to the emergency department.     Discharge Vital Signs:BP (!) 180/100   Pulse 84   Temp 97.7  F (36.5  C) (Oral)   Resp 20   Ht 1.702 m (5' 7\")   Wt 94.3 kg (207 lb 12.8 oz)   SpO2 96%   BMI 32.55 kg/m     PROCEDURES:   None  Diagnostic studies:  XR Chest Port 1 View   Final Result   IMPRESSION:       Left subclavian approach pacemaker.      Diffuse interstitial prominence, suspicious for mild interstitial pulmonary edema.      No focal " airspace disease. No pleural effusion or pneumothorax.      Stable cardiomegaly.        Labs Ordered and Resulted from Time of ED Arrival to Time of ED Departure   BASIC METABOLIC PANEL - Abnormal       Result Value    Sodium 138      Potassium 4.1      Chloride 104      Carbon Dioxide (CO2) 23      Anion Gap 11      Urea Nitrogen 9.3      Creatinine 0.80      GFR Estimate >90      Calcium 9.2      Glucose 124 (*)    ROUTINE UA WITH MICROSCOPIC REFLEX TO CULTURE - Abnormal    Color Urine Yellow      Appearance Urine Clear      Glucose Urine Negative      Bilirubin Urine Negative      Ketones Urine Negative      Specific Gravity Urine 1.025      Blood Urine Negative      pH Urine 6.5      Protein Albumin Urine 50 (*)     Urobilinogen Urine Normal      Nitrite Urine Negative      Leukocyte Esterase Urine Negative      Mucus Urine Present (*)     Sperm Urine Present (*)     RBC Urine 1      WBC Urine 1      Renal Tubular Epithelials Urine <1     TROPONIN T, HIGH SENSITIVITY - Abnormal    Troponin T, High Sensitivity 71 (*)    CBC WITH PLATELETS AND DIFFERENTIAL - Abnormal    WBC Count 10.2      RBC Count 4.73      Hemoglobin 12.8 (*)     Hematocrit 38.7 (*)     MCV 82      MCH 27.1      MCHC 33.1      RDW 16.1 (*)     Platelet Count 276      % Neutrophils 73      % Lymphocytes 20      % Monocytes 7      % Eosinophils 0      % Basophils 0      % Immature Granulocytes 0      NRBCs per 100 WBC 0      Absolute Neutrophils 7.4      Absolute Lymphocytes 2.0      Absolute Monocytes 0.7      Absolute Eosinophils 0.0      Absolute Basophils 0.0      Absolute Immature Granulocytes 0.0      Absolute NRBCs 0.0     TROPONIN T, HIGH SENSITIVITY - Abnormal    Troponin T, High Sensitivity 68 (*)    HEPATIC FUNCTION PANEL - Normal    Protein Total 7.0      Albumin 4.3      Bilirubin Total 0.5      Alkaline Phosphatase 55      AST 23      ALT 36      Bilirubin Direct 0.18       ED INTERVENTIONS     Medications   sodium chloride 0.9%  BOLUS 500 mL (0 mLs Intravenous Stopped 6/30/25 2041)     TOTAL CRITICAL CARE TIME (EXCLUDING PROCEDURES): Not applicable      DISCHARGE MEDICATIONS        Review of your medicines        UNREVIEWED medicines. Ask your doctor about these medicines        Dose / Directions   acetaminophen 500 MG tablet  Commonly known as: TYLENOL      Dose: 500-1,000 mg  Take 500-1,000 mg by mouth every 6 hours as needed for mild pain.  Refills: 0     apixaban ANTICOAGULANT 5 MG tablet  Commonly known as: ELIQUIS  Used for: Paroxysmal atrial fibrillation (H)      Dose: 5 mg  Take 1 tablet (5 mg) by mouth 2 times daily.  Quantity: 98 tablet  Refills: 0     aspirin 81 MG EC tablet  Commonly known as: ASA      Dose: 81 mg  [ASPIRIN (ASPIR-81) 81 MG EC TABLET] Take 81 mg by mouth daily.  Refills: 0     atorvastatin 80 MG tablet  Commonly known as: LIPITOR      Dose: 80 mg  Take 80 mg by mouth daily.  Refills: 0     carboxymethylcellulose 0.5 % Soln ophthalmic solution  Commonly known as: REFRESH PLUS      Dose: 1 drop  Place 1 drop into the right eye At Bedtime  Refills: 0     ferrous sulfate 325 (65 Fe) MG EC tablet  Commonly known as: FE TABS      Dose: 325 mg  Take 325 mg by mouth every other day.  Refills: 0     losartan 25 MG tablet  Commonly known as: COZAAR  Used for: Coronary artery disease involving native coronary artery of native heart with unstable angina pectoris (H), Paroxysmal atrial fibrillation (H), Abnormal nuclear cardiac imaging test, Status post insertion of drug eluting coronary artery stent, SSS (sick sinus syndrome) (H), Status post ablation of atrial fibrillation, Essential hypertension      Dose: 25 mg  TAKE 1 TABLET BY MOUTH EVERY DAY  Quantity: 90 tablet  Refills: 2     neomycin-polymyxin-dexAMETHasone 3.5-15243-2.1 ophthalmic ointment  Commonly known as: MAXITROL      Dose: 0.5 inch  Place 0.5 inches into both eyes 2 times daily as needed (.). 1 APPLICATION INTO THE LOWER EYELID OF AFFECTED EYE OPHTHALMIC 2  "TIMES DAILY AS NEEDED  Refills: 0     nitroGLYcerin 0.4 MG sublingual tablet  Commonly known as: NITROSTAT      Dose: 0.4 mg  Place 0.4 mg under the tongue every 5 minutes as needed for chest pain. For chest pain place 1 tablet under the tongue every 5 minutes for 3 doses. If symptoms persist 5 minutes after 1st dose call 911.  Refills: 0     pantoprazole 40 MG EC tablet  Commonly known as: PROTONIX  Used for: Status post insertion of drug eluting coronary artery stent      Dose: 40 mg  Take 1 tablet (40 mg) by mouth daily  Quantity: 90 tablet  Refills: 3     rosuvastatin 40 MG tablet  Commonly known as: Crestor  Used for: Coronary artery disease involving native coronary artery of native heart with unstable angina pectoris (H)      Dose: 40 mg  Take 1 tablet (40 mg) by mouth daily.  Quantity: 90 tablet  Refills: 3     sotalol 80 MG tablet  Commonly known as: BETAPACE      Dose: 80 mg  Take 1 tablet (80 mg) by mouth 2 times daily.  Refills: 0            DISPOSITION: Home    At the conclusion of the encounter I discussed the results of all of the tests and the disposition. The questions were answered. The patient or family acknowledged understanding and was agreeable with the care plan.        HISTORY OF PRESENT ILLNESS   Ashok Del Rosario is a 74 year old  male with a relevant past history of  paroxysmal atrial fibrillation on sotalol for rhythm control, status post Watchman device, sinus node dysfunction status post pacemaker placement, hypertension, coronary disease status post PCI to LAD 5/17/2024, who presents to the ED  for evaluation of chest pain and dizziness.     Per patient: Patient reports left-sided chest pain he describes as an aching sensation as if he had been \"punched\" ongoing since yesterday. Today, he began feeling lightheaded and dizzy while at rest sitting in his chair at home. He notes that these symptoms, as well as a transient irregular heart beat, have been intermittent since his recent " "ablation (see chart review below). Although he continues to have some shortness of breath with exertion, this has been improved since his coronary stent placement and has not recently changed in severity. He further mentions some constipation since his ablation, this having since resolved in the past few days. Although he is no longer feeling confused now in the emergency department, he does endorse some recurrent episodes of confusion that have tended to coincide with instances increased of dizziness.     Per patient's wife and grandson: As the three of them were watching a movie this afternoon, the patient dozed off in his chair. While sleeping, he began making a \"gurgling\" noise. He was eventually startled awake at which time his arms began to shake/vibrate. Patient has been \"off all day\".       Per chart review: Patient underwent ablation with access to the right groin for atrial fibrillation by Dr. Guzman on 6/25/2025 (5 days ago). Later that same day, he was seen at [facility] Emergency Department for evaluation of bleeding from the groin access site. No active bleeding upon exam. Dressing of the access site not entirely saturated; no significant swelling, induration, palpable thrill or hematoma to the area. Patient was reassured and counseled regarding this concern and discharged home.       INFORMATION SOURCE AND LIMITATIONS    History/Exam limitations: None  Patient information was obtained from: Patient, patient's wife, patient's grandson   Use of : N/A        REVIEW OF SYSTEMS:   All other systems reviewed and are negative except as noted above in HPI.    PATIENT HISTORY     Past Medical History:   Diagnosis Date    Anxiety about health     Dyslipidemia     GERD (gastroesophageal reflux disease)     Hypertension     Nonocclusive coronary atherosclerosis of native coronary artery 10/17/2017    Paroxysmal atrial fibrillation (H) 01/14/2019    Pulmonary emphysema (H) " 11/21/2019    Right bundle branch block 08/29/2019    Solitary pulmonary nodule 11/21/2019    Syncope 01/18/2019    TIA (transient ischemic attack) 12/30/2018     Patient Active Problem List   Diagnosis    Mixed hyperlipidemia    Gastroesophageal reflux disease, esophagitis presence not specified    SSS (sick sinus syndrome) (H)    Cardiac pacemaker in situ, dual chamber    Paroxysmal atrial fibrillation (H)    MARCELLA (obstructive sleep apnea)    Status post ablation of atrial fibrillation    Left Atrial Appendage Closure (Watchman FLX)    Essential hypertension    Coronary artery disease involving native coronary artery of native heart with unstable angina pectoris (H)    Status post insertion of drug eluting coronary artery stent    Dyspnea on exertion    Status post coronary angiogram    Pre-procedure lab exam    Abnormal nuclear cardiac imaging test    TIA (transient ischemic attack)    Speech disturbance, unspecified type     Past Surgical History:   Procedure Laterality Date    APPENDECTOMY  2000    CARDIAC ELECTROPHYSIOLOGY MAPPING AND ABLATION  08/29/2019    PVI done at [place]    CV CORONARY ANGIOGRAM N/A 10/17/2017    Procedure: Coronary Angiogram;  Surgeon: Ashok Hyatt MD;  Location: [facility] Cath Lab;  Service:     CV CORONARY ANGIOGRAM N/A 5/17/2024    Procedure: Coronary Angiogram;  Surgeon: Matias Scales MD;  Location: [facility] CV    CV LEFT HEART CATH N/A 5/17/2024    Procedure: Left Heart Catheterization;  Surgeon: Matias Scales MD;  Location: [facility] CV    CV LEFT HEART CATHETERIZATION WITH LEFT VENTRICULOGRAM N/A 10/17/2017    Procedure: Left Heart Catheterization with Left Ventriculogram;  Surgeon: Ashok Hyatt MD;  Location: [facility] Cath Lab;  Service:     CV PCI N/A 5/17/2024    Procedure: Percutaneous Coronary Intervention;  Surgeon: Matias Scales MD;  Location: [facility] CV    CV PCI ATHERECTOMY ORBITAL N/A 5/17/2024    Procedure:  Percutaneous Coronary Intervention - Atherectomy Rotational;  Surgeon: Matias Scales MD;  Location: [facility] CATH LAB CV    EP ABLATION PULMONARY VEIN ISOLATION N/A 6/25/2025    Procedure: Ablation Atrial Fibrillation;  Surgeon: Kristy Guzman MD;  Location: [facility] CATH LAB CV    EP PACEMAKER INSERT Left 12/31/2018    Procedure: EP Pacemaker Insertion;  Surgeon: Micheal Junior MD;  Location: [facility] Cath Lab;  Service: Cardiology    OTHER SURGICAL HISTORY  02/04/2020    Left atrial appendage closureWatchman FLX at [place]       Allergies   Allergen Reactions    Cephalexin Rash       OUTPATIENT MEDICATIONS     New Prescriptions    No medications on file      Vitals:    06/30/25 1945 06/30/25 2000 06/30/25 2015 06/30/25 2030   BP: (!) 162/96 (!) 166/104 (!) 164/96 (!) 180/100   Pulse: 79 73 83 84   Resp: 18 20 20 20   Temp:       TempSrc:       SpO2: 96% 96% 96% 96%   Weight:       Height:           Physical Exam   Constitutional: Oriented to person, place, and time. Appears well-developed and well-nourished.   HEENT:    Head: Atraumatic.   Cardiovascular: Normal rate, regular rhythm and normal heart sounds.  Occasional extra beat/irregular heart rate which correlates with PAC or unifocal PVC on monitor  Pulmonary/Chest: Normal effort  and breath sounds normal.   Abdominal: Soft. Bowel sounds are normal.   Neurological: Alert and oriented to person, place, and time. Normal strength. No sensory deficit. No cranial nerve deficit.  Skin: Skin is warm and dry.   Psychiatric: Normal mood and affect. Behavior is normal. Thought content normal.     DIAGNOSTICS    LABORATORY FINDINGS (REVIEWED AND INTERPRETED):  Labs Ordered and Resulted from Time of ED Arrival to Time of ED Departure   BASIC METABOLIC PANEL - Abnormal       Result Value    Sodium 138      Potassium 4.1      Chloride 104      Carbon Dioxide (CO2) 23      Anion Gap 11      Urea Nitrogen 9.3      Creatinine 0.80      GFR Estimate >90       Calcium 9.2      Glucose 124 (*)    ROUTINE UA WITH MICROSCOPIC REFLEX TO CULTURE - Abnormal    Color Urine Yellow      Appearance Urine Clear      Glucose Urine Negative      Bilirubin Urine Negative      Ketones Urine Negative      Specific Gravity Urine 1.025      Blood Urine Negative      pH Urine 6.5      Protein Albumin Urine 50 (*)     Urobilinogen Urine Normal      Nitrite Urine Negative      Leukocyte Esterase Urine Negative      Mucus Urine Present (*)     Sperm Urine Present (*)     RBC Urine 1      WBC Urine 1      Renal Tubular Epithelials Urine <1     TROPONIN T, HIGH SENSITIVITY - Abnormal    Troponin T, High Sensitivity 71 (*)    CBC WITH PLATELETS AND DIFFERENTIAL - Abnormal    WBC Count 10.2      RBC Count 4.73      Hemoglobin 12.8 (*)     Hematocrit 38.7 (*)     MCV 82      MCH 27.1      MCHC 33.1      RDW 16.1 (*)     Platelet Count 276      % Neutrophils 73      % Lymphocytes 20      % Monocytes 7      % Eosinophils 0      % Basophils 0      % Immature Granulocytes 0      NRBCs per 100 WBC 0      Absolute Neutrophils 7.4      Absolute Lymphocytes 2.0      Absolute Monocytes 0.7      Absolute Eosinophils 0.0      Absolute Basophils 0.0      Absolute Immature Granulocytes 0.0      Absolute NRBCs 0.0     TROPONIN T, HIGH SENSITIVITY - Abnormal    Troponin T, High Sensitivity 68 (*)    HEPATIC FUNCTION PANEL - Normal    Protein Total 7.0      Albumin 4.3      Bilirubin Total 0.5      Alkaline Phosphatase 55      AST 23      ALT 36      Bilirubin Direct 0.18           IMAGING (REVIEWED AND INTERPRETED):  XR Chest Port 1 View   Final Result   IMPRESSION:       Left subclavian approach pacemaker.      Diffuse interstitial prominence, suspicious for mild interstitial pulmonary edema.      No focal airspace disease. No pleural effusion or pneumothorax.      Stable cardiomegaly.            ECG (REVIEWED AND INTERPRETED):   ECG:   Performed at: 16:44  HR:  68 bpm  Rhythm: Sius  Axis: -35  QRS duration: 138  ms  QTC: 476 ms  ST changes: No ST segment elevation or depression, no T wave inversion,No Q wave  Interpretation: Sinus rhythm with PAC, right bundle branch block  Compared to most recent ECG from: 2/23/2025, no acute change PACs now present    I have reviewed the patient's ECG, with comments made as listed above. Please see scanned image for full interpretation.     Billing Documentation    I reviewed these outside records:  Cardiology outpatient visit June 2, 2025 reviewed:  Paroxysmal atrial fibrillation on sotalol for rhythm control: Seen in A-fib clinic and scheduled for ablation  Anticoagulation: CHADS2 vasc score of 2 status post Watchman device.  Will be on Eliquis pre and post ablation.    Sinus node dysfunction status post pacemaker placement: Functioning well on device check  Hypertension: Well-controlled  Coronary disease status post PCI to LAD 5/17/2024: No anginal complaints currently on aspirin  Dyslipidemia: LDL not optimally controlled.  Goal LDL of less than 7    Compliance Documentation  MIPS Documentation Medical Decision Making  I obtained history from Family Member/Significant Other  I reviewed the EMR: Ablation of 6/25/2025; ED visit of 6/25/2025 for bleeding of ablation access site  Care impacted by Anticoagulated State, Chronic Lung Disease, Heart Disease, Hyperlipidemia, and Hypertension  I discussed the care with another health care provider: NA  Discharge. No recommendations on prescription strength medication(s). N/A.    MIPS (CTPE, Dental pain, [place], Sinusitis, Asthma/COPD, Head Trauma): Not Applicable    SEPSIS: None        At the time of my evaluation, I do not feel the patient s symptoms are caused by sepsis      I, Darryn Mora, am serving as a scribe to document services personally performed by Aurelio Barnes DO based on my observation and the provider's statements to me. I, Aurelio Barnes DO attest that Darryn Mora is acting in a scribe capacity, has observed my performance  of the services and has documented them in accordance with my direction.       Aurelio Barnes D.O.  EMERGENCY MEDICINE   06/30/25  [facility] EMERGENCY DEPARTMENT  [address] [place] [address] [id number] [phone]  Dept: [phone]      Aurelio Barnes DO  06/30/25 2046

## 2025-07-01 NOTE — DISCHARGE INSTRUCTIONS
The test done here in the emergency department were reassuring.  Continue medications as previously prescribed.  Follow-up with your primary care physician this week.  If there are progressive symptoms including development of chest pain not improved with Tylenol, shortness of breath with exertion or lying down, dizziness or lightheadedness that persists, development of confusion that persists or other symptoms including fever return to the emergency department.

## 2025-07-07 LAB
ATRIAL RATE - MUSE: 68 BPM
DIASTOLIC BLOOD PRESSURE - MUSE: NORMAL MMHG
INTERPRETATION ECG - MUSE: NORMAL
P AXIS - MUSE: NORMAL DEGREES
PR INTERVAL - MUSE: 184 MS
QRS DURATION - MUSE: 138 MS
QT - MUSE: 448 MS
QTC - MUSE: 476 MS
R AXIS - MUSE: -35 DEGREES
SYSTOLIC BLOOD PRESSURE - MUSE: NORMAL MMHG
T AXIS - MUSE: -16 DEGREES
VENTRICULAR RATE- MUSE: 68 BPM

## 2025-07-31 ENCOUNTER — ANCILLARY PROCEDURE (OUTPATIENT)
Dept: CARDIOLOGY | Facility: CLINIC | Age: 75
End: 2025-07-31
Attending: INTERNAL MEDICINE
Payer: MEDICARE

## 2025-07-31 DIAGNOSIS — I49.5 SICK SINUS SYNDROME (H): ICD-10-CM

## 2025-07-31 DIAGNOSIS — Z95.0 CARDIAC PACEMAKER IN SITU: ICD-10-CM

## 2025-07-31 LAB
MDC_IDC_EPISODE_DTM: NORMAL
MDC_IDC_EPISODE_DTM: NORMAL
MDC_IDC_EPISODE_DURATION: 1 S
MDC_IDC_EPISODE_DURATION: 1 S
MDC_IDC_EPISODE_ID: 684
MDC_IDC_EPISODE_ID: 685
MDC_IDC_EPISODE_TYPE: NORMAL
MDC_IDC_EPISODE_TYPE: NORMAL
MDC_IDC_LEAD_CONNECTION_STATUS: NORMAL
MDC_IDC_LEAD_CONNECTION_STATUS: NORMAL
MDC_IDC_LEAD_IMPLANT_DT: NORMAL
MDC_IDC_LEAD_IMPLANT_DT: NORMAL
MDC_IDC_LEAD_LOCATION: NORMAL
MDC_IDC_LEAD_LOCATION: NORMAL
MDC_IDC_LEAD_LOCATION_DETAIL_1: NORMAL
MDC_IDC_LEAD_LOCATION_DETAIL_1: NORMAL
MDC_IDC_LEAD_MFG: NORMAL
MDC_IDC_LEAD_MFG: NORMAL
MDC_IDC_LEAD_MODEL: NORMAL
MDC_IDC_LEAD_MODEL: NORMAL
MDC_IDC_LEAD_POLARITY_TYPE: NORMAL
MDC_IDC_LEAD_POLARITY_TYPE: NORMAL
MDC_IDC_LEAD_SERIAL: NORMAL
MDC_IDC_LEAD_SERIAL: NORMAL
MDC_IDC_LEAD_SPECIAL_FUNCTION: NORMAL
MDC_IDC_LEAD_SPECIAL_FUNCTION: NORMAL
MDC_IDC_MSMT_BATTERY_DTM: NORMAL
MDC_IDC_MSMT_BATTERY_REMAINING_LONGEVITY: 103 MO
MDC_IDC_MSMT_BATTERY_RRT_TRIGGER: 2.62
MDC_IDC_MSMT_BATTERY_STATUS: NORMAL
MDC_IDC_MSMT_BATTERY_VOLTAGE: 2.99 V
MDC_IDC_MSMT_LEADCHNL_RA_IMPEDANCE_VALUE: 285 OHM
MDC_IDC_MSMT_LEADCHNL_RA_IMPEDANCE_VALUE: 342 OHM
MDC_IDC_MSMT_LEADCHNL_RA_PACING_THRESHOLD_AMPLITUDE: 0.75 V
MDC_IDC_MSMT_LEADCHNL_RA_PACING_THRESHOLD_PULSEWIDTH: 0.4 MS
MDC_IDC_MSMT_LEADCHNL_RA_SENSING_INTR_AMPL: 1.25 MV
MDC_IDC_MSMT_LEADCHNL_RA_SENSING_INTR_AMPL: 1.25 MV
MDC_IDC_MSMT_LEADCHNL_RV_IMPEDANCE_VALUE: 323 OHM
MDC_IDC_MSMT_LEADCHNL_RV_IMPEDANCE_VALUE: 380 OHM
MDC_IDC_MSMT_LEADCHNL_RV_PACING_THRESHOLD_AMPLITUDE: 1.38 V
MDC_IDC_MSMT_LEADCHNL_RV_PACING_THRESHOLD_PULSEWIDTH: 0.4 MS
MDC_IDC_MSMT_LEADCHNL_RV_SENSING_INTR_AMPL: 2.88 MV
MDC_IDC_MSMT_LEADCHNL_RV_SENSING_INTR_AMPL: 2.88 MV
MDC_IDC_PG_IMPLANT_DTM: NORMAL
MDC_IDC_PG_MFG: NORMAL
MDC_IDC_PG_MODEL: NORMAL
MDC_IDC_PG_SERIAL: NORMAL
MDC_IDC_PG_TYPE: NORMAL
MDC_IDC_SESS_CLINIC_NAME: NORMAL
MDC_IDC_SESS_DTM: NORMAL
MDC_IDC_SESS_TYPE: NORMAL
MDC_IDC_SET_BRADY_AT_MODE_SWITCH_RATE: 171 {BEATS}/MIN
MDC_IDC_SET_BRADY_HYSTRATE: NORMAL
MDC_IDC_SET_BRADY_LOWRATE: 60 {BEATS}/MIN
MDC_IDC_SET_BRADY_MAX_SENSOR_RATE: 130 {BEATS}/MIN
MDC_IDC_SET_BRADY_MAX_TRACKING_RATE: 130 {BEATS}/MIN
MDC_IDC_SET_BRADY_MODE: NORMAL
MDC_IDC_SET_BRADY_PAV_DELAY_LOW: 180 MS
MDC_IDC_SET_BRADY_SAV_DELAY_LOW: 150 MS
MDC_IDC_SET_LEADCHNL_RA_PACING_AMPLITUDE: 1.5 V
MDC_IDC_SET_LEADCHNL_RA_PACING_ANODE_ELECTRODE_1: NORMAL
MDC_IDC_SET_LEADCHNL_RA_PACING_ANODE_LOCATION_1: NORMAL
MDC_IDC_SET_LEADCHNL_RA_PACING_CAPTURE_MODE: NORMAL
MDC_IDC_SET_LEADCHNL_RA_PACING_CATHODE_ELECTRODE_1: NORMAL
MDC_IDC_SET_LEADCHNL_RA_PACING_CATHODE_LOCATION_1: NORMAL
MDC_IDC_SET_LEADCHNL_RA_PACING_POLARITY: NORMAL
MDC_IDC_SET_LEADCHNL_RA_PACING_PULSEWIDTH: 0.4 MS
MDC_IDC_SET_LEADCHNL_RA_SENSING_ANODE_ELECTRODE_1: NORMAL
MDC_IDC_SET_LEADCHNL_RA_SENSING_ANODE_LOCATION_1: NORMAL
MDC_IDC_SET_LEADCHNL_RA_SENSING_CATHODE_ELECTRODE_1: NORMAL
MDC_IDC_SET_LEADCHNL_RA_SENSING_CATHODE_LOCATION_1: NORMAL
MDC_IDC_SET_LEADCHNL_RA_SENSING_POLARITY: NORMAL
MDC_IDC_SET_LEADCHNL_RA_SENSING_SENSITIVITY: 0.3 MV
MDC_IDC_SET_LEADCHNL_RV_PACING_AMPLITUDE: 2 V
MDC_IDC_SET_LEADCHNL_RV_PACING_ANODE_ELECTRODE_1: NORMAL
MDC_IDC_SET_LEADCHNL_RV_PACING_ANODE_LOCATION_1: NORMAL
MDC_IDC_SET_LEADCHNL_RV_PACING_CAPTURE_MODE: NORMAL
MDC_IDC_SET_LEADCHNL_RV_PACING_CATHODE_ELECTRODE_1: NORMAL
MDC_IDC_SET_LEADCHNL_RV_PACING_CATHODE_LOCATION_1: NORMAL
MDC_IDC_SET_LEADCHNL_RV_PACING_POLARITY: NORMAL
MDC_IDC_SET_LEADCHNL_RV_PACING_PULSEWIDTH: 0.4 MS
MDC_IDC_SET_LEADCHNL_RV_SENSING_ANODE_ELECTRODE_1: NORMAL
MDC_IDC_SET_LEADCHNL_RV_SENSING_ANODE_LOCATION_1: NORMAL
MDC_IDC_SET_LEADCHNL_RV_SENSING_CATHODE_ELECTRODE_1: NORMAL
MDC_IDC_SET_LEADCHNL_RV_SENSING_CATHODE_LOCATION_1: NORMAL
MDC_IDC_SET_LEADCHNL_RV_SENSING_POLARITY: NORMAL
MDC_IDC_SET_LEADCHNL_RV_SENSING_SENSITIVITY: 0.45 MV
MDC_IDC_SET_ZONE_DETECTION_INTERVAL: 350 MS
MDC_IDC_SET_ZONE_DETECTION_INTERVAL: 400 MS
MDC_IDC_SET_ZONE_STATUS: NORMAL
MDC_IDC_SET_ZONE_STATUS: NORMAL
MDC_IDC_SET_ZONE_TYPE: NORMAL
MDC_IDC_SET_ZONE_VENDOR_TYPE: NORMAL
MDC_IDC_STAT_AT_BURDEN_PERCENT: 0 %
MDC_IDC_STAT_AT_DTM_END: NORMAL
MDC_IDC_STAT_AT_DTM_START: NORMAL
MDC_IDC_STAT_BRADY_AP_VP_PERCENT: 0.35 %
MDC_IDC_STAT_BRADY_AP_VS_PERCENT: 23.2 %
MDC_IDC_STAT_BRADY_AS_VP_PERCENT: 0.42 %
MDC_IDC_STAT_BRADY_AS_VS_PERCENT: 76.02 %
MDC_IDC_STAT_BRADY_DTM_END: NORMAL
MDC_IDC_STAT_BRADY_DTM_START: NORMAL
MDC_IDC_STAT_BRADY_RA_PERCENT_PACED: 23.12 %
MDC_IDC_STAT_BRADY_RV_PERCENT_PACED: 0.78 %
MDC_IDC_STAT_EPISODE_RECENT_COUNT: 0
MDC_IDC_STAT_EPISODE_RECENT_COUNT: 2
MDC_IDC_STAT_EPISODE_RECENT_COUNT_DTM_END: NORMAL
MDC_IDC_STAT_EPISODE_RECENT_COUNT_DTM_START: NORMAL
MDC_IDC_STAT_EPISODE_TOTAL_COUNT: 0
MDC_IDC_STAT_EPISODE_TOTAL_COUNT: 0
MDC_IDC_STAT_EPISODE_TOTAL_COUNT: 25
MDC_IDC_STAT_EPISODE_TOTAL_COUNT: 261
MDC_IDC_STAT_EPISODE_TOTAL_COUNT: 264
MDC_IDC_STAT_EPISODE_TOTAL_COUNT_DTM_END: NORMAL
MDC_IDC_STAT_EPISODE_TOTAL_COUNT_DTM_START: NORMAL
MDC_IDC_STAT_EPISODE_TYPE: NORMAL
MDC_IDC_STAT_TACHYTHERAPY_RECENT_DTM_END: NORMAL
MDC_IDC_STAT_TACHYTHERAPY_RECENT_DTM_START: NORMAL
MDC_IDC_STAT_TACHYTHERAPY_TOTAL_DTM_END: NORMAL
MDC_IDC_STAT_TACHYTHERAPY_TOTAL_DTM_START: NORMAL

## 2025-07-31 NOTE — PROGRESS NOTES
EKG Visit    Pt here for EKG, QTc check after starting 80mg Sotalol BID on 7/27/22    EKG shows SR with HR of 60 bpm, QT of 474 ms, and QTc of 474 ms  QTc within acceptable limits, pts EKG was compared to previous EKG in EMR, EKG is stable, and there has been minimal change in QTc  Vital signes were reviewed and are within normal limits    Pt reports tolerating medication without complaint, reviewed with pt reasoning for above medication, reviewed and confirmed pt understands current dose, administration, and frequency of medication, most common potential side effects from the medication, s/s to call the clinic with and when to seek emergency medical care    Pt was instructed to continue current medication as prescribed, results would be sent to ordering provider for review, pt will be contacted with further changes if necessary and pt verbalized understanding     Results sent to  Suzan Waddell NP for further review and recommendations if necessary  7/29/2022 9:41 AM  Zulay Potts RN          Quality 508: Adult Covid-19 Vaccination Status: Patients who are not up to date on their COVID-19 vaccinations as defined by CDC recommendations on current vaccination Quality 130: Documentation Of Current Medications In The Medical Record: Current Medications Documented Detail Level: Detailed Quality 226: Preventive Care And Screening: Tobacco Use: Screening And Cessation Intervention: Patient screened for tobacco use and is an ex/non-smoker Quality 47: Advance Care Plan: Advance Care Planning discussed and documented; advance care plan or surrogate decision maker documented in the medical record.

## 2025-08-03 PROCEDURE — 93296 REM INTERROG EVL PM/IDS: CPT | Performed by: INTERNAL MEDICINE

## 2025-08-03 PROCEDURE — 93294 REM INTERROG EVL PM/LDLS PM: CPT | Performed by: INTERNAL MEDICINE

## 2025-08-04 ENCOUNTER — OFFICE VISIT (OUTPATIENT)
Dept: CARDIOLOGY | Facility: CLINIC | Age: 75
End: 2025-08-04
Payer: MEDICARE

## 2025-08-04 VITALS
WEIGHT: 210 LBS | RESPIRATION RATE: 16 BRPM | DIASTOLIC BLOOD PRESSURE: 80 MMHG | SYSTOLIC BLOOD PRESSURE: 120 MMHG | BODY MASS INDEX: 32.96 KG/M2 | HEIGHT: 67 IN | HEART RATE: 60 BPM

## 2025-08-04 DIAGNOSIS — I25.110 CORONARY ARTERY DISEASE INVOLVING NATIVE CORONARY ARTERY OF NATIVE HEART WITH UNSTABLE ANGINA PECTORIS (H): ICD-10-CM

## 2025-08-04 DIAGNOSIS — I49.5 SSS (SICK SINUS SYNDROME) (H): ICD-10-CM

## 2025-08-04 DIAGNOSIS — Z95.818 PRESENCE OF LEFT ATRIAL APPENDAGE CLOSURE DEVICE COMPOSED OF NICKEL-TITANIUM ALLOY WITH POLYETHYLENE TEREPHTHALATE MEMBRANE: ICD-10-CM

## 2025-08-04 DIAGNOSIS — I48.0 PAROXYSMAL ATRIAL FIBRILLATION (H): Primary | ICD-10-CM

## 2025-08-04 DIAGNOSIS — Z95.0 CARDIAC PACEMAKER IN SITU: ICD-10-CM

## 2025-08-04 DIAGNOSIS — I10 ESSENTIAL HYPERTENSION: ICD-10-CM

## 2025-08-04 PROCEDURE — G2211 COMPLEX E/M VISIT ADD ON: HCPCS | Performed by: NURSE PRACTITIONER

## 2025-08-04 PROCEDURE — 3079F DIAST BP 80-89 MM HG: CPT | Performed by: NURSE PRACTITIONER

## 2025-08-04 PROCEDURE — 3074F SYST BP LT 130 MM HG: CPT | Performed by: NURSE PRACTITIONER

## 2025-08-04 PROCEDURE — 99214 OFFICE O/P EST MOD 30 MIN: CPT | Performed by: NURSE PRACTITIONER

## 2025-08-04 RX ORDER — METOPROLOL SUCCINATE 25 MG/1
25 TABLET, EXTENDED RELEASE ORAL DAILY
Qty: 90 TABLET | Refills: 3 | Status: SHIPPED | OUTPATIENT
Start: 2025-08-04

## (undated) LAB — FERRITIN SERPL-MCNC: 13 NG/ML (ref 31–409)

## (undated) DEVICE — INTRO TERUMO 8FRX25CM W/MARKER RSB803

## (undated) DEVICE — GUIDEWIRE JTIP 3MM .035 180CM IQ35F180J3

## (undated) DEVICE — MANIFOLD KIT ANGIO AUTOMATED 014613

## (undated) DEVICE — BURR ROTAPRO OS 2.0 1.50MM H749394671500

## (undated) DEVICE — CATH BALLOON EMERGE 3.0X12MM H7493918912300

## (undated) DEVICE — SHEATH PINNACLE 9/25/038 RSS905

## (undated) DEVICE — CLOSURE DEVICE VASCADE VENOUS XL ID10-12FR  800-1012XL-10U

## (undated) DEVICE — KIT HAND CONTROL ACIST 014644 AR-P54

## (undated) DEVICE — Device

## (undated) DEVICE — TRANSDUCER TRAY ARTERIAL 42646-06

## (undated) DEVICE — WIRE GUIDE HI-TRQ  WHISPER MS JTIP 0.014"X190CM 1005357HJ

## (undated) DEVICE — 8F SOUNDSTAR ECO ULTRASOUND CATHETER

## (undated) DEVICE — CATH BALLO0N SHOCKWAVE C2+ 3.5 X12MM CORONARY C2PIVL3512

## (undated) DEVICE — CATHETER OCTARAY LONG SPLINE CURVE F 3-3-3-3-3 D160906

## (undated) DEVICE — SLEEVE TR BAND RADIAL COMPRESSION DEVICE 29CM XX-RF06L

## (undated) DEVICE — CATH ACUNAV 8FRX90CM GE VIVID 10135910

## (undated) DEVICE — CATH LAUNCHER 6FR EBU 4.0 LA6EBU40

## (undated) DEVICE — PATCH CARTO 3 EXTERNAL REFERENCE 3D MAPPING CREFP6

## (undated) DEVICE — CATH DIAGNOSTIC RADIAL 5FR TIG 4.0

## (undated) DEVICE — ELECTRODE ADULT PACING MULTI P-211-M1

## (undated) DEVICE — CUSTOM PACK EP

## (undated) DEVICE — VALVE HEMOSTASIS .096" COPILOT MECH 1003331

## (undated) DEVICE — CATH EP CS BI-DIRECT 115CM 2-8-2 FJ-CURVE BD710FJ282RTS

## (undated) DEVICE — CUSTOM PACK CORONARY SAN5BCRHEA

## (undated) DEVICE — GUIDEWIRE ROTAWIRE DRIVE FLOPPY H74939462005

## (undated) DEVICE — ELECTRODE DEFIB CADENCE 22550R

## (undated) DEVICE — CABLE CATH FARASTAR CONNECTION STERILE LF DISP M004PF41M434

## (undated) DEVICE — SHEATH INTRO FARADRIVE L74 CM ID13 FR STEERABLE M004PF21M402

## (undated) DEVICE — DEVICE INFLATION SYR W/ HEMOSTASIS VALVE 12IN EXT IN4904

## (undated) DEVICE — EXCHANGE WIRE .035 260 STAR/JFC/035/260/ M001491681

## (undated) DEVICE — SYR ANGIOGRAPHY MULTIUSE KIT ACIST 014612

## (undated) DEVICE — SHTH INTRO 0.021IN ID 6FR DIA

## (undated) DEVICE — SHEATH HEMOSTASIS INTRODUCER 10FRX12CM 406120

## (undated) DEVICE — GUIDE WIRE SHEATH VERSACROSS D1 CURVE 93CM L180 VXAK0041

## (undated) DEVICE — CATH BALLOON NC EMERGE 3.75X12MM H7493926712370

## (undated) RX ORDER — CLOPIDOGREL 300 MG/1
TABLET, FILM COATED ORAL
Status: DISPENSED
Start: 2024-05-17

## (undated) RX ORDER — ONDANSETRON 2 MG/ML
INJECTION INTRAMUSCULAR; INTRAVENOUS
Status: DISPENSED
Start: 2025-06-25

## (undated) RX ORDER — MORPHINE SULFATE 2 MG/ML
INJECTION, SOLUTION INTRAMUSCULAR; INTRAVENOUS
Status: DISPENSED
Start: 2024-05-17

## (undated) RX ORDER — DIAZEPAM 5 MG
TABLET ORAL
Status: DISPENSED
Start: 2024-05-17

## (undated) RX ORDER — DEXMEDETOMIDINE HYDROCHLORIDE 4 UG/ML
INJECTION, SOLUTION INTRAVENOUS
Status: DISPENSED
Start: 2025-06-25

## (undated) RX ORDER — HYDRALAZINE HYDROCHLORIDE 20 MG/ML
INJECTION INTRAMUSCULAR; INTRAVENOUS
Status: DISPENSED
Start: 2024-05-17

## (undated) RX ORDER — DEXAMETHASONE SODIUM PHOSPHATE 10 MG/ML
INJECTION, SOLUTION INTRAMUSCULAR; INTRAVENOUS
Status: DISPENSED
Start: 2025-06-25

## (undated) RX ORDER — HEPARIN SODIUM 1000 [USP'U]/ML
INJECTION, SOLUTION INTRAVENOUS; SUBCUTANEOUS
Status: DISPENSED
Start: 2024-05-17

## (undated) RX ORDER — FENTANYL CITRATE 50 UG/ML
INJECTION, SOLUTION INTRAMUSCULAR; INTRAVENOUS
Status: DISPENSED
Start: 2024-05-17

## (undated) RX ORDER — ONDANSETRON 2 MG/ML
INJECTION INTRAMUSCULAR; INTRAVENOUS
Status: DISPENSED
Start: 2024-05-17

## (undated) RX ORDER — PROTAMINE SULFATE 10 MG/ML
INJECTION, SOLUTION INTRAVENOUS
Status: DISPENSED
Start: 2025-06-25

## (undated) RX ORDER — LIDOCAINE HYDROCHLORIDE 10 MG/ML
INJECTION, SOLUTION EPIDURAL; INFILTRATION; INTRACAUDAL; PERINEURAL
Status: DISPENSED
Start: 2024-05-17

## (undated) RX ORDER — FENTANYL CITRATE 50 UG/ML
INJECTION, SOLUTION INTRAMUSCULAR; INTRAVENOUS
Status: DISPENSED
Start: 2025-06-25

## (undated) RX ORDER — PROPOFOL 10 MG/ML
INJECTION, EMULSION INTRAVENOUS
Status: DISPENSED
Start: 2025-06-25